# Patient Record
Sex: MALE | Race: BLACK OR AFRICAN AMERICAN | Employment: PART TIME | URBAN - METROPOLITAN AREA
[De-identification: names, ages, dates, MRNs, and addresses within clinical notes are randomized per-mention and may not be internally consistent; named-entity substitution may affect disease eponyms.]

---

## 2024-08-14 ENCOUNTER — APPOINTMENT (OUTPATIENT)
Dept: GENERAL RADIOLOGY | Age: 64
DRG: 176 | End: 2024-08-14
Payer: MEDICAID

## 2024-08-14 ENCOUNTER — HOSPITAL ENCOUNTER (INPATIENT)
Age: 64
LOS: 2 days | Discharge: HOME | DRG: 176 | End: 2024-08-16
Attending: EMERGENCY MEDICINE | Admitting: FAMILY MEDICINE
Payer: MEDICAID

## 2024-08-14 ENCOUNTER — ANCILLARY PROCEDURE (OUTPATIENT)
Dept: EMERGENCY DEPT | Age: 64
DRG: 176 | End: 2024-08-14
Attending: EMERGENCY MEDICINE
Payer: MEDICAID

## 2024-08-14 ENCOUNTER — APPOINTMENT (OUTPATIENT)
Dept: CT IMAGING | Age: 64
DRG: 176 | End: 2024-08-14
Payer: MEDICAID

## 2024-08-14 DIAGNOSIS — I26.99 ACUTE PULMONARY EMBOLISM WITHOUT ACUTE COR PULMONALE, UNSPECIFIED PULMONARY EMBOLISM TYPE (HCC): Primary | ICD-10-CM

## 2024-08-14 LAB
ANION GAP SERPL CALCULATED.3IONS-SCNC: 11 MMOL/L (ref 3–16)
ANTI-XA UNFRAC HEPARIN: <0.1 IU/ML (ref 0.3–0.7)
APTT BLD: 32.9 SEC (ref 22.1–36.4)
BASOPHILS # BLD: 0 K/UL (ref 0–0.2)
BASOPHILS NFR BLD: 0.5 %
BUN SERPL-MCNC: 6 MG/DL (ref 7–20)
CALCIUM SERPL-MCNC: 9.3 MG/DL (ref 8.3–10.6)
CHLORIDE SERPL-SCNC: 97 MMOL/L (ref 99–110)
CO2 SERPL-SCNC: 27 MMOL/L (ref 21–32)
CREAT SERPL-MCNC: 0.7 MG/DL (ref 0.8–1.3)
D-DIMER QUANTITATIVE: 7.09 UG/ML FEU (ref 0–0.6)
DEPRECATED RDW RBC AUTO: 11.8 % (ref 12.4–15.4)
EKG ATRIAL RATE: 88 BPM
EKG DIAGNOSIS: NORMAL
EKG P AXIS: 78 DEGREES
EKG P-R INTERVAL: 138 MS
EKG Q-T INTERVAL: 402 MS
EKG QRS DURATION: 90 MS
EKG QTC CALCULATION (BAZETT): 486 MS
EKG R AXIS: -30 DEGREES
EKG T AXIS: 20 DEGREES
EKG VENTRICULAR RATE: 88 BPM
EOSINOPHIL # BLD: 0 K/UL (ref 0–0.6)
EOSINOPHIL NFR BLD: 0.2 %
GFR SERPLBLD CREATININE-BSD FMLA CKD-EPI: >90 ML/MIN/{1.73_M2}
GLUCOSE SERPL-MCNC: 98 MG/DL (ref 70–99)
HCT VFR BLD AUTO: 43.2 % (ref 40.5–52.5)
HGB BLD-MCNC: 14.2 G/DL (ref 13.5–17.5)
LACTATE BLDV-SCNC: 1.6 MMOL/L (ref 0.4–1.9)
LACTATE BLDV-SCNC: 2 MMOL/L (ref 0.4–1.9)
LYMPHOCYTES # BLD: 1.4 K/UL (ref 1–5.1)
LYMPHOCYTES NFR BLD: 16.8 %
MCH RBC QN AUTO: 30.6 PG (ref 26–34)
MCHC RBC AUTO-ENTMCNC: 32.8 G/DL (ref 31–36)
MCV RBC AUTO: 93.2 FL (ref 80–100)
MONOCYTES # BLD: 1.2 K/UL (ref 0–1.3)
MONOCYTES NFR BLD: 14.5 %
NEUTROPHILS # BLD: 5.7 K/UL (ref 1.7–7.7)
NEUTROPHILS NFR BLD: 68 %
NT-PROBNP SERPL-MCNC: 1170 PG/ML (ref 0–124)
PLATELET # BLD AUTO: 211 K/UL (ref 135–450)
PMV BLD AUTO: 8 FL (ref 5–10.5)
POTASSIUM SERPL-SCNC: 3.6 MMOL/L (ref 3.5–5.1)
RBC # BLD AUTO: 4.63 M/UL (ref 4.2–5.9)
SODIUM SERPL-SCNC: 135 MMOL/L (ref 136–145)
TROPONIN, HIGH SENSITIVITY: 24 NG/L (ref 0–22)
TROPONIN, HIGH SENSITIVITY: 25 NG/L (ref 0–22)
TROPONIN, HIGH SENSITIVITY: 29 NG/L (ref 0–22)
WBC # BLD AUTO: 8.4 K/UL (ref 4–11)

## 2024-08-14 PROCEDURE — 71260 CT THORAX DX C+: CPT

## 2024-08-14 PROCEDURE — 84484 ASSAY OF TROPONIN QUANT: CPT

## 2024-08-14 PROCEDURE — 6360000004 HC RX CONTRAST MEDICATION: Performed by: EMERGENCY MEDICINE

## 2024-08-14 PROCEDURE — 80048 BASIC METABOLIC PNL TOTAL CA: CPT

## 2024-08-14 PROCEDURE — 94761 N-INVAS EAR/PLS OXIMETRY MLT: CPT

## 2024-08-14 PROCEDURE — 2060000000 HC ICU INTERMEDIATE R&B

## 2024-08-14 PROCEDURE — 2580000003 HC RX 258: Performed by: FAMILY MEDICINE

## 2024-08-14 PROCEDURE — 83880 ASSAY OF NATRIURETIC PEPTIDE: CPT

## 2024-08-14 PROCEDURE — 36415 COLL VENOUS BLD VENIPUNCTURE: CPT

## 2024-08-14 PROCEDURE — 85520 HEPARIN ASSAY: CPT

## 2024-08-14 PROCEDURE — 71046 X-RAY EXAM CHEST 2 VIEWS: CPT

## 2024-08-14 PROCEDURE — 85025 COMPLETE CBC W/AUTO DIFF WBC: CPT

## 2024-08-14 PROCEDURE — 85730 THROMBOPLASTIN TIME PARTIAL: CPT

## 2024-08-14 PROCEDURE — 93005 ELECTROCARDIOGRAM TRACING: CPT | Performed by: EMERGENCY MEDICINE

## 2024-08-14 PROCEDURE — 70450 CT HEAD/BRAIN W/O DYE: CPT

## 2024-08-14 PROCEDURE — 93308 TTE F-UP OR LMTD: CPT

## 2024-08-14 PROCEDURE — 99285 EMERGENCY DEPT VISIT HI MDM: CPT

## 2024-08-14 PROCEDURE — 6360000002 HC RX W HCPCS: Performed by: EMERGENCY MEDICINE

## 2024-08-14 PROCEDURE — 85379 FIBRIN DEGRADATION QUANT: CPT

## 2024-08-14 PROCEDURE — 83605 ASSAY OF LACTIC ACID: CPT

## 2024-08-14 RX ORDER — HEPARIN SODIUM 10000 [USP'U]/100ML
5-30 INJECTION, SOLUTION INTRAVENOUS CONTINUOUS
Status: DISCONTINUED | OUTPATIENT
Start: 2024-08-14 | End: 2024-08-16

## 2024-08-14 RX ORDER — POTASSIUM CHLORIDE 20 MEQ/1
40 TABLET, EXTENDED RELEASE ORAL PRN
Status: DISCONTINUED | OUTPATIENT
Start: 2024-08-14 | End: 2024-08-15

## 2024-08-14 RX ORDER — HEPARIN SODIUM 1000 [USP'U]/ML
80 INJECTION, SOLUTION INTRAVENOUS; SUBCUTANEOUS PRN
Status: DISCONTINUED | OUTPATIENT
Start: 2024-08-14 | End: 2024-08-16

## 2024-08-14 RX ORDER — HEPARIN SODIUM 1000 [USP'U]/ML
80 INJECTION, SOLUTION INTRAVENOUS; SUBCUTANEOUS ONCE
Status: COMPLETED | OUTPATIENT
Start: 2024-08-14 | End: 2024-08-14

## 2024-08-14 RX ORDER — ACETAMINOPHEN 325 MG/1
650 TABLET ORAL EVERY 6 HOURS PRN
Status: DISCONTINUED | OUTPATIENT
Start: 2024-08-14 | End: 2024-08-16 | Stop reason: HOSPADM

## 2024-08-14 RX ORDER — SODIUM CHLORIDE 0.9 % (FLUSH) 0.9 %
5-40 SYRINGE (ML) INJECTION EVERY 12 HOURS SCHEDULED
Status: DISCONTINUED | OUTPATIENT
Start: 2024-08-14 | End: 2024-08-16 | Stop reason: HOSPADM

## 2024-08-14 RX ORDER — HEPARIN SODIUM 1000 [USP'U]/ML
80 INJECTION, SOLUTION INTRAVENOUS; SUBCUTANEOUS PRN
Status: CANCELLED | OUTPATIENT
Start: 2024-08-14

## 2024-08-14 RX ORDER — ONDANSETRON 2 MG/ML
4 INJECTION INTRAMUSCULAR; INTRAVENOUS EVERY 6 HOURS PRN
Status: DISCONTINUED | OUTPATIENT
Start: 2024-08-14 | End: 2024-08-16 | Stop reason: HOSPADM

## 2024-08-14 RX ORDER — MAGNESIUM SULFATE IN WATER 40 MG/ML
2000 INJECTION, SOLUTION INTRAVENOUS PRN
Status: DISCONTINUED | OUTPATIENT
Start: 2024-08-14 | End: 2024-08-16 | Stop reason: HOSPADM

## 2024-08-14 RX ORDER — POLYETHYLENE GLYCOL 3350 17 G/17G
17 POWDER, FOR SOLUTION ORAL DAILY PRN
Status: DISCONTINUED | OUTPATIENT
Start: 2024-08-14 | End: 2024-08-16 | Stop reason: HOSPADM

## 2024-08-14 RX ORDER — HEPARIN SODIUM 1000 [USP'U]/ML
40 INJECTION, SOLUTION INTRAVENOUS; SUBCUTANEOUS PRN
Status: CANCELLED | OUTPATIENT
Start: 2024-08-14

## 2024-08-14 RX ORDER — ACETAMINOPHEN 650 MG/1
650 SUPPOSITORY RECTAL EVERY 6 HOURS PRN
Status: DISCONTINUED | OUTPATIENT
Start: 2024-08-14 | End: 2024-08-16 | Stop reason: HOSPADM

## 2024-08-14 RX ORDER — SODIUM CHLORIDE 9 MG/ML
INJECTION, SOLUTION INTRAVENOUS PRN
Status: DISCONTINUED | OUTPATIENT
Start: 2024-08-14 | End: 2024-08-16 | Stop reason: HOSPADM

## 2024-08-14 RX ORDER — HEPARIN SODIUM 1000 [USP'U]/ML
80 INJECTION, SOLUTION INTRAVENOUS; SUBCUTANEOUS ONCE
Status: CANCELLED | OUTPATIENT
Start: 2024-08-14 | End: 2024-08-14

## 2024-08-14 RX ORDER — POTASSIUM CHLORIDE 7.45 MG/ML
10 INJECTION INTRAVENOUS PRN
Status: DISCONTINUED | OUTPATIENT
Start: 2024-08-14 | End: 2024-08-15

## 2024-08-14 RX ORDER — SODIUM CHLORIDE 0.9 % (FLUSH) 0.9 %
5-40 SYRINGE (ML) INJECTION PRN
Status: DISCONTINUED | OUTPATIENT
Start: 2024-08-14 | End: 2024-08-16 | Stop reason: HOSPADM

## 2024-08-14 RX ORDER — HEPARIN SODIUM 10000 [USP'U]/100ML
5-30 INJECTION, SOLUTION INTRAVENOUS CONTINUOUS
Status: CANCELLED | OUTPATIENT
Start: 2024-08-14

## 2024-08-14 RX ORDER — PROMETHAZINE HYDROCHLORIDE 25 MG/1
12.5 TABLET ORAL EVERY 6 HOURS PRN
Status: DISCONTINUED | OUTPATIENT
Start: 2024-08-14 | End: 2024-08-16 | Stop reason: HOSPADM

## 2024-08-14 RX ORDER — HEPARIN SODIUM 1000 [USP'U]/ML
40 INJECTION, SOLUTION INTRAVENOUS; SUBCUTANEOUS PRN
Status: DISCONTINUED | OUTPATIENT
Start: 2024-08-14 | End: 2024-08-16

## 2024-08-14 RX ADMIN — HEPARIN SODIUM 18 UNITS/KG/HR: 10000 INJECTION, SOLUTION INTRAVENOUS at 20:39

## 2024-08-14 RX ADMIN — SODIUM CHLORIDE, PRESERVATIVE FREE 10 ML: 5 INJECTION INTRAVENOUS at 21:33

## 2024-08-14 RX ADMIN — IOPAMIDOL 75 ML: 755 INJECTION, SOLUTION INTRAVENOUS at 17:30

## 2024-08-14 RX ADMIN — HEPARIN SODIUM 6300 UNITS: 1000 INJECTION INTRAVENOUS; SUBCUTANEOUS at 20:36

## 2024-08-14 ASSESSMENT — PAIN - FUNCTIONAL ASSESSMENT: PAIN_FUNCTIONAL_ASSESSMENT: NONE - DENIES PAIN

## 2024-08-14 NOTE — ED NOTES
Heparin Bolus and Heparin drip being held until stat head CT is performed to rule out bleed per Dr. Morton. Pt resting comfortably in room with call light in reach.      Henrik Mattson, TERESO  08/14/24 1947

## 2024-08-14 NOTE — H&P
Hospital Medicine History & Physical      Date of Admission: 8/14/2024    Date of Service:  Pt seen/examined on 8/14/2024    [x]Admitted to Inpatient with expected LOS greater than two midnights due to medical therapy.  []Placed in Observation status.    Chief Admission Complaint: Chest pain syncope    Presenting Admission History:      63 y.o. male who presented to [facility] with bilateral PE with heart strain.  PMHx significant for no significant past medical history.    States symptoms started Monday stated    He got out felt like he was going to pass out fell and hit the wall hit his head off the side of the wall, after that stated he has been having chest pain sharp stabbing especially on the right.  Reports shortness of breath especially on exertion.  Does report left lower extremity swelling/pain, states it was worse about a year ago.  Denies any abdominal pain abdominal distention nausea or vomiting    In the ER  Tachycardic elevated respiratory rate  Troponin 25, repeat 24  BNP 1170  D-dimer 7.09    Sodium 135    CT chest PE shows Bilateral pulmonary emboli./Small enhancing lesion right lobe of the liver.          Assessment/Plan:      Current Principal Problem:  Bilateral pulmonary embolism (HCC)    Bilateral PE  -Heparin gtt  -Was in room with interventional radiologist discussed procedure along with ER physician, patient was made aware of risks and benefits of the procedure by interventional radiologist me and the ER physician, patient refused procedure  -Monitor troponins BNP  -Echo    Fall/syncope  -CT head  -CTA head neck    Liver lesion  -MRI abdomen  -Monitor LFTs    Discussed management of the case in detail w/ the Emergency Department Provider:     CXR: I have reviewed the CXR with the following interpretation: No acute disease.   EKG:  I have reviewed the EKG with the following interpretation: T depressions T wave inversion    Physical Exam Performed:      /70   Pulse 90   Temp 98.1

## 2024-08-14 NOTE — PROGRESS NOTES
IR was asked to review this patient who has had prior episodes of syncope and now presented to the ED. He is a 64 yo male who was found to have bilateral PE and a small saddle PE. His RV/LV ratio is 1.1. He has mildly elevated troponin, lactate and BNP. He is not on oxygen and is currently hemodynamically stable. Based on the above criteria he is intermediate-high risk based on ESC 2019 criteria. I believe he would benefit from intervention given these findings. I discussed this with the ED physician and the patient via phone. I answered all of the patient's questions. After the discussion the patient has decided against intervention at this time. Please do not hesitate to call IR with any questions or if patient worsens overnight.     Gamal Wilks MD  7:45 PM  8/14/2024

## 2024-08-14 NOTE — ED PROVIDER NOTES
THE [facility]  EMERGENCY DEPARTMENT ENCOUNTER          ATTENDING PHYSICIAN NOTE       Date of evaluation: 8/14/2024    Chief Complaint     Foot Pain (Bilateral foot pain. No known injury.) and Rib Pain (injury) (Right rib pain. No known injury. Rates pain currently as a zero. )      History of Present Illness     Natan Lynch is a 63 y.o. male who presents to the emergency room today complaining of 1 week of a pleuritic pain in his right chest wall states that it feels like a catch when he takes a deep breath.  He has intermittently felt very dizzy/lightheaded and actually had an episode today where he fell forward after standing up to go to the bathroom.  Patient reports also that he is been dealing with some issues with his left foot off and on for the past year notes that a few weeks ago it was swollen up like a sausage but seems to be better now.    Review of Systems     Review of Systems  All systems were reviewed with the patient/family and negative except as otherwise documented in the HPI.      Past Medical, Surgical, Family, and Social History     He has no past medical history on file.  He has no past surgical history on file.  His family history is not on file.  He     Medications     Previous Medications    No medications on file       Allergies     He has No Known Allergies.    Physical Exam     INITIAL VITALS: BP: 108/72, Temp: 98.1 °F (36.7 °C), Pulse: 100, Respirations: 15, SpO2: 98 %   Physical Exam  Constitutional: Well-nourished appearing middle-aged male lying on the stretcher comfortable and in no acute distress  Eyes:  Sclera anicteric.  HEENT:  Normocephalic, oropharynx moist.   Neck: normal range of motion, supple.  Respiratory:  Even and non-labored, no distress, clear to auscultation bilaterally  Cardiovascular:  Extremities warm, well perfused, no audible murmurs rubs or gallop  GI:  Soft, nondistended   Musculoskeletal:  No edema, no deformities.   Skin:  No rash, no lesions, no

## 2024-08-15 ENCOUNTER — APPOINTMENT (OUTPATIENT)
Dept: VASCULAR LAB | Age: 64
DRG: 176 | End: 2024-08-15
Attending: STUDENT IN AN ORGANIZED HEALTH CARE EDUCATION/TRAINING PROGRAM
Payer: MEDICAID

## 2024-08-15 LAB
ALBUMIN SERPL-MCNC: 3.5 G/DL (ref 3.4–5)
ALBUMIN/GLOB SERPL: 1.1 {RATIO} (ref 1.1–2.2)
ALP SERPL-CCNC: 63 U/L (ref 40–129)
ALT SERPL-CCNC: 15 U/L (ref 10–40)
ANION GAP SERPL CALCULATED.3IONS-SCNC: 8 MMOL/L (ref 3–16)
ANTI-XA UNFRAC HEPARIN: 0.41 IU/ML (ref 0.3–0.7)
ANTI-XA UNFRAC HEPARIN: 0.49 IU/ML (ref 0.3–0.7)
AST SERPL-CCNC: 25 U/L (ref 15–37)
BASOPHILS # BLD: 0 K/UL (ref 0–0.2)
BASOPHILS NFR BLD: 0.5 %
BILIRUB SERPL-MCNC: 2.3 MG/DL (ref 0–1)
BUN SERPL-MCNC: 7 MG/DL (ref 7–20)
CALCIUM SERPL-MCNC: 8.8 MG/DL (ref 8.3–10.6)
CHLORIDE SERPL-SCNC: 99 MMOL/L (ref 99–110)
CO2 SERPL-SCNC: 28 MMOL/L (ref 21–32)
CREAT SERPL-MCNC: 0.7 MG/DL (ref 0.8–1.3)
DEPRECATED RDW RBC AUTO: 11.6 % (ref 12.4–15.4)
ECHO BSA: 2.07 M2
EKG ATRIAL RATE: 75 BPM
EKG DIAGNOSIS: NORMAL
EKG P AXIS: 77 DEGREES
EKG P-R INTERVAL: 144 MS
EKG Q-T INTERVAL: 432 MS
EKG QRS DURATION: 94 MS
EKG QTC CALCULATION (BAZETT): 482 MS
EKG R AXIS: -1 DEGREES
EKG T AXIS: 47 DEGREES
EKG VENTRICULAR RATE: 75 BPM
EOSINOPHIL # BLD: 0 K/UL (ref 0–0.6)
EOSINOPHIL NFR BLD: 0.3 %
GFR SERPLBLD CREATININE-BSD FMLA CKD-EPI: >90 ML/MIN/{1.73_M2}
GLUCOSE SERPL-MCNC: 105 MG/DL (ref 70–99)
HCT VFR BLD AUTO: 38.4 % (ref 40.5–52.5)
HGB BLD-MCNC: 12.6 G/DL (ref 13.5–17.5)
LYMPHOCYTES # BLD: 1.6 K/UL (ref 1–5.1)
LYMPHOCYTES NFR BLD: 18.4 %
MCH RBC QN AUTO: 30.4 PG (ref 26–34)
MCHC RBC AUTO-ENTMCNC: 32.7 G/DL (ref 31–36)
MCV RBC AUTO: 92.8 FL (ref 80–100)
MONOCYTES # BLD: 1.1 K/UL (ref 0–1.3)
MONOCYTES NFR BLD: 12.7 %
NEUTROPHILS # BLD: 6.1 K/UL (ref 1.7–7.7)
NEUTROPHILS NFR BLD: 68.1 %
NT-PROBNP SERPL-MCNC: 892 PG/ML (ref 0–124)
PLATELET # BLD AUTO: 209 K/UL (ref 135–450)
PMV BLD AUTO: 8.2 FL (ref 5–10.5)
POTASSIUM SERPL-SCNC: 3.6 MMOL/L (ref 3.5–5.1)
PROT SERPL-MCNC: 6.7 G/DL (ref 6.4–8.2)
RBC # BLD AUTO: 4.14 M/UL (ref 4.2–5.9)
SODIUM SERPL-SCNC: 135 MMOL/L (ref 136–145)
TROPONIN, HIGH SENSITIVITY: 23 NG/L (ref 0–22)
WBC # BLD AUTO: 8.9 K/UL (ref 4–11)

## 2024-08-15 PROCEDURE — 85520 HEPARIN ASSAY: CPT

## 2024-08-15 PROCEDURE — 2580000003 HC RX 258: Performed by: STUDENT IN AN ORGANIZED HEALTH CARE EDUCATION/TRAINING PROGRAM

## 2024-08-15 PROCEDURE — 2580000003 HC RX 258: Performed by: FAMILY MEDICINE

## 2024-08-15 PROCEDURE — 99254 IP/OBS CNSLTJ NEW/EST MOD 60: CPT | Performed by: INTERNAL MEDICINE

## 2024-08-15 PROCEDURE — 93005 ELECTROCARDIOGRAM TRACING: CPT | Performed by: FAMILY MEDICINE

## 2024-08-15 PROCEDURE — 2060000000 HC ICU INTERMEDIATE R&B

## 2024-08-15 PROCEDURE — 93970 EXTREMITY STUDY: CPT | Performed by: SURGERY

## 2024-08-15 PROCEDURE — 6360000002 HC RX W HCPCS: Performed by: EMERGENCY MEDICINE

## 2024-08-15 PROCEDURE — 36415 COLL VENOUS BLD VENIPUNCTURE: CPT

## 2024-08-15 PROCEDURE — 80053 COMPREHEN METABOLIC PANEL: CPT

## 2024-08-15 PROCEDURE — 84484 ASSAY OF TROPONIN QUANT: CPT

## 2024-08-15 PROCEDURE — 6370000000 HC RX 637 (ALT 250 FOR IP): Performed by: STUDENT IN AN ORGANIZED HEALTH CARE EDUCATION/TRAINING PROGRAM

## 2024-08-15 PROCEDURE — 85025 COMPLETE CBC W/AUTO DIFF WBC: CPT

## 2024-08-15 PROCEDURE — 93970 EXTREMITY STUDY: CPT

## 2024-08-15 PROCEDURE — 93010 ELECTROCARDIOGRAM REPORT: CPT | Performed by: INTERNAL MEDICINE

## 2024-08-15 PROCEDURE — 83880 ASSAY OF NATRIURETIC PEPTIDE: CPT

## 2024-08-15 RX ORDER — POTASSIUM CHLORIDE 20 MEQ/1
40 TABLET, EXTENDED RELEASE ORAL ONCE
Status: COMPLETED | OUTPATIENT
Start: 2024-08-15 | End: 2024-08-15

## 2024-08-15 RX ORDER — SODIUM CHLORIDE 9 MG/ML
INJECTION, SOLUTION INTRAVENOUS CONTINUOUS
Status: DISCONTINUED | OUTPATIENT
Start: 2024-08-15 | End: 2024-08-16

## 2024-08-15 RX ADMIN — HEPARIN SODIUM 18 UNITS/KG/HR: 10000 INJECTION, SOLUTION INTRAVENOUS at 12:36

## 2024-08-15 RX ADMIN — SODIUM CHLORIDE: 9 INJECTION, SOLUTION INTRAVENOUS at 10:11

## 2024-08-15 RX ADMIN — SODIUM CHLORIDE 950 ML: 9 INJECTION, SOLUTION INTRAVENOUS at 20:50

## 2024-08-15 RX ADMIN — SODIUM CHLORIDE, PRESERVATIVE FREE 10 ML: 5 INJECTION INTRAVENOUS at 08:42

## 2024-08-15 RX ADMIN — POTASSIUM CHLORIDE 40 MEQ: 1500 TABLET, EXTENDED RELEASE ORAL at 10:11

## 2024-08-15 NOTE — PROGRESS NOTES
Pt arrived to RM 4301 from ED via stretcher. VSS. Pt educated on plan of care and safety precautions such as calling RN prior to getting out of bed. Tele monitor placed, gripper socks placed, gown and pants placed per patient request. Call light within reach.    RN attempted to complete MRI screening but patient states he does not want an MRI because \"they just did a scan of my abdomen and head and I don't have the money for all of these tests.\" Patient educated on differences in scans and medical necessity.   MD notified.    Electronically signed by Zhane Martinez RN on 8/14/2024 at 10:01 PM

## 2024-08-15 NOTE — PROGRESS NOTES
Spoke with patient's nurse and patient is still refusing MRI. Scan will be canceled at this time. If the patient decides to go forward with scan, please reorder.

## 2024-08-15 NOTE — CONSULTS
IR CONSULT NOTE:    Patient seen at bedside by myself and Dr Escudero to discuss options for treatment of PE and DVT. Thrombectomy and EKOS were explained in detail. Patient states that he declines all intervention at this time and is not interested in further discussion. IR will now sign off. Please do not hesitate to reach out with any additional questions or concerns.     Puja Pyle, APRN - CNP  [phone]

## 2024-08-15 NOTE — PROGRESS NOTES
Hospital Medicine Progress Note      Date of Admission: 8/14/2024  Hospital Day: 2    Chief Admission Complaint: Chest pain, syncope     Subjective:  Patient was seen and evaluated at bedside.  He reports having right-sided pleuritic chest pain on deep breathing.  No events overnight.    Presenting Admission History:       63-year-old gentleman with no reported past medical history who presented to the hospital after sustaining a fall and trauma to the head.  Reported having episodes of sharp chest pain on the right side ongoing for 2 days prior to presentation.  Reported dyspnea on exertion.  Also reported left lower extremity swelling and pain which is worse from about a year ago.    In the ER patient was tachypneic and tachycardic.  High-sensitivity troponin 25, 24.  BNP 1170.  D-dimer 7.09.  EKG showed T wave inversions from V1-V3.  CT angiogram for PE showed bilateral PE and a small enhancing lesion of the right lobe of the liver.  Interventional radiology was consulted and after discussion with the patient patient declined intervention.  Was started on heparin and admitted to our service.    Assessment/Plan:      Current Principal Problem:  Bilateral pulmonary embolism (HCC)    Bilateral saddle PE with concerns of right heart strain  Syncope/EKG changes  Right liver lesion  Cigarette smoker    Plan:  *CT angiogram for PE concerning for bilateral PE/right heart strain?  Point-of-care ultrasound in the ER negative  IR consulted in the ER, patient declined intervention  Anticoagulation with heparin by weight  Check lower extremity Dopplers  Check echo  IV fluids  As needed pain control  Cardiology consulted on admission for PE with right heart strain    *Presented with syncope likely secondary from PE  Trend troponins  EKG shows T wave inversions in V1-V3  Cardiology consult?  Echo  IV fluids  BNP 1170  Orthostatic vitals    *Incidental finding of right liver lesion  MRI abdomen ordered, patient

## 2024-08-15 NOTE — PROGRESS NOTES
4 Eyes Skin Assessment     NAME:  Natan Lynch  YOB: 1960  MEDICAL RECORD NUMBER:  [id number]    The patient is being assessed for  Admission    I agree that at least one RN has performed a thorough Head to Toe Skin Assessment on the patient. ALL assessment sites listed below have been assessed.      Areas assessed by both nurses:    Head, Face, Ears, Shoulders, Back, Chest, Arms, Elbows, Hands, Sacrum. Buttock, Coccyx, Ischium, Legs. Feet and Heels, and Under Medical Devices         Does the Patient have a Wound? No noted wound(s)    Abrasion upper back  Plantar wart sole of right foot       Antelmo Prevention initiated by RN: No  Wound Care Orders initiated by RN: No    Pressure Injury (Stage 3,4, Unstageable, DTI, NWPT, and Complex wounds) if present, place Wound referral order by RN under : No    New Ostomies, if present place, Ostomy referral order under : No     Nurse 1 eSignature: Electronically signed by Zhane Martinez RN on 8/14/24 at 9:49 PM EDT    Nurse 2 eSignature: Electronically signed by Darren Mazariegos RN on 8/14/24 at 9:39 PM EDT

## 2024-08-15 NOTE — ED NOTES
ED TO INPATIENT SBAR HANDOFF    Patient Name: Natan Lynch   :  1960  63 y.o.   MRN:  [id number]  Preferred Name    ED Room #:  B22/B22-22  Family/Caregiver Present no   Restraints no   Sitter no   Sepsis Risk Score      Situation  Code Status: Full Code No additional code details.    Allergies: Patient has no known allergies.  Weight: Patient Vitals for the past 96 hrs (Last 3 readings):   Weight   24 1912 78.5 kg (173 lb 1 oz)     Arrived from: home  Chief Complaint:   Chief Complaint   Patient presents with    Foot Pain     Bilateral foot pain. No known injury.    Rib Pain (injury)     Right rib pain. No known injury. Rates pain currently as a zero.      Hospital Problem/Diagnosis:  Principal Problem:    Bilateral pulmonary embolism (HCC)  Resolved Problems:    * No resolved hospital problems. *    Imaging:   CT HEAD WO CONTRAST   Final Result   No acute intracranial hemorrhage      Electronically signed by Rosa Pinto      POC US ECHOCARDIO [employer]STHOR[employer] LTD   Final Result      CT CHEST PULMONARY EMBOLISM W CONTRAST   Final Result      Bilateral pulmonary emboli.   Small enhancing lesion right lobe of the liver.      Findings were relayed to the emergency room and spoke with Dr. Jackson 5:59 PM 2024      Electronically signed by Rosa Pinto      XR CHEST (2 VW)   Final Result      No acute disease.         Electronically signed by Fish Rivera      CTA HEAD NECK W CONTRAST    (Results Pending)     Abnormal labs:   Abnormal Labs Reviewed   BASIC METABOLIC PANEL W/ REFLEX TO MG FOR LOW K - Abnormal; Notable for the following components:       Result Value    Sodium 135 (*)     Chloride 97 (*)     BUN 6 (*)     Creatinine 0.7 (*)     All other components within normal limits   CBC WITH AUTO DIFFERENTIAL - Abnormal; Notable for the following components:    RDW 11.8 (*)     All other components within normal limits   TROPONIN - Abnormal; Notable for the following components:

## 2024-08-15 NOTE — CARE COORDINATION
Case Management Assessment  Initial Evaluation    Date/Time of Evaluation: 8/15/2024 11:34 AM  Assessment Completed by: Lore Ackerman    If patient is discharged prior to next notation, then this note serves as note for discharge by case management.    Patient Name: Natan Lynch                   YOB: 1960  Diagnosis: Bilateral pulmonary embolism (HCC) [I26.99]  Acute pulmonary embolism without acute cor pulmonale, unspecified pulmonary embolism type (HCC) [I26.99]                   Date / Time: 8/14/2024  3:44 PM    Patient Admission Status: Inpatient   Readmission Risk (Low < 19, Mod (19-27), High > 27): Readmission Risk Score: 6    Current PCP: No primary care provider on file.  PCP verified by CM? No    Chart Reviewed: Yes      History Provided by: Patient  Patient Orientation: Alert and Oriented    Patient Cognition: Alert    Hospitalization in the last 30 days (Readmission):  No    If yes, Readmission Assessment in CM Navigator will be completed.    Advance Directives:      Code Status: Full Code   Patient's Primary Decision Maker is: Legal Next of Kin      Discharge Planning:    Patient lives with: Alone Type of Home: Apartment  Primary Care Giver: Self  Patient Support Systems include: Family Members   Current Financial resources: Other (Comment) (Medicaid pending)  Current community resources: None  Current services prior to admission: None            Current DME:              Type of Home Care services:  None    ADLS  Prior functional level: Independent in ADLs/IADLs  Current functional level: Independent in ADLs/IADLs    PT AM-PAC:   /24  OT AM-PAC:   /24    Family can provide assistance at DC: Yes  Would you like Case Management to discuss the discharge plan with any other family members/significant others, and if so, who? No  Plans to Return to Present Housing: Yes  Other Identified Issues/Barriers to RETURNING to current housing: None  Potential Assistance needed at discharge: N/A

## 2024-08-15 NOTE — PLAN OF CARE
Problem: Discharge Planning  Goal: Discharge to home or other facility with appropriate resources  Outcome: Progressing  Flowsheets (Taken 8/15/2024 1847)  Discharge to home or other facility with appropriate resources:   Identify barriers to discharge with patient and caregiver   Arrange for needed discharge resources and transportation as appropriate   Identify discharge learning needs (meds, wound care, etc)   Arrange for interpreters to assist at discharge as needed   Refer to discharge planning if patient needs post-hospital services based on physician order or complex needs related to functional status, cognitive ability or social support system     Problem: Safety - Adult  Goal: Free from fall injury  Outcome: Progressing  Flowsheets (Taken 8/15/2024 1847)  Free From Fall Injury:   Instruct family/caregiver on patient safety   Based on caregiver fall risk screen, instruct family/caregiver to ask for assistance with transferring infant if caregiver noted to have fall risk factors     Problem: ABCDS Injury Assessment  Goal: Absence of physical injury  Outcome: Progressing  Flowsheets (Taken 8/15/2024 1847)  Absence of Physical Injury: Implement safety measures based on patient assessment

## 2024-08-15 NOTE — CONSULTS
[facility]  Cardiology Inpatient Consult Service                                                                                          Pt Name: Natan Lynch  Age: 63 y.o.  Sex: male  : 1960  Location: [facility]    Referring Physician: Rome Rosa MD  Primary cardiologist :     Reason for Consult:     Reason for Consultation/Chief Complaint: Chest pain    HPI:      Natan Lynch is a 63 y.o. male presenting with right-sided pleuritic chest pain associate with shortness of breath for the last 3 to 4 days.  Had dizziness.  States that he hit the wall.  No recent travel.  States that he might of lost approximately 20 pounds in weight.  Previously was 190 pounds.  Current weight is 170 pounds.  Currently continues to have chest pain on taking a deep breath.  No hemoptysis.  No recent surgery.  No family history of thrombophilia.  Denies any GI bleed.  No abdominal pain.      Histories     No significant past medical history, social history, family history.  Social History:   reports that he has been smoking cigarettes. He has a 22.5 pack-year smoking history. He has been exposed to tobacco smoke. He has never used smokeless tobacco. He reports that he does not currently use drugs.         Medications:       Home Medications  Were reviewed and are listed in nursing record. and/or listed below  Prior to Admission medications    Not on File          Inpatient Medications:   sodium chloride flush  5-40 mL IntraVENous 2 times per day       IV drips:   sodium chloride 100 mL/hr at 08/15/24 1011    heparin (PORCINE) Infusion 18 Units/kg/hr (08/15/24 1236)    sodium chloride         PRN:  heparin (porcine), heparin (porcine), sodium chloride flush, sodium chloride, magnesium sulfate, polyethylene glycol, acetaminophen **OR** acetaminophen, promethazine **OR** ondansetron    Allergy:     Patient has no known allergies.       Review of Systems:     As per HPI.  Rest of 10  needs evaluation for any occult malignancy.  Currently, patient is hemodynamically stable.  See no indication for emergent EKOS.  Will check echocardiogram.  Continue heparin.  Possible switch to Eliquis or Coumadin.  Marginal increase in troponin and BNP secondary to pulmonary embolism.  Will discuss with hospitalist team.  Cardiology will follow along.      Thank you for allowing to us to participate in the care or Natan Lynch. Further evaluation will be based upon the patient's clinical course and testing results.    I have spent 55 minutes of face to face time with the patient with more than 50% spent counseling and coordinating care.     All questions and concerns were addressed to the patient/family. Alternatives to my treatment were discussed. The note was completed using EMR. Every effort was made to ensure accuracy; however, inadvertent computerized transcription errors may be present. I have personally reviewed the reports and images of labs, radiological studies, cardiac studies including ECG's and telemetry, current and old medical records.     Electronically signed by Phu Conrad MD on 8/15/24 at 12:49 PM EDT

## 2024-08-15 NOTE — ED NOTES
CT informed RN they recommend waiting six hours after previous scan for STAT head CT.  Reached out to inpatient doctor to inform them of this and ask plan of care.  MD responded with \"K.\"    CT now taking patient to scan, stating they spoke with a doctor who said it was okay for the patient to go to scan and receive additional contrast at this time.     Heparin still being held until head CT is obtained and resulted.      Asif Pruett, RN  08/14/24 2011

## 2024-08-15 NOTE — PLAN OF CARE
Problem: Discharge Planning  Goal: Discharge to home or other facility with appropriate resources  Outcome: Progressing  Flowsheets (Taken 8/15/2024 0018)  Discharge to home or other facility with appropriate resources:   Identify barriers to discharge with patient and caregiver   Arrange for needed discharge resources and transportation as appropriate   Identify discharge learning needs (meds, wound care, etc)   Arrange for interpreters to assist at discharge as needed   Refer to discharge planning if patient needs post-hospital services based on physician order or complex needs related to functional status, cognitive ability or social support system     Problem: Safety - Adult  Goal: Free from fall injury  Outcome: Progressing  Flowsheets (Taken 8/15/2024 0018)  Free From Fall Injury: Instruct family/caregiver on patient safety     Problem: ABCDS Injury Assessment  Goal: Absence of physical injury  Outcome: Progressing  Flowsheets (Taken 8/15/2024 0018)  Absence of Physical Injury: Implement safety measures based on patient assessment

## 2024-08-16 ENCOUNTER — APPOINTMENT (OUTPATIENT)
Age: 64
DRG: 176 | End: 2024-08-16
Attending: FAMILY MEDICINE
Payer: MEDICAID

## 2024-08-16 VITALS
BODY MASS INDEX: 19.36 KG/M2 | WEIGHT: 164 LBS | DIASTOLIC BLOOD PRESSURE: 61 MMHG | HEART RATE: 74 BPM | OXYGEN SATURATION: 94 % | SYSTOLIC BLOOD PRESSURE: 103 MMHG | RESPIRATION RATE: 18 BRPM | TEMPERATURE: 98.4 F | HEIGHT: 77 IN

## 2024-08-16 LAB
ALBUMIN SERPL-MCNC: 3.1 G/DL (ref 3.4–5)
ALBUMIN/GLOB SERPL: 1 {RATIO} (ref 1.1–2.2)
ALP SERPL-CCNC: 58 U/L (ref 40–129)
ALT SERPL-CCNC: 18 U/L (ref 10–40)
ANION GAP SERPL CALCULATED.3IONS-SCNC: 10 MMOL/L (ref 3–16)
AST SERPL-CCNC: 27 U/L (ref 15–37)
BASOPHILS # BLD: 0 K/UL (ref 0–0.2)
BASOPHILS NFR BLD: 0.4 %
BILIRUB SERPL-MCNC: 2 MG/DL (ref 0–1)
BUN SERPL-MCNC: 5 MG/DL (ref 7–20)
CALCIUM SERPL-MCNC: 8.5 MG/DL (ref 8.3–10.6)
CHLORIDE SERPL-SCNC: 102 MMOL/L (ref 99–110)
CO2 SERPL-SCNC: 24 MMOL/L (ref 21–32)
CREAT SERPL-MCNC: 0.7 MG/DL (ref 0.8–1.3)
DEPRECATED RDW RBC AUTO: 11.6 % (ref 12.4–15.4)
ECHO AO ROOT DIAM: 3.5 CM
ECHO AO ROOT INDEX: 1.7 CM/M2
ECHO AV AREA PEAK VELOCITY: 2.7 CM2
ECHO AV AREA VTI: 2.8 CM2
ECHO AV AREA/BSA PEAK VELOCITY: 1.3 CM2/M2
ECHO AV AREA/BSA VTI: 1.4 CM2/M2
ECHO AV MEAN GRADIENT: 2 MMHG
ECHO AV MEAN VELOCITY: 0.7 M/S
ECHO AV PEAK GRADIENT: 5 MMHG
ECHO AV PEAK VELOCITY: 1.1 M/S
ECHO AV VELOCITY RATIO: 0.82
ECHO AV VTI: 19.7 CM
ECHO BSA: 2.01 M2
ECHO LA AREA 2C: 17.3 CM2
ECHO LA AREA 4C: 15.3 CM2
ECHO LA MAJOR AXIS: 4.9 CM
ECHO LA MINOR AXIS: 4.4 CM
ECHO LA VOL BP: 48 ML (ref 18–58)
ECHO LA VOL MOD A2C: 57 ML (ref 18–58)
ECHO LA VOL MOD A4C: 36 ML (ref 18–58)
ECHO LA VOL/BSA BIPLANE: 23 ML/M2 (ref 16–34)
ECHO LA VOLUME INDEX MOD A2C: 28 ML/M2 (ref 16–34)
ECHO LA VOLUME INDEX MOD A4C: 17 ML/M2 (ref 16–34)
ECHO LV E' LATERAL VELOCITY: 13 CM/S
ECHO LV E' SEPTAL VELOCITY: 9 CM/S
ECHO LV EDV A2C: 132 ML
ECHO LV EDV A4C: 115 ML
ECHO LV EDV INDEX A4C: 56 ML/M2
ECHO LV EDV NDEX A2C: 64 ML/M2
ECHO LV EJECTION FRACTION A2C: 63 %
ECHO LV EJECTION FRACTION A4C: 59 %
ECHO LV EJECTION FRACTION BIPLANE: 62 % (ref 55–100)
ECHO LV ESV A2C: 49 ML
ECHO LV ESV A4C: 47 ML
ECHO LV ESV INDEX A2C: 24 ML/M2
ECHO LV ESV INDEX A4C: 23 ML/M2
ECHO LV FRACTIONAL SHORTENING: 29 % (ref 28–44)
ECHO LV INTERNAL DIMENSION DIASTOLE INDEX: 2.33 CM/M2
ECHO LV INTERNAL DIMENSION DIASTOLIC: 4.8 CM (ref 4.2–5.9)
ECHO LV INTERNAL DIMENSION SYSTOLIC INDEX: 1.65 CM/M2
ECHO LV INTERNAL DIMENSION SYSTOLIC: 3.4 CM
ECHO LV IVSD: 0.7 CM (ref 0.6–1)
ECHO LV MASS 2D: 126.7 G (ref 88–224)
ECHO LV MASS INDEX 2D: 61.5 G/M2 (ref 49–115)
ECHO LV POSTERIOR WALL DIASTOLIC: 0.9 CM (ref 0.6–1)
ECHO LV RELATIVE WALL THICKNESS RATIO: 0.38
ECHO LVOT AREA: 3.5 CM2
ECHO LVOT AV VTI INDEX: 0.81
ECHO LVOT DIAM: 2.1 CM
ECHO LVOT MEAN GRADIENT: 1 MMHG
ECHO LVOT PEAK GRADIENT: 3 MMHG
ECHO LVOT PEAK VELOCITY: 0.9 M/S
ECHO LVOT STROKE VOLUME INDEX: 26.9 ML/M2
ECHO LVOT SV: 55.4 ML
ECHO LVOT VTI: 16 CM
ECHO MV A VELOCITY: 0.79 M/S
ECHO MV AREA VTI: 2.1 CM2
ECHO MV E DECELERATION TIME (DT): 214 MS
ECHO MV E VELOCITY: 0.82 M/S
ECHO MV E/A RATIO: 1.04
ECHO MV E/E' LATERAL: 6.31
ECHO MV E/E' RATIO (AVERAGED): 7.71
ECHO MV E/E' SEPTAL: 9.11
ECHO MV LVOT VTI INDEX: 1.68
ECHO MV MAX VELOCITY: 0.9 M/S
ECHO MV MEAN GRADIENT: 1 MMHG
ECHO MV MEAN VELOCITY: 0.6 M/S
ECHO MV PEAK GRADIENT: 3 MMHG
ECHO MV REGURGITANT PEAK GRADIENT: 4 MMHG
ECHO MV REGURGITANT PEAK VELOCITY: 1 M/S
ECHO MV REGURGITANT VTIA: 16.9 CM
ECHO MV VTI: 26.9 CM
ECHO PULMONARY ARTERY END DIASTOLIC PRESSURE: 1 MMHG
ECHO PV MAX VELOCITY: 0.6 M/S
ECHO PV MEAN GRADIENT: 1 MMHG
ECHO PV MEAN VELOCITY: 0.4 M/S
ECHO PV PEAK GRADIENT: 1 MMHG
ECHO PV REGURGITANT MAX VELOCITY: 0.6 M/S
ECHO PV VTI: 12.2 CM
ECHO RA AREA 4C: 18.1 CM2
ECHO RA END SYSTOLIC VOLUME APICAL 4 CHAMBER INDEX BSA: 27 ML/M2
ECHO RA VOLUME: 55 ML
ECHO RV BASAL DIMENSION: 3.9 CM
ECHO RV FREE WALL PEAK S': 14 CM/S
ECHO RV MID DIMENSION: 3.3 CM
ECHO RV TAPSE: 2.5 CM (ref 1.7–?)
EOSINOPHIL # BLD: 0.1 K/UL (ref 0–0.6)
EOSINOPHIL NFR BLD: 0.7 %
GFR SERPLBLD CREATININE-BSD FMLA CKD-EPI: >90 ML/MIN/{1.73_M2}
GLUCOSE SERPL-MCNC: 96 MG/DL (ref 70–99)
HCT VFR BLD AUTO: 34.5 % (ref 40.5–52.5)
HGB BLD-MCNC: 11.4 G/DL (ref 13.5–17.5)
LYMPHOCYTES # BLD: 1.7 K/UL (ref 1–5.1)
LYMPHOCYTES NFR BLD: 23.1 %
MCH RBC QN AUTO: 30.6 PG (ref 26–34)
MCHC RBC AUTO-ENTMCNC: 33 G/DL (ref 31–36)
MCV RBC AUTO: 92.6 FL (ref 80–100)
MONOCYTES # BLD: 0.9 K/UL (ref 0–1.3)
MONOCYTES NFR BLD: 13.2 %
NEUTROPHILS # BLD: 4.5 K/UL (ref 1.7–7.7)
NEUTROPHILS NFR BLD: 62.6 %
NT-PROBNP SERPL-MCNC: 531 PG/ML (ref 0–124)
PLATELET # BLD AUTO: 249 K/UL (ref 135–450)
PMV BLD AUTO: 7.3 FL (ref 5–10.5)
POTASSIUM SERPL-SCNC: 3.7 MMOL/L (ref 3.5–5.1)
PROT SERPL-MCNC: 6.2 G/DL (ref 6.4–8.2)
RBC # BLD AUTO: 3.73 M/UL (ref 4.2–5.9)
SODIUM SERPL-SCNC: 136 MMOL/L (ref 136–145)
WBC # BLD AUTO: 7.2 K/UL (ref 4–11)

## 2024-08-16 PROCEDURE — 2580000003 HC RX 258: Performed by: FAMILY MEDICINE

## 2024-08-16 PROCEDURE — 2580000003 HC RX 258: Performed by: STUDENT IN AN ORGANIZED HEALTH CARE EDUCATION/TRAINING PROGRAM

## 2024-08-16 PROCEDURE — 36415 COLL VENOUS BLD VENIPUNCTURE: CPT

## 2024-08-16 PROCEDURE — 85025 COMPLETE CBC W/AUTO DIFF WBC: CPT

## 2024-08-16 PROCEDURE — 6370000000 HC RX 637 (ALT 250 FOR IP): Performed by: STUDENT IN AN ORGANIZED HEALTH CARE EDUCATION/TRAINING PROGRAM

## 2024-08-16 PROCEDURE — 6360000002 HC RX W HCPCS: Performed by: EMERGENCY MEDICINE

## 2024-08-16 PROCEDURE — 93306 TTE W/DOPPLER COMPLETE: CPT | Performed by: INTERNAL MEDICINE

## 2024-08-16 PROCEDURE — 99231 SBSQ HOSP IP/OBS SF/LOW 25: CPT | Performed by: INTERNAL MEDICINE

## 2024-08-16 PROCEDURE — 83880 ASSAY OF NATRIURETIC PEPTIDE: CPT

## 2024-08-16 PROCEDURE — 80053 COMPREHEN METABOLIC PANEL: CPT

## 2024-08-16 PROCEDURE — 93306 TTE W/DOPPLER COMPLETE: CPT

## 2024-08-16 RX ORDER — ACETAMINOPHEN 500 MG
500 TABLET ORAL 4 TIMES DAILY PRN
Qty: 120 TABLET | Refills: 0 | Status: SHIPPED | OUTPATIENT
Start: 2024-08-16

## 2024-08-16 RX ORDER — TRAMADOL HYDROCHLORIDE 50 MG/1
50 TABLET ORAL EVERY 8 HOURS PRN
Qty: 9 TABLET | Refills: 0 | Status: CANCELLED | OUTPATIENT
Start: 2024-08-16 | End: 2024-08-19

## 2024-08-16 RX ADMIN — SODIUM CHLORIDE, PRESERVATIVE FREE 10 ML: 5 INJECTION INTRAVENOUS at 00:35

## 2024-08-16 RX ADMIN — SODIUM CHLORIDE 950 ML: 9 INJECTION, SOLUTION INTRAVENOUS at 05:19

## 2024-08-16 RX ADMIN — SODIUM CHLORIDE, PRESERVATIVE FREE 10 ML: 5 INJECTION INTRAVENOUS at 08:30

## 2024-08-16 RX ADMIN — HEPARIN SODIUM 18 UNITS/KG/HR: 10000 INJECTION, SOLUTION INTRAVENOUS at 06:25

## 2024-08-16 RX ADMIN — APIXABAN 10 MG: 5 TABLET, FILM COATED ORAL at 08:29

## 2024-08-16 NOTE — PROGRESS NOTES
Pt d/c at 1600 to home.  Pt refused lyft and stated he was going to call cousin or walk.  IV and telemetry removed.  Pt belongings gathered and sent with pt.  Meds to beds obtained.  D/C education and instructions reviewed and sent with pt.  All questions asked answered.   Update-  Pt called again and was able to further clarify eliquis starter pack instructions.

## 2024-08-16 NOTE — PROGRESS NOTES
Received follow up call from provider stating pt had additional questions about eliquis starter pack after discharge and asked that I follow up.  Attempted to call pt back at 1725.  No answer x2. Unable to leave voicemail.

## 2024-08-16 NOTE — DISCHARGE SUMMARY
alert and oriented x3,  Cardiac: S1 plus S2 regular rate and rhythm, no murmurs rubs or gallops  Respiratory: No wheeze or crackles appreciated, equal air entry bilaterally  GI/: Abdomen soft, nontender, bowel sounds audible  Skin: No rashes or ulcers present  MSK: Peripheral pulses intact        Labs and Imaging   Vascular duplex lower extremity venous bilateral    Result Date: 8/15/2024    Acute occlusive deep vein thrombosis in the right popliteal vein.   Acute occlusive deep vein thrombosis in the right soleal vein.   Acute occlusive deep vein thrombosis in the right posterior tibial vein.   No other evidence of deep vein and superficial thrombosis in the bilateral lower extremity.     CT HEAD WO CONTRAST    Result Date: 8/14/2024  CT HEAD WO CONTRAST: DATE: 8/14/2024 20:06 EDT CLINICAL HISTORY:: light headed fall presyncope' . COMPARISON: No previous for comparison. TECHNIQUE: CT examination of the head was performed with axial images and additional reconstructions as needed without intravenous contrast. A dose lowering technique was used for this procedure, which may include, but is not limited to, dose reduction technique, automated exposure control, the use of iterative reconstruction, and ALARA (As Low As Reasonably Achievable) / Image Gently techniques. 350 FINDINGS: Brain:Mild age-related atrophy. No midline shift or mass effect no acute intracranial hemorrhage. . Ventricles:Ventricles normal in size and position.. Orbits:The orbits are unremarkable. Sinuses:The visualized sinuses appear within normal limits. Mastoids: No evidence of mastoid effusion. Bones: No evidence of fracture or osseous destructive lesion..     No acute intracranial hemorrhage Electronically signed by Rosa Pinto    [facility] [facility]    Result Date: 8/14/2024  POCUS_Cardiac     Exam Information:          Exam type:  Clinically indicated     Indication(s) for Exam:          The exam was performed with the  pericardial effusion. . Mediastinum and Marj: No suspicious hilar or mediastinal lymphadenopathy. Chest Wall and Lower Neck: The visualized portions of the thyroid gland appear within normal limits. No axillary lymphadenopathy. No chest wall masses. Upper Abdomen: There is a small enhancing lesion in the right lobe of the liver measuring 10 mm. Liver is incompletely evaluated. Bones: No osseous destructive lesions.     Bilateral pulmonary emboli. Small enhancing lesion right lobe of the liver. Findings were relayed to the emergency room and spoke with Dr. Jackson 5:59 PM 8/14/2024 Electronically signed by Rosa Pinto    XR CHEST (2 VW)    Result Date: 8/14/2024  CHEST PA AND LATERAL: HISTORY: Pain COMPARISON:  None FINDINGS: The heart size is within normal limits. The lungs appear clear of acute parenchymal infiltrate. Hyperinflation is seen suggesting emphysematous change.     No acute disease. Electronically signed by Fish Rivera      CBC:   Recent Labs     08/14/24  1630 08/15/24  0213   WBC 8.4 8.9   HGB 14.2 12.6*    209     BMP:    Recent Labs     08/14/24  1630 08/15/24  0213   * 135*   K 3.6 3.6   CL 97* 99   CO2 27 28   BUN 6* 7   CREATININE 0.7* 0.7*   GLUCOSE 98 105*     Hepatic:   Recent Labs     08/15/24  0213   AST 25   ALT 15   BILITOT 2.3*   ALKPHOS 63     Lipids: No results found for: \"CHOL\", \"HDL\", \"TRIG\"  Hemoglobin A1C: No results found for: \"LABA1C\"  TSH: No results found for: \"TSH\"  Troponin: No results found for: \"TROPONINT\"  Lactic Acid: No results for input(s): \"LACTA\" in the last 72 hours.  BNP:   Recent Labs     08/14/24  1630 08/15/24  0213   PROBNP 1,170* 892*     UA:No results found for: \"NITRU\", \"COLORU\", \"PHUR\", \"LABCAST\", \"WBCUA\", \"RBCUA\", \"MUCUS\", \"TRICHOMONAS\", \"YEAST\", \"BACTERIA\", \"CLARITYU\", \"SPECGRAV\", \"LEUKOCYTESUR\", \"UROBILINOGEN\", \"BILIRUBINUR\", \"BLOODU\", \"GLUCOSEU\", \"KETUA\", \"AMORPHOUS\"  Urine Cultures: No results found for: \"LABURIN\"  Blood Cultures:

## 2024-08-16 NOTE — PLAN OF CARE
Problem: Discharge Planning  Goal: Discharge to home or other facility with appropriate resources  8/15/2024 2253 by Leyda Wadsworth RN  Outcome: Progressing  Flowsheets (Taken 8/15/2024 1847 by Megan Valente RN)  Discharge to home or other facility with appropriate resources:   Identify barriers to discharge with patient and caregiver   Arrange for needed discharge resources and transportation as appropriate   Identify discharge learning needs (meds, wound care, etc)   Arrange for interpreters to assist at discharge as needed   Refer to discharge planning if patient needs post-hospital services based on physician order or complex needs related to functional status, cognitive ability or social support system     Problem: Safety - Adult  Goal: Free from fall injury  8/15/2024 2253 by Leyda Wadsworth RN  Outcome: Progressing  Flowsheets (Taken 8/15/2024 1847 by Megan Valente RN)  Free From Fall Injury:   Instruct family/caregiver on patient safety   Based on caregiver fall risk screen, instruct family/caregiver to ask for assistance with transferring infant if caregiver noted to have fall risk factors     Problem: ABCDS Injury Assessment  Goal: Absence of physical injury  8/15/2024 2253 by Leyda Wadsworth RN  Outcome: Progressing  Flowsheets (Taken 8/15/2024 1847 by Megan Valente RN)  Absence of Physical Injury: Implement safety measures based on patient assessment     Problem: Cardiovascular - Adult  Goal: Maintains optimal cardiac output and hemodynamic stability  Outcome: Progressing  Flowsheets (Taken 8/15/2024 2253)  Maintains optimal cardiac output and hemodynamic stability:   Monitor blood pressure and heart rate   Monitor urine output and notify Licensed Independent Practitioner for values outside of normal range   Assess for signs of decreased cardiac output     Problem: Cardiovascular - Adult  Goal: Absence of cardiac dysrhythmias or at baseline  Outcome: Progressing  Flowsheets (Taken 8/15/2024

## 2024-08-16 NOTE — CARE COORDINATION
Case Management Assessment            Discharge Note                    Date / Time of Note: 8/16/2024 1:36 PM                  Discharge Note Completed by: Lore Ackerman    Patient Name: Natan Lynch   YOB: 1960  Diagnosis: Bilateral pulmonary embolism (HCC) [I26.99]  Acute pulmonary embolism without acute cor pulmonale, unspecified pulmonary embolism type (HCC) [I26.99]   Date / Time: 8/14/2024  3:44 PM    Current PCP: No primary care provider on file.  Clinic patient: No    Hospitalization in the last 30 days: No       Advance Directives:  Code Status: Full Code  [place] DNR form completed and on chart: Not Indicated    Financial:  Payor: PENDING MEDICAID / Plan: PENDING MEDICAID / Product Type: *No Product type* /      Pharmacy:  No Pharmacies Listed    Assistance purchasing medications?: Potential Assistance Purchasing Medications: No  Assistance provided by Case Management: None at this time    Does patient want to participate in local refill/ meds to beds program?: Yes    Meds To Beds General Rules:  1. Can ONLY be done Monday- Friday between 8:30am-5pm  2. Prescription(s) must be in pharmacy by 3pm to be filled same day  3.Copy of patient's insurance/ prescription drug card and patient face sheet must be sent along with the prescription(s)  4. Cost of Rx cannot be added to hospital bill. If financial assistance is needed, please contact unit  or ;  or  CANNOT provide pharmacy voucher for patients co-pays  5. Patients can then  the prescription on their way out of the hospital at discharge, or pharmacy can deliver to the bedside if staff is available. (payment due at time of pick-up or delivery - cash, check, or card accepted)     Able to afford home medications/ co-pay costs: No    ADLS:  Current PT AM-PAC Score:   /24  Current OT AM-PAC Score:   /24    DISCHARGE Disposition: Home- No Services Needed    LOC at discharge: Not

## 2024-08-16 NOTE — PLAN OF CARE
Problem: Discharge Planning  Goal: Discharge to home or other facility with appropriate resources  8/16/2024 1138 by Renea Braun RN  Outcome: Progressing  Flowsheets (Taken 8/15/2024 1847 by Megan Valente RN)  Discharge to home or other facility with appropriate resources:   Identify barriers to discharge with patient and caregiver   Arrange for needed discharge resources and transportation as appropriate   Identify discharge learning needs (meds, wound care, etc)   Arrange for interpreters to assist at discharge as needed   Refer to discharge planning if patient needs post-hospital services based on physician order or complex needs related to functional status, cognitive ability or social support system  Note: Pt bridged off heparin gtt to eliquis.  V/S stable.  Pt denies pain.  Neurovascular checks unchanged.     Problem: Safety - Adult  Goal: Free from fall injury  8/16/2024 1138 by Renea Braun RN  Outcome: Progressing  Flowsheets (Taken 8/15/2024 1847 by Megan Valente RN)  Free From Fall Injury:   Instruct family/caregiver on patient safety   Based on caregiver fall risk screen, instruct family/caregiver to ask for assistance with transferring infant if caregiver noted to have fall risk factors  Note: Pt bed in low position and side rails up. Call light and belongings in reach. Pt encouraged to call for assistance. Will continue with hourly rounds for PO intake, pain needs, toileting, and repositioning as needed.         Problem: Cardiovascular - Adult  Goal: Maintains optimal cardiac output and hemodynamic stability  8/16/2024 1138 by Renea Braun RN  Outcome: Progressing  Flowsheets (Taken 8/15/2024 1274 by Leyda Wadsworth RN)  Maintains optimal cardiac output and hemodynamic stability:   Monitor blood pressure and heart rate   Monitor urine output and notify Licensed Independent Practitioner for values outside of normal range   Assess for signs of decreased cardiac output     Problem:

## 2024-08-16 NOTE — PROGRESS NOTES
[facility]  Cardiology Inpatient Consult Service  Daily Progress Note        Admit Date:  8/14/2024    Referring Physician: Rome Rosa MD      Assessment & Plan:     DVT with acute pulmonary embolism.  No evidence of acute cor pulmonale.  Waiting for formal echocardiogram.  Plan for Eliquis at discharge with possible transition to Coumadin after a month.  Unexplained weight loss.  Advised patient to consider age-appropriate screening for any underlying malignancy.  Discharge planning per primary team.    Subjective:   Interval history:  Continues to have pleuritic chest pain however symptoms like marginally better.    Medications:   apixaban  10 mg Oral BID    Followed by    [START ON 8/23/2024] apixaban  5 mg Oral BID    sodium chloride flush  5-40 mL IntraVENous 2 times per day       IV drips:   sodium chloride         PRN:  sodium chloride flush, sodium chloride, magnesium sulfate, polyethylene glycol, acetaminophen **OR** acetaminophen, promethazine **OR** ondansetron      Objective:     Vitals:    08/15/24 2032 08/16/24 0008 08/16/24 0515 08/16/24 0548   BP: 108/67  (!) 96/58    Pulse: 74 74 73    Resp: 18 18 18    Temp: 98.4 °F (36.9 °C) 98.7 °F (37.1 °C) 98.8 °F (37.1 °C)    TempSrc: Oral Oral Oral    SpO2: 97% 95% 93%    Weight:    74.4 kg (164 lb 0.4 oz)   Height:           Intake/Output Summary (Last 24 hours) at 8/16/2024 0814  Last data filed at 8/16/2024 0548  Gross per 24 hour   Intake 600 ml   Output 0 ml   Net 600 ml     I/O last 3 completed shifts:  In: 600 [P.O.:600]  Out: 0   Wt Readings from Last 3 Encounters:   08/16/24 74.4 kg (164 lb 0.4 oz)       Admit Wt: Weight - Scale: 78.5 kg (173 lb 1 oz)   Todays Wt: Weight - Scale: 74.4 kg (164 lb 0.4 oz)    TELEMETRY: Personally interpreted Sinus     Physical Exam:     General:  Awake, alert, NAD  Skin:  Warm and dry  Neck:  No JVD  Chest:  Clear to auscultation, respiration normal  Cardiovascular:  RRR  S1S2  Abdomen:  Soft. BS+  Extremities:  No edema        Labs: Reviewed.  White cell count is normal.  Hemoglobin is 11.4  Recent Labs     08/14/24  1630 08/15/24  0213   * 135*   K 3.6 3.6   BUN 6* 7   CREATININE 0.7* 0.7*   CL 97* 99   CO2 27 28   GLUCOSE 98 105*   CALCIUM 9.3 8.8     Recent Labs     08/14/24  1630 08/15/24  0213 08/16/24  0740   WBC 8.4 8.9 7.2   HGB 14.2 12.6* 11.4*   HCT 43.2 38.4* 34.5*    209 249   MCV 93.2 92.8 92.6       Lab Results   Component Value Date/Time    TROPHS 23 08/15/2024 09:34 AM       Imaging:   Waiting on echo.  Right lower extremity DVT involving the popliteal vein, posterior tibial vein and soleal vein.    All questions and concerns were addressed to the patient/family. Alternatives to my treatment were discussed.  I have personally reviewed the reports and images of labs, radiological studies, cardiac studies including ECG's and telemetry, current and old medical records.     I would like to thank you for providing me the opportunity to participate in the care of your patient. If you have any questions, please do not hesitate to contact me.     Phu Conrad MD,  [facility] [place] [address]  Ph: [phone]  Fax: [phone]

## 2024-08-30 ENCOUNTER — OFFICE VISIT (OUTPATIENT)
Dept: INTERNAL MEDICINE CLINIC | Age: 64
End: 2024-08-30
Payer: MEDICAID

## 2024-08-30 VITALS
BODY MASS INDEX: 19.53 KG/M2 | OXYGEN SATURATION: 98 % | RESPIRATION RATE: 16 BRPM | SYSTOLIC BLOOD PRESSURE: 101 MMHG | TEMPERATURE: 97.2 F | WEIGHT: 168.8 LBS | HEART RATE: 94 BPM | DIASTOLIC BLOOD PRESSURE: 66 MMHG | HEIGHT: 78 IN

## 2024-08-30 DIAGNOSIS — F17.200 NICOTINE DEPENDENCE WITH CURRENT USE: ICD-10-CM

## 2024-08-30 DIAGNOSIS — I82.401 ACUTE DEEP VEIN THROMBOSIS (DVT) OF RIGHT LOWER EXTREMITY, UNSPECIFIED VEIN (HCC): Primary | ICD-10-CM

## 2024-08-30 DIAGNOSIS — R60.0 EDEMA OF BOTH FEET: ICD-10-CM

## 2024-08-30 PROCEDURE — 99213 OFFICE O/P EST LOW 20 MIN: CPT

## 2024-08-30 ASSESSMENT — PATIENT HEALTH QUESTIONNAIRE - PHQ9
SUM OF ALL RESPONSES TO PHQ QUESTIONS 1-9: 0
SUM OF ALL RESPONSES TO PHQ9 QUESTIONS 1 & 2: 0
2. FEELING DOWN, DEPRESSED OR HOPELESS: NOT AT ALL
1. LITTLE INTEREST OR PLEASURE IN DOING THINGS: NOT AT ALL
SUM OF ALL RESPONSES TO PHQ QUESTIONS 1-9: 0

## 2024-08-30 ASSESSMENT — ENCOUNTER SYMPTOMS
SHORTNESS OF BREATH: 0
CHEST TIGHTNESS: 0
ANAL BLEEDING: 0

## 2024-08-30 NOTE — PROGRESS NOTES
The [facility] Out[facility]    Natan Lynch is a 63 y.o. male, with a MHx significant for DVT with PE, who presents to the clinic for hospital follow-up.    Natan Lynch presents today to establish care after a recent hospital admission for DVT with saddle PE. He has been doing well since his discharge and adherent with his Eliquis. He has concerns today about swelling in his feet. He also notes that he has had warts recently that were treated successfully with home remedies (apple cider vinegar and duct tape).        Review of Systems   Constitutional:  Negative for appetite change and fatigue.   Respiratory:  Negative for chest tightness and shortness of breath.    Cardiovascular:  Negative for chest pain.   Gastrointestinal:  Negative for anal bleeding.   Genitourinary:  Negative for hematuria.   Neurological:  Negative for dizziness and light-headedness.   Hematological:  Does not bruise/bleed easily.   All other systems reviewed and are negative.      MEDICATION:  Prior to Visit Medications    Medication Sig Taking? Authorizing Provider   apixaban (ELIQUIS) 5 MG TABS tablet Take 1 tablet by mouth 2 times daily Yes Robbin Handley MD   acetaminophen (TYLENOL) 500 MG tablet Take 1 tablet by mouth 4 times daily as needed for Pain  Patient not taking: Reported on 8/30/2024  Rome Rosa MD          VITALS:  Vitals:    08/30/24 1024   BP: 101/66   Site: Right Upper Arm   Position: Sitting   Cuff Size: Medium Adult   Pulse: 94   Resp: 16   Temp: 97.2 °F (36.2 °C)   TempSrc: Temporal   SpO2: 98%   Weight: 76.6 kg (168 lb 12.8 oz)   Height: 1.969 m (6' 5.5\")      Estimated body mass index is 19.76 kg/m² as calculated from the following:    Height as of this encounter: 1.969 m (6' 5.5\").    Weight as of this encounter: 76.6 kg (168 lb 12.8 oz).  Physical Exam  Vitals and nursing note reviewed.   Constitutional:       Appearance: Normal appearance. He is normal weight.   HENT:

## 2024-08-30 NOTE — PATIENT INSTRUCTIONS
Continue taking your Eliquis as prescribed.    Start using T.E.D. hose every day to help with the swelling in your legs and feet. Put them on when you first wake up and wear them throughout the day. Be sure to remove them before you go to bed.    Please return to the clinic for follow-up in about 2 weeks

## 2024-09-05 ENCOUNTER — TELEPHONE (OUTPATIENT)
Dept: INTERNAL MEDICINE CLINIC | Age: 64
End: 2024-09-05

## 2024-09-05 NOTE — TELEPHONE ENCOUNTER
Received phone call regarding unemployment documents. I have answered the questions that were included on the form as below and sent a copy of my answers via fax to the [place] Unemployment office.      Patient name  Natan Lynch    Date seen  8/13    Nature of illness  Deep Vein Thrombosis, Pulmonary Embolism    Date unable to work/Work restirictions:  Date unable to work: N/A     Work Restrictions:  Avoid tasks with high risk of bleeding.    Did you advise patient to quit employment  No    Have you completed any other applications for insurance/workers comp/ assistance  No    Rafael Gilliland MD  9/5/2024

## 2024-09-05 NOTE — TELEPHONE ENCOUNTER
Pt would like to speak with a doctor about his unemployment forms, and to verify he has no restrictions.     Pt can be reached at [phone]

## 2024-09-17 ENCOUNTER — TELEPHONE (OUTPATIENT)
Dept: INTERNAL MEDICINE CLINIC | Age: 64
End: 2024-09-17

## 2024-09-19 ENCOUNTER — OFFICE VISIT (OUTPATIENT)
Dept: INTERNAL MEDICINE CLINIC | Age: 64
End: 2024-09-19

## 2024-09-19 VITALS
DIASTOLIC BLOOD PRESSURE: 77 MMHG | BODY MASS INDEX: 19.74 KG/M2 | TEMPERATURE: 98.1 F | WEIGHT: 170.6 LBS | HEIGHT: 78 IN | OXYGEN SATURATION: 100 % | HEART RATE: 84 BPM | RESPIRATION RATE: 20 BRPM | SYSTOLIC BLOOD PRESSURE: 119 MMHG

## 2024-09-19 DIAGNOSIS — I82.401 ACUTE DEEP VEIN THROMBOSIS (DVT) OF RIGHT LOWER EXTREMITY, UNSPECIFIED VEIN (HCC): Primary | ICD-10-CM

## 2024-09-19 DIAGNOSIS — I26.99 BILATERAL PULMONARY EMBOLISM (HCC): ICD-10-CM

## 2024-09-19 PROCEDURE — 99213 OFFICE O/P EST LOW 20 MIN: CPT

## 2024-09-19 ASSESSMENT — ENCOUNTER SYMPTOMS
ALLERGIC/IMMUNOLOGIC NEGATIVE: 1
EYES NEGATIVE: 1
GASTROINTESTINAL NEGATIVE: 1
RESPIRATORY NEGATIVE: 1

## 2024-09-20 ENCOUNTER — TELEPHONE (OUTPATIENT)
Dept: INTERNAL MEDICINE CLINIC | Age: 64
End: 2024-09-20

## 2024-09-20 DIAGNOSIS — I82.401 ACUTE DEEP VEIN THROMBOSIS (DVT) OF RIGHT LOWER EXTREMITY, UNSPECIFIED VEIN (HCC): Primary | ICD-10-CM

## 2024-09-20 NOTE — TELEPHONE ENCOUNTER
BRE FROM [facility] STATED THE ELIQUIS RX IS NOT ON ANY DISCOUNT PROGRAM AND IT WILL COST THE PT ABOUT 600.00.

## 2024-09-20 NOTE — TELEPHONE ENCOUNTER
Per chart review pt gets his meds from [facility] pharmacy, but per Dr. Ndiaye note, they will not have Eliquis available come 10/16.     In the interim, pt was able to obtain Eliquis at his pharmacy and called in confused about his Rx so he was switched back to Eliquis, but the Rx was sent to [facility], which would cost him ~600$.     Will sent original Rx for Xarelto to [facility] pharmacy and update pt. He can continue Eliquis for now and start on Xarelto in Oct.     Sim Roman MD  Internal Medicine Resident  PGY-3

## 2025-01-10 DIAGNOSIS — I73.9 PERIPHERAL VASCULAR DISEASE, UNSPECIFIED (HCC): Primary | ICD-10-CM

## 2025-01-29 ENCOUNTER — TELEPHONE (OUTPATIENT)
Dept: INTERNAL MEDICINE CLINIC | Age: 65
End: 2025-01-29

## 2025-01-29 NOTE — TELEPHONE ENCOUNTER
Patient called with some questions regarding some testing for DVT.     Patient can be reached at [phone]

## 2025-02-11 ENCOUNTER — HOSPITAL ENCOUNTER (OUTPATIENT)
Dept: VASCULAR LAB | Age: 65
Discharge: HOME | End: 2025-02-13
Attending: STUDENT IN AN ORGANIZED HEALTH CARE EDUCATION/TRAINING PROGRAM
Payer: COMMERCIAL

## 2025-02-11 PROCEDURE — 93923 UPR/LXTR ART STDY 3+ LVLS: CPT

## 2025-02-16 LAB
VAS LEFT ABI: 0.76
VAS LEFT ANKLE BP: 90 MMHG
VAS LEFT ARM BP: 119 MMHG
VAS LEFT CALF PRESSURE: 152 MMHG
VAS LEFT DORSALIS PEDIS BP: 90 MMHG
VAS LEFT HIGH THIGH PRESSURE: 151 MMHG
VAS LEFT LOW THIGH PRESSURE: 144 MMHG
VAS LEFT PTA BP: 90 MMHG
VAS RIGHT ABI: 0.78
VAS RIGHT ANKLE BP: 93 MMHG
VAS RIGHT ARM BP: 109 MMHG
VAS RIGHT CALF PRESSURE: 121 MMHG
VAS RIGHT DORSALIS PEDIS BP: 60 MMHG
VAS RIGHT HIGH THIGH PRESSURE: 151 MMHG
VAS RIGHT LOW THIGH PRESSURE: 162 MMHG
VAS RIGHT PTA BP: 93 MMHG

## 2025-02-19 ENCOUNTER — TELEPHONE (OUTPATIENT)
Dept: INTERNAL MEDICINE CLINIC | Age: 65
End: 2025-02-19

## 2025-02-21 ENCOUNTER — OFFICE VISIT (OUTPATIENT)
Dept: INTERNAL MEDICINE CLINIC | Age: 65
End: 2025-02-21
Payer: COMMERCIAL

## 2025-02-21 VITALS
HEART RATE: 105 BPM | TEMPERATURE: 97.5 F | SYSTOLIC BLOOD PRESSURE: 111 MMHG | OXYGEN SATURATION: 100 % | WEIGHT: 177.5 LBS | DIASTOLIC BLOOD PRESSURE: 70 MMHG | BODY MASS INDEX: 20.54 KG/M2 | HEIGHT: 78 IN | RESPIRATION RATE: 20 BRPM

## 2025-02-21 DIAGNOSIS — I73.9 PERIPHERAL VASCULAR DISEASE: ICD-10-CM

## 2025-02-21 DIAGNOSIS — I26.99 BILATERAL PULMONARY EMBOLISM (HCC): Primary | ICD-10-CM

## 2025-02-21 PROCEDURE — 99213 OFFICE O/P EST LOW 20 MIN: CPT

## 2025-02-21 ASSESSMENT — PATIENT HEALTH QUESTIONNAIRE - PHQ9
SUM OF ALL RESPONSES TO PHQ QUESTIONS 1-9: 0
1. LITTLE INTEREST OR PLEASURE IN DOING THINGS: NOT AT ALL
SUM OF ALL RESPONSES TO PHQ QUESTIONS 1-9: 0
SUM OF ALL RESPONSES TO PHQ9 QUESTIONS 1 & 2: 0
2. FEELING DOWN, DEPRESSED OR HOPELESS: NOT AT ALL
SUM OF ALL RESPONSES TO PHQ QUESTIONS 1-9: 0
SUM OF ALL RESPONSES TO PHQ QUESTIONS 1-9: 0

## 2025-02-21 NOTE — PATIENT INSTRUCTIONS
Nice to meet you Mr. Lynch.    Please continue taking your Xarelto, we are going to continue this for now as the clots in your lungs were pretty severe and want to prevent any recurrences.    Please come to this clinic for a vascular surgery appointment this coming Monday at 9 AM. This is for you feet pain which is likely due to arterial issues with your legs. This was revealed on your ultrasounds that were taken on your legs recently.    Please come back for an appointment in 10 weeks.

## 2025-02-21 NOTE — ASSESSMENT & PLAN NOTE
Uncontrolled, never addressed prior.  Has recent arterial vascular studies done showing severe disease both sides.  -Vascular surgery appointment this coming Monday 9 AM with resident clinic here  -Instructed patient to elevate feet when possible for pain relief.  -Likely no major role for cilostazol for medications for pain relief given severe disease.

## 2025-02-21 NOTE — PROGRESS NOTES
Discharge Note  Short Stay      SUMMARY     Admit Date: 9/8/2020    Attending Physician: Kunal Barnard Jr., MD     Discharge Physician: Kunal Barnard Jr., MD    Discharge Date: 9/8/2020 9:30 AM    Final Diagnosis: Screening [Z13.9]  Impression:          - One 2 to 3 mm polyp in the cecum, removed with a                        cold snare. Resected and retrieved.                        - Diverticulosis in the sigmoid colon.                        - Non-bleeding internal hemorrhoids.                        - The examination was otherwise normal.                        - The examined portion of the ileum was normal.   Recommendation:      - Discharge patient to home.                        - Await pathology results.                        - If the pathology report reveals adenomatous                        tissue, then repeat the colonoscopy for surveillance                        in 5 years.                        - If the pathology report indicates hyperplastic                        polyp, then repeat colonoscopy for surveillance in 6                        years.                        - High fiber diet.                        - Use fiber, for example Citrucel, Fibercon, Konsyl                        or Metamucil.                        - Call the G.I. clinic in 2 weeks for reports (if                        you haven't heard from us sooner) [phone].                        - Patient has a contact number available for                        emergencies. The signs and symptoms of potential                        delayed complications were discussed with the                        patient. Return to normal activities tomorrow.                        Written discharge instructions were provided to the                        patient.                        - Return to normal activities tomorrow.                        - Continue present medications.   Kunal Barnard MD   9/8/2020   Disposition: HOME OR SELF  [facility]    Natan Lynch is a 64 y.o. male, here for evaluation of the following concerns:    Patient is a 64M, with PMHx bilateral PE and RLE DVT both 8/2024, on Xarelto.  Last OV 9/30, patient reported no symptoms, plan was made to check coag studies after completion of Xarelto which is plan to continue for 6 months following diagnosis which was on 8/14/2024.    Patient reports continued bilateral feet pain, worse with walking, does improve with elevating feet, in bed sometimes laying on bed on a certain side does aggravate his pressure points and makes it worse.  Reports this is occurred for the past 1 or 2 years.  Reports he is compliant with Xarelto, denies any sort of bleeding or other issues.  Denies shortness of breath, chest pain, palpitations, N/V/D, abdominal pain, dizziness or lightheadedness.  Denies losing sensation in feet as well, denies changes in temperature of feet.  Denies changes in vision    MEDICATIONS:  Prior to Visit Medications    Medication Sig Taking? Authorizing Provider   rivaroxaban (XARELTO) 20 MG TABS tablet Take 1 tablet by mouth daily (with breakfast) Yes Sim Roman MD   acetaminophen (TYLENOL) 500 MG tablet Take 1 tablet by mouth 4 times daily as needed for Pain  Patient not taking: Reported on 8/30/2024  Rome Rosa MD        Vitals:    02/21/25 1418   BP: 111/70   Site: Left Upper Arm   Position: Sitting   Cuff Size: Medium Adult   Pulse: (!) 105   Resp: 20   Temp: 97.5 °F (36.4 °C)   TempSrc: Temporal   SpO2: 100%   Weight: 80.5 kg (177 lb 8 oz)   Height: 1.969 m (6' 5.5\")      Estimated body mass index is 20.78 kg/m² as calculated from the following:    Height as of this encounter: 1.969 m (6' 5.5\").    Weight as of this encounter: 80.5 kg (177 lb 8 oz).  Physical Exam  Constitutional:       General: He is not in acute distress.     Appearance: Normal appearance. He is not ill-appearing.   HENT:       CARE    Patient Instructions:   Current Discharge Medication List      CONTINUE these medications which have NOT CHANGED    Details   ASCORBATE CALCIUM (VITAMIN C ORAL) Take 1 tablet by mouth once daily.        cholecalciferol, vitamin D3, 3,000 unit Tab Take 5,000 Int'l Units by mouth once daily.      KRILL OIL ORAL Take 500 mg by mouth once daily.        multivitamin (MULTIVITAMIN) per tablet Take 1 tablet by mouth once daily.        polycarbophil (FIBERCON) 625 mg tablet Take 2 tablets by mouth once daily.       albuterol (VENTOLIN HFA) 90 mcg/actuation inhaler Inhale 2 puffs into the lungs every 6 (six) hours as needed for Wheezing. Rescue  Qty: 18 g, Refills: 0    Associated Diagnoses: Wheezing      meloxicam (MOBIC) 7.5 MG tablet Take 1 tablet (7.5 mg total) by mouth once daily.  Qty: 30 tablet, Refills: 1    Associated Diagnoses: Acute pain of left knee      UNABLE TO FIND Take 4 tablets by mouth once daily. NUGENIX             Discharge Procedure Orders (must include Diet, Follow-up, Activity)    Follow Up:  Follow up with PCP as per your routine.  Please follow a high fiber diet.  Activity as tolerated.    No driving day of procedure.

## 2025-02-21 NOTE — ASSESSMENT & PLAN NOTE
Now asymptomatic, initially diagnosed 8/2024, had bilateral PE as well as acute DVT.  Status post 6 months of Xarelto  -Continue Xarelto given bilateral PE/saddle characteristic, patient tolerating Xarelto no bleeding issues

## 2025-02-24 DIAGNOSIS — I73.9 PERIPHERAL VASCULAR DISEASE, UNSPECIFIED: Primary | ICD-10-CM

## 2025-02-24 NOTE — PROGRESS NOTES
Ordering vascular surgery referral due to concern about rest pain, and absent digital signals on recent BALJIT test.

## 2025-02-25 ENCOUNTER — TELEPHONE (OUTPATIENT)
Dept: INTERNAL MEDICINE CLINIC | Age: 65
End: 2025-02-25

## 2025-02-25 ENCOUNTER — TELEPHONE (OUTPATIENT)
Dept: VASCULAR SURGERY | Age: 65
End: 2025-02-25

## 2025-02-25 NOTE — TELEPHONE ENCOUNTER
Spoke to patient. He said his symptoms are no worse since his recent visit. He is waiting on vascular surgery to call him back to schedule an appointment. Recommended he goes to the ED if his feet pain worsened and to call back vascular surgery office to book an appointment.

## 2025-02-25 NOTE — TELEPHONE ENCOUNTER
Patient called to schedule a new patient appt with Dr. Alvarado. Referral in [facility]     Please call patient [phone]

## 2025-02-25 NOTE — TELEPHONE ENCOUNTER
Attempted to call Mr. Lynch but unable to LVM. Mr. Lynch does not need scan, only new patient appt.

## 2025-02-25 NOTE — TELEPHONE ENCOUNTER
Patient called asking for a Doctor to give him a call. Patient will not discuss the reason he needs to speak with a Doctor.      Patient can be reached at [phone]

## 2025-02-27 ENCOUNTER — TELEPHONE (OUTPATIENT)
Dept: VASCULAR SURGERY | Age: 65
End: 2025-02-27

## 2025-03-11 ENCOUNTER — OFFICE VISIT (OUTPATIENT)
Dept: VASCULAR SURGERY | Age: 65
End: 2025-03-11

## 2025-03-11 VITALS
WEIGHT: 165 LBS | SYSTOLIC BLOOD PRESSURE: 98 MMHG | HEIGHT: 78 IN | OXYGEN SATURATION: 96 % | BODY MASS INDEX: 19.09 KG/M2 | DIASTOLIC BLOOD PRESSURE: 60 MMHG | HEART RATE: 117 BPM

## 2025-03-11 DIAGNOSIS — F17.200 NICOTINE DEPENDENCE WITH CURRENT USE: ICD-10-CM

## 2025-03-11 DIAGNOSIS — I70.263 ATHEROSCLEROSIS OF NATIVE ARTERY OF BOTH LOWER EXTREMITIES WITH GANGRENE: Primary | ICD-10-CM

## 2025-03-11 DIAGNOSIS — I73.9 PERIPHERAL VASCULAR DISEASE: Primary | ICD-10-CM

## 2025-03-11 NOTE — PROGRESS NOTES
[facility] Vascular Surgery  Casandra Alvarado MD, FACS, [facility]  [phone]    OUTPATIENT CONSULTATION          Date of Consultation:  3/11/2025    PCP:  Robbin Handley MD       Chief Complaint: Foot pain and ulcers    History of Present Illness:   Natan Guzman a 64 y.o. male who presents today for several week h/o of worsening bilateral foot ulceration.  Other symptoms include pain and discoloration of feet as well as continued increased size of ulcers.  Current, everyday smoker, but interested in quitting and understands the connection between this and PAD.  H/o DVT/PE and has been on Xarelto.  Denies trauma to feet.  No prior claudication or rest pain before this started.  Non-invasive arterial study suggested bilateral tibial artery disease as well as right femoral-popliteal disease.     I personally reviewed images of the following study:  Vascular US Arterial PVR Lower at Rest 2/11/2025          PastMedical History:  History reviewed. No pertinent past medical history.    Past Surgical History:   History reviewed. No pertinent surgical history.    Home Medications:   Prior to Admission medications    Medication Sig Start Date End Date Taking? Authorizing Provider   rivaroxaban (XARELTO) 20 MG TABS tablet Take 1 tablet by mouth daily (with breakfast) 10/16/24   Sim Roman MD   acetaminophen (TYLENOL) 500 MG tablet Take 1 tablet by mouth 4 times daily as needed for Pain  Patient not taking: Reported on 8/30/2024 8/16/24   Rome Rosa MD      Allergies:  Patient has no known allergies.     Social History:    Social History     Socioeconomic History    Marital status: Single     Spouse name: Not on file    Number of children: Not on file    Years of education: Not on file    Highest education level: Not on file   Occupational History    Not on file   Tobacco Use    Smoking status: Every Day     Current packs/day: 0.50     Average packs/day: 0.5 packs/day for 45.0 years

## 2025-03-18 ENCOUNTER — APPOINTMENT (OUTPATIENT)
Dept: CT IMAGING | Age: 65
End: 2025-03-18
Payer: COMMERCIAL

## 2025-03-18 ENCOUNTER — APPOINTMENT (OUTPATIENT)
Dept: GENERAL RADIOLOGY | Age: 65
End: 2025-03-18
Payer: COMMERCIAL

## 2025-03-18 ENCOUNTER — HOSPITAL ENCOUNTER (EMERGENCY)
Age: 65
Discharge: HOME | End: 2025-03-19
Attending: EMERGENCY MEDICINE
Payer: COMMERCIAL

## 2025-03-18 VITALS
HEART RATE: 85 BPM | SYSTOLIC BLOOD PRESSURE: 92 MMHG | TEMPERATURE: 98.6 F | RESPIRATION RATE: 18 BRPM | DIASTOLIC BLOOD PRESSURE: 57 MMHG | OXYGEN SATURATION: 99 %

## 2025-03-18 DIAGNOSIS — I73.9 PAD (PERIPHERAL ARTERY DISEASE): ICD-10-CM

## 2025-03-18 DIAGNOSIS — L03.116 CELLULITIS OF LEFT LOWER EXTREMITY: Primary | ICD-10-CM

## 2025-03-18 LAB
ANION GAP SERPL CALCULATED.3IONS-SCNC: 13 MMOL/L (ref 3–16)
BASOPHILS # BLD: 0 K/UL (ref 0–0.2)
BASOPHILS NFR BLD: 0.4 %
BUN SERPL-MCNC: 5 MG/DL (ref 7–20)
CALCIUM SERPL-MCNC: 9.8 MG/DL (ref 8.3–10.6)
CHLORIDE SERPL-SCNC: 95 MMOL/L (ref 99–110)
CO2 SERPL-SCNC: 25 MMOL/L (ref 21–32)
CREAT SERPL-MCNC: 0.8 MG/DL (ref 0.8–1.3)
CRP SERPL-MCNC: 71.2 MG/L (ref 0–5.1)
DEPRECATED RDW RBC AUTO: 11.3 % (ref 12.4–15.4)
EOSINOPHIL # BLD: 0.1 K/UL (ref 0–0.6)
EOSINOPHIL NFR BLD: 0.8 %
ERYTHROCYTE [SEDIMENTATION RATE] IN BLOOD BY WESTERGREN METHOD: 104 MM/HR (ref 0–20)
GFR SERPLBLD CREATININE-BSD FMLA CKD-EPI: >90 ML/MIN/{1.73_M2}
GLUCOSE SERPL-MCNC: 105 MG/DL (ref 70–99)
HCT VFR BLD AUTO: 37.9 % (ref 40.5–52.5)
HGB BLD-MCNC: 12.7 G/DL (ref 13.5–17.5)
LYMPHOCYTES # BLD: 1.6 K/UL (ref 1–5.1)
LYMPHOCYTES NFR BLD: 24.3 %
MCH RBC QN AUTO: 30 PG (ref 26–34)
MCHC RBC AUTO-ENTMCNC: 33.6 G/DL (ref 31–36)
MCV RBC AUTO: 89.5 FL (ref 80–100)
MONOCYTES # BLD: 1 K/UL (ref 0–1.3)
MONOCYTES NFR BLD: 14.3 %
NEUTROPHILS # BLD: 4.1 K/UL (ref 1.7–7.7)
NEUTROPHILS NFR BLD: 60.2 %
PLATELET # BLD AUTO: 349 K/UL (ref 135–450)
PMV BLD AUTO: 7.2 FL (ref 5–10.5)
POTASSIUM SERPL-SCNC: 3.9 MMOL/L (ref 3.5–5.1)
RBC # BLD AUTO: 4.23 M/UL (ref 4.2–5.9)
SODIUM SERPL-SCNC: 133 MMOL/L (ref 136–145)
WBC # BLD AUTO: 6.7 K/UL (ref 4–11)

## 2025-03-18 PROCEDURE — 85652 RBC SED RATE AUTOMATED: CPT

## 2025-03-18 PROCEDURE — 80048 BASIC METABOLIC PNL TOTAL CA: CPT

## 2025-03-18 PROCEDURE — 6370000000 HC RX 637 (ALT 250 FOR IP): Performed by: PHYSICIAN ASSISTANT

## 2025-03-18 PROCEDURE — 6370000000 HC RX 637 (ALT 250 FOR IP): Performed by: EMERGENCY MEDICINE

## 2025-03-18 PROCEDURE — 86140 C-REACTIVE PROTEIN: CPT

## 2025-03-18 PROCEDURE — 96374 THER/PROPH/DIAG INJ IV PUSH: CPT

## 2025-03-18 PROCEDURE — 2500000003 HC RX 250 WO HCPCS: Performed by: PHYSICIAN ASSISTANT

## 2025-03-18 PROCEDURE — 73630 X-RAY EXAM OF FOOT: CPT

## 2025-03-18 PROCEDURE — 75635 CT ANGIO ABDOMINAL ARTERIES: CPT

## 2025-03-18 PROCEDURE — 6360000004 HC RX CONTRAST MEDICATION: Performed by: PHYSICIAN ASSISTANT

## 2025-03-18 PROCEDURE — 99285 EMERGENCY DEPT VISIT HI MDM: CPT

## 2025-03-18 PROCEDURE — 6360000002 HC RX W HCPCS: Performed by: PHYSICIAN ASSISTANT

## 2025-03-18 PROCEDURE — 85025 COMPLETE CBC W/AUTO DIFF WBC: CPT

## 2025-03-18 RX ORDER — OXYCODONE HYDROCHLORIDE 5 MG/1
5 TABLET ORAL ONCE
Refills: 0 | Status: COMPLETED | OUTPATIENT
Start: 2025-03-18 | End: 2025-03-18

## 2025-03-18 RX ORDER — SULFAMETHOXAZOLE AND TRIMETHOPRIM 800; 160 MG/1; MG/1
1 TABLET ORAL ONCE
Status: COMPLETED | OUTPATIENT
Start: 2025-03-18 | End: 2025-03-18

## 2025-03-18 RX ORDER — SULFAMETHOXAZOLE AND TRIMETHOPRIM 800; 160 MG/1; MG/1
1 TABLET ORAL 2 TIMES DAILY
Qty: 14 TABLET | Refills: 0 | Status: SHIPPED | OUTPATIENT
Start: 2025-03-18 | End: 2025-03-25

## 2025-03-18 RX ORDER — CEPHALEXIN 500 MG/1
500 CAPSULE ORAL 4 TIMES DAILY
Qty: 28 CAPSULE | Refills: 0 | Status: SHIPPED | OUTPATIENT
Start: 2025-03-18 | End: 2025-03-25

## 2025-03-18 RX ORDER — IOPAMIDOL 755 MG/ML
75 INJECTION, SOLUTION INTRAVASCULAR
Status: COMPLETED | OUTPATIENT
Start: 2025-03-18 | End: 2025-03-18

## 2025-03-18 RX ADMIN — OXYCODONE HYDROCHLORIDE 5 MG: 5 TABLET ORAL at 20:11

## 2025-03-18 RX ADMIN — WATER 2000 MG: 1 INJECTION INTRAMUSCULAR; INTRAVENOUS; SUBCUTANEOUS at 21:45

## 2025-03-18 RX ADMIN — SULFAMETHOXAZOLE AND TRIMETHOPRIM 1 TABLET: 800; 160 TABLET ORAL at 21:44

## 2025-03-18 RX ADMIN — IOPAMIDOL 75 ML: 755 INJECTION, SOLUTION INTRAVENOUS at 20:24

## 2025-03-18 ASSESSMENT — PAIN DESCRIPTION - ORIENTATION: ORIENTATION: LEFT

## 2025-03-18 ASSESSMENT — PAIN DESCRIPTION - DESCRIPTORS: DESCRIPTORS: CRAMPING

## 2025-03-18 ASSESSMENT — PAIN SCALES - GENERAL: PAINLEVEL_OUTOF10: 5

## 2025-03-18 ASSESSMENT — PAIN DESCRIPTION - LOCATION: LOCATION: FOOT

## 2025-03-18 NOTE — ED PROVIDER NOTES
THE [facility]  EMERGENCY DEPARTMENT ENCOUNTER          PHYSICIAN ASSISTANT NOTE     Date of evaluation: 3/18/2025    ADDENDUM:      Care of this patient was assumed from Dr. Rabago.  The patient was seen for Toe Pain (Pt. Presents to ED with pain in left foot and pain in toes on right pain. Noticed \"sores\" in area a couple weeks ago. ) and Foot Pain  .  The patient's initial evaluation and plan have been discussed with the prior provider who initially evaluated the patient.  Nursing Notes, Past Medical Hx, Past Surgical Hx, Social Hx, Allergies, and Family Hx were all reviewed.    ASSESSMENT / PLAN  (MEDICAL DECISION MAKING)     Natan Lynch is a 64 y.o. male with PMH of PAD, PVD, DVT/PE on ATC who presents with bilateral foot wounds x 2 weeks that are getting worse. Per previous provider, medium sized wound on lateral aspect R foot and multiple small wounds on toes of right foot. Pulses are palpable and feet warm but toes feel cool. Labs notable for elevated inflammatory markers. Xrays negative.   Vascular surgery consulted given his history of vascular disease and recent office visit 1 week ago. CTA abdomen with b/l runoff ordered. IV abx ordered as well for cellultis.  CTA revealed no hemodynamic significant stenosis.  Vascular surgery recommended podiatry consult and local wound care.  Podiatry was consulted and recommended local wound care, antibiotics and they will follow-up outpatient.  Patient given prescription for Keflex and Bactrim.  He is discharged home with return precautions.    Is this patient to be included in the SEP-1 core measure? No Exclusion criteria - the patient is NOT to be included for SEP-1 Core Measure due to: 2+ SIRS criteria are not met    Medical Decision Making  Problems Addressed:  Cellulitis of left lower extremity: acute illness or injury  PAD (peripheral artery disease): acute illness or injury    Amount and/or Complexity of Data Reviewed  Labs: ordered. 
  ED Attending Attestation Note     Date of evaluation: 3/18/2025    This patient was seen by the resident.  I have seen and examined the patient, agree with the workup, evaluation, management and diagnosis. The care plan has been discussed.      Briefly, Natan Lynch is a 64 y.o. male with a PMH inclusive of PAD, PVD, VTE on anticoagulation who presents for evaluation of bilat foot wounds.  Present for the last couple of weeks.    Notable exam findings include wounds to first and second toes of right foot and to the lateral aspect of the dorsal surface of the left foot.  Dopplerable pulses bilaterally.  Warmth, swelling noted to left foot and ankle.    Assessment/ Medical Decision Making:     X-rays without acute findings.  Labs with elevated inflammatory markers.  Will treat for cellulitis given this and his associated warmth and swelling of the left foot the wound and themselves do not appear infected.    Vascular surgery consulted given his history of vascular disease, recent clinic visit and plan for further workup of vascular insufficiency.  CTA was performed at the recommendation which did not demonstrate any orthopedic blockage.  They evaluated and advised podiatry consultation.  This is pending at time of signout to oncoming team.  Pain treated with oral narcotics.     Nicky Conn MD  03/18/25 [id number]    
  Physical Exam  Constitutional:       General: He is not in acute distress.     Appearance: He is not ill-appearing or toxic-appearing.   HENT:      Head: Normocephalic and atraumatic.      Right Ear: External ear normal.      Left Ear: External ear normal.      Nose: Nose normal.      Mouth/Throat:      Mouth: Mucous membranes are moist.      Pharynx: Oropharynx is clear.   Eyes:      Extraocular Movements: Extraocular movements intact.      Pupils: Pupils are equal, round, and reactive to light.   Cardiovascular:      Rate and Rhythm: Normal rate and regular rhythm.      Pulses: Normal pulses.      Heart sounds: Normal heart sounds.   Pulmonary:      Effort: Pulmonary effort is normal.      Breath sounds: Normal breath sounds.   Abdominal:      General: Abdomen is flat.      Palpations: Abdomen is soft.   Musculoskeletal:         General: Normal range of motion.      Cervical back: Normal range of motion.   Skin:     Comments: 10 x 8 cm wound to the left lateral foot without evidence of bleeding or drainage.  Does not appear grossly infected.  Multiple small wounds to patient's right big and middle toe.  Does have bilateral DP pulses present but there is evidence of decreased perfusion.   Neurological:      General: No focal deficit present.      Mental Status: He is alert and oriented to person, place, and time.      Cranial Nerves: No cranial nerve deficit.      Sensory: No sensory deficit.      Motor: No weakness.   Psychiatric:         Mood and Affect: Mood normal.         Behavior: Behavior normal.               Fish Rabago MD  Resident  03/18/25 2036

## 2025-03-19 NOTE — DISCHARGE INSTRUCTIONS
Take antibiotics as prescribed until complete.    Follow-up with podiatry next Monday in clinic.  Call to schedule an appointment.    Return to the emergency department with new or worsening symptoms.

## 2025-03-19 NOTE — CONSULTS
Vascular Surgery   Resident Consult Note    Reason for Consult: \"pt with bilateral foot wounds x 2 weeks, getting worse, recently seen by vascular surgeon Dr. Alvarado 3/11- note in but not complete, we see order for CTA runoff, curious what plan was and if we should obtain this today\"    History of Present Illness:   Natan Lynch is a 64 y.o. male with Hx of bilateral PE and RLE DBT (08/2024) on Xarelto, and PAD who presents with bilateral food wounds that he believes are getting worse. Patient is known to Dr. Alvarado who saw him on 03/11 with future plans for CTA imaging.     Patient reports long standing history of bilateral foot pain. He reports that it has become worse over the past few weeks and noticed some wounds developing. He presents today because he wanted it taken care of. He walks at baseline and is able to walk despite the pain. He smokes on average 1/2 pack of cigarettes per day for 50+ years. He denies having to rest after walking long distances due to pain, denies waking up with legs hanging off the bed. He denies any heart or renal disease or diabetes.     Past Medical History:    History reviewed. No pertinent past medical history.    Past Surgical History:    History reviewed. No pertinent surgical history.    Allergies:  Patient has no known allergies.    Medications:   Home Meds  No current facility-administered medications on file prior to encounter.     Current Outpatient Medications on File Prior to Encounter   Medication Sig Dispense Refill    rivaroxaban (XARELTO) 20 MG TABS tablet Take 1 tablet by mouth daily (with breakfast) 90 tablet 3    acetaminophen (TYLENOL) 500 MG tablet Take 1 tablet by mouth 4 times daily as needed for Pain 120 tablet 0       Current Meds  sulfamethoxazole-trimethoprim (BACTRIM DS;SEPTRA DS) 800-160 MG per tablet 1 tablet, Once  ceFAZolin (ANCEF) 2,000 mg in sterile water 20 mL IV syringe, Once        Family History:   History reviewed. No pertinent family 
patient have further vascular workup however vascular surgery recommending outpatient follow-up.  Patient should have close follow-up with vascular surgery in the outpatient setting as well as with podiatric service.  Recommend patient be discharged with oral broad-spectrum antibiotics for 1 week.  Patient instructed to call [facility] clinic for appointment next Monday.  Patient may be weightbearing as tolerated.  Patient given dressing supplies.    The patient assessment and plan was discussed with Dr. Leon Acosta DPM.    Paulina Godwin DPM   Podiatric Resident PGY2  [employer]  Pager number [phone]  3/19/2025, 12:04 AM

## 2025-03-24 ENCOUNTER — OFFICE VISIT (OUTPATIENT)
Dept: INTERNAL MEDICINE CLINIC | Age: 65
End: 2025-03-24
Payer: COMMERCIAL

## 2025-03-24 DIAGNOSIS — R23.4 ESCHAR OF FOOT: ICD-10-CM

## 2025-03-24 DIAGNOSIS — Z87.891 SMOKING HISTORY: ICD-10-CM

## 2025-03-24 DIAGNOSIS — I96 GANGRENE OF TOE OF RIGHT FOOT (HCC): Primary | ICD-10-CM

## 2025-03-24 DIAGNOSIS — I73.9 PERIPHERAL ARTERIAL DISEASE: ICD-10-CM

## 2025-03-24 DIAGNOSIS — Z86.718 HISTORY OF DVT (DEEP VEIN THROMBOSIS): ICD-10-CM

## 2025-03-24 DIAGNOSIS — I73.9 PERIPHERAL VASCULAR DISEASE: ICD-10-CM

## 2025-03-24 PROCEDURE — 99213 OFFICE O/P EST LOW 20 MIN: CPT

## 2025-03-24 NOTE — PROGRESS NOTES
Department of Podiatry  Resident Progress Note    Natan Lynch  Allergies: Patient has no known allergies.    SUBJECTIVE  The patient is a 64 y.o. male who presents to [facility] podiatry clinic today after being seen in [facility] ED on 3/18. Patient was seen in the ED for bilateral lower extremity ulcerations with concern for infection. He has been dealing with these wounds for approximately 2 weeks now. He noticed discoloration to his right foot first and was referred to Dr. Alvarado who was him on 3/11. He was told that he would need some intervention although he is unable to specify the type. He was unsure if he has a follow up appointment with her. Patient states that he had arterial studies done by a different podiatrist that did not show any blockages. He states that as instructed in the ED he has been painting the feet with betadine ans covering them with gauze. He has been keeping the feet dry when showering. Patient was given Keflex and Bactrim in the ED. Patient is concerned he will loose his toes and possibly his foot. Patient denies any other pedal complaints today. Patient denies any N/F/V/SOB/CP.     Past Medical History:    PMH of PAD, PVD, DVT/PE on ATC     REVIEW OF SYSTEMS:  A 12 point review of systems is unremarkable with the exception of the chief complaint. Patient specifically denies nausea, vomiting, fever, chills, shortness of breath, chest pain, abdominal pain, constipation, or difficulty urinating.     OBJECTIVE  Patient presents to clinic today unaccompanied and unassisted wearing boots. Patient is Aox3 and appears in NAD.    VASCULAR: DP and PT pulses are non-palpable 0/4 b/l.  Upon Doppler examination DP and PT pulses bilateral are found to be monophasic.  CFT is delayed to the digits of the foot b/l. Skin temperature is cool to cool from proximal to distal with no focal calor.  Significant coolness appreciated to the right distal forefoot compared to the left foot.  No edema. No pain with calf

## 2025-03-24 NOTE — PATIENT INSTRUCTIONS
Schedule  appointment with Dr. Alvarado [phone] and make appointment .  Rerturn in 1 week .  Paint with betadine and cover with dry dressing

## 2025-03-25 ENCOUNTER — TELEPHONE (OUTPATIENT)
Dept: VASCULAR SURGERY | Age: 65
End: 2025-03-25

## 2025-03-25 NOTE — TELEPHONE ENCOUNTER
Attempted to call pt to r/s appt with Dr. Alvarado, as she will be out of the office 3/27. VM not set up, so could not leave a message.

## 2025-03-27 ENCOUNTER — TELEPHONE (OUTPATIENT)
Dept: INTERNAL MEDICINE CLINIC | Age: 65
End: 2025-03-27

## 2025-03-27 NOTE — TELEPHONE ENCOUNTER
Patient called to be sure he is to do betadine dressing DAILY   . I told him yes , Dr. Antonia Godwin wants wound cleaned with betadine daily and apply dry dressing . Patient verbalized understanding and repeated back correctly

## 2025-03-31 ENCOUNTER — OFFICE VISIT (OUTPATIENT)
Dept: INTERNAL MEDICINE CLINIC | Age: 65
End: 2025-03-31
Payer: COMMERCIAL

## 2025-03-31 DIAGNOSIS — I96 GANGRENE OF TOE OF RIGHT FOOT (HCC): Primary | ICD-10-CM

## 2025-03-31 DIAGNOSIS — R23.4 ESCHAR OF FOOT: ICD-10-CM

## 2025-03-31 DIAGNOSIS — Z72.0 TOBACCO ABUSE DISORDER: ICD-10-CM

## 2025-03-31 DIAGNOSIS — R60.0 EDEMA OF BOTH FEET: ICD-10-CM

## 2025-03-31 DIAGNOSIS — Z86.718 HISTORY OF DVT (DEEP VEIN THROMBOSIS): ICD-10-CM

## 2025-03-31 DIAGNOSIS — I73.9 PERIPHERAL ARTERIAL DISEASE: ICD-10-CM

## 2025-03-31 PROCEDURE — 99213 OFFICE O/P EST LOW 20 MIN: CPT

## 2025-03-31 NOTE — PATIENT INSTRUCTIONS
Keep your appointment ion Thursday with Dr. Roche.  Paint wound with betadine swab then cover with dry dressing .

## 2025-03-31 NOTE — PROGRESS NOTES
Department of Podiatry  Resident Progress Note    Natan Lynch  Allergies: Patient has no known allergies.    SUBJECTIVE  The patient is a 64 y.o. male who presents to [facility] podiatry clinic today after being seen in [facility] ED on 3/18. Patient was seen in the ED for bilateral lower extremity ulcerations with concern for infection. He noticed discoloration to his right foot first and was referred to Dr. Alvarado who was him on 3/11. Pt states that his appointment on 03/27 was cancelled and he will be following up with her on 03/27/2025 as directed. He states that as instructed at his last visit he has been painting the feet with betadine ans covering them with gauze. He has been keeping the feet dry when showering. Patient was given Keflex and Bactrim in the ED and has since finished these in their entirety. Patient is upset that he may lose digits or more. Patient denies any other pedal complaints today. Patient denies any N/F/V/SOB/CP.     Past Medical History:    PMH of PAD, PVD, DVT/PE on ATC     REVIEW OF SYSTEMS:  A 12 point review of systems is unremarkable with the exception of the chief complaint. Patient specifically denies nausea, vomiting, fever, chills, shortness of breath, chest pain, abdominal pain, constipation, or difficulty urinating.     OBJECTIVE  Patient presents to clinic today unaccompanied and unassisted wearing boots. Patient is Aox3 and appears in NAD.    VASCULAR: DP and PT pulses are non-palpable 0/4 b/l.  Upon prior Doppler examination DP and PT pulses bilateral are found to be monophasic b/l.  CFT is delayed to the digits of the foot b/l. Skin temperature is cool to cool from proximal to distal with no focal calor.  Significant coolness appreciated to the right distal forefoot compared to the left foot.  No edema. No pain with calf compression b/l.     NEUROLOGIC: Gross and epicritic sensation is intact b/l. Protective sensation is intact at all pedal sites b/l.     DERMATOLOGIC:   Patient

## 2025-04-03 ENCOUNTER — OFFICE VISIT (OUTPATIENT)
Dept: VASCULAR SURGERY | Age: 65
End: 2025-04-03

## 2025-04-03 VITALS
HEIGHT: 78 IN | TEMPERATURE: 96.4 F | SYSTOLIC BLOOD PRESSURE: 90 MMHG | HEART RATE: 114 BPM | BODY MASS INDEX: 19.44 KG/M2 | DIASTOLIC BLOOD PRESSURE: 62 MMHG | OXYGEN SATURATION: 97 % | WEIGHT: 168 LBS

## 2025-04-03 DIAGNOSIS — I70.263 ATHEROSCLEROSIS OF NATIVE ARTERY OF BOTH LOWER EXTREMITIES WITH GANGRENE: Primary | ICD-10-CM

## 2025-04-03 DIAGNOSIS — F17.200 NICOTINE DEPENDENCE WITH CURRENT USE: ICD-10-CM

## 2025-04-03 RX ORDER — VARENICLINE TARTRATE 0.5 MG/1
.5-1 TABLET, FILM COATED ORAL SEE ADMIN INSTRUCTIONS
Qty: 57 TABLET | Refills: 0 | Status: SHIPPED | OUTPATIENT
Start: 2025-04-03

## 2025-04-03 NOTE — PROGRESS NOTES
4/3/2025     Natan Lynch (:  1960) is a 64 y.o. male,Established patient, here for evaluation of the following chief complaint(s):  Follow-up ( f/u CTA w/runoff/)        ASSESSMENT/PLAN:  1. 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 Nicotine dependence with current use    CTA shows bilateral tibial artery disease.  He has moderate tissue loss of left lateral foot, and minor right foot (Toes).    Recommend lower extremity arteriogram--scheduled for next week.   Interested in Chantix for smoking cessation.  Rx sent to Pharmacy.  All questions answered and wishes to proceed.     No follow-ups on file.       SUBJECTIVE/OBJECTIVE:  Mr. Lynch returns today in follow up of his CTA runoff and claudication symptoms.   He presented to the ED on 3./18, and was prescribed antibiotics.   Started care at Podiatry Clinic on 3/31.  CTA showed severe tibial artery disease.  Still having pain, coolness, especially in left foot.  No fevers or chills.  No drainage from the right toes or left lateral foot ulcer.   Wants to stop smoking.  Interested in Chantix.    Component      Latest Ref Rng 3/18/2025   WBC      4.0 - 11.0 K/uL 6.7    RBC      4.20 - 5.90 M/uL 4.23    Hemoglobin Quant      13.5 - 17.5 g/dL 12.7 (L)    Hematocrit      40.5 - 52.5 % 37.9 (L)    MCV      80.0 - 100.0 fL 89.5    MCH      26.0 - 34.0 pg 30.0    MCHC      31.0 - 36.0 g/dL 33.6    RDW      12.4 - 15.4 % 11.3 (L)    Platelet Count      135 - 450 K/uL 349       Component      Latest Ref Rng 3/18/2025   Sodium      136 - 145 mmol/L 133 (L)    Potassium      3.5 - 5.1 mmol/L 3.9    Chloride      99 - 110 mmol/L 95 (L)    CARBON DIOXIDE      21 - 32 mmol/L 25    Anion Gap      3 - 16  13    Glucose      70 - 99 mg/dL 105 (H)    BUN,BUNPL      7 - 20 mg/dL 5 (L)    Creatinine      0.8 - 1.3 mg/dL 0.8       Physical Exam  Constitutional:       Appearance: Normal appearance.   Cardiovascular:      Rate and Rhythm: Normal

## 2025-04-08 NOTE — PROGRESS NOTES
Attempted to call patient about procedure. No answer. Unable to leave message. Voicemail not set up.

## 2025-04-09 ENCOUNTER — HOSPITAL ENCOUNTER (INPATIENT)
Age: 65
LOS: 15 days | Discharge: NURSING, REHAB OR LONG-TERM CARE | DRG: 197 | End: 2025-04-24
Attending: SURGERY | Admitting: SURGERY
Payer: COMMERCIAL

## 2025-04-09 DIAGNOSIS — I82.402 DEEP VEIN THROMBOSIS (DVT) OF LEFT LOWER EXTREMITY, UNSPECIFIED CHRONICITY, UNSPECIFIED VEIN (HCC): ICD-10-CM

## 2025-04-09 DIAGNOSIS — I73.9 PVD (PERIPHERAL VASCULAR DISEASE): ICD-10-CM

## 2025-04-09 DIAGNOSIS — Z01.810 PRE-OPERATIVE CARDIOVASCULAR EXAMINATION: Primary | ICD-10-CM

## 2025-04-09 DIAGNOSIS — I73.9 PAD (PERIPHERAL ARTERY DISEASE): ICD-10-CM

## 2025-04-09 DIAGNOSIS — I99.8 ISCHEMIA OF LEFT LOWER EXTREMITY: ICD-10-CM

## 2025-04-09 DIAGNOSIS — I96 GANGRENE (HCC): ICD-10-CM

## 2025-04-09 LAB
ANION GAP SERPL CALCULATED.3IONS-SCNC: 11 MMOL/L (ref 3–16)
BUN SERPL-MCNC: 4 MG/DL (ref 7–20)
CALCIUM SERPL-MCNC: 9.5 MG/DL (ref 8.3–10.6)
CHLORIDE SERPL-SCNC: 100 MMOL/L (ref 99–110)
CO2 SERPL-SCNC: 27 MMOL/L (ref 21–32)
CREAT SERPL-MCNC: 0.8 MG/DL (ref 0.8–1.3)
DEPRECATED RDW RBC AUTO: 12 % (ref 12.4–15.4)
ECHO BSA: 1.99 M2
GFR SERPLBLD CREATININE-BSD FMLA CKD-EPI: >90 ML/MIN/{1.73_M2}
GLUCOSE SERPL-MCNC: 99 MG/DL (ref 70–99)
HCT VFR BLD AUTO: 34.4 % (ref 40.5–52.5)
HGB BLD-MCNC: 11.2 G/DL (ref 13.5–17.5)
INR PPP: 1.06 (ref 0.85–1.15)
MCH RBC QN AUTO: 29.4 PG (ref 26–34)
MCHC RBC AUTO-ENTMCNC: 32.5 G/DL (ref 31–36)
MCV RBC AUTO: 90.3 FL (ref 80–100)
PLATELET # BLD AUTO: 404 K/UL (ref 135–450)
PMV BLD AUTO: 6.7 FL (ref 5–10.5)
POTASSIUM SERPL-SCNC: 3.5 MMOL/L (ref 3.5–5.1)
PROTHROMBIN TIME: 14 SEC (ref 11.9–14.9)
RBC # BLD AUTO: 3.81 M/UL (ref 4.2–5.9)
SODIUM SERPL-SCNC: 138 MMOL/L (ref 136–145)
WBC # BLD AUTO: 6.5 K/UL (ref 4–11)

## 2025-04-09 PROCEDURE — 99152 MOD SED SAME PHYS/QHP 5/>YRS: CPT | Performed by: SURGERY

## 2025-04-09 PROCEDURE — 6370000000 HC RX 637 (ALT 250 FOR IP): Performed by: SURGERY

## 2025-04-09 PROCEDURE — 2580000003 HC RX 258

## 2025-04-09 PROCEDURE — 75710 ARTERY X-RAYS ARM/LEG: CPT | Performed by: SURGERY

## 2025-04-09 PROCEDURE — 2709999900 HC NON-CHARGEABLE SUPPLY: Performed by: SURGERY

## 2025-04-09 PROCEDURE — C1887 CATHETER, GUIDING: HCPCS | Performed by: SURGERY

## 2025-04-09 PROCEDURE — 6370000000 HC RX 637 (ALT 250 FOR IP)

## 2025-04-09 PROCEDURE — C1769 GUIDE WIRE: HCPCS | Performed by: SURGERY

## 2025-04-09 PROCEDURE — C1760 CLOSURE DEV, VASC: HCPCS | Performed by: SURGERY

## 2025-04-09 PROCEDURE — B41GZZZ FLUOROSCOPY OF LEFT LOWER EXTREMITY ARTERIES: ICD-10-PCS | Performed by: SURGERY

## 2025-04-09 PROCEDURE — 2500000003 HC RX 250 WO HCPCS

## 2025-04-09 PROCEDURE — 36247 INS CATH ABD/L-EXT ART 3RD: CPT | Performed by: SURGERY

## 2025-04-09 PROCEDURE — 1200000000 HC SEMI PRIVATE

## 2025-04-09 PROCEDURE — 99153 MOD SED SAME PHYS/QHP EA: CPT | Performed by: SURGERY

## 2025-04-09 PROCEDURE — 85610 PROTHROMBIN TIME: CPT

## 2025-04-09 PROCEDURE — 7100000011 HC PHASE II RECOVERY - ADDTL 15 MIN: Performed by: SURGERY

## 2025-04-09 PROCEDURE — 6360000004 HC RX CONTRAST MEDICATION: Performed by: SURGERY

## 2025-04-09 PROCEDURE — 80048 BASIC METABOLIC PNL TOTAL CA: CPT

## 2025-04-09 PROCEDURE — 85027 COMPLETE CBC AUTOMATED: CPT

## 2025-04-09 PROCEDURE — 7100000010 HC PHASE II RECOVERY - FIRST 15 MIN: Performed by: SURGERY

## 2025-04-09 PROCEDURE — 6360000002 HC RX W HCPCS: Performed by: SURGERY

## 2025-04-09 PROCEDURE — C1894 INTRO/SHEATH, NON-LASER: HCPCS | Performed by: SURGERY

## 2025-04-09 PROCEDURE — 75774 ARTERY X-RAY EACH VESSEL: CPT | Performed by: SURGERY

## 2025-04-09 RX ORDER — ONDANSETRON 2 MG/ML
4 INJECTION INTRAMUSCULAR; INTRAVENOUS EVERY 6 HOURS PRN
Status: DISCONTINUED | OUTPATIENT
Start: 2025-04-09 | End: 2025-04-24 | Stop reason: HOSPADM

## 2025-04-09 RX ORDER — MIDAZOLAM HYDROCHLORIDE 1 MG/ML
INJECTION, SOLUTION INTRAMUSCULAR; INTRAVENOUS PRN
Status: DISCONTINUED | OUTPATIENT
Start: 2025-04-09 | End: 2025-04-09 | Stop reason: HOSPADM

## 2025-04-09 RX ORDER — LIDOCAINE HYDROCHLORIDE 10 MG/ML
INJECTION, SOLUTION EPIDURAL; INFILTRATION; INTRACAUDAL; PERINEURAL PRN
Status: DISCONTINUED | OUTPATIENT
Start: 2025-04-09 | End: 2025-04-09 | Stop reason: HOSPADM

## 2025-04-09 RX ORDER — SODIUM CHLORIDE 9 MG/ML
INJECTION, SOLUTION INTRAVENOUS CONTINUOUS
Status: DISCONTINUED | OUTPATIENT
Start: 2025-04-10 | End: 2025-04-13

## 2025-04-09 RX ORDER — DIAZEPAM 5 MG/1
5 TABLET ORAL EVERY 12 HOURS
Status: DISCONTINUED | OUTPATIENT
Start: 2025-04-09 | End: 2025-04-11

## 2025-04-09 RX ORDER — SODIUM CHLORIDE 9 MG/ML
INJECTION, SOLUTION INTRAVENOUS PRN
Status: DISCONTINUED | OUTPATIENT
Start: 2025-04-09 | End: 2025-04-24 | Stop reason: HOSPADM

## 2025-04-09 RX ORDER — SODIUM CHLORIDE 0.9 % (FLUSH) 0.9 %
5-40 SYRINGE (ML) INJECTION EVERY 12 HOURS SCHEDULED
Status: DISCONTINUED | OUTPATIENT
Start: 2025-04-09 | End: 2025-04-24 | Stop reason: HOSPADM

## 2025-04-09 RX ORDER — SODIUM CHLORIDE 9 MG/ML
INJECTION, SOLUTION INTRAVENOUS CONTINUOUS
Status: DISCONTINUED | OUTPATIENT
Start: 2025-04-09 | End: 2025-04-09

## 2025-04-09 RX ORDER — MORPHINE SULFATE 2 MG/ML
2 INJECTION, SOLUTION INTRAMUSCULAR; INTRAVENOUS
Status: DISCONTINUED | OUTPATIENT
Start: 2025-04-09 | End: 2025-04-14

## 2025-04-09 RX ORDER — OXYCODONE HYDROCHLORIDE 5 MG/1
10 TABLET ORAL EVERY 4 HOURS PRN
Status: DISCONTINUED | OUTPATIENT
Start: 2025-04-09 | End: 2025-04-14

## 2025-04-09 RX ORDER — OXYCODONE HYDROCHLORIDE 5 MG/1
10 TABLET ORAL EVERY 4 HOURS PRN
Status: DISCONTINUED | OUTPATIENT
Start: 2025-04-09 | End: 2025-04-11 | Stop reason: SDUPTHER

## 2025-04-09 RX ORDER — ASPIRIN 81 MG/1
81 TABLET ORAL DAILY
Status: DISCONTINUED | OUTPATIENT
Start: 2025-04-09 | End: 2025-04-14

## 2025-04-09 RX ORDER — FENTANYL CITRATE 50 UG/ML
INJECTION, SOLUTION INTRAMUSCULAR; INTRAVENOUS PRN
Status: DISCONTINUED | OUTPATIENT
Start: 2025-04-09 | End: 2025-04-09 | Stop reason: HOSPADM

## 2025-04-09 RX ORDER — LABETALOL HYDROCHLORIDE 5 MG/ML
10 INJECTION, SOLUTION INTRAVENOUS
Status: DISCONTINUED | OUTPATIENT
Start: 2025-04-09 | End: 2025-04-24 | Stop reason: HOSPADM

## 2025-04-09 RX ORDER — SODIUM CHLORIDE 0.9 % (FLUSH) 0.9 %
5-40 SYRINGE (ML) INJECTION PRN
Status: DISCONTINUED | OUTPATIENT
Start: 2025-04-09 | End: 2025-04-24 | Stop reason: HOSPADM

## 2025-04-09 RX ORDER — METHOCARBAMOL 500 MG/1
1000 TABLET, FILM COATED ORAL 3 TIMES DAILY
Status: DISCONTINUED | OUTPATIENT
Start: 2025-04-09 | End: 2025-04-15

## 2025-04-09 RX ORDER — IOPAMIDOL 612 MG/ML
INJECTION, SOLUTION INTRAVASCULAR PRN
Status: DISCONTINUED | OUTPATIENT
Start: 2025-04-09 | End: 2025-04-09 | Stop reason: HOSPADM

## 2025-04-09 RX ORDER — MORPHINE SULFATE 2 MG/ML
4 INJECTION, SOLUTION INTRAMUSCULAR; INTRAVENOUS
Status: DISCONTINUED | OUTPATIENT
Start: 2025-04-09 | End: 2025-04-14

## 2025-04-09 RX ORDER — HEPARIN SODIUM 1000 [USP'U]/ML
INJECTION, SOLUTION INTRAVENOUS; SUBCUTANEOUS PRN
Status: DISCONTINUED | OUTPATIENT
Start: 2025-04-09 | End: 2025-04-09 | Stop reason: HOSPADM

## 2025-04-09 RX ORDER — ONDANSETRON 4 MG/1
4 TABLET, ORALLY DISINTEGRATING ORAL EVERY 8 HOURS PRN
Status: DISCONTINUED | OUTPATIENT
Start: 2025-04-09 | End: 2025-04-24 | Stop reason: HOSPADM

## 2025-04-09 RX ADMIN — METHOCARBAMOL 1000 MG: 500 TABLET ORAL at 20:44

## 2025-04-09 RX ADMIN — SODIUM CHLORIDE, PRESERVATIVE FREE 10 ML: 5 INJECTION INTRAVENOUS at 20:48

## 2025-04-09 RX ADMIN — SODIUM CHLORIDE: 0.9 INJECTION, SOLUTION INTRAVENOUS at 16:53

## 2025-04-09 RX ADMIN — OXYCODONE 10 MG: 5 TABLET ORAL at 20:47

## 2025-04-09 RX ADMIN — SODIUM CHLORIDE: 0.9 INJECTION, SOLUTION INTRAVENOUS at 20:50

## 2025-04-09 RX ADMIN — OXYCODONE 10 MG: 5 TABLET ORAL at 14:03

## 2025-04-09 ASSESSMENT — PAIN SCALES - WONG BAKER
WONGBAKER_NUMERICALRESPONSE: NO HURT
WONGBAKER_NUMERICALRESPONSE: NO HURT

## 2025-04-09 ASSESSMENT — PAIN - FUNCTIONAL ASSESSMENT
PAIN_FUNCTIONAL_ASSESSMENT: PREVENTS OR INTERFERES SOME ACTIVE ACTIVITIES AND ADLS

## 2025-04-09 ASSESSMENT — PAIN DESCRIPTION - DESCRIPTORS
DESCRIPTORS: BURNING
DESCRIPTORS: ACHING;GNAWING;THROBBING
DESCRIPTORS: ACHING;GNAWING;THROBBING

## 2025-04-09 ASSESSMENT — PAIN SCALES - GENERAL
PAINLEVEL_OUTOF10: 6
PAINLEVEL_OUTOF10: 7
PAINLEVEL_OUTOF10: 6
PAINLEVEL_OUTOF10: 8
PAINLEVEL_OUTOF10: 6

## 2025-04-09 ASSESSMENT — PAIN DESCRIPTION - ORIENTATION
ORIENTATION: LEFT;RIGHT
ORIENTATION: LEFT;RIGHT
ORIENTATION: RIGHT;LEFT

## 2025-04-09 ASSESSMENT — PAIN DESCRIPTION - LOCATION
LOCATION: FOOT

## 2025-04-09 NOTE — PROGRESS NOTES
4 Eyes Skin Assessment     NAME:  Natan Lynch  YOB: 1960  MEDICAL RECORD NUMBER:  [id number]    The patient is being assessed for  Admission    I agree that at least one RN has performed a thorough Head to Toe Skin Assessment on the patient. ALL assessment sites listed below have been assessed.      Areas assessed by both nurses:    Head, Face, Ears, Shoulders, Back, Chest, Arms, Elbows, Hands, Sacrum. Buttock, Coccyx, Ischium, Legs. Feet and Heels, and Under Medical Devices   Right foot first two toes necrotic   Left foot lateral anterior side ulcer   Blanchable redness on bilateral heels   Right femoral puncture site       Does the Patient have a Wound? Yes wound(s) were present on assessment. LDA wound assessment was Initiated and completed by TERESO Rodriges Prevention initiated by RN: Yes  Wound Care Orders initiated by RN: Yes    Pressure Injury (Stage 3,4, Unstageable, DTI, NWPT, and Complex wounds) if present, place Wound referral order by RN under : Yes    New Ostomies, if present place, Ostomy referral order under : No     Nurse 1 eSignature: Electronically signed by Nissa Marrero RN on 4/9/25 at 6:26 PM EDT    **SHARE this note so that the co-signing nurse can place an eSignature**    Nurse 2 eSignature: Electronically signed by Gonzalez Kumar RN on 4/9/25 at 6:19 PM EDT            Spoke with Britney and Verena Arroyo on speaker phone, infromed we did not get any call or information from her hospital stay in [place]. She states it was because of a syncopal episode. Discussed with both Dr Barnett would feel for continuity of care to see Dr Reid. He also works out of [facility] which is close to [place]. They will check if her driving service will take her to [place]. Verena Arroyo also has had her mammograms done at [facility]. Informed to have mammogram done at [facility] since it is all one system and she will not need to get the records.  I inquired if her POA is activated and Britney said she is not sure what that in tails. I tried to explain that usually a Dr signs off saying the patient is not able to take care of her or himself, she states she is not sure. Suggested to contact the  to verify. Britney acknowledged an understanding.  Contact information given for [facility].

## 2025-04-09 NOTE — PROGRESS NOTES
Cath Lab Pre Procedure Flowsheet    Plan of Care:     Hemodynamics and cardiac rhythm will remain stable.   Comfort level will be maintained.   Respiratory function will remain adequate.   Pt/family will verbalize understanding of the procedure.   Procedure will be tolerated without complications.   Patient will recover from procedure without complications.   ID armband on patient and identification verified.   Informed consent obtained.   Non invasive blood pressure cuff applied, monitoring initiated.   EKG pads and pulse oximeter applied, monitoring initiated.   Instructions given. Patient and / or family verbalize understanding.   H[facility] will [facility].     Pre-procedure:    NPO Status: Pt has been NPO since midnight. .    Contrast / IV Dye Allergy: None    Pregnancy Test: N/A.    Prep Sites: Groin(s)    Richard's Test: N/A    Pulses: Bilat Radial 2, Bilat Fem - BONIFACIO, Bilat DP/PT - BONIFACIO, wrapped in dressings    Anticoagulants: Xarelto. Last Dose: per pt, \"past week, Rx unable to fill\".  Any missed doses:  Yes..     Antiplatelets: None.     Chief Complaint:      Diabetic: No    Pre EKG Rhythm: Sinus rhythm    Pre SBP: 126    IV access: PIV #22 LFA    Pre-procedure blood work collected by: MORIAH Barboza RN    NIH Scale: N/A

## 2025-04-09 NOTE — H&P
Vascular Surgery   Resident History and Physical       Chief Complaint: Peripheral Arterial Disease    History of Present Illness:    Natan Lynch is a 64 y.o. male with history of DVT/PE on Xarelto, PAD, and tobacco abuse who initially presented to [facility] cath lab for elective LLE arteriogram earlier today.  Patient was unable to tolerate procedure due to continued movement despite deep sedation; we will plan for intervention tomorrow under general anesthesia.  Patient currently recovering in cath lab recovery bay.    On post operative check, patient reports he feels cramping to his RLQ which he believes is due to the fem stop. Fem stop removed. Patient reports improvement in discomfort. No numbness no tingling.     Past Medical History:        Diagnosis Date    Bilateral pulmonary embolism (HCC)     DVT (deep venous thrombosis) (HCC)     Peripheral artery disease     Tobacco abuse        Past Surgical History:    History reviewed. No pertinent surgical history.    Allergies:    Patient has no known allergies.    Medications:   Home Meds  No current facility-administered medications on file prior to encounter.     Current Outpatient Medications on File Prior to Encounter   Medication Sig Dispense Refill    rivaroxaban (XARELTO) 20 MG TABS tablet Take 1 tablet by mouth daily (with breakfast) 90 tablet 3    acetaminophen (TYLENOL) 500 MG tablet Take 1 tablet by mouth 4 times daily as needed for Pain 120 tablet 0    varenicline (CHANTIX) 0.5 MG tablet Take 1-2 tablets by mouth See Admin Instructions 0.5mg DAILY for 3 days followed by 0.5mg TWICE DAILY for 4 days followed by 1mg TWICE DAILY 57 tablet 0       Current Meds  0.9 % sodium chloride infusion, Continuous  sodium chloride flush 0.9 % injection 5-40 mL, 2 times per day  sodium chloride flush 0.9 % injection 5-40 mL, PRN  0.9 % sodium chloride infusion, PRN  labetalol (NORMODYNE;TRANDATE) injection 10 mg, Q2H PRN  morphine (PF) injection 2 mg, Q1H PRN

## 2025-04-09 NOTE — BRIEF OP NOTE
Brief Postoperative Note      Patient: Natan Lynch  YOB: 1960  MRN: [id number]    Date of Procedure: 4/9/2025    Pre-Op Diagnosis Codes:      * PVD (peripheral vascular disease) [I73.9]    Post-Op Diagnosis: Same       Procedure:  Ultrasound-guided access right common femoral artery  Third order selective catheterization left anterior tibial artery    Surgeon(s):  Casandra Alvarado MD    Assistant:  * No surgical staff found *    Anesthesia: IV Sedation    Estimated Blood Loss (mL): Minimal    Complications: None    Specimens:   * No specimens in log *    Implants:  * No implants in log *      Drains: * No LDAs found *    Findings:  Infection Present At Time Of Surgery (PATOS) (choose all levels that have infection present):  - Superficial Infection (skin/subcutaneous) present as evidenced by fluid consistent with infection  Other Findings: Severe tibial artery disease.  Unable to complete tibial artery angioplasty due to significant patient movement despite deep sedation.  Will return tomorrow for tibial intervention under general anesthesia.     Electronically signed by Casandra Alvarado MD on 4/9/2025 at 12:53 PM

## 2025-04-10 ENCOUNTER — ANESTHESIA EVENT (OUTPATIENT)
Dept: INTERVENTIONAL RADIOLOGY/VASCULAR | Age: 65
End: 2025-04-10
Payer: COMMERCIAL

## 2025-04-10 ENCOUNTER — ANESTHESIA (OUTPATIENT)
Dept: INTERVENTIONAL RADIOLOGY/VASCULAR | Age: 65
End: 2025-04-10
Payer: COMMERCIAL

## 2025-04-10 PROBLEM — E43 SEVERE MALNUTRITION: Chronic | Status: ACTIVE | Noted: 2025-04-10

## 2025-04-10 LAB
ALBUMIN SERPL-MCNC: 2.8 G/DL (ref 3.4–5)
ANION GAP SERPL CALCULATED.3IONS-SCNC: 8 MMOL/L (ref 3–16)
BASOPHILS # BLD: 0 K/UL (ref 0–0.2)
BASOPHILS NFR BLD: 0.5 %
BUN SERPL-MCNC: 6 MG/DL (ref 7–20)
CALCIUM SERPL-MCNC: 8.5 MG/DL (ref 8.3–10.6)
CHLORIDE SERPL-SCNC: 104 MMOL/L (ref 99–110)
CO2 SERPL-SCNC: 24 MMOL/L (ref 21–32)
CREAT SERPL-MCNC: 0.7 MG/DL (ref 0.8–1.3)
DEPRECATED RDW RBC AUTO: 12.1 % (ref 12.4–15.4)
ECHO BSA: 1.99 M2
EOSINOPHIL # BLD: 0.1 K/UL (ref 0–0.6)
EOSINOPHIL NFR BLD: 1.3 %
GFR SERPLBLD CREATININE-BSD FMLA CKD-EPI: >90 ML/MIN/{1.73_M2}
GLUCOSE SERPL-MCNC: 88 MG/DL (ref 70–99)
HCT VFR BLD AUTO: 29.8 % (ref 40.5–52.5)
HGB BLD-MCNC: 10 G/DL (ref 13.5–17.5)
LYMPHOCYTES # BLD: 1.3 K/UL (ref 1–5.1)
LYMPHOCYTES NFR BLD: 24.6 %
MAGNESIUM SERPL-MCNC: 2.07 MG/DL (ref 1.8–2.4)
MCH RBC QN AUTO: 30 PG (ref 26–34)
MCHC RBC AUTO-ENTMCNC: 33.4 G/DL (ref 31–36)
MCV RBC AUTO: 89.8 FL (ref 80–100)
MONOCYTES # BLD: 0.5 K/UL (ref 0–1.3)
MONOCYTES NFR BLD: 8.6 %
NEUTROPHILS # BLD: 3.5 K/UL (ref 1.7–7.7)
NEUTROPHILS NFR BLD: 65 %
PHOSPHATE SERPL-MCNC: 3.1 MG/DL (ref 2.5–4.9)
PLATELET # BLD AUTO: 284 K/UL (ref 135–450)
PMV BLD AUTO: 6.6 FL (ref 5–10.5)
POTASSIUM SERPL-SCNC: 3.7 MMOL/L (ref 3.5–5.1)
RBC # BLD AUTO: 3.32 M/UL (ref 4.2–5.9)
SODIUM SERPL-SCNC: 136 MMOL/L (ref 136–145)
WBC # BLD AUTO: 5.4 K/UL (ref 4–11)

## 2025-04-10 PROCEDURE — B41FZZZ FLUOROSCOPY OF RIGHT LOWER EXTREMITY ARTERIES: ICD-10-PCS | Performed by: SURGERY

## 2025-04-10 PROCEDURE — 6370000000 HC RX 637 (ALT 250 FOR IP)

## 2025-04-10 PROCEDURE — 7100000000 HC PACU RECOVERY - FIRST 15 MIN: Performed by: SURGERY

## 2025-04-10 PROCEDURE — 6360000004 HC RX CONTRAST MEDICATION: Performed by: SURGERY

## 2025-04-10 PROCEDURE — B41GZZZ FLUOROSCOPY OF LEFT LOWER EXTREMITY ARTERIES: ICD-10-PCS | Performed by: SURGERY

## 2025-04-10 PROCEDURE — 7100000001 HC PACU RECOVERY - ADDTL 15 MIN: Performed by: SURGERY

## 2025-04-10 PROCEDURE — 2500000003 HC RX 250 WO HCPCS: Performed by: SURGERY

## 2025-04-10 PROCEDURE — 36415 COLL VENOUS BLD VENIPUNCTURE: CPT

## 2025-04-10 PROCEDURE — 3700000000 HC ANESTHESIA ATTENDED CARE: Performed by: SURGERY

## 2025-04-10 PROCEDURE — C1760 CLOSURE DEV, VASC: HCPCS | Performed by: SURGERY

## 2025-04-10 PROCEDURE — C1769 GUIDE WIRE: HCPCS | Performed by: SURGERY

## 2025-04-10 PROCEDURE — 2580000003 HC RX 258

## 2025-04-10 PROCEDURE — 3700000001 HC ADD 15 MINUTES (ANESTHESIA): Performed by: SURGERY

## 2025-04-10 PROCEDURE — 36247 INS CATH ABD/L-EXT ART 3RD: CPT | Performed by: SURGERY

## 2025-04-10 PROCEDURE — 75774 ARTERY X-RAY EACH VESSEL: CPT | Performed by: SURGERY

## 2025-04-10 PROCEDURE — 6360000002 HC RX W HCPCS

## 2025-04-10 PROCEDURE — 36140 INTRO NDL ICATH UPR/LXTR ART: CPT | Performed by: SURGERY

## 2025-04-10 PROCEDURE — 75716 ARTERY X-RAYS ARMS/LEGS: CPT | Performed by: SURGERY

## 2025-04-10 PROCEDURE — C1887 CATHETER, GUIDING: HCPCS | Performed by: SURGERY

## 2025-04-10 PROCEDURE — 85025 COMPLETE CBC W/AUTO DIFF WBC: CPT

## 2025-04-10 PROCEDURE — 2709999900 HC NON-CHARGEABLE SUPPLY: Performed by: SURGERY

## 2025-04-10 PROCEDURE — 2060000000 HC ICU INTERMEDIATE R&B

## 2025-04-10 PROCEDURE — 80069 RENAL FUNCTION PANEL: CPT

## 2025-04-10 PROCEDURE — 2500000003 HC RX 250 WO HCPCS

## 2025-04-10 PROCEDURE — C1894 INTRO/SHEATH, NON-LASER: HCPCS | Performed by: SURGERY

## 2025-04-10 PROCEDURE — 83735 ASSAY OF MAGNESIUM: CPT

## 2025-04-10 PROCEDURE — 6360000002 HC RX W HCPCS: Performed by: SURGERY

## 2025-04-10 RX ORDER — SODIUM CHLORIDE 0.9 % (FLUSH) 0.9 %
5-40 SYRINGE (ML) INJECTION PRN
Status: DISCONTINUED | OUTPATIENT
Start: 2025-04-10 | End: 2025-04-10 | Stop reason: HOSPADM

## 2025-04-10 RX ORDER — FENTANYL CITRATE 50 UG/ML
25 INJECTION, SOLUTION INTRAMUSCULAR; INTRAVENOUS EVERY 5 MIN PRN
Status: DISCONTINUED | OUTPATIENT
Start: 2025-04-10 | End: 2025-04-10 | Stop reason: HOSPADM

## 2025-04-10 RX ORDER — PROPOFOL 10 MG/ML
INJECTION, EMULSION INTRAVENOUS
Status: DISCONTINUED | OUTPATIENT
Start: 2025-04-10 | End: 2025-04-10 | Stop reason: SDUPTHER

## 2025-04-10 RX ORDER — HEPARIN SODIUM 1000 [USP'U]/ML
INJECTION, SOLUTION INTRAVENOUS; SUBCUTANEOUS
Status: DISCONTINUED | OUTPATIENT
Start: 2025-04-10 | End: 2025-04-10 | Stop reason: SDUPTHER

## 2025-04-10 RX ORDER — DOBUTAMINE HYDROCHLORIDE 200 MG/100ML
10 INJECTION INTRAVENOUS CONTINUOUS
Status: CANCELLED | OUTPATIENT
Start: 2025-04-10

## 2025-04-10 RX ORDER — SODIUM CHLORIDE 0.9 % (FLUSH) 0.9 %
5-40 SYRINGE (ML) INJECTION EVERY 12 HOURS SCHEDULED
Status: DISCONTINUED | OUTPATIENT
Start: 2025-04-10 | End: 2025-04-10 | Stop reason: HOSPADM

## 2025-04-10 RX ORDER — EPHEDRINE SULFATE 50 MG/ML
INJECTION INTRAVENOUS
Status: DISCONTINUED | OUTPATIENT
Start: 2025-04-10 | End: 2025-04-10 | Stop reason: SDUPTHER

## 2025-04-10 RX ORDER — IOPAMIDOL 612 MG/ML
INJECTION, SOLUTION INTRAVASCULAR PRN
Status: DISCONTINUED | OUTPATIENT
Start: 2025-04-10 | End: 2025-04-10 | Stop reason: HOSPADM

## 2025-04-10 RX ORDER — SODIUM CHLORIDE 9 MG/ML
INJECTION, SOLUTION INTRAVENOUS PRN
Status: DISCONTINUED | OUTPATIENT
Start: 2025-04-10 | End: 2025-04-10 | Stop reason: HOSPADM

## 2025-04-10 RX ORDER — HALOPERIDOL 5 MG/ML
1 INJECTION INTRAMUSCULAR
Status: DISCONTINUED | OUTPATIENT
Start: 2025-04-10 | End: 2025-04-10 | Stop reason: HOSPADM

## 2025-04-10 RX ORDER — LIDOCAINE HYDROCHLORIDE 20 MG/ML
INJECTION, SOLUTION INTRAVENOUS
Status: DISCONTINUED | OUTPATIENT
Start: 2025-04-10 | End: 2025-04-10 | Stop reason: SDUPTHER

## 2025-04-10 RX ORDER — NALOXONE HYDROCHLORIDE 0.4 MG/ML
INJECTION, SOLUTION INTRAMUSCULAR; INTRAVENOUS; SUBCUTANEOUS PRN
Status: DISCONTINUED | OUTPATIENT
Start: 2025-04-10 | End: 2025-04-10 | Stop reason: HOSPADM

## 2025-04-10 RX ORDER — DEXAMETHASONE SODIUM PHOSPHATE 4 MG/ML
INJECTION, SOLUTION INTRA-ARTICULAR; INTRALESIONAL; INTRAMUSCULAR; INTRAVENOUS; SOFT TISSUE
Status: DISCONTINUED | OUTPATIENT
Start: 2025-04-10 | End: 2025-04-10 | Stop reason: SDUPTHER

## 2025-04-10 RX ORDER — SODIUM CHLORIDE, SODIUM LACTATE, POTASSIUM CHLORIDE, CALCIUM CHLORIDE 600; 310; 30; 20 MG/100ML; MG/100ML; MG/100ML; MG/100ML
INJECTION, SOLUTION INTRAVENOUS
Status: DISCONTINUED | OUTPATIENT
Start: 2025-04-10 | End: 2025-04-10 | Stop reason: SDUPTHER

## 2025-04-10 RX ORDER — HYDROMORPHONE HYDROCHLORIDE 1 MG/ML
0.5 INJECTION, SOLUTION INTRAMUSCULAR; INTRAVENOUS; SUBCUTANEOUS EVERY 5 MIN PRN
Status: DISCONTINUED | OUTPATIENT
Start: 2025-04-10 | End: 2025-04-10 | Stop reason: HOSPADM

## 2025-04-10 RX ORDER — SUCCINYLCHOLINE CHLORIDE 20 MG/ML
INJECTION INTRAMUSCULAR; INTRAVENOUS
Status: DISCONTINUED | OUTPATIENT
Start: 2025-04-10 | End: 2025-04-10 | Stop reason: SDUPTHER

## 2025-04-10 RX ORDER — ROCURONIUM BROMIDE 10 MG/ML
INJECTION, SOLUTION INTRAVENOUS
Status: DISCONTINUED | OUTPATIENT
Start: 2025-04-10 | End: 2025-04-10 | Stop reason: SDUPTHER

## 2025-04-10 RX ORDER — ONDANSETRON 2 MG/ML
INJECTION INTRAMUSCULAR; INTRAVENOUS
Status: DISCONTINUED | OUTPATIENT
Start: 2025-04-10 | End: 2025-04-10 | Stop reason: SDUPTHER

## 2025-04-10 RX ORDER — HYDRALAZINE HYDROCHLORIDE 20 MG/ML
10 INJECTION INTRAMUSCULAR; INTRAVENOUS
Status: DISCONTINUED | OUTPATIENT
Start: 2025-04-10 | End: 2025-04-10 | Stop reason: HOSPADM

## 2025-04-10 RX ORDER — PHENYLEPHRINE HYDROCHLORIDE 10 MG/ML
INJECTION INTRAVENOUS
Status: DISCONTINUED | OUTPATIENT
Start: 2025-04-10 | End: 2025-04-10 | Stop reason: SDUPTHER

## 2025-04-10 RX ORDER — PROCHLORPERAZINE EDISYLATE 5 MG/ML
5 INJECTION INTRAMUSCULAR; INTRAVENOUS
Status: DISCONTINUED | OUTPATIENT
Start: 2025-04-10 | End: 2025-04-10 | Stop reason: HOSPADM

## 2025-04-10 RX ADMIN — SUCCINYLCHOLINE CHLORIDE 160 MG: 20 INJECTION, SOLUTION INTRAMUSCULAR; INTRAVENOUS at 15:24

## 2025-04-10 RX ADMIN — ROCURONIUM BROMIDE 10 MG: 10 INJECTION, SOLUTION INTRAVENOUS at 16:31

## 2025-04-10 RX ADMIN — SODIUM CHLORIDE, PRESERVATIVE FREE 10 ML: 5 INJECTION INTRAVENOUS at 21:21

## 2025-04-10 RX ADMIN — SODIUM CHLORIDE, SODIUM LACTATE, POTASSIUM CHLORIDE, AND CALCIUM CHLORIDE: .6; .31; .03; .02 INJECTION, SOLUTION INTRAVENOUS at 15:17

## 2025-04-10 RX ADMIN — EPHEDRINE SULFATE 5 MG: 50 INJECTION INTRAVENOUS at 16:06

## 2025-04-10 RX ADMIN — ONDANSETRON 4 MG: 2 INJECTION INTRAMUSCULAR; INTRAVENOUS at 15:34

## 2025-04-10 RX ADMIN — SUGAMMADEX 200 MG: 100 INJECTION, SOLUTION INTRAVENOUS at 17:37

## 2025-04-10 RX ADMIN — PHENYLEPHRINE HYDROCHLORIDE 100 MCG: 10 INJECTION, SOLUTION INTRAVENOUS at 16:05

## 2025-04-10 RX ADMIN — DIAZEPAM 5 MG: 5 TABLET ORAL at 05:55

## 2025-04-10 RX ADMIN — SODIUM CHLORIDE: 9 INJECTION, SOLUTION INTRAVENOUS at 07:38

## 2025-04-10 RX ADMIN — PHENYLEPHRINE HYDROCHLORIDE 100 MCG: 10 INJECTION, SOLUTION INTRAVENOUS at 15:29

## 2025-04-10 RX ADMIN — PHENYLEPHRINE HYDROCHLORIDE 100 MCG: 10 INJECTION, SOLUTION INTRAVENOUS at 15:45

## 2025-04-10 RX ADMIN — DEXAMETHASONE SODIUM PHOSPHATE 4 MG: 4 INJECTION, SOLUTION INTRAMUSCULAR; INTRAVENOUS at 15:34

## 2025-04-10 RX ADMIN — HEPARIN SODIUM 2000 UNITS: 1000 INJECTION INTRAVENOUS; SUBCUTANEOUS at 16:50

## 2025-04-10 RX ADMIN — OXYCODONE 10 MG: 5 TABLET ORAL at 20:44

## 2025-04-10 RX ADMIN — HEPARIN SODIUM 2000 UNITS: 1000 INJECTION INTRAVENOUS; SUBCUTANEOUS at 16:26

## 2025-04-10 RX ADMIN — PHENYLEPHRINE HYDROCHLORIDE 100 MCG: 10 INJECTION, SOLUTION INTRAVENOUS at 15:50

## 2025-04-10 RX ADMIN — PROPOFOL 140 MG: 10 INJECTION, EMULSION INTRAVENOUS at 15:23

## 2025-04-10 RX ADMIN — OXYCODONE 10 MG: 5 TABLET ORAL at 09:30

## 2025-04-10 RX ADMIN — EPHEDRINE SULFATE 5 MG: 50 INJECTION INTRAVENOUS at 16:16

## 2025-04-10 RX ADMIN — PROPOFOL 50 MG: 10 INJECTION, EMULSION INTRAVENOUS at 15:24

## 2025-04-10 RX ADMIN — ASPIRIN 81 MG: 81 TABLET, COATED ORAL at 09:30

## 2025-04-10 RX ADMIN — ROCURONIUM BROMIDE 10 MG: 10 INJECTION, SOLUTION INTRAVENOUS at 15:23

## 2025-04-10 RX ADMIN — ROCURONIUM BROMIDE 50 MG: 10 INJECTION, SOLUTION INTRAVENOUS at 15:35

## 2025-04-10 RX ADMIN — PHENYLEPHRINE HYDROCHLORIDE 100 MCG: 10 INJECTION, SOLUTION INTRAVENOUS at 15:31

## 2025-04-10 RX ADMIN — DIAZEPAM 5 MG: 5 TABLET ORAL at 22:18

## 2025-04-10 RX ADMIN — LIDOCAINE HYDROCHLORIDE 100 MG: 20 INJECTION, SOLUTION INTRAVENOUS at 15:23

## 2025-04-10 RX ADMIN — SODIUM CHLORIDE: 9 INJECTION, SOLUTION INTRAVENOUS at 00:30

## 2025-04-10 RX ADMIN — METHOCARBAMOL 1000 MG: 500 TABLET ORAL at 20:44

## 2025-04-10 ASSESSMENT — PAIN DESCRIPTION - ORIENTATION
ORIENTATION: LEFT;RIGHT
ORIENTATION: RIGHT;LEFT
ORIENTATION: RIGHT

## 2025-04-10 ASSESSMENT — PAIN - FUNCTIONAL ASSESSMENT
PAIN_FUNCTIONAL_ASSESSMENT: PREVENTS OR INTERFERES SOME ACTIVE ACTIVITIES AND ADLS

## 2025-04-10 ASSESSMENT — PAIN SCALES - GENERAL
PAINLEVEL_OUTOF10: 8
PAINLEVEL_OUTOF10: 0
PAINLEVEL_OUTOF10: 6
PAINLEVEL_OUTOF10: 5
PAINLEVEL_OUTOF10: 9
PAINLEVEL_OUTOF10: 4
PAINLEVEL_OUTOF10: 0

## 2025-04-10 ASSESSMENT — PAIN DESCRIPTION - PAIN TYPE
TYPE: CHRONIC PAIN

## 2025-04-10 ASSESSMENT — PAIN DESCRIPTION - ONSET
ONSET: ON-GOING

## 2025-04-10 ASSESSMENT — PAIN DESCRIPTION - LOCATION
LOCATION: BACK;FOOT
LOCATION: FOOT
LOCATION: LEG

## 2025-04-10 ASSESSMENT — PAIN DESCRIPTION - FREQUENCY
FREQUENCY: CONTINUOUS

## 2025-04-10 ASSESSMENT — PAIN DESCRIPTION - DESCRIPTORS
DESCRIPTORS: SHOOTING;SHARP
DESCRIPTORS: ACHING;GNAWING;THROBBING
DESCRIPTORS: DISCOMFORT

## 2025-04-10 ASSESSMENT — PAIN SCALES - WONG BAKER
WONGBAKER_NUMERICALRESPONSE: HURTS A LITTLE BIT
WONGBAKER_NUMERICALRESPONSE: HURTS LITTLE MORE

## 2025-04-10 ASSESSMENT — LIFESTYLE VARIABLES: SMOKING_STATUS: 1

## 2025-04-10 ASSESSMENT — PAIN DESCRIPTION - DIRECTION
RADIATING_TOWARDS: ANKLES
RADIATING_TOWARDS: ANKLES

## 2025-04-10 NOTE — PROGRESS NOTES
Patient admitted to PACU #11 per bed from cath lab at 1746 s/p Angiography lower ext left. Report received at bedside in PACU per CRNA, Cath Lab nurse and Dr. Salomon. Patient was reported to be hemodynamically stable during procedure with no complications reported. Patient connected to PACU monitoring equipment. IVF's infusing with site unremarkable. Right groin with clear occlusive dressing with site unremarkable without any drainage noted. Left top of foot with small dressing in place. Bilateral DP's and PT's per doppler. Cath Lab nurse placed a strap across patient's legs to remind patient to keep legs straight. Patient is to lay flat for 4 hours from time of arrival to PACU. No further changes. Will continue to monitor.

## 2025-04-10 NOTE — PLAN OF CARE
Problem: Pain  Goal: Verbalizes/displays adequate comfort level or baseline comfort level  Outcome: Progressing  Flowsheets  Taken 4/10/2025 1635 by Hung Danielson  Verbalizes/displays adequate comfort level or baseline comfort level:   Encourage patient to monitor pain and request assistance   Assess pain using appropriate pain scale   Administer analgesics based on type and severity of pain and evaluate response   Implement non-pharmacological measures as appropriate and evaluate response   Consider cultural and social influences on pain and pain management   Notify Licensed Independent Practitioner if interventions unsuccessful or patient reports new pain  Problem: Discharge Planning  Goal: Discharge to home or other facility with appropriate resources  Outcome: Progressing  Flowsheets (Taken 4/10/2025 1635)  Discharge to home or other facility with appropriate resources:   Identify barriers to discharge with patient and caregiver   Identify discharge learning needs (meds, wound care, etc)   Arrange for needed discharge resources and transportation as appropriate     Problem: Skin/Tissue Integrity  Goal: Skin integrity remains intact  Description: 1.  Monitor for areas of redness and/or skin breakdown  2.  Assess vascular access sites hourly  3.  Every 4-6 hours minimum:  Change oxygen saturation probe site  4.  Every 4-6 hours:  If on nasal continuous positive airway pressure, respiratory therapy assess nares and determine need for appliance change or resting period  Outcome: Progressing  Flowsheets (Taken 4/10/2025 1635)  Skin Integrity Remains Intact:   Monitor for areas of redness and/or skin breakdown   Every 4-6 hours minimum:  Change oxygen saturation probe site   Assess vascular access sites hourly     Problem: Safety - Adult  Goal: Free from fall injury  Outcome: Progressing  Flowsheets (Taken 4/10/2025 1635)  Free From Fall Injury: Instruct family/caregiver on patient safety  Note: Pt will remain free

## 2025-04-10 NOTE — PROGRESS NOTES
Vascular Surgery Daily Progress Note  Patient: Natan Lynch    CC: peripheral arterial disease    Subjective :  No acute events overnight. Resting comfortably this morning. Pain controlled.     ROS: A 14 point review of systems was conducted, significant findings as noted above. All other systems negative.    Objective :   Infusions:   sodium chloride      sodium chloride 100 mL/hr at 04/10/25 0030        I/O:I/O last 3 completed shifts:  In: 150 [P.O.:150]  Out: 5 [Blood:5]           Wt Readings from Last 1 Encounters:   04/10/25 73.1 kg (161 lb 3.2 oz)       Exam:BP (!) 105/56   Pulse 71   Temp 98.5 °F (36.9 °C) (Oral)   Resp 16   Ht 1.969 m (6' 5.5\")   Wt 73.1 kg (161 lb 3.2 oz)   SpO2 100%   BMI 18.87 kg/m²     General appearance: alert, in no apparent distress   Lungs: normal effort on room air  CV: RRR  Abdomen: soft, non-tender, non-distended  Extremities: R groin access site CDI, soft, no hematoma or ecchymosis.     Pulses    F P PT DP   R + + -,+ -,+   L + + -,+ -,+    +, -/+ ,-/-         LABS:   Recent Labs     04/09/25  1115 04/10/25  0405   WBC 6.5 5.4   HGB 11.2* 10.0*   HCT 34.4* 29.8*   MCV 90.3 89.8    284        Recent Labs     04/09/25  1115 04/10/25  0405    136   K 3.5 3.7    104   CO2 27 24   PHOS  --  3.1   BUN 4* 6*   CREATININE 0.8 0.7*      No results for input(s): \"AST\", \"ALT\", \"BILIDIR\", \"BILITOT\", \"ALKPHOS\" in the last 72 hours.    Invalid input(s): \"ALB\"   No results for input(s): \"LIPASE\", \"AMYLASE\" in the last 72 hours.     Recent Labs     04/09/25  1115   INR 1.06      No results for input(s): \"CKTOTAL\", \"CKMB\", \"CKMBINDEX\", \"TROPONINI\" in the last 72 hours.    ASSESSMENT/PLAN: Pt. is a 64 y.o. male with history of DVT/PE on Xarelto, PAD, and tobacco abuse s/p LLE arteriogram (4/9) with plans to repeat procedure today.     -NPO for procedure today  -[facility]       Jasmin Salomon MD  PGY2, General Surgery  04/10/25   6:58 AM   [employer]  [phone]

## 2025-04-10 NOTE — PROGRESS NOTES
Comprehensive Nutrition Assessment    RECOMMENDATIONS:  PO Diet: NPO, recommend Regular  Nutrition Supplement: NPO, recommend Ensure+ HP TID (strawberry)  Nutrition Education: Education/Counseling initiated     NUTRITION ASSESSMENT:   Nutritional summary & status: Positive screen for wt loss. Pt s/p ultrasound-guided access right femoral artery & third order selective catheterization of left anterior tibial artery 4/9; unable to complete angioplasty d/t continued movement by pt so plans for intervention today under general anesthesia per H&P. Currently NPO for procedure. Per wt hx in EMR, pt has had clinically insignificant wt loss of 2% over past 8 months. Documented PO intake per EMR shows 1 meal at 26-50% consumed. Upon visit, pt reports 30 lb wt loss since August which wt hx doesn't reflect, however pt could be thinking of August 2023. Endorses poor intake pta more so related to difficulty obtaining food and cooking (r/t pain from PAD/ischemia) rather than decreased appetite. Discussed frozen meals and meals on wheels as potential options. Pt agreeable to Ensure and RD let pt know he could bring home any Ensures he doesn't drink while admitted; prefers srawberry. Pt expresses desire to gain some wt back and inquires about general, healthy diet so will provide handouts and verbal review as time permits. Will continue to monitor for diet advancement to order ONS as well as PO intake of meals. RD to follow.   Admission // PMH: Ischemia of left lower extremity // PAD, DVT, bilateral pulmonary embolism on Xarelto    MALNUTRITION ASSESSMENT  Context of Malnutrition: Chronic Illness   Malnutrition Status: Severe malnutrition  Findings of the 6 clinical characteristics of malnutrition (Minimum of 2 out of 6 clinical characteristics is required to make the diagnosis of moderate or severe Protein Calorie Malnutrition based on AND/ASPEN Guidelines):  Energy Intake:  Mild decrease in energy intake  Weight Loss:  Mild  weight loss (2% over 8 months however wt hx limited)     Body Fat Loss:  Severe body fat loss Orbital, Buccal region   Muscle Mass Loss:  Severe muscle mass loss Clavicles (pectoralis & deltoids)    NUTRITION DIAGNOSIS   Severe malnutrition, in context of chronic illness related to inadequate protein-energy intake as evidenced by criteria as identified in malnutrition assessment    Nutrition Monitoring and Evaluation:   Food/Nutrient Intake Outcomes:  Diet Advancement/Tolerance, Food and Nutrient Intake  Physical Signs/Symptoms Outcomes:  Biochemical Data, Nutrition Focused Physical Findings, Skin, Weight     OBJECTIVE DATA: Significant to nutrition assessment  Nutrition Related Findings: LBM not documented. Labs reviewed; lytes WNL. Gluc 88-99.  Wounds: Venous Stasis  Nutrition Goals: Initiate PO diet, PO intake 50% or greater, by next RD assessment     CURRENT NUTRITION THERAPIES  Diet NPO Exceptions are: Sips of Water with Meds  PO Intake: 26-50% (based on 1 meal)   PO Supplement Intake:None Ordered  Additional Sources of Calories/IVF:NS @ 100 mL/hr     COMPARATIVE STANDARDS  Energy (kcal):  2047 - 2339 (28 - 32 kcal/kg CBW)     Protein (g):  95 - 117 (1.3 - 1.6 gm/kg CBW)       Fluid (ml/day):  1 ml/kcal    ANTHROPOMETRICS  Current Height: 196.9 cm (6' 5.5\")  Current Weight - Scale: 73.1 kg (161 lb 3.2 oz)    Admission weight: 72.8 kg (160 lb 6.4 oz)    The patient will be monitored per nutrition standards of care. Consult dietitian if additional nutrition interventions are needed prior to RD reassessment.     Emily Santana RD  Toni:  [phone]

## 2025-04-10 NOTE — CARE COORDINATION
Case Management Assessment  Initial Evaluation    Date/Time of Evaluation: 4/10/2025 2:47 PM  Assessment Completed by: Asif Valdez RN    If patient is discharged prior to next notation, then this note serves as note for discharge by case management.    Patient Name: Natan Lynch                   YOB: 1960  Diagnosis: PVD (peripheral vascular disease) [I73.9]  Ischemia of left lower extremity [I99.8]                   Date / Time: 4/9/2025 10:31 AM    Patient Admission Status: Inpatient   Readmission Risk (Low < 19, Mod (19-27), High > 27): Readmission Risk Score: 12.9    Current PCP: Robbin Handley MD  PCP verified by CM? Yes    Chart Reviewed: Yes      History Provided by: Patient, Medical Record  Patient Orientation: Alert and Oriented    Patient Cognition: Alert    Hospitalization in the last 30 days (Readmission):  No    If yes, Readmission Assessment in CM Navigator will be completed and shown below.          Advance Directives:      Code Status: Full Code   Patient's Primary Decision Maker is: Legal Next of Kin      Discharge Planning:    Patient lives with: Alone Type of Home: Apartment  Primary Care Giver: Self  Patient Support Systems include: Family Members, Friends/Neighbors   Current Financial resources: Medicaid  Current community resources:    Current services prior to admission: None            Current DME:              Type of Home Care services:  None    ADLS  Prior functional level: Independent in ADLs/IADLs  Current functional level: Other (see comment) (tbd)    PT AM-PAC:   /24  OT AM-PAC:   /24    Family can provide assistance at DC: No  Would you like Case Management to discuss the discharge plan with any other family members/significant others, and if so, who? Yes  Plans to Return to Present Housing: Unknown at present  Other Identified Issues/Barriers to RETURNING to current housing: medical complications  Potential Assistance needed at discharge: N/A

## 2025-04-10 NOTE — PLAN OF CARE
Problem: Pain  Goal: Verbalizes/displays adequate comfort level or baseline comfort level  Outcome: Progressing     Problem: Discharge Planning  Goal: Discharge to home or other facility with appropriate resources  Outcome: Progressing  Flowsheets (Taken 4/9/2025 2000)  Discharge to home or other facility with appropriate resources: Identify barriers to discharge with patient and caregiver     Problem: Skin/Tissue Integrity  Goal: Skin integrity remains intact  Description: 1.  Monitor for areas of redness and/or skin breakdown  2.  Assess vascular access sites hourly  3.  Every 4-6 hours minimum:  Change oxygen saturation probe site  4.  Every 4-6 hours:  If on nasal continuous positive airway pressure, respiratory therapy assess nares and determine need for appliance change or resting period  Outcome: Progressing  Flowsheets  Taken 4/9/2025 2018  Skin Integrity Remains Intact: Monitor for areas of redness and/or skin breakdown  Taken 4/9/2025 2000  Skin Integrity Remains Intact: Monitor for areas of redness and/or skin breakdown     Problem: Safety - Adult  Goal: Free from fall injury  Outcome: Progressing  Flowsheets (Taken 4/9/2025 2018)  Free From Fall Injury: Instruct family/caregiver on patient safety     Problem: ABCDS Injury Assessment  Goal: Absence of physical injury  Outcome: Progressing  Flowsheets (Taken 4/9/2025 2018)  Absence of Physical Injury: Implement safety measures based on patient assessment

## 2025-04-10 NOTE — BRIEF OP NOTE
Brief Postoperative Note      Patient: Natan Lynch  YOB: 1960  MRN: [id number]    Date of Procedure: 4/10/2025    Pre-Op Diagnosis Codes:      * Deep vein thrombosis (DVT) of left lower extremity, unspecified chronicity, unspecified vein [I82.402]    Post-Op Diagnosis: Same       Procedure(s):  1) Ultrasound guided access right common femoral artery   2) Third order selective catheterization left anterior tibial artery  3) Ultrasound guided access left anterior tibial artery  4) right lower extremity arteriogram    Surgeon(s):  Casandra Alvarado MD    Assistant:  Jasmin Salomon MD    Anesthesia: General    Estimated Blood Loss (mL): less than 50     Complications: None    Specimens:   * No specimens in log *    Implants:  * No implants in log *      Drains: * No LDAs found *    Findings:  Infection Present At Time Of Surgery (PATOS) (choose all levels that have infection present):  - Superficial Infection (skin/subcutaneous) present as evidenced by fluid consistent with infection  Other Findings: unable to complete tibial artery angioplasty   Plan: cardiac stress test, bilateral lower extremity vein mapping, patient to need bypass of bilateral lower extremities (below knee pop to anterior tibial)  Flat bedrest for 4 hours    Electronically signed by Jasmin Salomon MD on 4/10/2025 at 5:26 PM

## 2025-04-10 NOTE — ANESTHESIA PRE PROCEDURE
PRN Beloy, Clair Bigornia, DO   10 mg at 04/10/25 0930   • ondansetron (ZOFRAN-ODT) disintegrating tablet 4 mg  4 mg Oral Q8H PRN Beloy, Clair Bigornia, DO        Or   • ondansetron (ZOFRAN) injection 4 mg  4 mg IntraVENous Q6H PRN Beloy, Clair Bigornia, DO       • methocarbamol (ROBAXIN) tablet 1,000 mg  1,000 mg Oral TID Beloy, Clair Bigornia, DO   1,000 mg at 04/09/25 2044   • diazePAM (VALIUM) tablet 5 mg  5 mg Oral Q12H Beloy, Clair Bigornia, DO   5 mg at 04/10/25 0555   • 0.9 % sodium chloride infusion   IntraVENous Continuous Beloy, Clair Bigornia,  mL/hr at 04/10/25 1214 Rate Verify at 04/10/25 1214       Allergies:  No Known Allergies    Problem List:    Patient Active Problem List   Diagnosis Code   • Bilateral pulmonary embolism (HCC) I26.99   • Acute deep vein thrombosis (DVT) of right lower extremity I82.401   • Nicotine dependence with current use F17.200   • Edema of both feet R60.0   • Peripheral vascular disease I73.9   • 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 I70.263   • Ischemia of left lower extremity I99.8       Past Medical History:        Diagnosis Date   • Bilateral pulmonary embolism ([facility])    • DVT (deep venous thrombosis) ([facility])    • Peripheral artery disease    • Tobacco abuse        Past Surgical History:        Procedure Laterality Date   • INVASIVE VASCULAR N/A 4/9/2025    Angiography lower ext left performed by Casandra Alvarado MD at [facility] CARDIAC CATH LAB   • INVASIVE VASCULAR Left 4/9/2025    Angioplasty ant tib artery performed by Casandra Alvarado MD at [facility] CARDIAC CATH LAB       Social History:    Social History     Tobacco Use   • Smoking status: Every Day     Current packs/day: 0.50     Average packs/day: 0.5 packs/day for 45.0 years (22.5 ttl pk-yrs)     Types: Cigarettes     Passive exposure: Past   • Smokeless tobacco: Never   Substance Use Topics   • Alcohol use: Yes     Comment: drinks beer on weekends and while watching

## 2025-04-10 NOTE — PROGRESS NOTES
PACU Transfer to Floor Note    Procedure(s):  Angiography lower ext left    Current Allergies: Patient has no known allergies.    Pt meets criteria as per Fatuma Score and ASPAN Standards to transfer to next phase of care.     No results for input(s): \"POCGLU\" in the last 72 hours.    Vitals:    04/10/25 1845   BP: 108/73   Pulse: 76   Resp: 10   Temp: 97.7 °F (36.5 °C)   SpO2: 100%     Vitals within 20% of pt's admission vitals as per FATUMA SCORE    SpO2: 100 %    O2 Flow Rate (L/min): 6 L/min      Intake/Output Summary (Last 24 hours) at 4/10/2025 1905  Last data filed at 4/10/2025 1800  Gross per 24 hour   Intake 3601 ml   Output 600 ml   Net 3001 ml       Pain assessment:  none    Pain Level: 0    Patient was assessed for alterations to skin integrity. There were not alterations observed.    Is patient incontinent: no    Report called to TERESO Persaud receiving patient on 4 PCU prior to patient leaving PACU. Patient connected to floor telemetry. No further changes.       4/10/2025 7:05 PM

## 2025-04-11 ENCOUNTER — APPOINTMENT (OUTPATIENT)
Dept: NUCLEAR MEDICINE | Age: 65
DRG: 197 | End: 2025-04-11
Attending: INTERNAL MEDICINE
Payer: COMMERCIAL

## 2025-04-11 ENCOUNTER — HOSPITAL ENCOUNTER (INPATIENT)
Dept: VASCULAR LAB | Age: 65
Discharge: HOME | DRG: 197 | End: 2025-04-13
Attending: SURGERY
Payer: COMMERCIAL

## 2025-04-11 ENCOUNTER — HOSPITAL ENCOUNTER (INPATIENT)
Age: 65
Discharge: HOME | DRG: 197 | End: 2025-04-13
Attending: INTERNAL MEDICINE
Payer: COMMERCIAL

## 2025-04-11 LAB
ECHO BSA: 1.99 M2
NUC STRESS EJECTION FRACTION: 71 %
NUC STRESS LV EDV: 129 ML (ref 67–155)
NUC STRESS LV ESV: 37 ML (ref 22–58)
NUC STRESS LV MASS: 157 G
NUC STRESS LV STROKE VOLUME: 92 ML
STRESS BASELINE DIAS BP: 56 MMHG
STRESS BASELINE HR: 57 BPM
STRESS BASELINE SYS BP: 105 MMHG
STRESS ESTIMATED WORKLOAD: 1 METS
STRESS EXERCISE DUR MIN: 1 MIN
STRESS EXERCISE DUR SEC: 0 SEC
STRESS PEAK DIAS BP: 60 MMHG
STRESS PEAK SYS BP: 108 MMHG
STRESS PERCENT HR ACHIEVED: 56 %
STRESS POST PEAK HR: 88 BPM
STRESS RATE PRESSURE PRODUCT: 9504 BPM*MMHG
STRESS TARGET HR: 158 BPM

## 2025-04-11 PROCEDURE — 6370000000 HC RX 637 (ALT 250 FOR IP)

## 2025-04-11 PROCEDURE — 93018 CV STRESS TEST I&R ONLY: CPT | Performed by: INTERNAL MEDICINE

## 2025-04-11 PROCEDURE — 2500000003 HC RX 250 WO HCPCS

## 2025-04-11 PROCEDURE — 2580000003 HC RX 258

## 2025-04-11 PROCEDURE — 3430000000 HC RX DIAGNOSTIC RADIOPHARMACEUTICAL: Performed by: INTERNAL MEDICINE

## 2025-04-11 PROCEDURE — A9502 TC99M TETROFOSMIN: HCPCS | Performed by: INTERNAL MEDICINE

## 2025-04-11 PROCEDURE — 6360000002 HC RX W HCPCS

## 2025-04-11 PROCEDURE — 93017 CV STRESS TEST TRACING ONLY: CPT

## 2025-04-11 PROCEDURE — 78452 HT MUSCLE IMAGE SPECT MULT: CPT

## 2025-04-11 PROCEDURE — 93016 CV STRESS TEST SUPVJ ONLY: CPT | Performed by: INTERNAL MEDICINE

## 2025-04-11 PROCEDURE — 78452 HT MUSCLE IMAGE SPECT MULT: CPT | Performed by: INTERNAL MEDICINE

## 2025-04-11 PROCEDURE — 2060000000 HC ICU INTERMEDIATE R&B

## 2025-04-11 PROCEDURE — 6360000002 HC RX W HCPCS: Performed by: INTERNAL MEDICINE

## 2025-04-11 PROCEDURE — 93970 EXTREMITY STUDY: CPT

## 2025-04-11 RX ORDER — METOPROLOL TARTRATE 50 MG
50 TABLET ORAL ONCE
Status: DISCONTINUED | OUTPATIENT
Start: 2025-04-11 | End: 2025-04-14

## 2025-04-11 RX ORDER — REGADENOSON 0.08 MG/ML
0.4 INJECTION, SOLUTION INTRAVENOUS
Status: CANCELLED | OUTPATIENT
Start: 2025-04-11

## 2025-04-11 RX ORDER — ENOXAPARIN SODIUM 100 MG/ML
40 INJECTION SUBCUTANEOUS DAILY
Status: DISCONTINUED | OUTPATIENT
Start: 2025-04-11 | End: 2025-04-14

## 2025-04-11 RX ORDER — DIAZEPAM 5 MG/1
2.5 TABLET ORAL EVERY 12 HOURS
Status: DISCONTINUED | OUTPATIENT
Start: 2025-04-11 | End: 2025-04-24 | Stop reason: HOSPADM

## 2025-04-11 RX ORDER — REGADENOSON 0.08 MG/ML
0.4 INJECTION, SOLUTION INTRAVENOUS
Status: COMPLETED | OUTPATIENT
Start: 2025-04-11 | End: 2025-04-11

## 2025-04-11 RX ADMIN — SODIUM CHLORIDE, PRESERVATIVE FREE 10 ML: 5 INJECTION INTRAVENOUS at 08:57

## 2025-04-11 RX ADMIN — METHOCARBAMOL 1000 MG: 500 TABLET ORAL at 08:57

## 2025-04-11 RX ADMIN — SODIUM CHLORIDE: 9 INJECTION, SOLUTION INTRAVENOUS at 01:01

## 2025-04-11 RX ADMIN — SODIUM CHLORIDE, PRESERVATIVE FREE 10 ML: 5 INJECTION INTRAVENOUS at 20:35

## 2025-04-11 RX ADMIN — ASPIRIN 81 MG: 81 TABLET, COATED ORAL at 08:57

## 2025-04-11 RX ADMIN — DIAZEPAM 5 MG: 5 TABLET ORAL at 06:25

## 2025-04-11 RX ADMIN — DIAZEPAM 2.5 MG: 5 TABLET ORAL at 18:17

## 2025-04-11 RX ADMIN — TETROFOSMIN 30.1 MILLICURIE: 1.38 INJECTION, POWDER, LYOPHILIZED, FOR SOLUTION INTRAVENOUS at 13:45

## 2025-04-11 RX ADMIN — ENOXAPARIN SODIUM 40 MG: 100 INJECTION SUBCUTANEOUS at 08:57

## 2025-04-11 RX ADMIN — METHOCARBAMOL 1000 MG: 500 TABLET ORAL at 20:35

## 2025-04-11 RX ADMIN — REGADENOSON 0.4 MG: 0.08 INJECTION, SOLUTION INTRAVENOUS at 13:45

## 2025-04-11 RX ADMIN — TETROFOSMIN 10.02 MILLICURIE: 1.38 INJECTION, POWDER, LYOPHILIZED, FOR SOLUTION INTRAVENOUS at 11:35

## 2025-04-11 RX ADMIN — METHOCARBAMOL 1000 MG: 500 TABLET ORAL at 16:13

## 2025-04-11 ASSESSMENT — PAIN SCALES - WONG BAKER
WONGBAKER_NUMERICALRESPONSE: NO HURT

## 2025-04-11 ASSESSMENT — PAIN SCALES - GENERAL
PAINLEVEL_OUTOF10: 0

## 2025-04-11 NOTE — PROGRESS NOTES
Vascular Surgery Daily Progress Note  Patient: Natan Lynch    CC: peripheral arterial disease    Subjective :  No acute events overnight. Resting comfortably this morning. Pain controlled.     ROS: A 14 point review of systems was conducted, significant findings as noted above. All other systems negative.    Objective :   Infusions:   sodium chloride      sodium chloride 100 mL/hr at 04/11/25 0101        I/O:I/O last 3 completed shifts:  In: 3721 [P.O.:300; I.V.:3421]  Out: 950 [Urine:950]           Wt Readings from Last 1 Encounters:   04/11/25 73.1 kg (161 lb 2.5 oz)       Exam:/68   Pulse 74   Temp 98 °F (36.7 °C) (Oral)   Resp 18   Ht 1.969 m (6' 5.5\")   Wt 73.1 kg (161 lb 2.5 oz)   SpO2 98%   BMI 18.86 kg/m²     General appearance: alert, in no apparent distress   Lungs: normal effort on room air  CV: RRR  Abdomen: soft, non-tender, non-distended  Extremities: R groin access site CDI, soft, no hematoma or ecchymosis.     Pulses    F P PT DP   R + + -,+ -,+   L + + -,+ -,+    +, -/+ ,-/-         LABS:   Recent Labs     04/09/25  1115 04/10/25  0405   WBC 6.5 5.4   HGB 11.2* 10.0*   HCT 34.4* 29.8*   MCV 90.3 89.8    284        Recent Labs     04/09/25  1115 04/10/25  0405    136   K 3.5 3.7    104   CO2 27 24   PHOS  --  3.1   BUN 4* 6*   CREATININE 0.8 0.7*      No results for input(s): \"AST\", \"ALT\", \"BILIDIR\", \"BILITOT\", \"ALKPHOS\" in the last 72 hours.    Invalid input(s): \"ALB\"   No results for input(s): \"LIPASE\", \"AMYLASE\" in the last 72 hours.     Recent Labs     04/09/25  1115   INR 1.06      No results for input(s): \"CKTOTAL\", \"CKMB\", \"CKMBINDEX\", \"TROPONINI\" in the last 72 hours.    ASSESSMENT/PLAN: Pt. is a 64 y.o. male with history of DVT/PE on Xarelto, PAD, and tobacco abuse s/p LLE arteriogram (4/9) with s/p repeat arteriogram 4/10 with findings of bilateral anterior tibial artery disease.     -cardiac stress test- NPO for test, okay for diet after  -bilateral LE

## 2025-04-11 NOTE — PLAN OF CARE
Problem: Safety - Adult  Goal: Free from fall injury  Outcome: Progressing  Flowsheets (Taken 4/11/2025 1632)  Free From Fall Injury:   Instruct family/caregiver on patient safety   Based on caregiver fall risk screen, instruct family/caregiver to ask for assistance with transferring infant if caregiver noted to have fall risk factors     Problem: ABCDS Injury Assessment  Goal: Absence of physical injury  Outcome: Progressing  Flowsheets (Taken 4/11/2025 1632)  Absence of Physical Injury: Implement safety measures based on patient assessment     Problem: Nutrition Deficit:  Goal: Optimize nutritional status  Outcome: Progressing  Flowsheets (Taken 4/11/2025 1632)  Nutrient intake appropriate for improving, restoring, or maintaining nutritional needs:   Assess nutritional status and recommend course of action   Monitor oral intake, labs, and treatment plans   Recommend appropriate diets, oral nutritional supplements, and vitamin/mineral supplements     Problem: Respiratory - Adult  Goal: Achieves optimal ventilation and oxygenation  Outcome: Progressing  Flowsheets (Taken 4/11/2025 1632)  Achieves optimal ventilation and oxygenation:   Assess for changes in respiratory status   Assess for changes in mentation and behavior   Position to facilitate oxygenation and minimize respiratory effort     Problem: Cardiovascular - Adult  Goal: Maintains optimal cardiac output and hemodynamic stability  Outcome: Progressing  Flowsheets (Taken 4/11/2025 1632)  Maintains optimal cardiac output and hemodynamic stability:   Monitor blood pressure and heart rate   Monitor urine output and notify Licensed Independent Practitioner for values outside of normal range   Assess for signs of decreased cardiac output

## 2025-04-11 NOTE — CARE COORDINATION
Case Management Assessment           Daily Note                 Date/ Time of Note: 4/11/2025 1:15 PM         Note completed by: Asif Valdez RN    Patient Name: Natan Lynch  YOB: 1960    Diagnosis:PVD (peripheral vascular disease) [I73.9]  Ischemia of left lower extremity [I99.8]  Patient Admission Status: Inpatient  Date of Admission:4/9/2025 10:31 AM    Length of Stay: 2 GLOS: GMLOS: 3.1 Readmission Risk Score: Readmission Risk Score: 13.4    Current Plan of Care: stress test & vein mapping, bilat. Planned.   Vascular surgery following. S/p LLE arteriogram    ________________________________________________________________________________________    Would benefit from therapy evals prior to dc  ________________________________________________________________________________________  Discharge Plan: Home  Pre-cert required for SNF: Not Applicable  COVID Result:  No results found for: \"COVID19\"    Transportation PLAN for discharge: family    Tentative discharge date: tbd    Potential assistance Purchasing Medications: Potential Assistance Purchasing Medications: No  Does Patient want to participate in local refill/ meds to beds program?:      Current barriers to discharge: Medical complications    Referrals completed:     Resources/ information provided:   ________________________________________________________________________________________  Case Management Notes: patient is from home, IPTA. Likely to return home; patient would benefit from therapy evals prior to dc for safe dispo planning    Natan and his family were provided with choice of provider; he and his family are in agreement with the discharge plan.    Emergency Contacts:  Extended Emergency Contact Information  Primary Emergency Contact: Nicolasa Lynch  Address: [address] [place], [place] [id number] [facility]  Home Phone: [phone]  Mobile Phone: [phone]  Relation: Brother/Sister    Care

## 2025-04-11 NOTE — ANESTHESIA POSTPROCEDURE EVALUATION
Department of Anesthesiology  Postprocedure Note    Patient: Natan Lynch  MRN: [id number]  YOB: 1960  Date of evaluation: 4/11/2025    Procedure Summary       Date: 04/10/25 Room / Location: [facility] HYBRID OR / [facility] CARDIAC CATH LAB    Anesthesia Start: 1517 Anesthesia Stop: 1745    Procedure: Angiography lower ext left Diagnosis:       Deep vein thrombosis (DVT) of left lower extremity, unspecified chronicity, unspecified vein      (Deep vein thrombosis (DVT) of left lower extremity, unspecified chronicity, unspecified vein [I82.402])    Providers: Casandra Alvarado MD Responsible Provider: Mick Butts MD    Anesthesia Type: General ASA Status: 3            Anesthesia Type: General    Daniele Phase I: Daniele Score: 10    Daniele Phase II: Daniele Score: 10    Anesthesia Post Evaluation    No notable events documented.

## 2025-04-11 NOTE — PROGRESS NOTES
Department of Surgery:  Post-op Note    CC: Deep vein thrombosis (DVT) of left lower extremity, unspecified chronicity, unspecified vein     Procedure(s) Performed:   1) Ultrasound guided access right common femoral artery   2) Third order selective catheterization left anterior tibial artery  3) Ultrasound guided access left anterior tibial artery  4) left lower extremity arteriogram    Subjective:   Pain is controlled, denies nausea or vomiting. Tolerating diet. No other issues.     Objective:  Anesthesia type: General      I/O    Intra op    Post op     Fluids  1500 mL 400 mL     EBL Less than 50 mL 0 mL     Urine 0 mL 400 mL     Exam:  Vitals:    04/10/25 1815 04/10/25 1830 04/10/25 1845 04/10/25 1902   BP: 108/62 109/64 108/73 105/61   Pulse: 73 89 76 79   Resp: 16 15 10 16   Temp:   97.7 °F (36.5 °C) 97.7 °F (36.5 °C)   TempSrc:   Oral Oral   SpO2: 99% 99% 100% 99%   Weight:       Height:           General appearance: alert, in no apparent distress   Lungs: normal effort on room air  CV: RRR  Abdomen: soft, non-tender, non-distended  Extremities: R groin access site CDI, soft, no hematoma or ecchymosis. LLE access site soft without hematoma or ecchymosis     Pulses     F P PT DP   R + + -,+ -,+   L + + -,+ -,+    +, -/+ ,-/-        Assessment and Plan  This is a 64 y.o. year old male s/p LLE arteriogram POD #0    Flat bedrest for 4 hours  No walking tonight  Will need cardiac stress test  Will need bilateral vein mapping  Okay for regular diet    Henrik Tejada DO  PGY-1, General Surgery Resident  04/10/25 8:03 PM  [phone]

## 2025-04-11 NOTE — PROGRESS NOTES
Pharmacist Review and Automatic Dose Adjustment of Prophylactic Enoxaparin    The reviewing pharmacist has made an adjustment to the ordered enoxaparin dose or converted to UFH per the approved [facility] protocol and table as defined below.    Plan / Rationale: Based upon the patient's weight and renal function, the ordered dose of Lovenox 30 mg daily has been converted to Lovenox 40 mg daily.    Thank you,  Bhavna Barker, [facility]  4/11/2025, 7:13 AM      Natan Lynch is a 64 y.o. male.     Recent Labs     04/09/25  1115 04/10/25  0405   CREATININE 0.8 0.7*       Estimated Creatinine Clearance: 113 mL/min (A) (based on SCr of 0.7 mg/dL (L)).    Recent Labs     04/09/25  1115 04/10/25  0405   HGB 11.2* 10.0*   HCT 34.4* 29.8*    284     Recent Labs     04/09/25  1115   INR 1.06       Height:   Ht Readings from Last 1 Encounters:   04/09/25 1.969 m (6' 5.5\")     Weight:  Wt Readings from Last 1 Encounters:   04/11/25 73.1 kg (161 lb 2.5 oz)

## 2025-04-11 NOTE — PLAN OF CARE
Problem: Pain  Goal: Verbalizes/displays adequate comfort level or baseline comfort level  4/10/2025 2307 by Summerlin, Christina, RN  Outcome: Progressing     Problem: Discharge Planning  Goal: Discharge to home or other facility with appropriate resources  4/10/2025 2307 by Summerlin, Christina, RN  Outcome: Progressing     Problem: Skin/Tissue Integrity  Goal: Skin integrity remains intact  Description: 1.  Monitor for areas of redness and/or skin breakdown  2.  Assess vascular access sites hourly  3.  Every 4-6 hours minimum:  Change oxygen saturation probe site  4.  Every 4-6 hours:  If on nasal continuous positive airway pressure, respiratory therapy assess nares and determine need for appliance change or resting period  4/10/2025 2307 by Summerlin, Christina, RN  Outcome: Progressing     Problem: Safety - Adult  Goal: Free from fall injury  4/10/2025 2307 by Summerlin, Christina, RN  Outcome: Progressing     Problem: Respiratory - Adult  Goal: Achieves optimal ventilation and oxygenation  4/10/2025 2307 by Summerlin, Christina, RN  Outcome: Progressing     Problem: Cardiovascular - Adult  Goal: Maintains optimal cardiac output and hemodynamic stability  4/10/2025 2307 by Summerlin, Christina, RN  Outcome: Progressing

## 2025-04-12 ENCOUNTER — ANESTHESIA EVENT (OUTPATIENT)
Dept: OPERATING ROOM | Age: 65
End: 2025-04-12
Payer: COMMERCIAL

## 2025-04-12 LAB
ALBUMIN SERPL-MCNC: 2.9 G/DL (ref 3.4–5)
ANION GAP SERPL CALCULATED.3IONS-SCNC: 6 MMOL/L (ref 3–16)
BASOPHILS # BLD: 0 K/UL (ref 0–0.2)
BASOPHILS NFR BLD: 0.3 %
BUN SERPL-MCNC: 5 MG/DL (ref 7–20)
CALCIUM SERPL-MCNC: 8.8 MG/DL (ref 8.3–10.6)
CHLORIDE SERPL-SCNC: 107 MMOL/L (ref 99–110)
CO2 SERPL-SCNC: 26 MMOL/L (ref 21–32)
CREAT SERPL-MCNC: 0.7 MG/DL (ref 0.8–1.3)
DEPRECATED RDW RBC AUTO: 12 % (ref 12.4–15.4)
EOSINOPHIL # BLD: 0.1 K/UL (ref 0–0.6)
EOSINOPHIL NFR BLD: 1.5 %
GFR SERPLBLD CREATININE-BSD FMLA CKD-EPI: >90 ML/MIN/{1.73_M2}
GLUCOSE SERPL-MCNC: 90 MG/DL (ref 70–99)
HCT VFR BLD AUTO: 28.8 % (ref 40.5–52.5)
HGB BLD-MCNC: 9.3 G/DL (ref 13.5–17.5)
LYMPHOCYTES # BLD: 2.1 K/UL (ref 1–5.1)
LYMPHOCYTES NFR BLD: 35.5 %
MAGNESIUM SERPL-MCNC: 1.96 MG/DL (ref 1.8–2.4)
MCH RBC QN AUTO: 29.4 PG (ref 26–34)
MCHC RBC AUTO-ENTMCNC: 32.4 G/DL (ref 31–36)
MCV RBC AUTO: 90.5 FL (ref 80–100)
MONOCYTES # BLD: 0.5 K/UL (ref 0–1.3)
MONOCYTES NFR BLD: 8.8 %
NEUTROPHILS # BLD: 3.2 K/UL (ref 1.7–7.7)
NEUTROPHILS NFR BLD: 53.9 %
PHOSPHATE SERPL-MCNC: 3.1 MG/DL (ref 2.5–4.9)
PLATELET # BLD AUTO: 319 K/UL (ref 135–450)
PMV BLD AUTO: 6.7 FL (ref 5–10.5)
POTASSIUM SERPL-SCNC: 4.1 MMOL/L (ref 3.5–5.1)
PREALB SERPL-MCNC: 6.6 MG/DL (ref 20–40)
RBC # BLD AUTO: 3.18 M/UL (ref 4.2–5.9)
SODIUM SERPL-SCNC: 139 MMOL/L (ref 136–145)
WBC # BLD AUTO: 6 K/UL (ref 4–11)

## 2025-04-12 PROCEDURE — 99231 SBSQ HOSP IP/OBS SF/LOW 25: CPT | Performed by: SURGERY

## 2025-04-12 PROCEDURE — 36415 COLL VENOUS BLD VENIPUNCTURE: CPT

## 2025-04-12 PROCEDURE — 6360000002 HC RX W HCPCS

## 2025-04-12 PROCEDURE — 2500000003 HC RX 250 WO HCPCS

## 2025-04-12 PROCEDURE — 2060000000 HC ICU INTERMEDIATE R&B

## 2025-04-12 PROCEDURE — 80069 RENAL FUNCTION PANEL: CPT

## 2025-04-12 PROCEDURE — 2580000003 HC RX 258

## 2025-04-12 PROCEDURE — 6370000000 HC RX 637 (ALT 250 FOR IP)

## 2025-04-12 PROCEDURE — 84134 ASSAY OF PREALBUMIN: CPT

## 2025-04-12 PROCEDURE — 83735 ASSAY OF MAGNESIUM: CPT

## 2025-04-12 PROCEDURE — 85025 COMPLETE CBC W/AUTO DIFF WBC: CPT

## 2025-04-12 RX ADMIN — DIAZEPAM 2.5 MG: 5 TABLET ORAL at 04:46

## 2025-04-12 RX ADMIN — OXYCODONE 10 MG: 5 TABLET ORAL at 08:59

## 2025-04-12 RX ADMIN — ASPIRIN 81 MG: 81 TABLET, COATED ORAL at 08:56

## 2025-04-12 RX ADMIN — SODIUM CHLORIDE, PRESERVATIVE FREE 10 ML: 5 INJECTION INTRAVENOUS at 08:56

## 2025-04-12 RX ADMIN — SODIUM CHLORIDE: 9 INJECTION, SOLUTION INTRAVENOUS at 11:18

## 2025-04-12 RX ADMIN — METHOCARBAMOL 1000 MG: 500 TABLET ORAL at 13:27

## 2025-04-12 RX ADMIN — DIAZEPAM 2.5 MG: 5 TABLET ORAL at 17:53

## 2025-04-12 RX ADMIN — SODIUM CHLORIDE: 9 INJECTION, SOLUTION INTRAVENOUS at 21:44

## 2025-04-12 RX ADMIN — SODIUM CHLORIDE: 9 INJECTION, SOLUTION INTRAVENOUS at 01:01

## 2025-04-12 RX ADMIN — METHOCARBAMOL 1000 MG: 500 TABLET ORAL at 19:21

## 2025-04-12 RX ADMIN — SODIUM CHLORIDE, PRESERVATIVE FREE 10 ML: 5 INJECTION INTRAVENOUS at 19:21

## 2025-04-12 RX ADMIN — METHOCARBAMOL 1000 MG: 500 TABLET ORAL at 08:56

## 2025-04-12 RX ADMIN — ENOXAPARIN SODIUM 40 MG: 100 INJECTION SUBCUTANEOUS at 08:56

## 2025-04-12 ASSESSMENT — PAIN DESCRIPTION - DESCRIPTORS: DESCRIPTORS: DISCOMFORT

## 2025-04-12 ASSESSMENT — PAIN SCALES - GENERAL
PAINLEVEL_OUTOF10: 0
PAINLEVEL_OUTOF10: 9
PAINLEVEL_OUTOF10: 0

## 2025-04-12 ASSESSMENT — PAIN SCALES - WONG BAKER
WONGBAKER_NUMERICALRESPONSE: NO HURT

## 2025-04-12 ASSESSMENT — PAIN - FUNCTIONAL ASSESSMENT: PAIN_FUNCTIONAL_ASSESSMENT: PREVENTS OR INTERFERES SOME ACTIVE ACTIVITIES AND ADLS

## 2025-04-12 ASSESSMENT — PAIN DESCRIPTION - ONSET: ONSET: PROGRESSIVE

## 2025-04-12 ASSESSMENT — PAIN DESCRIPTION - FREQUENCY: FREQUENCY: INTERMITTENT

## 2025-04-12 ASSESSMENT — PAIN DESCRIPTION - LOCATION: LOCATION: FOOT

## 2025-04-12 ASSESSMENT — PAIN DESCRIPTION - PAIN TYPE: TYPE: NEUROPATHIC PAIN;CHRONIC PAIN

## 2025-04-12 ASSESSMENT — PAIN DESCRIPTION - ORIENTATION: ORIENTATION: LEFT;RIGHT

## 2025-04-12 NOTE — CONSULTS
Department of Podiatry Consult Note  Resident       Reason for Consult: Established patient.  Foot wound  Requesting Physician: Dr. Casandra Alvarado MD    CHIEF COMPLAINT: Bilateral foot wounds    HISTORY OF PRESENT ILLNESS:                The patient is a 64 y.o. male with significant past medical history as seen below who is consulted for established patient.  Foot wound.  Patient is followed with our podiatric service most recently being seen on 3/31/2025 at which time we were caring for bilateral foot wounds ischemic in nature.  At that time patient had scheduled office appointment with vascular surgery with plans for intervention to increase blood flow.  Since his last appointment patient has been keeping his wounds clean and dry utilizing Betadine dressings daily.  He denies any acute pain however says he does have some aching to the areas on occasion. Patient denies any other pedal complaints at this time. Denies F/C/N/V.      Past Medical History:        Diagnosis Date    Bilateral pulmonary embolism (HCC)     DVT (deep venous thrombosis) (HCC)     Peripheral artery disease     Tobacco abuse        Past Surgical History:        Procedure Laterality Date    INVASIVE VASCULAR N/A 4/9/2025    Angiography lower ext left performed by Casandra Alvarado MD at [facility] CARDIAC CATH LAB    INVASIVE VASCULAR Left 4/9/2025    Angioplasty ant tib artery performed by Casandra Alvarado MD at [facility] CARDIAC CATH LAB       Allergies:   Patient has no known allergies.    Medications:   Home Meds  No current facility-administered medications on file prior to encounter.     Current Outpatient Medications on File Prior to Encounter   Medication Sig Dispense Refill    rivaroxaban (XARELTO) 20 MG TABS tablet Take 1 tablet by mouth daily (with breakfast) 90 tablet 3    acetaminophen (TYLENOL) 500 MG tablet Take 1 tablet by mouth 4 times daily as needed for Pain 120 tablet 0    varenicline (CHANTIX) 0.5 MG tablet Take 1-2 tablets by mouth See

## 2025-04-12 NOTE — PLAN OF CARE
Problem: Pain  Goal: Verbalizes/displays adequate comfort level or baseline comfort level  Outcome: Progressing  Flowsheets (Taken 4/11/2025 2125)  Verbalizes/displays adequate comfort level or baseline comfort level:   Encourage patient to monitor pain and request assistance   Assess pain using appropriate pain scale   Administer analgesics based on type and severity of pain and evaluate response   Implement non-pharmacological measures as appropriate and evaluate response   Consider cultural and social influences on pain and pain management   Notify Licensed Independent Practitioner if interventions unsuccessful or patient reports new pain     Problem: Safety - Adult  Goal: Free from fall injury  4/11/2025 2125 by Summerlin, Christina, RN  Flowsheets (Taken 4/11/2025 2125)  Free From Fall Injury:   Instruct family/caregiver on patient safety   Based on caregiver fall risk screen, instruct family/caregiver to ask for assistance with transferring infant if caregiver noted to have fall risk factors     Problem: Skin/Tissue Integrity  Goal: Skin integrity remains intact  Description: 1.  Monitor for areas of redness and/or skin breakdown  2.  Assess vascular access sites hourly  3.  Every 4-6 hours minimum:  Change oxygen saturation probe site  4.  Every 4-6 hours:  If on nasal continuous positive airway pressure, respiratory therapy assess nares and determine need for appliance change or resting period  Flowsheets (Taken 4/11/2025 2125)  Skin Integrity Remains Intact:   Monitor for areas of redness and/or skin breakdown   Every 4-6 hours minimum:  Change oxygen saturation probe site   Check visual cues for pain   Monitor skin under medical devices

## 2025-04-12 NOTE — PLAN OF CARE
Problem: Pain  Goal: Verbalizes/displays adequate comfort level or baseline comfort level  Outcome: Progressing  Flowsheets (Taken 4/12/2025 1610)  Verbalizes/displays adequate comfort level or baseline comfort level:   Assess pain using appropriate pain scale   Encourage patient to monitor pain and request assistance   Administer analgesics based on type and severity of pain and evaluate response     Problem: Safety - Adult  Goal: Free from fall injury  Outcome: Progressing  Flowsheets (Taken 4/12/2025 1610)  Free From Fall Injury:   Based on caregiver fall risk screen, instruct family/caregiver to ask for assistance with transferring infant if caregiver noted to have fall risk factors   Instruct family/caregiver on patient safety     Problem: ABCDS Injury Assessment  Goal: Absence of physical injury  Outcome: Progressing  Flowsheets (Taken 4/12/2025 1610)  Absence of Physical Injury: Implement safety measures based on patient assessment     Problem: Nutrition Deficit:  Goal: Optimize nutritional status  Outcome: Progressing  Flowsheets (Taken 4/12/2025 1610)  Nutrient intake appropriate for improving, restoring, or maintaining nutritional needs:   Recommend appropriate diets, oral nutritional supplements, and vitamin/mineral supplements   Assess nutritional status and recommend course of action   Monitor oral intake, labs, and treatment plans

## 2025-04-12 NOTE — PROGRESS NOTES
Vascular Surgery Daily Progress Note  Patient: Natan Lynch    CC: peripheral arterial disease    Subjective :  No acute events overnight. Resting comfortably this morning. Nuclear stress test yesterday with no signs of ischemia.    ROS: A 14 point review of systems was conducted, significant findings as noted above. All other systems negative.    Objective :   Infusions:   sodium chloride      sodium chloride 100 mL/hr at 04/12/25 0239        I/O:I/O last 3 completed shifts:  In: 2897.3 [P.O.:720; I.V.:2177.3]  Out: 2300 [Urine:2300]           Wt Readings from Last 1 Encounters:   04/12/25 70.1 kg (154 lb 8 oz)       Exam:/76   Pulse 83   Temp 98.1 °F (36.7 °C) (Oral)   Resp 16   Ht 1.969 m (6' 5.5\")   Wt 70.1 kg (154 lb 8 oz)   SpO2 100%   BMI 18.09 kg/m²     General appearance: alert, in no apparent distress   Lungs: normal effort on room air  CV: RRR  Abdomen: soft, non-tender, non-distended  Extremities: R groin access site CDI, soft, no hematoma or ecchymosis.     Pulses    F P PT DP   R + + -,+ -,+   L + + -,+ -,+    +, -/+ ,-/-         LABS:   Recent Labs     04/10/25  0405 04/12/25  0617   WBC 5.4 6.0   HGB 10.0* 9.3*   HCT 29.8* 28.8*   MCV 89.8 90.5    319        Recent Labs     04/10/25  0405 04/12/25  0617    139   K 3.7 4.1    107   CO2 24 26   PHOS 3.1 3.1   BUN 6* 5*   CREATININE 0.7* 0.7*      No results for input(s): \"AST\", \"ALT\", \"BILIDIR\", \"BILITOT\", \"ALKPHOS\" in the last 72 hours.    Invalid input(s): \"ALB\"   No results for input(s): \"LIPASE\", \"AMYLASE\" in the last 72 hours.     Recent Labs     04/09/25  1115   INR 1.06      No results for input(s): \"CKTOTAL\", \"CKMB\", \"CKMBINDEX\", \"TROPONINI\" in the last 72 hours.    ASSESSMENT/PLAN: Pt. is a 64 y.o. male with history of DVT/PE on Xarelto, PAD, and tobacco abuse s/p LLE arteriogram (4/9) with s/p repeat arteriogram 4/10 with findings of bilateral anterior tibial artery disease.     -cardiac stress test- no signs

## 2025-04-12 NOTE — PLAN OF CARE
Problem: Pain  Goal: Verbalizes/displays adequate comfort level or baseline comfort level  4/12/2025 1932 by Summerlin, Christina, RN  Outcome: Progressing  Flowsheets (Taken 4/12/2025 1932)  Verbalizes/displays adequate comfort level or baseline comfort level:   Encourage patient to monitor pain and request assistance   Assess pain using appropriate pain scale   Administer analgesics based on type and severity of pain and evaluate response   Implement non-pharmacological measures as appropriate and evaluate response   Consider cultural and social influences on pain and pain management     Problem: Skin/Tissue Integrity  Goal: Skin integrity remains intact  Description: 1.  Monitor for areas of redness and/or skin breakdown  2.  Assess vascular access sites hourly  3.  Every 4-6 hours minimum:  Change oxygen saturation probe site  4.  Every 4-6 hours:  If on nasal continuous positive airway pressure, respiratory therapy assess nares and determine need for appliance change or resting period  Outcome: Progressing  Flowsheets (Taken 4/12/2025 1932)  Skin Integrity Remains Intact:   Monitor for areas of redness and/or skin breakdown   Every 4-6 hours minimum:  Change oxygen saturation probe site   Assess need for specialty bed   Check visual cues for pain   Monitor skin under medical devices   Turn and reposition as indicated     Problem: Safety - Adult  Goal: Free from fall injury  4/12/2025 1932 by Summerlin, Christina, RN  Outcome: Progressing  Flowsheets (Taken 4/12/2025 1932)  Free From Fall Injury:   Instruct family/caregiver on patient safety   Based on caregiver fall risk screen, instruct family/caregiver to ask for assistance with transferring infant if caregiver noted to have fall risk factors     Problem: Cardiovascular - Adult  Goal: Maintains optimal cardiac output and hemodynamic stability  Outcome: Progressing  Flowsheets (Taken 4/12/2025 1932)  Maintains optimal cardiac output and hemodynamic

## 2025-04-13 LAB
ABO + RH BLD: NORMAL
ABO/RH: NORMAL
ALBUMIN SERPL-MCNC: 3.2 G/DL (ref 3.4–5)
ANION GAP SERPL CALCULATED.3IONS-SCNC: 10 MMOL/L (ref 3–16)
ANTIBODY SCREEN: NORMAL
BASOPHILS # BLD: 0 K/UL (ref 0–0.2)
BASOPHILS NFR BLD: 0.5 %
BUN SERPL-MCNC: 7 MG/DL (ref 7–20)
CALCIUM SERPL-MCNC: 9.3 MG/DL (ref 8.3–10.6)
CHLORIDE SERPL-SCNC: 107 MMOL/L (ref 99–110)
CO2 SERPL-SCNC: 25 MMOL/L (ref 21–32)
CREAT SERPL-MCNC: 0.7 MG/DL (ref 0.8–1.3)
DEPRECATED RDW RBC AUTO: 12.3 % (ref 12.4–15.4)
ECHO BSA: 1.99 M2
EOSINOPHIL # BLD: 0.2 K/UL (ref 0–0.6)
EOSINOPHIL NFR BLD: 2.7 %
GFR SERPLBLD CREATININE-BSD FMLA CKD-EPI: >90 ML/MIN/{1.73_M2}
GLUCOSE SERPL-MCNC: 85 MG/DL (ref 70–99)
HCT VFR BLD AUTO: 32.3 % (ref 40.5–52.5)
HGB BLD-MCNC: 10.3 G/DL (ref 13.5–17.5)
INR PPP: 1.2 (ref 0.85–1.15)
LYMPHOCYTES # BLD: 1.8 K/UL (ref 1–5.1)
LYMPHOCYTES NFR BLD: 28.5 %
MAGNESIUM SERPL-MCNC: 2.01 MG/DL (ref 1.8–2.4)
MCH RBC QN AUTO: 28.9 PG (ref 26–34)
MCHC RBC AUTO-ENTMCNC: 31.9 G/DL (ref 31–36)
MCV RBC AUTO: 90.6 FL (ref 80–100)
MONOCYTES # BLD: 0.5 K/UL (ref 0–1.3)
MONOCYTES NFR BLD: 8.5 %
NEUTROPHILS # BLD: 3.7 K/UL (ref 1.7–7.7)
NEUTROPHILS NFR BLD: 59.8 %
PHOSPHATE SERPL-MCNC: 3.4 MG/DL (ref 2.5–4.9)
PLATELET # BLD AUTO: 336 K/UL (ref 135–450)
PMV BLD AUTO: 6.7 FL (ref 5–10.5)
POTASSIUM SERPL-SCNC: 3.8 MMOL/L (ref 3.5–5.1)
PROTHROMBIN TIME: 15.4 SEC (ref 11.9–14.9)
RBC # BLD AUTO: 3.57 M/UL (ref 4.2–5.9)
SODIUM SERPL-SCNC: 142 MMOL/L (ref 136–145)
VAS LEFT GSV ANKLE DIAM: 3.9 MM
VAS LEFT GSV AT KNEE DIAM: 2.9 MM
VAS LEFT GSV BK DIST DIAM: 3.5 MM
VAS LEFT GSV BK MID DIAM: 2.4 MM
VAS LEFT GSV BK PROX DIAM: 2.3 MM
VAS LEFT GSV JUNC DIAM: 7.9 MM
VAS LEFT GSV THIGH DIST DIAM: 2.9 MM
VAS LEFT GSV THIGH MID DIAM: 3 MM
VAS LEFT GSV THIGH PROX DIAM: 2.9 MM
VAS RIGHT GSV ANKLE DIAM: 4.1 MM
VAS RIGHT GSV AT KNEE DIAM: 3.4 MM
VAS RIGHT GSV BK DIST DIAM: 4.2 MM
VAS RIGHT GSV BK MID DIAM: 2.6 MM
VAS RIGHT GSV BK PROX DIAM: 2.7 MM
VAS RIGHT GSV JUNC DIAM: 7.8 MM
VAS RIGHT GSV THIGH DIST DIAM: 3.2 MM
VAS RIGHT GSV THIGH MID DIAM: 3.5 MM
VAS RIGHT GSV THIGH PROX DIAM: 4.2 MM
WBC # BLD AUTO: 6.2 K/UL (ref 4–11)

## 2025-04-13 PROCEDURE — 6360000002 HC RX W HCPCS

## 2025-04-13 PROCEDURE — P9016 RBC LEUKOCYTES REDUCED: HCPCS

## 2025-04-13 PROCEDURE — 2500000003 HC RX 250 WO HCPCS

## 2025-04-13 PROCEDURE — 99231 SBSQ HOSP IP/OBS SF/LOW 25: CPT | Performed by: SURGERY

## 2025-04-13 PROCEDURE — 83735 ASSAY OF MAGNESIUM: CPT

## 2025-04-13 PROCEDURE — 36415 COLL VENOUS BLD VENIPUNCTURE: CPT

## 2025-04-13 PROCEDURE — 2580000003 HC RX 258

## 2025-04-13 PROCEDURE — 86850 RBC ANTIBODY SCREEN: CPT

## 2025-04-13 PROCEDURE — 93971 EXTREMITY STUDY: CPT | Performed by: SURGERY

## 2025-04-13 PROCEDURE — 86923 COMPATIBILITY TEST ELECTRIC: CPT

## 2025-04-13 PROCEDURE — 30233N1 TRANSFUSION OF NONAUTOLOGOUS RED BLOOD CELLS INTO PERIPHERAL VEIN, PERCUTANEOUS APPROACH: ICD-10-PCS | Performed by: SURGERY

## 2025-04-13 PROCEDURE — 80069 RENAL FUNCTION PANEL: CPT

## 2025-04-13 PROCEDURE — 85025 COMPLETE CBC W/AUTO DIFF WBC: CPT

## 2025-04-13 PROCEDURE — 6370000000 HC RX 637 (ALT 250 FOR IP)

## 2025-04-13 PROCEDURE — 86901 BLOOD TYPING SEROLOGIC RH(D): CPT

## 2025-04-13 PROCEDURE — 86900 BLOOD TYPING SEROLOGIC ABO: CPT

## 2025-04-13 PROCEDURE — 2060000000 HC ICU INTERMEDIATE R&B

## 2025-04-13 PROCEDURE — 85610 PROTHROMBIN TIME: CPT

## 2025-04-13 RX ORDER — SODIUM CHLORIDE, SODIUM LACTATE, POTASSIUM CHLORIDE, CALCIUM CHLORIDE 600; 310; 30; 20 MG/100ML; MG/100ML; MG/100ML; MG/100ML
INJECTION, SOLUTION INTRAVENOUS CONTINUOUS
Status: DISCONTINUED | OUTPATIENT
Start: 2025-04-14 | End: 2025-04-16

## 2025-04-13 RX ADMIN — DIAZEPAM 2.5 MG: 5 TABLET ORAL at 06:01

## 2025-04-13 RX ADMIN — DIAZEPAM 2.5 MG: 5 TABLET ORAL at 17:54

## 2025-04-13 RX ADMIN — OXYCODONE 10 MG: 5 TABLET ORAL at 21:53

## 2025-04-13 RX ADMIN — ASPIRIN 81 MG: 81 TABLET, COATED ORAL at 08:36

## 2025-04-13 RX ADMIN — SODIUM CHLORIDE, PRESERVATIVE FREE 10 ML: 5 INJECTION INTRAVENOUS at 21:53

## 2025-04-13 RX ADMIN — MORPHINE SULFATE 4 MG: 2 INJECTION, SOLUTION INTRAMUSCULAR; INTRAVENOUS at 13:11

## 2025-04-13 RX ADMIN — MORPHINE SULFATE 2 MG: 2 INJECTION, SOLUTION INTRAMUSCULAR; INTRAVENOUS at 23:40

## 2025-04-13 RX ADMIN — METHOCARBAMOL 1000 MG: 500 TABLET ORAL at 08:36

## 2025-04-13 RX ADMIN — SODIUM CHLORIDE, SODIUM LACTATE, POTASSIUM CHLORIDE, AND CALCIUM CHLORIDE: .6; .31; .03; .02 INJECTION, SOLUTION INTRAVENOUS at 23:41

## 2025-04-13 RX ADMIN — ENOXAPARIN SODIUM 40 MG: 100 INJECTION SUBCUTANEOUS at 08:36

## 2025-04-13 RX ADMIN — SODIUM CHLORIDE: 9 INJECTION, SOLUTION INTRAVENOUS at 08:37

## 2025-04-13 RX ADMIN — OXYCODONE 10 MG: 5 TABLET ORAL at 12:36

## 2025-04-13 RX ADMIN — METHOCARBAMOL 1000 MG: 500 TABLET ORAL at 21:53

## 2025-04-13 RX ADMIN — METHOCARBAMOL 1000 MG: 500 TABLET ORAL at 15:11

## 2025-04-13 ASSESSMENT — PAIN DESCRIPTION - DESCRIPTORS
DESCRIPTORS: ACHING;DISCOMFORT
DESCRIPTORS: ACHING
DESCRIPTORS: ACHING;DISCOMFORT
DESCRIPTORS: ACHING

## 2025-04-13 ASSESSMENT — PAIN SCALES - GENERAL
PAINLEVEL_OUTOF10: 7
PAINLEVEL_OUTOF10: 5
PAINLEVEL_OUTOF10: 7
PAINLEVEL_OUTOF10: 5
PAINLEVEL_OUTOF10: 5
PAINLEVEL_OUTOF10: 1
PAINLEVEL_OUTOF10: 1
PAINLEVEL_OUTOF10: 5

## 2025-04-13 ASSESSMENT — PAIN DESCRIPTION - FREQUENCY
FREQUENCY: INTERMITTENT

## 2025-04-13 ASSESSMENT — PAIN DESCRIPTION - ORIENTATION
ORIENTATION: RIGHT;LEFT
ORIENTATION: LEFT;RIGHT
ORIENTATION: RIGHT;LEFT
ORIENTATION: LEFT;RIGHT
ORIENTATION: RIGHT;LEFT
ORIENTATION: LEFT;RIGHT

## 2025-04-13 ASSESSMENT — PAIN DESCRIPTION - LOCATION
LOCATION: FOOT
LOCATION: FOOT;ANKLE
LOCATION: FOOT
LOCATION: FOOT;ANKLE
LOCATION: FOOT
LOCATION: FOOT

## 2025-04-13 ASSESSMENT — PAIN - FUNCTIONAL ASSESSMENT

## 2025-04-13 ASSESSMENT — PAIN SCALES - WONG BAKER
WONGBAKER_NUMERICALRESPONSE: NO HURT
WONGBAKER_NUMERICALRESPONSE: NO HURT

## 2025-04-13 ASSESSMENT — PAIN DESCRIPTION - ONSET
ONSET: ON-GOING
ONSET: AWAKENED FROM SLEEP
ONSET: ON-GOING

## 2025-04-13 ASSESSMENT — PAIN DESCRIPTION - PAIN TYPE
TYPE: NEUROPATHIC PAIN;CHRONIC PAIN
TYPE: NEUROPATHIC PAIN;CHRONIC PAIN
TYPE: NEUROPATHIC PAIN

## 2025-04-13 NOTE — PLAN OF CARE
Problem: Pain  Goal: Verbalizes/displays adequate comfort level or baseline comfort level  Outcome: Progressing     Problem: Discharge Planning  Goal: Discharge to home or other facility with appropriate resources  Outcome: Progressing     Problem: Skin/Tissue Integrity  Goal: Skin integrity remains intact  Description: 1.  Monitor for areas of redness and/or skin breakdown  2.  Assess vascular access sites hourly  3.  Every 4-6 hours minimum:  Change oxygen saturation probe site  4.  Every 4-6 hours:  If on nasal continuous positive airway pressure, respiratory therapy assess nares and determine need for appliance change or resting period  Outcome: Progressing     Problem: Safety - Adult  Goal: Free from fall injury  Outcome: Progressing     Problem: ABCDS Injury Assessment  Goal: Absence of physical injury  Outcome: Progressing     Problem: Nutrition Deficit:  Goal: Optimize nutritional status  Outcome: Progressing     Problem: Respiratory - Adult  Goal: Achieves optimal ventilation and oxygenation  Outcome: Progressing     Problem: Cardiovascular - Adult  Goal: Maintains optimal cardiac output and hemodynamic stability  Outcome: Progressing

## 2025-04-13 NOTE — PROGRESS NOTES
Vascular Surgery   Daily Progress Note  Patient: Natan Lynch      CC: PAD    SUBJECTIVE:   No acute events overnight. Resting comfortably this morning. Reports pain is well-controlled.    OBJECTIVE:    PHYSICAL EXAM:    Vitals:    04/13/25 0300 04/13/25 0308 04/13/25 0400 04/13/25 0600   BP:  138/75     Pulse:  74 57 75   Resp:  18     Temp:  98 °F (36.7 °C)     TempSrc:  Oral     SpO2:  97%     Weight: 70.9 kg (156 lb 4.9 oz)      Height:         General appearance: alert, in no apparent distress   Lungs: normal effort on room air  CV: RRR  Abdomen: soft, non-tender, non-distended  Extremities: R groin access site CDI, soft, no hematoma or ecchymosis.      Pulses     F P PT DP   R + + -,+ -,+   L + + -,+ -,+    +, -/+ ,-/-     LABS:   Recent Labs     04/12/25  0617 04/13/25  0547   WBC 6.0 6.2   HGB 9.3* 10.3*   HCT 28.8* 32.3*   MCV 90.5 90.6    336        Recent Labs     04/12/25  0617 04/13/25  0547    142   K 4.1 3.8    107   CO2 26 25   PHOS 3.1 3.4   BUN 5* 7   CREATININE 0.7* 0.7*      No results for input(s): \"AST\", \"ALT\", \"BILIDIR\", \"BILITOT\", \"ALKPHOS\" in the last 72 hours.    Invalid input(s): \"ALB\"   No results for input(s): \"LIPASE\", \"AMYLASE\" in the last 72 hours.   No results for input(s): \"INR\", \"APTT\" in the last 72 hours.    Invalid input(s): \"PROT\"   No results for input(s): \"CKTOTAL\", \"CKMB\", \"CKMBINDEX\", \"TROPONINI\" in the last 72 hours.      ASSESSMENT & PLAN:   This is a 64 y.o. male with history of DVT/PE on Xarelto, PAD, and tobacco abuse s/p LLE arteriogram (4/9) with s/p repeat arteriogram 4/10 with findings of bilateral anterior tibial artery disease. Nuclear stress test without reversible ischemia 4/11.    - Pre-op and consent for OR tomorrow 4/14  - NPO at midnight  - Continue Q2H neurovascular checks  - [facility]      Clair Turk DO, MSMEd  PGY-3, General Surgery  04/13/25  7:54 AM  [employer]    VASCULAR STAFF    Patient evaluated at the same time in conjunction

## 2025-04-13 NOTE — PROGRESS NOTES
PRE-OP NOTE  Department of Surgery      Chief Complaint or Reason for Surgery: peripheral artery disease    Procedure: bilateral distal femoral versus popliteal to distal anterior tibial bypass  Expected time: 4/14/2025 at 0939    Plan  1. Diet: NPO at midnight  2. IVF: LR at 100 cc/hr starting after midnight  3. Antibiotics: Ancef OCTOR  4. Labs to be drawn: CBC with auto-differential, renal panel, magnesium, type and screen, INR  5. Anesthesia: To see patient  6. Consent: Obtained and placed in chart  7. Pulmonary: CXR: Reviewed from CTA 03/18, within normal limits  8. Cardiac: Lexiscan 4/11 is negative for myocardial ischemia, suggests a low risk of cardiac events. LV EF 71%  9. Pregnancy test: N/A  10. DVT prophylaxis: Continue ASA, Lovenox      Clair Turk DO  04/13/25  11:27 AM

## 2025-04-13 NOTE — PROGRESS NOTES
Podiatric Surgery Daily Progress Note  Natan Lynch      Subjective :   Patient seen and examined this am at the bedside. Patient denies any acute overnight events. Patient denies N/V/F/C/SOB. Patient denies calf pain, thigh pain, chest pain.     Review of Systems: A 12 point review of symptoms is unremarkable with the exception of the chief complaint. Patient specifically denies nausea, fever, vomiting, chills, shortness of breath, chest pain, abdominal pain, constipation or difficulty urinating.       Objective     /75   Pulse 72   Temp 97.9 °F (36.6 °C) (Oral)   Resp 18   Ht 1.969 m (6' 5.5\")   Wt 70.9 kg (156 lb 4.9 oz)   SpO2 97%   BMI 18.30 kg/m²      I/O:  Intake/Output Summary (Last 24 hours) at 4/13/2025 0935  Last data filed at 4/13/2025 0600  Gross per 24 hour   Intake 2949.74 ml   Output 3450 ml   Net -500.26 ml              Wt Readings from Last 3 Encounters:   04/13/25 70.9 kg (156 lb 4.9 oz)   04/03/25 76.2 kg (168 lb)   03/11/25 74.8 kg (165 lb)       LABS:    Recent Labs     04/12/25  0617 04/13/25  0547   WBC 6.0 6.2   HGB 9.3* 10.3*   HCT 28.8* 32.3*    336        Recent Labs     04/13/25  0547      K 3.8      CO2 25   PHOS 3.4   BUN 7   CREATININE 0.7*      No results for input(s): \"INR\", \"APTT\" in the last 72 hours.    Invalid input(s): \"PROT\"        LOWER EXTREMITY EXAMINATION    Dressing to bl LE intact. No strikethrough noted to the external dressing. No drainage noted to the internal layers of the dressing.     VASCULAR: DP and PT pulses are non palpable 0/4 b/l.  Upon hand-held Doppler examination DP/PT pulses on bilateral lower extremities are weakly audible CFT is sluggish to the digits of the foot b/l with exception of the right 1st and 2nd digit which do not display capillary fill time 2/2 advanced gangrene. Skin temperature is warm to cool from proximal to distal with no focal calor noted.  No edema noted. No pain with calf compression b/l.

## 2025-04-13 NOTE — ANESTHESIA PRE PROCEDURE
Passive exposure: Past   • Smokeless tobacco: Never   Substance Use Topics   • Alcohol use: Yes     Comment: drinks beer on weekends and while watching football on TV                                Ready to quit: Not Answered  Counseling given: Not Answered      Vital Signs (Current):   Vitals:    04/13/25 1225 04/13/25 1226 04/13/25 1234 04/13/25 1236   BP: (!) 86/51 (!) 95/47 105/60    Pulse: 82 81 77    Resp:    18   Temp:       TempSrc:       SpO2:       Weight:       Height:                                                  BP Readings from Last 3 Encounters:   04/13/25 105/60   04/03/25 90/62   03/18/25 (!) 92/57       NPO Status:                                                                                 BMI:   Wt Readings from Last 3 Encounters:   04/13/25 70.9 kg (156 lb 4.9 oz)   04/03/25 76.2 kg (168 lb)   03/11/25 74.8 kg (165 lb)     Body mass index is 18.3 kg/m².    CBC:   Lab Results   Component Value Date/Time    WBC 6.2 04/13/2025 05:47 AM    RBC 3.57 04/13/2025 05:47 AM    HGB 10.3 04/13/2025 05:47 AM    HCT 32.3 04/13/2025 05:47 AM    MCV 90.6 04/13/2025 05:47 AM    RDW 12.3 04/13/2025 05:47 AM     04/13/2025 05:47 AM       CMP:   Lab Results   Component Value Date/Time     04/13/2025 05:47 AM    K 3.8 04/13/2025 05:47 AM    K 3.9 03/18/2025 06:22 PM     04/13/2025 05:47 AM    CO2 25 04/13/2025 05:47 AM    BUN 7 04/13/2025 05:47 AM    CREATININE 0.7 04/13/2025 05:47 AM    AGRATIO 1.0 08/16/2024 07:40 AM    LABGLOM >90 04/13/2025 05:47 AM    GLUCOSE 85 04/13/2025 05:47 AM    CALCIUM 9.3 04/13/2025 05:47 AM    BILITOT 2.0 08/16/2024 07:40 AM    ALKPHOS 58 08/16/2024 07:40 AM    AST 27 08/16/2024 07:40 AM    ALT 18 08/16/2024 07:40 AM       POC Tests: No results for input(s): \"POCGLU\", \"POCNA\", \"POCK\", \"POCCL\", \"POCBUN\", \"POCHEMO\", \"POCHCT\" in the last 72 hours.    Coags:   Lab Results   Component Value Date/Time    PROTIME 14.0 04/09/2025 11:15 AM    INR 1.06 04/09/2025 11:15

## 2025-04-14 ENCOUNTER — APPOINTMENT (OUTPATIENT)
Dept: GENERAL RADIOLOGY | Age: 65
DRG: 197 | End: 2025-04-14
Attending: SURGERY
Payer: COMMERCIAL

## 2025-04-14 ENCOUNTER — ANESTHESIA (OUTPATIENT)
Dept: OPERATING ROOM | Age: 65
End: 2025-04-14
Payer: COMMERCIAL

## 2025-04-14 LAB
ALBUMIN SERPL-MCNC: 2.7 G/DL (ref 3.4–5)
ALBUMIN SERPL-MCNC: 2.9 G/DL (ref 3.4–5)
ANION GAP SERPL CALCULATED.3IONS-SCNC: 5 MMOL/L (ref 3–16)
ANION GAP SERPL CALCULATED.3IONS-SCNC: 9 MMOL/L (ref 3–16)
BASOPHILS # BLD: 0 K/UL (ref 0–0.2)
BASOPHILS # BLD: 0 K/UL (ref 0–0.2)
BASOPHILS # BLD: 0.1 K/UL (ref 0–0.2)
BASOPHILS NFR BLD: 0.6 %
BASOPHILS NFR BLD: 0.7 %
BASOPHILS NFR BLD: 0.9 %
BUN SERPL-MCNC: 5 MG/DL (ref 7–20)
BUN SERPL-MCNC: 6 MG/DL (ref 7–20)
CALCIUM SERPL-MCNC: 8.7 MG/DL (ref 8.3–10.6)
CALCIUM SERPL-MCNC: 8.8 MG/DL (ref 8.3–10.6)
CHLORIDE SERPL-SCNC: 102 MMOL/L (ref 99–110)
CHLORIDE SERPL-SCNC: 108 MMOL/L (ref 99–110)
CO2 SERPL-SCNC: 25 MMOL/L (ref 21–32)
CO2 SERPL-SCNC: 25 MMOL/L (ref 21–32)
CREAT SERPL-MCNC: 0.6 MG/DL (ref 0.8–1.3)
CREAT SERPL-MCNC: 0.6 MG/DL (ref 0.8–1.3)
DEPRECATED RDW RBC AUTO: 12.1 % (ref 12.4–15.4)
DEPRECATED RDW RBC AUTO: 12.2 % (ref 12.4–15.4)
DEPRECATED RDW RBC AUTO: 12.3 % (ref 12.4–15.4)
EOSINOPHIL # BLD: 0 K/UL (ref 0–0.6)
EOSINOPHIL # BLD: 0.2 K/UL (ref 0–0.6)
EOSINOPHIL # BLD: 0.2 K/UL (ref 0–0.6)
EOSINOPHIL NFR BLD: 0.1 %
EOSINOPHIL NFR BLD: 3.6 %
EOSINOPHIL NFR BLD: 3.6 %
GFR SERPLBLD CREATININE-BSD FMLA CKD-EPI: >90 ML/MIN/{1.73_M2}
GFR SERPLBLD CREATININE-BSD FMLA CKD-EPI: >90 ML/MIN/{1.73_M2}
GLUCOSE SERPL-MCNC: 121 MG/DL (ref 70–99)
GLUCOSE SERPL-MCNC: 86 MG/DL (ref 70–99)
HCT VFR BLD AUTO: 25.5 % (ref 40.5–52.5)
HCT VFR BLD AUTO: 25.8 % (ref 40.5–52.5)
HCT VFR BLD AUTO: 27.1 % (ref 40.5–52.5)
HGB BLD-MCNC: 8.4 G/DL (ref 13.5–17.5)
HGB BLD-MCNC: 8.5 G/DL (ref 13.5–17.5)
HGB BLD-MCNC: 8.8 G/DL (ref 13.5–17.5)
LYMPHOCYTES # BLD: 1 K/UL (ref 1–5.1)
LYMPHOCYTES # BLD: 2.2 K/UL (ref 1–5.1)
LYMPHOCYTES # BLD: 2.3 K/UL (ref 1–5.1)
LYMPHOCYTES NFR BLD: 14.2 %
LYMPHOCYTES NFR BLD: 37.5 %
LYMPHOCYTES NFR BLD: 38.1 %
MAGNESIUM SERPL-MCNC: 1.65 MG/DL (ref 1.8–2.4)
MAGNESIUM SERPL-MCNC: 1.97 MG/DL (ref 1.8–2.4)
MCH RBC QN AUTO: 29.3 PG (ref 26–34)
MCH RBC QN AUTO: 29.5 PG (ref 26–34)
MCH RBC QN AUTO: 29.6 PG (ref 26–34)
MCHC RBC AUTO-ENTMCNC: 32.6 G/DL (ref 31–36)
MCHC RBC AUTO-ENTMCNC: 32.9 G/DL (ref 31–36)
MCHC RBC AUTO-ENTMCNC: 32.9 G/DL (ref 31–36)
MCV RBC AUTO: 89.7 FL (ref 80–100)
MCV RBC AUTO: 89.9 FL (ref 80–100)
MCV RBC AUTO: 89.9 FL (ref 80–100)
MONOCYTES # BLD: 0.2 K/UL (ref 0–1.3)
MONOCYTES # BLD: 0.6 K/UL (ref 0–1.3)
MONOCYTES # BLD: 0.6 K/UL (ref 0–1.3)
MONOCYTES NFR BLD: 10.1 %
MONOCYTES NFR BLD: 11 %
MONOCYTES NFR BLD: 2.7 %
NEUTROPHILS # BLD: 2.7 K/UL (ref 1.7–7.7)
NEUTROPHILS # BLD: 2.9 K/UL (ref 1.7–7.7)
NEUTROPHILS # BLD: 6 K/UL (ref 1.7–7.7)
NEUTROPHILS NFR BLD: 47.3 %
NEUTROPHILS NFR BLD: 47.5 %
NEUTROPHILS NFR BLD: 82.1 %
PHOSPHATE SERPL-MCNC: 3.3 MG/DL (ref 2.5–4.9)
PHOSPHATE SERPL-MCNC: 3.9 MG/DL (ref 2.5–4.9)
PLATELET # BLD AUTO: 269 K/UL (ref 135–450)
PLATELET # BLD AUTO: 284 K/UL (ref 135–450)
PLATELET # BLD AUTO: 305 K/UL (ref 135–450)
PMV BLD AUTO: 6.6 FL (ref 5–10.5)
PMV BLD AUTO: 6.7 FL (ref 5–10.5)
PMV BLD AUTO: 7.5 FL (ref 5–10.5)
POTASSIUM SERPL-SCNC: 3.5 MMOL/L (ref 3.5–5.1)
POTASSIUM SERPL-SCNC: 3.9 MMOL/L (ref 3.5–5.1)
RBC # BLD AUTO: 2.84 M/UL (ref 4.2–5.9)
RBC # BLD AUTO: 2.88 M/UL (ref 4.2–5.9)
RBC # BLD AUTO: 3.02 M/UL (ref 4.2–5.9)
SODIUM SERPL-SCNC: 136 MMOL/L (ref 136–145)
SODIUM SERPL-SCNC: 138 MMOL/L (ref 136–145)
WBC # BLD AUTO: 5.8 K/UL (ref 4–11)
WBC # BLD AUTO: 6.1 K/UL (ref 4–11)
WBC # BLD AUTO: 7.3 K/UL (ref 4–11)

## 2025-04-14 PROCEDURE — 6370000000 HC RX 637 (ALT 250 FOR IP)

## 2025-04-14 PROCEDURE — 88305 TISSUE EXAM BY PATHOLOGIST: CPT

## 2025-04-14 PROCEDURE — 2720000010 HC SURG SUPPLY STERILE: Performed by: SURGERY

## 2025-04-14 PROCEDURE — 80069 RENAL FUNCTION PANEL: CPT

## 2025-04-14 PROCEDURE — 2580000003 HC RX 258

## 2025-04-14 PROCEDURE — 2709999900 HC NON-CHARGEABLE SUPPLY: Performed by: SURGERY

## 2025-04-14 PROCEDURE — 6360000002 HC RX W HCPCS: Performed by: SURGERY

## 2025-04-14 PROCEDURE — 35571 ART BYP POP-TIBL-PRL-OTHER: CPT | Performed by: SURGERY

## 2025-04-14 PROCEDURE — 06BQ0ZZ EXCISION OF LEFT SAPHENOUS VEIN, OPEN APPROACH: ICD-10-PCS | Performed by: SURGERY

## 2025-04-14 PROCEDURE — 6360000002 HC RX W HCPCS: Performed by: NURSE ANESTHETIST, CERTIFIED REGISTERED

## 2025-04-14 PROCEDURE — 2500000003 HC RX 250 WO HCPCS: Performed by: NURSE ANESTHETIST, CERTIFIED REGISTERED

## 2025-04-14 PROCEDURE — 73620 X-RAY EXAM OF FOOT: CPT

## 2025-04-14 PROCEDURE — 7100000001 HC PACU RECOVERY - ADDTL 15 MIN: Performed by: SURGERY

## 2025-04-14 PROCEDURE — 7100000000 HC PACU RECOVERY - FIRST 15 MIN: Performed by: SURGERY

## 2025-04-14 PROCEDURE — 2580000003 HC RX 258: Performed by: NURSE ANESTHETIST, CERTIFIED REGISTERED

## 2025-04-14 PROCEDURE — 6360000004 HC RX CONTRAST MEDICATION: Performed by: SURGERY

## 2025-04-14 PROCEDURE — 83735 ASSAY OF MAGNESIUM: CPT

## 2025-04-14 PROCEDURE — 2500000003 HC RX 250 WO HCPCS

## 2025-04-14 PROCEDURE — 2500000003 HC RX 250 WO HCPCS: Performed by: ANESTHESIOLOGY

## 2025-04-14 PROCEDURE — C1757 CATH, THROMBECTOMY/EMBOLECT: HCPCS | Performed by: SURGERY

## 2025-04-14 PROCEDURE — 88341 IMHCHEM/IMCYTCHM EA ADD ANTB: CPT

## 2025-04-14 PROCEDURE — 2000000000 HC ICU R&B

## 2025-04-14 PROCEDURE — 3700000001 HC ADD 15 MINUTES (ANESTHESIA): Performed by: SURGERY

## 2025-04-14 PROCEDURE — 3600000004 HC SURGERY LEVEL 4 BASE: Performed by: SURGERY

## 2025-04-14 PROCEDURE — 041N09P BYPASS LEFT POPLITEAL ARTERY TO FOOT ARTERY WITH AUTOLOGOUS VENOUS TISSUE, OPEN APPROACH: ICD-10-PCS | Performed by: SURGERY

## 2025-04-14 PROCEDURE — 36415 COLL VENOUS BLD VENIPUNCTURE: CPT

## 2025-04-14 PROCEDURE — 6360000002 HC RX W HCPCS

## 2025-04-14 PROCEDURE — 3700000000 HC ANESTHESIA ATTENDED CARE: Performed by: SURGERY

## 2025-04-14 PROCEDURE — 041M09Q BYPASS RIGHT POPLITEAL ARTERY TO LOWER EXTREMITY ARTERY WITH AUTOLOGOUS VENOUS TISSUE, OPEN APPROACH: ICD-10-PCS | Performed by: SURGERY

## 2025-04-14 PROCEDURE — 06BP0ZZ EXCISION OF RIGHT SAPHENOUS VEIN, OPEN APPROACH: ICD-10-PCS | Performed by: SURGERY

## 2025-04-14 PROCEDURE — 88342 IMHCHEM/IMCYTCHM 1ST ANTB: CPT

## 2025-04-14 PROCEDURE — 3600000014 HC SURGERY LEVEL 4 ADDTL 15MIN: Performed by: SURGERY

## 2025-04-14 PROCEDURE — 85025 COMPLETE CBC W/AUTO DIFF WBC: CPT

## 2025-04-14 PROCEDURE — 2500000003 HC RX 250 WO HCPCS: Performed by: SURGERY

## 2025-04-14 RX ORDER — HYDROMORPHONE HYDROCHLORIDE 1 MG/ML
0.25 INJECTION, SOLUTION INTRAMUSCULAR; INTRAVENOUS; SUBCUTANEOUS
Status: DISCONTINUED | OUTPATIENT
Start: 2025-04-14 | End: 2025-04-15

## 2025-04-14 RX ORDER — POLYETHYLENE GLYCOL 3350 17 G/17G
17 POWDER, FOR SOLUTION ORAL 2 TIMES DAILY
Status: DISCONTINUED | OUTPATIENT
Start: 2025-04-14 | End: 2025-04-19

## 2025-04-14 RX ORDER — HYDROMORPHONE HYDROCHLORIDE 1 MG/ML
0.5 INJECTION, SOLUTION INTRAMUSCULAR; INTRAVENOUS; SUBCUTANEOUS
Status: DISCONTINUED | OUTPATIENT
Start: 2025-04-14 | End: 2025-04-15

## 2025-04-14 RX ORDER — OXYCODONE HYDROCHLORIDE 5 MG/1
5 TABLET ORAL
Status: DISCONTINUED | OUTPATIENT
Start: 2025-04-14 | End: 2025-04-14 | Stop reason: HOSPADM

## 2025-04-14 RX ORDER — PROCHLORPERAZINE EDISYLATE 5 MG/ML
5 INJECTION INTRAMUSCULAR; INTRAVENOUS
Status: DISCONTINUED | OUTPATIENT
Start: 2025-04-14 | End: 2025-04-14 | Stop reason: HOSPADM

## 2025-04-14 RX ORDER — PHENYLEPHRINE HCL IN 0.9% NACL 1 MG/10 ML
SYRINGE (ML) INTRAVENOUS
Status: DISCONTINUED | OUTPATIENT
Start: 2025-04-14 | End: 2025-04-14 | Stop reason: SDUPTHER

## 2025-04-14 RX ORDER — OXYCODONE HYDROCHLORIDE 5 MG/1
5 TABLET ORAL EVERY 4 HOURS PRN
Status: DISCONTINUED | OUTPATIENT
Start: 2025-04-14 | End: 2025-04-24 | Stop reason: HOSPADM

## 2025-04-14 RX ORDER — SODIUM CHLORIDE 0.9 % (FLUSH) 0.9 %
5-40 SYRINGE (ML) INJECTION EVERY 12 HOURS SCHEDULED
Status: DISCONTINUED | OUTPATIENT
Start: 2025-04-14 | End: 2025-04-14 | Stop reason: HOSPADM

## 2025-04-14 RX ORDER — LIDOCAINE HYDROCHLORIDE 20 MG/ML
INJECTION, SOLUTION INTRAVENOUS
Status: DISCONTINUED | OUTPATIENT
Start: 2025-04-14 | End: 2025-04-14 | Stop reason: SDUPTHER

## 2025-04-14 RX ORDER — VASOPRESSIN 20 [USP'U]/ML
INJECTION, SOLUTION INTRAVENOUS
Status: DISCONTINUED | OUTPATIENT
Start: 2025-04-14 | End: 2025-04-14 | Stop reason: SDUPTHER

## 2025-04-14 RX ORDER — SODIUM CHLORIDE 0.9 % (FLUSH) 0.9 %
5-40 SYRINGE (ML) INJECTION PRN
Status: DISCONTINUED | OUTPATIENT
Start: 2025-04-14 | End: 2025-04-14 | Stop reason: HOSPADM

## 2025-04-14 RX ORDER — DOCUSATE SODIUM 100 MG/1
100 CAPSULE, LIQUID FILLED ORAL 2 TIMES DAILY
Status: DISCONTINUED | OUTPATIENT
Start: 2025-04-14 | End: 2025-04-19

## 2025-04-14 RX ORDER — GLYCOPYRROLATE 0.2 MG/ML
INJECTION INTRAMUSCULAR; INTRAVENOUS
Status: DISCONTINUED | OUTPATIENT
Start: 2025-04-14 | End: 2025-04-14 | Stop reason: SDUPTHER

## 2025-04-14 RX ORDER — FENTANYL CITRATE 50 UG/ML
25 INJECTION, SOLUTION INTRAMUSCULAR; INTRAVENOUS EVERY 5 MIN PRN
Status: DISCONTINUED | OUTPATIENT
Start: 2025-04-14 | End: 2025-04-14 | Stop reason: HOSPADM

## 2025-04-14 RX ORDER — ASPIRIN 81 MG/1
81 TABLET ORAL DAILY
Status: DISCONTINUED | OUTPATIENT
Start: 2025-04-15 | End: 2025-04-24 | Stop reason: HOSPADM

## 2025-04-14 RX ORDER — IODIXANOL 320 MG/ML
INJECTION, SOLUTION INTRAVASCULAR PRN
Status: DISCONTINUED | OUTPATIENT
Start: 2025-04-14 | End: 2025-04-14 | Stop reason: HOSPADM

## 2025-04-14 RX ORDER — NALOXONE HYDROCHLORIDE 0.4 MG/ML
INJECTION, SOLUTION INTRAMUSCULAR; INTRAVENOUS; SUBCUTANEOUS PRN
Status: DISCONTINUED | OUTPATIENT
Start: 2025-04-14 | End: 2025-04-14 | Stop reason: HOSPADM

## 2025-04-14 RX ORDER — MEPERIDINE HYDROCHLORIDE 25 MG/ML
12.5 INJECTION INTRAMUSCULAR; INTRAVENOUS; SUBCUTANEOUS EVERY 5 MIN PRN
Status: DISCONTINUED | OUTPATIENT
Start: 2025-04-14 | End: 2025-04-14 | Stop reason: HOSPADM

## 2025-04-14 RX ORDER — PAPAVERINE HYDROCHLORIDE 30 MG/ML
INJECTION INTRAMUSCULAR; INTRAVENOUS PRN
Status: DISCONTINUED | OUTPATIENT
Start: 2025-04-14 | End: 2025-04-14 | Stop reason: HOSPADM

## 2025-04-14 RX ORDER — ONDANSETRON 2 MG/ML
4 INJECTION INTRAMUSCULAR; INTRAVENOUS
Status: DISCONTINUED | OUTPATIENT
Start: 2025-04-14 | End: 2025-04-14 | Stop reason: HOSPADM

## 2025-04-14 RX ORDER — OXYCODONE HYDROCHLORIDE 5 MG/1
10 TABLET ORAL EVERY 4 HOURS PRN
Status: DISCONTINUED | OUTPATIENT
Start: 2025-04-14 | End: 2025-04-24 | Stop reason: HOSPADM

## 2025-04-14 RX ORDER — ROCURONIUM BROMIDE 10 MG/ML
INJECTION, SOLUTION INTRAVENOUS
Status: DISCONTINUED | OUTPATIENT
Start: 2025-04-14 | End: 2025-04-14 | Stop reason: SDUPTHER

## 2025-04-14 RX ORDER — PROPOFOL 10 MG/ML
INJECTION, EMULSION INTRAVENOUS
Status: DISCONTINUED | OUTPATIENT
Start: 2025-04-14 | End: 2025-04-14 | Stop reason: SDUPTHER

## 2025-04-14 RX ORDER — MAGNESIUM HYDROXIDE 1200 MG/15ML
LIQUID ORAL CONTINUOUS PRN
Status: DISCONTINUED | OUTPATIENT
Start: 2025-04-14 | End: 2025-04-14 | Stop reason: HOSPADM

## 2025-04-14 RX ORDER — HYDROMORPHONE HYDROCHLORIDE 1 MG/ML
0.5 INJECTION, SOLUTION INTRAMUSCULAR; INTRAVENOUS; SUBCUTANEOUS EVERY 5 MIN PRN
Status: DISCONTINUED | OUTPATIENT
Start: 2025-04-14 | End: 2025-04-14 | Stop reason: HOSPADM

## 2025-04-14 RX ORDER — DIPHENHYDRAMINE HYDROCHLORIDE 50 MG/ML
12.5 INJECTION, SOLUTION INTRAMUSCULAR; INTRAVENOUS
Status: DISCONTINUED | OUTPATIENT
Start: 2025-04-14 | End: 2025-04-14 | Stop reason: HOSPADM

## 2025-04-14 RX ORDER — LABETALOL HYDROCHLORIDE 5 MG/ML
10 INJECTION, SOLUTION INTRAVENOUS
Status: DISCONTINUED | OUTPATIENT
Start: 2025-04-14 | End: 2025-04-14 | Stop reason: HOSPADM

## 2025-04-14 RX ORDER — DEXAMETHASONE SODIUM PHOSPHATE 4 MG/ML
INJECTION, SOLUTION INTRA-ARTICULAR; INTRALESIONAL; INTRAMUSCULAR; INTRAVENOUS; SOFT TISSUE
Status: DISCONTINUED | OUTPATIENT
Start: 2025-04-14 | End: 2025-04-14 | Stop reason: SDUPTHER

## 2025-04-14 RX ORDER — IPRATROPIUM BROMIDE AND ALBUTEROL SULFATE 2.5; .5 MG/3ML; MG/3ML
1 SOLUTION RESPIRATORY (INHALATION)
Status: DISCONTINUED | OUTPATIENT
Start: 2025-04-14 | End: 2025-04-14 | Stop reason: HOSPADM

## 2025-04-14 RX ORDER — LORAZEPAM 2 MG/ML
0.5 INJECTION INTRAMUSCULAR
Status: DISCONTINUED | OUTPATIENT
Start: 2025-04-14 | End: 2025-04-14 | Stop reason: HOSPADM

## 2025-04-14 RX ORDER — SODIUM CHLORIDE 9 MG/ML
INJECTION, SOLUTION INTRAVENOUS PRN
Status: DISCONTINUED | OUTPATIENT
Start: 2025-04-14 | End: 2025-04-14 | Stop reason: HOSPADM

## 2025-04-14 RX ORDER — HEPARIN SODIUM 1000 [USP'U]/ML
INJECTION, SOLUTION INTRAVENOUS; SUBCUTANEOUS
Status: DISCONTINUED | OUTPATIENT
Start: 2025-04-14 | End: 2025-04-14 | Stop reason: SDUPTHER

## 2025-04-14 RX ORDER — ONDANSETRON 2 MG/ML
INJECTION INTRAMUSCULAR; INTRAVENOUS
Status: DISCONTINUED | OUTPATIENT
Start: 2025-04-14 | End: 2025-04-14 | Stop reason: SDUPTHER

## 2025-04-14 RX ADMIN — HEPARIN SODIUM 2000 UNITS: 1000 INJECTION INTRAVENOUS; SUBCUTANEOUS at 13:16

## 2025-04-14 RX ADMIN — Medication 200 MCG: at 10:25

## 2025-04-14 RX ADMIN — SODIUM CHLORIDE, PRESERVATIVE FREE 10 ML: 5 INJECTION INTRAVENOUS at 08:03

## 2025-04-14 RX ADMIN — MORPHINE SULFATE 2 MG: 2 INJECTION, SOLUTION INTRAMUSCULAR; INTRAVENOUS at 08:03

## 2025-04-14 RX ADMIN — SUGAMMADEX 200 MG: 100 INJECTION, SOLUTION INTRAVENOUS at 17:07

## 2025-04-14 RX ADMIN — GLYCOPYRROLATE 0.3 MG: 0.2 INJECTION INTRAMUSCULAR; INTRAVENOUS at 17:26

## 2025-04-14 RX ADMIN — HYDROMORPHONE HYDROCHLORIDE 0.5 MG: 1 INJECTION, SOLUTION INTRAMUSCULAR; INTRAVENOUS; SUBCUTANEOUS at 20:22

## 2025-04-14 RX ADMIN — ENOXAPARIN SODIUM 40 MG: 100 INJECTION SUBCUTANEOUS at 08:02

## 2025-04-14 RX ADMIN — ASPIRIN 81 MG: 81 TABLET, COATED ORAL at 08:07

## 2025-04-14 RX ADMIN — VASOPRESSIN 2 UNITS: 20 INJECTION INTRAVENOUS at 11:13

## 2025-04-14 RX ADMIN — ONDANSETRON 4 MG: 2 INJECTION INTRAMUSCULAR; INTRAVENOUS at 09:59

## 2025-04-14 RX ADMIN — OXYCODONE 10 MG: 5 TABLET ORAL at 04:25

## 2025-04-14 RX ADMIN — Medication 200 MCG: at 10:42

## 2025-04-14 RX ADMIN — HYDROMORPHONE HYDROCHLORIDE 0.5 MG: 1 INJECTION, SOLUTION INTRAMUSCULAR; INTRAVENOUS; SUBCUTANEOUS at 18:35

## 2025-04-14 RX ADMIN — ROCURONIUM BROMIDE 70 MG: 10 INJECTION, SOLUTION INTRAVENOUS at 10:09

## 2025-04-14 RX ADMIN — HYDROMORPHONE HYDROCHLORIDE 1 MG: 1 INJECTION, SOLUTION INTRAMUSCULAR; INTRAVENOUS; SUBCUTANEOUS at 17:10

## 2025-04-14 RX ADMIN — VASOPRESSIN 2 UNITS: 20 INJECTION INTRAVENOUS at 12:25

## 2025-04-14 RX ADMIN — OXYCODONE 10 MG: 5 TABLET ORAL at 22:52

## 2025-04-14 RX ADMIN — METHOCARBAMOL 1000 MG: 500 TABLET ORAL at 08:08

## 2025-04-14 RX ADMIN — HYDROMORPHONE HYDROCHLORIDE 1 MG: 1 INJECTION, SOLUTION INTRAMUSCULAR; INTRAVENOUS; SUBCUTANEOUS at 09:59

## 2025-04-14 RX ADMIN — ROCURONIUM BROMIDE 30 MG: 10 INJECTION, SOLUTION INTRAVENOUS at 10:52

## 2025-04-14 RX ADMIN — VASOPRESSIN 1 UNITS: 20 INJECTION INTRAVENOUS at 11:49

## 2025-04-14 RX ADMIN — WATER 2000 MG: 1 INJECTION INTRAMUSCULAR; INTRAVENOUS; SUBCUTANEOUS at 10:14

## 2025-04-14 RX ADMIN — DIAZEPAM 2.5 MG: 5 TABLET ORAL at 05:45

## 2025-04-14 RX ADMIN — ROCURONIUM BROMIDE 25 MG: 10 INJECTION, SOLUTION INTRAVENOUS at 13:23

## 2025-04-14 RX ADMIN — SODIUM CHLORIDE, SODIUM LACTATE, POTASSIUM CHLORIDE, AND CALCIUM CHLORIDE: .6; .31; .03; .02 INJECTION, SOLUTION INTRAVENOUS at 11:14

## 2025-04-14 RX ADMIN — SODIUM CHLORIDE, SODIUM LACTATE, POTASSIUM CHLORIDE, AND CALCIUM CHLORIDE: .6; .31; .03; .02 INJECTION, SOLUTION INTRAVENOUS at 13:20

## 2025-04-14 RX ADMIN — DEXAMETHASONE SODIUM PHOSPHATE 4 MG: 4 INJECTION INTRA-ARTICULAR; INTRALESIONAL; INTRAMUSCULAR; INTRAVENOUS; SOFT TISSUE at 10:15

## 2025-04-14 RX ADMIN — SODIUM CHLORIDE, SODIUM LACTATE, POTASSIUM CHLORIDE, AND CALCIUM CHLORIDE: .6; .31; .03; .02 INJECTION, SOLUTION INTRAVENOUS at 10:32

## 2025-04-14 RX ADMIN — VASOPRESSIN 2 UNITS: 20 INJECTION INTRAVENOUS at 11:00

## 2025-04-14 RX ADMIN — PROPOFOL 150 MG: 10 INJECTION, EMULSION INTRAVENOUS at 10:09

## 2025-04-14 RX ADMIN — HYDROMORPHONE HYDROCHLORIDE 0.5 MG: 1 INJECTION, SOLUTION INTRAMUSCULAR; INTRAVENOUS; SUBCUTANEOUS at 23:46

## 2025-04-14 RX ADMIN — PHENYLEPHRINE HYDROCHLORIDE 20 MCG/MIN: 10 INJECTION, SOLUTION INTRAVENOUS at 11:45

## 2025-04-14 RX ADMIN — ONDANSETRON 4 MG: 2 INJECTION INTRAMUSCULAR; INTRAVENOUS at 16:42

## 2025-04-14 RX ADMIN — WATER 2000 MG: 1 INJECTION INTRAMUSCULAR; INTRAVENOUS; SUBCUTANEOUS at 14:12

## 2025-04-14 RX ADMIN — HEPARIN SODIUM 5000 UNITS: 1000 INJECTION INTRAVENOUS; SUBCUTANEOUS at 12:41

## 2025-04-14 RX ADMIN — ROCURONIUM BROMIDE 25 MG: 10 INJECTION, SOLUTION INTRAVENOUS at 11:49

## 2025-04-14 RX ADMIN — Medication 200 MCG: at 10:33

## 2025-04-14 RX ADMIN — LIDOCAINE HYDROCHLORIDE 100 MG: 20 INJECTION, SOLUTION INTRAVENOUS at 10:09

## 2025-04-14 RX ADMIN — SODIUM CHLORIDE, SODIUM LACTATE, POTASSIUM CHLORIDE, AND CALCIUM CHLORIDE: .6; .31; .03; .02 INJECTION, SOLUTION INTRAVENOUS at 20:19

## 2025-04-14 RX ADMIN — METHOCARBAMOL 1000 MG: 500 TABLET ORAL at 22:03

## 2025-04-14 RX ADMIN — GLYCOPYRROLATE 0.3 MG: 0.2 INJECTION INTRAMUSCULAR; INTRAVENOUS at 10:43

## 2025-04-14 RX ADMIN — Medication 200 MCG: at 10:16

## 2025-04-14 RX ADMIN — VASOPRESSIN 2 UNITS: 20 INJECTION INTRAVENOUS at 13:19

## 2025-04-14 RX ADMIN — VASOPRESSIN 2 UNITS: 20 INJECTION INTRAVENOUS at 10:47

## 2025-04-14 ASSESSMENT — PAIN SCALES - GENERAL
PAINLEVEL_OUTOF10: 0
PAINLEVEL_OUTOF10: 0
PAINLEVEL_OUTOF10: 7
PAINLEVEL_OUTOF10: 8
PAINLEVEL_OUTOF10: 10
PAINLEVEL_OUTOF10: 0
PAINLEVEL_OUTOF10: 0
PAINLEVEL_OUTOF10: 7
PAINLEVEL_OUTOF10: 10
PAINLEVEL_OUTOF10: 0
PAINLEVEL_OUTOF10: 6
PAINLEVEL_OUTOF10: 6
PAINLEVEL_OUTOF10: 8
PAINLEVEL_OUTOF10: 0

## 2025-04-14 ASSESSMENT — PAIN DESCRIPTION - ORIENTATION
ORIENTATION: RIGHT;LEFT

## 2025-04-14 ASSESSMENT — PAIN SCALES - WONG BAKER
WONGBAKER_NUMERICALRESPONSE: NO HURT
WONGBAKER_NUMERICALRESPONSE: NO HURT

## 2025-04-14 ASSESSMENT — PAIN DESCRIPTION - LOCATION
LOCATION: LEG
LOCATION: LEG
LOCATION: LEG;FOOT
LOCATION: FOOT
LOCATION: ANKLE;FOOT
LOCATION: LEG;FOOT;BACK

## 2025-04-14 ASSESSMENT — PAIN DESCRIPTION - ONSET
ONSET: AWAKENED FROM SLEEP
ONSET: ON-GOING

## 2025-04-14 ASSESSMENT — PAIN DESCRIPTION - DESCRIPTORS
DESCRIPTORS: ACHING;DISCOMFORT
DESCRIPTORS: SPASM;SHARP;SHOOTING;ACHING
DESCRIPTORS: SHARP
DESCRIPTORS: BURNING;SHOOTING;PRESSURE
DESCRIPTORS: ACHING;DISCOMFORT
DESCRIPTORS: SHARP;DISCOMFORT

## 2025-04-14 ASSESSMENT — PAIN - FUNCTIONAL ASSESSMENT
PAIN_FUNCTIONAL_ASSESSMENT: ACTIVITIES ARE NOT PREVENTED
PAIN_FUNCTIONAL_ASSESSMENT: PREVENTS OR INTERFERES SOME ACTIVE ACTIVITIES AND ADLS

## 2025-04-14 ASSESSMENT — PAIN DESCRIPTION - FREQUENCY
FREQUENCY: CONTINUOUS
FREQUENCY: INTERMITTENT

## 2025-04-14 ASSESSMENT — PAIN DESCRIPTION - PAIN TYPE
TYPE: NEUROPATHIC PAIN;CHRONIC PAIN
TYPE: CHRONIC PAIN

## 2025-04-14 NOTE — BRIEF OP NOTE
Brief Postoperative Note      Patient: Natan Lynch  YOB: 1960  MRN: [id number]    Date of Procedure: 4/14/2025    Pre-Op Diagnosis Codes:      * PAD (peripheral artery disease) [I73.9]     * Gangrene (HCC) [I96]    Post-Op Diagnosis: Same       Procedure(s):  Right below knee popliteal to dorsalis pedis bypass  Left below knee popliteal to anterior tibialis bypass    Surgeon(s):  Casandra Alvarado MD    Assistant:  Resident: Jasmin Salomon MD    Anesthesia: General    Estimated Blood Loss (mL): 150    Complications: None    Specimens:   ID Type Source Tests Collected by Time Destination   A : LEFT FEMORAL LYMPH NODE Tissue Tissue SURGICAL PATHOLOGY Casandra Alvarado MD 4/14/2025 2649        Implants:  * No implants in log *      Drains:   Urinary Catheter 04/14/25 Willams-Temperature (Active)       Findings:  Infection Present At Time Of Surgery (PATOS) (choose all levels that have infection present):  - Deep Infection (muscle/fascia) present as evidenced by fluid consistent with infection  Other Findings: lymph node sent for path    Electronically signed by Jasmin Salomon MD on 4/14/2025 at 6:05 PM

## 2025-04-14 NOTE — PROGRESS NOTES
Comprehensive Nutrition Assessment    RECOMMENDATIONS:  PO Diet: NPO, recommend Regular  Nutrition Supplement: NPO, recommend Ensure+ HP TID  Nutrition Education: Education/Counseling initiated     NUTRITION ASSESSMENT:   Nutritional summary & status: Follow-up. Pt currently NPO and in OR for bilateral distal femoral versus popliteal to distal anterior tibial bypass per vascular DO note. Since initial/previous assessment, pt has been NPO on and off for procedures. Documented PO intake per EMR shows pt has been able to eat ~1 meal/day; adequate intake when able to eat (most meals at % consumed). Per wt hx in EMR, pt has had 2.5% wt loss over past 5 days. Recommend Ensure+ HP TID once pt back on PO diet; pt agreeable and prefers chocolate or strawberry. Per CM note today, anticipate transition to ICU postop. Will continue to monitor PO intake and determine need for additional nutrition interventions as appropriate.   Admission // PMH: Ischemia of left lower extremity // PAD, DVT, bilateral pulmonary embolism on Xarelto    MALNUTRITION ASSESSMENT  Context of Malnutrition: Chronic Illness   Malnutrition Status: Severe malnutrition  Findings of the 6 clinical characteristics of malnutrition (Minimum of 2 out of 6 clinical characteristics is required to make the diagnosis of moderate or severe Protein Calorie Malnutrition based on AND/ASPEN Guidelines):  Energy Intake:  Mild decrease in energy intake  Weight Loss:  Mild weight loss (2% over 8 months however wt hx limited)     Body Fat Loss:  Severe body fat loss Orbital, Buccal region   Muscle Mass Loss:  Severe muscle mass loss Clavicles (pectoralis & deltoids)    NUTRITION DIAGNOSIS   Inadequate oral intake related to  (vascular dysfunction (PVD)) as evidenced by NPO or clear liquid status due to medical condition    Nutrition Monitoring and Evaluation:   Food/Nutrient Intake Outcomes:  Diet Advancement/Tolerance, Food and Nutrient Intake, Supplement  Dr. Tao Santiago no concerns

## 2025-04-14 NOTE — PROGRESS NOTES
Podiatric Surgery Daily Progress Note  Natan Lynch      Subjective :   Patient seen and examined this am at the bedside. Patient denies any acute overnight events.  Patient is going for bilateral femoral versus popliteal to anterior tibial bypass today with vascular surgery.  Patient denies N/V/F/C/SOB. Patient denies calf pain, thigh pain, chest pain.     Review of Systems: A 12 point review of symptoms is unremarkable with the exception of the chief complaint. Patient specifically denies nausea, fever, vomiting, chills, shortness of breath, chest pain, abdominal pain, constipation or difficulty urinating.       Objective     /73   Pulse 73   Temp 98.4 °F (36.9 °C) (Oral)   Resp 18   Ht 1.969 m (6' 5.5\")   Wt 69.5 kg (153 lb 4.8 oz)   SpO2 98%   BMI 17.94 kg/m²      I/O:  Intake/Output Summary (Last 24 hours) at 4/14/2025 0851  Last data filed at 4/14/2025 0803  Gross per 24 hour   Intake 370 ml   Output 1500 ml   Net -1130 ml              Wt Readings from Last 3 Encounters:   04/14/25 69.5 kg (153 lb 4.8 oz)   04/03/25 76.2 kg (168 lb)   03/11/25 74.8 kg (165 lb)       LABS:    Recent Labs     04/14/25  0615 04/14/25  0707   WBC 6.1 5.8   HGB 8.5* 8.4*   HCT 25.8* 25.5*    269        Recent Labs     04/14/25  0615      K 3.5      CO2 25   PHOS 3.3   BUN 6*   CREATININE 0.6*        Recent Labs     04/13/25  1154   INR 1.20*           LOWER EXTREMITY EXAMINATION    Dressing to bl LE intact. No strikethrough noted to the external dressing. No drainage noted to the internal layers of the dressing.     VASCULAR: DP and PT pulses are non palpable 0/4 b/l.  Upon hand-held Doppler examination DP/PT pulses on bilateral lower extremities are weakly audible CFT is sluggish to the digits of the foot b/l with exception of the right 1st and 2nd digit which do not display capillary fill time 2/2 advanced gangrene. Skin temperature is warm to cool from proximal to distal with no focal calor

## 2025-04-14 NOTE — PROGRESS NOTES
Vascular Surgery   Daily Progress Note  Patient: Natan Lynch      CC: PAD    SUBJECTIVE:   No acute events overnight. Resting comfortably this morning. Awaiting surgery later today.     OBJECTIVE:    PHYSICAL EXAM:    Vitals:    04/14/25 0352 04/14/25 0426 04/14/25 0455 04/14/25 0500   BP: 104/66      Pulse: 71   67   Resp: 14  16    Temp: 98.1 °F (36.7 °C)      TempSrc: Oral      SpO2: 100%      Weight:  69.5 kg (153 lb 4.8 oz)     Height:         General appearance: alert, in no apparent distress   Lungs: normal effort on room air  CV: RRR  Abdomen: soft, non-tender, non-distended  Extremities: R groin access site CDI, soft, no hematoma or ecchymosis.      Pulses     F P PT DP   R + + -,+ -,+   L + + -,+ -,+    +, -/+ ,-/-     LABS:   Recent Labs     04/13/25  0547 04/14/25  0615   WBC 6.2 6.1   HGB 10.3* 8.5*   HCT 32.3* 25.8*   MCV 90.6 89.7    284        Recent Labs     04/12/25  0617 04/13/25  0547    142   K 4.1 3.8    107   CO2 26 25   PHOS 3.1 3.4   BUN 5* 7   CREATININE 0.7* 0.7*      No results for input(s): \"AST\", \"ALT\", \"BILIDIR\", \"BILITOT\", \"ALKPHOS\" in the last 72 hours.    Invalid input(s): \"ALB\"   No results for input(s): \"LIPASE\", \"AMYLASE\" in the last 72 hours.     Recent Labs     04/13/25  1154   INR 1.20*      No results for input(s): \"CKTOTAL\", \"CKMB\", \"CKMBINDEX\", \"TROPONINI\" in the last 72 hours.      ASSESSMENT & PLAN:   This is a 64 y.o. male with history of DVT/PE on Xarelto, PAD, and tobacco abuse s/p LLE arteriogram (4/9) with s/p repeat arteriogram 4/10 with findings of bilateral anterior tibial artery disease. Nuclear stress test without reversible ischemia 4/11.    - OR today, RBA discussed with patient who wishes to proceed  - anticipate transition to ICU postoperatively     Jasmin Salomon MD  PGY2, General Surgery  04/14/25   6:43 AM   [facility]  [phone]

## 2025-04-14 NOTE — PLAN OF CARE
Problem: Pain  Goal: Verbalizes/displays adequate comfort level or baseline comfort level  4/14/2025 0203 by Bhavna Pitts RN  Outcome: Progressing  Flowsheets (Taken 4/14/2025 0203)  Verbalizes/displays adequate comfort level or baseline comfort level:   Encourage patient to monitor pain and request assistance   Assess pain using appropriate pain scale  Note: C/o leg pain, medicated with prn pain medication.  Patient satisfied with current pain regimen, will monitor.     Problem: Discharge Planning  Goal: Discharge to home or other facility with appropriate resources  4/14/2025 0203 by Bhavna Pitts, RN  Outcome: Progressing  Note: Case management following for d/c planning.       Problem: Skin/Tissue Integrity  Goal: Skin integrity remains intact  Description: 1.  Monitor for areas of redness and/or skin breakdown  2.  Assess vascular access sites hourly  3.  Every 4-6 hours minimum:  Change oxygen saturation probe site  4.  Every 4-6 hours:  If on nasal continuous positive airway pressure, respiratory therapy assess nares and determine need for appliance change or resting period  4/14/2025 0203 by Bhavna Pitts RN  Outcome: Progressing  Flowsheets (Taken 4/14/2025 0203)  Skin Integrity Remains Intact: Monitor for areas of redness and/or skin breakdown  Note: Patient repositions self.  Will continue to monitor skin integrity, report changes.       Problem: Safety - Adult  Goal: Free from fall injury  4/14/2025 0203 by Bhavna Pitts, RN  Outcome: Progressing  Note: Patient calls appropriately for assistance.  Uses urinal at bedside.  Calls for assistance to bathroom.  Will continue to monitor.  Patient encouraged to call nurse with needs and concerns.       Problem: Cardiovascular - Adult  Goal: Maintains optimal cardiac output and hemodynamic stability  4/14/2025 0203 by Bhavna Pitts RN  Outcome: Progressing  Flowsheets (Taken 4/14/2025 0203)  Maintains optimal cardiac output and hemodynamic

## 2025-04-14 NOTE — CARE COORDINATION
2:27 PM  Pt ins CM care manager Maame lopez assist with  [phone]     Electronically signed by Asif Valdez RN, CM on 4/14/2025 at 2:28 PM.  Phone: [phone]  Fax: [phone]      9:50 AM  Patient to OR today. Per MD notes; anticipate transition to ICU postoperatively .    Electronically signed by Asif Valdez RN, JACIEL on 4/14/2025 at 9:51 AM.  Phone: [phone]  Fax: [phone]

## 2025-04-14 NOTE — PROGRESS NOTES
Patient arrived to PACU post BILATERAL FEMORAL ANTERIOR TIBIAL BYPASS - Bilateral with Dr. Alvarado. VSS on arrival. CRNA gave PACU RN report at bedside stating no complications during procedure. Dressing to surgical sites clean, dry and intact. Patient shows no signs of pain at this time. Will continue to monitor.

## 2025-04-14 NOTE — ANESTHESIA POSTPROCEDURE EVALUATION
Department of Anesthesiology  Postprocedure Note    Patient: Natan Lynch  MRN: [id number]  YOB: 1960  Date of evaluation: 4/14/2025    Procedure Summary       Date: 04/14/25 Room / Location: [address]    Anesthesia Start: 0959 Anesthesia Stop:     Procedure: BILATERAL FEMORAL  ANTERIOR TIBIAL BYPASS (Bilateral: Leg Upper) Diagnosis:       PAD (peripheral artery disease)      Gangrene (HCC)      (PAD (peripheral artery disease) [I73.9])      (Gangrene (HCC) [I96])    Surgeons: Casandra Alvarado MD Responsible Provider: Arpit Moreno MD    Anesthesia Type: general ASA Status: 3            Anesthesia Type: No value filed.    Daniele Phase I: Daniele Score: 8    Daniele Phase II: Daniele Score: 10    Anesthesia Post Evaluation    Patient location during evaluation: PACU  Patient participation: complete - patient participated  Level of consciousness: awake  Pain score: 2  Airway patency: patent  Cardiovascular status: blood pressure returned to baseline  Respiratory status: acceptable  Hydration status: euvolemic  Pain management: adequate    No notable events documented.

## 2025-04-15 ENCOUNTER — HOSPITAL ENCOUNTER (INPATIENT)
Dept: VASCULAR LAB | Age: 65
Discharge: HOME | DRG: 197 | End: 2025-04-17
Attending: SURGERY
Payer: COMMERCIAL

## 2025-04-15 LAB
ALBUMIN SERPL-MCNC: 2.7 G/DL (ref 3.4–5)
ANION GAP SERPL CALCULATED.3IONS-SCNC: 6 MMOL/L (ref 3–16)
BASOPHILS # BLD: 0 K/UL (ref 0–0.2)
BASOPHILS # BLD: 0 K/UL (ref 0–0.2)
BASOPHILS NFR BLD: 0.3 %
BASOPHILS NFR BLD: 0.4 %
BUN SERPL-MCNC: 7 MG/DL (ref 7–20)
CALCIUM SERPL-MCNC: 8.9 MG/DL (ref 8.3–10.6)
CHLORIDE SERPL-SCNC: 103 MMOL/L (ref 99–110)
CO2 SERPL-SCNC: 29 MMOL/L (ref 21–32)
CREAT SERPL-MCNC: 0.7 MG/DL (ref 0.8–1.3)
CRP SERPL-MCNC: 48.5 MG/L (ref 0–5.1)
DEPRECATED RDW RBC AUTO: 12.3 % (ref 12.4–15.4)
DEPRECATED RDW RBC AUTO: 12.3 % (ref 12.4–15.4)
ECHO BSA: 1.99 M2
EOSINOPHIL # BLD: 0 K/UL (ref 0–0.6)
EOSINOPHIL # BLD: 0.1 K/UL (ref 0–0.6)
EOSINOPHIL NFR BLD: 0.2 %
EOSINOPHIL NFR BLD: 1.7 %
ERYTHROCYTE [SEDIMENTATION RATE] IN BLOOD BY WESTERGREN METHOD: 47 MM/HR (ref 0–20)
GFR SERPLBLD CREATININE-BSD FMLA CKD-EPI: >90 ML/MIN/{1.73_M2}
GLUCOSE SERPL-MCNC: 123 MG/DL (ref 70–99)
HBV SURFACE AB SERPL IA-ACNC: <3.5 MIU/ML
HCT VFR BLD AUTO: 21.1 % (ref 40.5–52.5)
HCT VFR BLD AUTO: 23.6 % (ref 40.5–52.5)
HCT VFR BLD AUTO: 25 % (ref 40.5–52.5)
HCV AB SERPL QL IA: NORMAL
HGB BLD-MCNC: 7.1 G/DL (ref 13.5–17.5)
HGB BLD-MCNC: 8.1 G/DL (ref 13.5–17.5)
HGB BLD-MCNC: 8.4 G/DL (ref 13.5–17.5)
LYMPHOCYTES # BLD: 1.6 K/UL (ref 1–5.1)
LYMPHOCYTES # BLD: 1.9 K/UL (ref 1–5.1)
LYMPHOCYTES NFR BLD: 23.6 %
LYMPHOCYTES NFR BLD: 27.4 %
MAGNESIUM SERPL-MCNC: 1.81 MG/DL (ref 1.8–2.4)
MCH RBC QN AUTO: 29.2 PG (ref 26–34)
MCH RBC QN AUTO: 29.5 PG (ref 26–34)
MCHC RBC AUTO-ENTMCNC: 33.6 G/DL (ref 31–36)
MCHC RBC AUTO-ENTMCNC: 33.7 G/DL (ref 31–36)
MCV RBC AUTO: 86.9 FL (ref 80–100)
MCV RBC AUTO: 87.4 FL (ref 80–100)
MONOCYTES # BLD: 0.7 K/UL (ref 0–1.3)
MONOCYTES # BLD: 0.9 K/UL (ref 0–1.3)
MONOCYTES NFR BLD: 10.6 %
MONOCYTES NFR BLD: 12.3 %
NEUTROPHILS # BLD: 4.2 K/UL (ref 1.7–7.7)
NEUTROPHILS # BLD: 4.4 K/UL (ref 1.7–7.7)
NEUTROPHILS NFR BLD: 60 %
NEUTROPHILS NFR BLD: 63.5 %
PHOSPHATE SERPL-MCNC: 3.4 MG/DL (ref 2.5–4.9)
PLATELET # BLD AUTO: 262 K/UL (ref 135–450)
PLATELET # BLD AUTO: 281 K/UL (ref 135–450)
PMV BLD AUTO: 6.7 FL (ref 5–10.5)
PMV BLD AUTO: 6.8 FL (ref 5–10.5)
POTASSIUM SERPL-SCNC: 3.9 MMOL/L (ref 3.5–5.1)
RBC # BLD AUTO: 2.43 M/UL (ref 4.2–5.9)
RBC # BLD AUTO: 2.86 M/UL (ref 4.2–5.9)
SODIUM SERPL-SCNC: 138 MMOL/L (ref 136–145)
TROPONIN, HIGH SENSITIVITY: 12 NG/L (ref 0–22)
WBC # BLD AUTO: 6.9 K/UL (ref 4–11)
WBC # BLD AUTO: 6.9 K/UL (ref 4–11)

## 2025-04-15 PROCEDURE — 2000000000 HC ICU R&B

## 2025-04-15 PROCEDURE — 85014 HEMATOCRIT: CPT

## 2025-04-15 PROCEDURE — 85025 COMPLETE CBC W/AUTO DIFF WBC: CPT

## 2025-04-15 PROCEDURE — 86803 HEPATITIS C AB TEST: CPT

## 2025-04-15 PROCEDURE — 86160 COMPLEMENT ANTIGEN: CPT

## 2025-04-15 PROCEDURE — 83735 ASSAY OF MAGNESIUM: CPT

## 2025-04-15 PROCEDURE — 80069 RENAL FUNCTION PANEL: CPT

## 2025-04-15 PROCEDURE — 84484 ASSAY OF TROPONIN QUANT: CPT

## 2025-04-15 PROCEDURE — 36415 COLL VENOUS BLD VENIPUNCTURE: CPT

## 2025-04-15 PROCEDURE — 93923 UPR/LXTR ART STDY 3+ LVLS: CPT

## 2025-04-15 PROCEDURE — 93923 UPR/LXTR ART STDY 3+ LVLS: CPT | Performed by: SURGERY

## 2025-04-15 PROCEDURE — 6370000000 HC RX 637 (ALT 250 FOR IP)

## 2025-04-15 PROCEDURE — 86706 HEP B SURFACE ANTIBODY: CPT

## 2025-04-15 PROCEDURE — 86038 ANTINUCLEAR ANTIBODIES: CPT

## 2025-04-15 PROCEDURE — 2580000003 HC RX 258

## 2025-04-15 PROCEDURE — 86140 C-REACTIVE PROTEIN: CPT

## 2025-04-15 PROCEDURE — 82595 ASSAY OF CRYOGLOBULIN: CPT

## 2025-04-15 PROCEDURE — 85652 RBC SED RATE AUTOMATED: CPT

## 2025-04-15 PROCEDURE — 85018 HEMOGLOBIN: CPT

## 2025-04-15 PROCEDURE — 2500000003 HC RX 250 WO HCPCS

## 2025-04-15 PROCEDURE — 36430 TRANSFUSION BLD/BLD COMPNT: CPT

## 2025-04-15 PROCEDURE — 6360000002 HC RX W HCPCS

## 2025-04-15 RX ORDER — SODIUM CHLORIDE, SODIUM LACTATE, POTASSIUM CHLORIDE, AND CALCIUM CHLORIDE .6; .31; .03; .02 G/100ML; G/100ML; G/100ML; G/100ML
1000 INJECTION, SOLUTION INTRAVENOUS ONCE
Status: COMPLETED | OUTPATIENT
Start: 2025-04-15 | End: 2025-04-15

## 2025-04-15 RX ORDER — MAGNESIUM SULFATE IN WATER 40 MG/ML
4000 INJECTION, SOLUTION INTRAVENOUS ONCE
Status: COMPLETED | OUTPATIENT
Start: 2025-04-15 | End: 2025-04-15

## 2025-04-15 RX ORDER — SODIUM CHLORIDE 9 MG/ML
INJECTION, SOLUTION INTRAVENOUS PRN
Status: DISCONTINUED | OUTPATIENT
Start: 2025-04-15 | End: 2025-04-24 | Stop reason: HOSPADM

## 2025-04-15 RX ORDER — ENOXAPARIN SODIUM 100 MG/ML
1 INJECTION SUBCUTANEOUS 2 TIMES DAILY
Status: DISCONTINUED | OUTPATIENT
Start: 2025-04-15 | End: 2025-04-17

## 2025-04-15 RX ORDER — DOPAMINE HYDROCHLORIDE 160 MG/100ML
1-20 INJECTION, SOLUTION INTRAVENOUS CONTINUOUS
Status: DISCONTINUED | OUTPATIENT
Start: 2025-04-15 | End: 2025-04-15

## 2025-04-15 RX ORDER — SODIUM CHLORIDE, SODIUM LACTATE, POTASSIUM CHLORIDE, AND CALCIUM CHLORIDE .6; .31; .03; .02 G/100ML; G/100ML; G/100ML; G/100ML
1000 INJECTION, SOLUTION INTRAVENOUS ONCE
Status: DISCONTINUED | OUTPATIENT
Start: 2025-04-15 | End: 2025-04-15

## 2025-04-15 RX ORDER — ACETAMINOPHEN 325 MG/1
650 TABLET ORAL EVERY 6 HOURS SCHEDULED
Status: DISCONTINUED | OUTPATIENT
Start: 2025-04-15 | End: 2025-04-24 | Stop reason: HOSPADM

## 2025-04-15 RX ORDER — METHOCARBAMOL 750 MG/1
750 TABLET, FILM COATED ORAL 3 TIMES DAILY
Status: DISCONTINUED | OUTPATIENT
Start: 2025-04-15 | End: 2025-04-24 | Stop reason: HOSPADM

## 2025-04-15 RX ADMIN — SODIUM CHLORIDE, SODIUM LACTATE, POTASSIUM CHLORIDE, AND CALCIUM CHLORIDE: .6; .31; .03; .02 INJECTION, SOLUTION INTRAVENOUS at 20:28

## 2025-04-15 RX ADMIN — METHOCARBAMOL 750 MG: 750 TABLET ORAL at 20:19

## 2025-04-15 RX ADMIN — SODIUM CHLORIDE, SODIUM LACTATE, POTASSIUM CHLORIDE, AND CALCIUM CHLORIDE 1000 ML: .6; .31; .03; .02 INJECTION, SOLUTION INTRAVENOUS at 00:26

## 2025-04-15 RX ADMIN — ACETAMINOPHEN 650 MG: 325 TABLET, FILM COATED ORAL at 23:42

## 2025-04-15 RX ADMIN — MAGNESIUM SULFATE HEPTAHYDRATE 4000 MG: 40 INJECTION, SOLUTION INTRAVENOUS at 03:07

## 2025-04-15 RX ADMIN — PIPERACILLIN AND TAZOBACTAM 3375 MG: 3; .375 INJECTION, POWDER, LYOPHILIZED, FOR SOLUTION INTRAVENOUS at 13:21

## 2025-04-15 RX ADMIN — ACETAMINOPHEN 650 MG: 325 TABLET, FILM COATED ORAL at 13:19

## 2025-04-15 RX ADMIN — OXYCODONE 5 MG: 5 TABLET ORAL at 22:58

## 2025-04-15 RX ADMIN — PIPERACILLIN AND TAZOBACTAM 3375 MG: 3; .375 INJECTION, POWDER, LYOPHILIZED, FOR SOLUTION INTRAVENOUS at 22:33

## 2025-04-15 RX ADMIN — DIAZEPAM 2.5 MG: 5 TABLET ORAL at 17:30

## 2025-04-15 RX ADMIN — PIPERACILLIN AND TAZOBACTAM 4500 MG: 4; .5 INJECTION, POWDER, LYOPHILIZED, FOR SOLUTION INTRAVENOUS at 08:15

## 2025-04-15 RX ADMIN — SODIUM CHLORIDE, SODIUM LACTATE, POTASSIUM CHLORIDE, AND CALCIUM CHLORIDE 1000 ML: .6; .31; .03; .02 INJECTION, SOLUTION INTRAVENOUS at 08:19

## 2025-04-15 RX ADMIN — SODIUM CHLORIDE, PRESERVATIVE FREE 10 ML: 5 INJECTION INTRAVENOUS at 20:18

## 2025-04-15 RX ADMIN — ACETAMINOPHEN 650 MG: 325 TABLET, FILM COATED ORAL at 02:58

## 2025-04-15 RX ADMIN — ASPIRIN 81 MG: 81 TABLET, COATED ORAL at 08:16

## 2025-04-15 RX ADMIN — SODIUM CHLORIDE, SODIUM LACTATE, POTASSIUM CHLORIDE, AND CALCIUM CHLORIDE: .6; .31; .03; .02 INJECTION, SOLUTION INTRAVENOUS at 01:02

## 2025-04-15 RX ADMIN — ENOXAPARIN SODIUM 70 MG: 100 INJECTION SUBCUTANEOUS at 08:21

## 2025-04-15 RX ADMIN — METHOCARBAMOL 750 MG: 750 TABLET ORAL at 08:16

## 2025-04-15 RX ADMIN — METHOCARBAMOL 750 MG: 750 TABLET ORAL at 13:19

## 2025-04-15 RX ADMIN — ENOXAPARIN SODIUM 70 MG: 100 INJECTION SUBCUTANEOUS at 20:19

## 2025-04-15 RX ADMIN — ACETAMINOPHEN 650 MG: 325 TABLET, FILM COATED ORAL at 17:30

## 2025-04-15 ASSESSMENT — PAIN DESCRIPTION - PAIN TYPE
TYPE: SURGICAL PAIN

## 2025-04-15 ASSESSMENT — PAIN DESCRIPTION - ONSET
ONSET: ON-GOING

## 2025-04-15 ASSESSMENT — PAIN DESCRIPTION - LOCATION
LOCATION: FOOT;LEG
LOCATION: LEG;FOOT
LOCATION: LEG;FOOT
LOCATION: LEG
LOCATION: LEG;FOOT
LOCATION: LEG;FOOT
LOCATION: LEG

## 2025-04-15 ASSESSMENT — PAIN DESCRIPTION - ORIENTATION
ORIENTATION: RIGHT;LEFT

## 2025-04-15 ASSESSMENT — PAIN DESCRIPTION - FREQUENCY
FREQUENCY: CONTINUOUS

## 2025-04-15 ASSESSMENT — PAIN DESCRIPTION - DESCRIPTORS
DESCRIPTORS: SHARP
DESCRIPTORS: ACHING;DISCOMFORT;SORE;SHOOTING
DESCRIPTORS: ACHING;DISCOMFORT;SORE
DESCRIPTORS: ACHING
DESCRIPTORS: ACHING;SHARP

## 2025-04-15 ASSESSMENT — PAIN - FUNCTIONAL ASSESSMENT
PAIN_FUNCTIONAL_ASSESSMENT: PREVENTS OR INTERFERES WITH ALL ACTIVE AND SOME PASSIVE ACTIVITIES
PAIN_FUNCTIONAL_ASSESSMENT: PREVENTS OR INTERFERES WITH MANY ACTIVE NOT PASSIVE ACTIVITIES

## 2025-04-15 ASSESSMENT — PAIN SCALES - GENERAL
PAINLEVEL_OUTOF10: 5
PAINLEVEL_OUTOF10: 10
PAINLEVEL_OUTOF10: 1
PAINLEVEL_OUTOF10: 4
PAINLEVEL_OUTOF10: 5
PAINLEVEL_OUTOF10: 10
PAINLEVEL_OUTOF10: 5
PAINLEVEL_OUTOF10: 1
PAINLEVEL_OUTOF10: 10
PAINLEVEL_OUTOF10: 8
PAINLEVEL_OUTOF10: 10
PAINLEVEL_OUTOF10: 5

## 2025-04-15 NOTE — PLAN OF CARE
Problem: Pain  Goal: Verbalizes/displays adequate comfort level or baseline comfort level  Outcome: Progressing  Flowsheets (Taken 4/14/2025 0203 by Bhavna Pitts, RN)  Verbalizes/displays adequate comfort level or baseline comfort level:   Encourage patient to monitor pain and request assistance   Assess pain using appropriate pain scale     Problem: Discharge Planning  Goal: Discharge to home or other facility with appropriate resources  Outcome: Progressing  Flowsheets (Taken 4/10/2025 1635 by Hung Danielson)  Discharge to home or other facility with appropriate resources:   Identify barriers to discharge with patient and caregiver   Identify discharge learning needs (meds, wound care, etc)   Arrange for needed discharge resources and transportation as appropriate     Problem: Skin/Tissue Integrity  Goal: Skin integrity remains intact  Description: 1.  Monitor for areas of redness and/or skin breakdown  2.  Assess vascular access sites hourly  3.  Every 4-6 hours minimum:  Change oxygen saturation probe site  4.  Every 4-6 hours:  If on nasal continuous positive airway pressure, respiratory therapy assess nares and determine need for appliance change or resting period  Outcome: Progressing  Flowsheets  Taken 4/15/2025 0700  Skin Integrity Remains Intact:   Monitor for areas of redness and/or skin breakdown   Assess vascular access sites hourly   Every 4-6 hours minimum:  Change oxygen saturation probe site  Taken 4/14/2025 2015  Skin Integrity Remains Intact:   Assess vascular access sites hourly   Monitor for areas of redness and/or skin breakdown   Every 4-6 hours minimum:  Change oxygen saturation probe site     Problem: Safety - Adult  Goal: Free from fall injury  Outcome: Progressing  Flowsheets (Taken 4/15/2025 0700)  Free From Fall Injury: Instruct family/caregiver on patient safety     Problem: ABCDS Injury Assessment  Goal: Absence of physical injury  Outcome: Progressing  Flowsheets (Taken 4/12/2025  1610 by Jodi Santos, RN)  Absence of Physical Injury: Implement safety measures based on patient assessment     Problem: Nutrition Deficit:  Goal: Optimize nutritional status  Outcome: Progressing  Flowsheets (Taken 4/12/2025 1610 by Jodi Santos, RN)  Nutrient intake appropriate for improving, restoring, or maintaining nutritional needs:   Recommend appropriate diets, oral nutritional supplements, and vitamin/mineral supplements   Assess nutritional status and recommend course of action   Monitor oral intake, labs, and treatment plans     Problem: Respiratory - Adult  Goal: Achieves optimal ventilation and oxygenation  Outcome: Progressing  Flowsheets (Taken 4/11/2025 1632 by Jodi Santos, RN)  Achieves optimal ventilation and oxygenation:   Assess for changes in respiratory status   Assess for changes in mentation and behavior   Position to facilitate oxygenation and minimize respiratory effort     Problem: Cardiovascular - Adult  Goal: Maintains optimal cardiac output and hemodynamic stability  Outcome: Progressing  Flowsheets (Taken 4/14/2025 0203 by Bhavna Pitts, RN)  Maintains optimal cardiac output and hemodynamic stability:   Monitor blood pressure and heart rate   Monitor urine output and notify Licensed Independent Practitioner for values outside of normal range   Assess for signs of decreased cardiac output

## 2025-04-15 NOTE — PROGRESS NOTES
Pt hypotensive post IVP dilaudid, 70s/40s. Also unable to locate PT pulse with doppler. Surgery resident made aware and at bedside. LR bolus started. Resident able to find pulse with doppler.

## 2025-04-15 NOTE — PROGRESS NOTES
Podiatric Surgery Daily Progress Note  Natan Lynch      Subjective :   Patient seen and examined this am at the bedside. Patient denies any acute overnight events.  Patient is POD #1 bilateral femoral versus popliteal to anterior tibial bypass with vascular surgery.  Patient denies N/V/F/C/SOB. Patient denies calf pain, thigh pain, chest pain.     Review of Systems: A 12 point review of symptoms is unremarkable with the exception of the chief complaint. Patient specifically denies nausea, fever, vomiting, chills, shortness of breath, chest pain, abdominal pain, constipation or difficulty urinating.       Objective     BP (!) 89/45   Pulse 81   Temp 98.8 °F (37.1 °C) (Temporal)   Resp 18   Ht 1.969 m (6' 5.5\")   Wt 69.5 kg (153 lb 4.8 oz)   SpO2 98%   BMI 17.94 kg/m²      I/O:  Intake/Output Summary (Last 24 hours) at 4/15/2025 1006  Last data filed at 4/15/2025 0658  Gross per 24 hour   Intake 4185.12 ml   Output 4135 ml   Net 50.12 ml              Wt Readings from Last 3 Encounters:   04/14/25 69.5 kg (153 lb 4.8 oz)   04/03/25 76.2 kg (168 lb)   03/11/25 74.8 kg (165 lb)       LABS:    Recent Labs     04/14/25  1949 04/15/25  0252   WBC 7.3 6.9   HGB 8.8* 8.4*   HCT 27.1* 25.0*    281        Recent Labs     04/15/25  0252      K 3.9      CO2 29   PHOS 3.4   BUN 7   CREATININE 0.7*        Recent Labs     04/13/25  1154   INR 1.20*           LOWER EXTREMITY EXAMINATION    Dressing to bl LE intact. No strikethrough noted to the external dressing. No drainage noted to the internal layers of the dressing.     VASCULAR: DP and PT pulses are non palpable 0/4 b/l.  Upon hand-held Doppler examination DP/PT pulses on bilateral lower extremities are weakly audible CFT is sluggish to the digits of the foot b/l with exception of the right 1st and 2nd digit which do not display capillary fill time 2/2 advanced gangrene. Skin temperature is warm to cool from proximal to distal with no focal calor  noted.  No edema noted. No pain with calf compression b/l.     NEUROLOGIC: Gross and epicritic sensation is diminished b/l. Protective sensation is diminished at all pedal sites b/l.     DERMATOLOGIC:     Right lower extremity:    Advanced gangrenous changes to the 1st and 2nd digit with the dry gangrenous changes on the first digit extending dorsally along the medial midfoot.  The second digit gangrene terminates at the metatarsal phalangeal joint.  No open wounds at this time.     Left lower extremity:    Full-thickness ulceration to the lateral midfoot/hindfoot.  Wound bed is completely ischemic in nature measuring roughly 5 cm x 3 cm with unstageable depth.  No evidence of purulence, fluctuance, or crepitations.     MUSCULOSKELETAL: Muscle strength is 5/5 for all pedal groups tested.  Moderate pain with palpation and manual manipulation to bilateral lower extremities specifically in the area surrounding the gangrenous changes. Ankle joint ROM is decreased in dorsiflexion with the knee extended. No obvious biomechanical abnormalities.     IMAGING:  X-ray, right foot (03/18/2025)  IMPRESSION:     1. No acute abnormality     X-ray, left foot (03/18/2025)  IMPRESSION:     1. No acute abnormality.     CTA abdominal aorta with bilateral runoff (03/18/2025)  IMPRESSION:     1. No hemodynamic significant stenosis. Scattered areas of calcified atherosclerotic plaque. Correlate with clinical symptoms and findings     ASSESSMENT/PLAN:   Gangrenous digits, right foot   2.  Gangrenous wound, left foot   3.  Peripheral arterial disease, bilateral lower extremities     -Patient seen and examined at bedside this a.m.  - Hypotensive otherwise VSS, no leukocytosis noted (WBC 6.9)  - and CRP 71.2  -Prealbumin 6.6, daily wound healing supplementation ordered  -Images reviewed, impression noted above  -Wound culture not obtained at this time 2/2 no active drainage.  - Local debridement deferred at this time 2/2 arterial

## 2025-04-15 NOTE — PROGRESS NOTES
4 Eyes Skin Assessment     NAME:  Natan Lynch  YOB: 1960  MEDICAL RECORD NUMBER:  [id number]    The patient is being assessed for  Transfer to New Unit    I agree that at least one RN has performed a thorough Head to Toe Skin Assessment on the patient. ALL assessment sites listed below have been assessed.      Areas assessed by both nurses:    Head, Face, Ears, Shoulders, Back, Chest, Arms, Elbows, Hands, Sacrum. Buttock, Coccyx, Ischium, Legs. Feet and Heels, and Under Medical Devices         Does the Patient have a Wound? No noted wound(s)       Antelmo Prevention initiated by RN: Yes  Wound Care Orders initiated by RN: No    Pressure Injury (Stage 3,4, Unstageable, DTI, NWPT, and Complex wounds) if present, place Wound referral order by RN under : No    New Ostomies, if present place, Ostomy referral order under : No     Nurse 1 eSignature: Electronically signed by Lindy Lobato RN on 4/14/25 at 8:57 PM EDT    **SHARE this note so that the co-signing nurse can place an eSignature**    Nurse 2 eSignature: Electronically signed by Rosalia Huston RN on 4/14/25 at 11:45 PM EDT

## 2025-04-15 NOTE — PROGRESS NOTES
Pt to room 4519 from PACU post  BILATERAL FEMORAL ANTERIOR TIBIAL BYPASS. VSS, room air. Pt oriented to room and movement limitations. Dressings intact with small amount of blood noted on left groin and left calf dressings. Pulses palpable. Skin assessment completed.

## 2025-04-15 NOTE — PROGRESS NOTES
ICU DAILY PROGRESS NOTE  SURGERY    SUBJECTIVE:   INTERVAL HISTORY/SUBJECTIVE:   Resting comfortably. In good spirits. Pain controlled.         OBJECTIVE:   VITALS: /64   Pulse 89   Temp 97.2 °F (36.2 °C) (Temporal)   Resp 16   Ht 1.969 m (6' 5.5\")   Wt 69.5 kg (153 lb 4.8 oz)   SpO2 97%   BMI 17.94 kg/m²     PHYSICAL EXAMINATION:   General appearance - no acute distress  HEENT - normocephalic, EOMI  Cardiac- regular rate and rhythm,   Pulmonary - normal respiratory effort on RA  Abdomen - soft, non tender, non-distended  : Willams in place  Extremities - LLE dressing CDI with slight strikethrough at groin and inferior aspect. RLE dressings CDI. Bilateral bypasses palpable.   Neurological - AxOx3, no focal deficits  Skin - warm and dry    INTAKE/OUTPUT:     Intake/Output Summary (Last 24 hours) at 4/14/2025 2214  Last data filed at 4/14/2025 1953  Gross per 24 hour   Intake 3783.33 ml   Output 4335 ml   Net -551.67 ml     I/O last 3 completed shifts:  In: 4023.3 [P.O.:360; I.V.:3663.3]  Out: 4385 [Urine:4235; Blood:150]    LABORATORY RESULTS:  CBC:   Recent Labs     04/14/25  0615 04/14/25  0707 04/14/25 1949   WBC 6.1 5.8 7.3   HGB 8.5* 8.4* 8.8*   HCT 25.8* 25.5* 27.1*    269 305       BMP:   Recent Labs     04/13/25  0547 04/14/25  0615 04/14/25 1949    138 136   K 3.8 3.5 3.9    108 102   CO2 25 25 25   BUN 7 6* 5*   CREATININE 0.7* 0.6* 0.6*   GLUCOSE 85 86 121*   PHOS 3.4 3.3 3.9     LFT's: No results for input(s): \"AST\", \"ALT\", \"BILITOT\", \"ALKPHOS\" in the last 72 hours.    Invalid input(s): \"ALB\"  Troponin: No results for input(s): \"TROPONINI\" in the last 72 hours.  BNP: No results for input(s): \"BNP\" in the last 72 hours.  ABGs: No results for input(s): \"PHART\", \"TPR7FSA\", \"PO2ART\" in the last 72 hours.  INR:   Recent Labs     04/13/25  1154   INR 1.20*       U/A:No results for input(s): \"NITRITE\", \"COLORU\", \"PHUR\", \"LABCAST\", \"WBCUA\", \"RBCUA\", \"MUCUS\", \"TRICHOMONAS\",

## 2025-04-15 NOTE — PROGRESS NOTES
Spiritual Health History and Assessment/Progress Note  [facility]    Spiritual/Emotional Needs,  ,  ,  Patient requested prayer for his health.     Name: Natan Lynch MRN: [id number]    Age: 64 y.o.     Sex: male   Language: English   Scientology: Unknown   Ischemia of left lower extremity     Date: 4/15/2025            Total Time Calculated: 13 min              Spiritual Assessment began in [facility] ICU        Referral/Consult From: Rounding   Encounter Overview/Reason: Spiritual/Emotional Needs  Service Provided For: Patient    Suzi, Belief, Meaning:   Patient has beliefs or practices that help with coping during difficult times  Family/Friends No family/friends present      Importance and Influence:  Patient has no beliefs influential to healthcare decision-making identified during this visit  Family/Friends No family/friends present    Community:  Patient feels well-supported. Support system includes: Extended family  Family/Friends No family/friends present    Assessment and Plan of Care:     Patient Interventions include: Facilitated expression of thoughts and feelings and Affirmed coping skills/support systems  Family/Friends Interventions include: No family/friends present    Patient Plan of Care: No spiritual needs identified for follow-up and Spiritual Care available upon further referral  Family/Friends Plan of Care: No family/friends present    Electronically signed by JULIANNE Velasquez on 4/15/2025 at 10:33 AM

## 2025-04-15 NOTE — PROGRESS NOTES
PACU Transfer to Floor Note    Procedure(s):  BILATERAL FEMORAL  ANTERIOR TIBIAL BYPASS    Current Allergies: Patient has no known allergies.    Pt meets criteria as per Fatuma Score and ASPAN Standards to transfer to next phase of care.     No results for input(s): \"POCGLU\" in the last 72 hours.    Vitals:    04/14/25 2011   BP:    Pulse:    Resp:    Temp: 97.2 °F (36.2 °C)   SpO2:      Vitals within 20% of pt's admission vitals as per FATUMA SCORE    SpO2: 96 %    O2 Flow Rate (L/min): 5 L/min      Intake/Output Summary (Last 24 hours) at 4/14/2025 2012  Last data filed at 4/14/2025 1953  Gross per 24 hour   Intake 4023.33 ml   Output 5035 ml   Net -1011.67 ml       Pain assessment:  present - adequately treated    Pain Level: 0 (resting comfortably).    Patient was assessed for alterations to skin integrity. There were not alterations observed.    Is patient incontinent: no    Handoff report given at bedside.   Family updated and directed to pt room 8535      4/14/2025 8:12 PM

## 2025-04-15 NOTE — CONSENT
Informed Consent for Blood Component Transfusion Note    I have discussed with the patient the rationale for blood component transfusion; its benefits in treating or preventing fatigue, organ damage, or death; and its risk which includes mild transfusion reactions, rare risk of blood borne infection, or more serious but rare reactions. I have discussed the alternatives to transfusion, including the risk and consequences of not receiving transfusion. The patient had an opportunity to ask questions and had agreed to proceed with transfusion of blood components. Blood consent is also obtained from the initial procedure consent form.     Electronically signed by Clair Turk DO on 4/15/25 at 4:57 PM EDT

## 2025-04-15 NOTE — PROGRESS NOTES
ICU DAILY PROGRESS NOTE  SURGERY    SUBJECTIVE:   INTERVAL HISTORY/SUBJECTIVE:   Resting comfortably. In good spirits. Pain controlled. Intermittent hypotension overnight, asymptomatic, not associated with tachycardia. Hgb stable.     OBJECTIVE:   VITALS: BP (!) 91/55   Pulse 82   Temp 98 °F (36.7 °C) (Temporal)   Resp 20   Ht 1.969 m (6' 5.5\")   Wt 69.5 kg (153 lb 4.8 oz)   SpO2 100%   BMI 17.94 kg/m²     PHYSICAL EXAMINATION:   General appearance - no acute distress  HEENT - normocephalic, EOMI  Cardiac- regular rate and rhythm,   Pulmonary - normal respiratory effort on RA  Abdomen - soft, non tender, non-distended  : Willams in place  Extremities - bilateral LE medial dressings with slight strikethrough. Bilateral bypasses palpable.   Neurological - AxOx3, no focal deficits  Skin - warm and dry    INTAKE/OUTPUT:     Intake/Output Summary (Last 24 hours) at 4/15/2025 0646  Last data filed at 4/15/2025 0620  Gross per 24 hour   Intake 3663.33 ml   Output 4660 ml   Net -996.67 ml     I/O last 3 completed shifts:  In: 4023.3 [P.O.:360; I.V.:3663.3]  Out: 4385 [Urine:4235; Blood:150]    LABORATORY RESULTS:  CBC:   Recent Labs     04/14/25  0707 04/14/25  1949 04/15/25  0252   WBC 5.8 7.3 6.9   HGB 8.4* 8.8* 8.4*   HCT 25.5* 27.1* 25.0*    305 281       BMP:   Recent Labs     04/14/25  0615 04/14/25  1949 04/15/25  0252    136 138   K 3.5 3.9 3.9    102 103   CO2 25 25 29   BUN 6* 5* 7   CREATININE 0.6* 0.6* 0.7*   GLUCOSE 86 121* 123*   PHOS 3.3 3.9 3.4     LFT's: No results for input(s): \"AST\", \"ALT\", \"BILITOT\", \"ALKPHOS\" in the last 72 hours.    Invalid input(s): \"ALB\"  Troponin: No results for input(s): \"TROPONINI\" in the last 72 hours.  BNP: No results for input(s): \"BNP\" in the last 72 hours.  ABGs: No results for input(s): \"PHART\", \"ASC6YLZ\", \"PO2ART\" in the last 72 hours.  INR:   Recent Labs     04/13/25  1154   INR 1.20*       U/A:No results for input(s): \"NITRITE\", \"COLORU\", \"PHUR\",

## 2025-04-15 NOTE — PLAN OF CARE
Problem: Pain  Goal: Verbalizes/displays adequate comfort level or baseline comfort level  4/15/2025 1018 by Zarina Gonzalez RN  Outcome: Progressing     Problem: Discharge Planning  Goal: Discharge to home or other facility with appropriate resources  4/15/2025 1018 by Zarina Gonzaelz RN  Outcome: Progressing     Problem: Skin/Tissue Integrity  Goal: Skin integrity remains intact  Description: 1.  Monitor for areas of redness and/or skin breakdown  2.  Assess vascular access sites hourly  3.  Every 4-6 hours minimum:  Change oxygen saturation probe site  4.  Every 4-6 hours:  If on nasal continuous positive airway pressure, respiratory therapy assess nares and determine need for appliance change or resting period  4/15/2025 1018 by Zarina Gonzalez RN  Outcome: Progressing     Problem: Safety - Adult  Goal: Free from fall injury  4/15/2025 1018 by Zarina Gonzalez RN  Outcome: Progressing     Problem: ABCDS Injury Assessment  Goal: Absence of physical injury  4/15/2025 1018 by Zarina Gonzalez RN  Outcome: Progressing     Problem: Nutrition Deficit:  Goal: Optimize nutritional status  4/15/2025 1018 by Zarina Gonzalez RN  Outcome: Progressing     Problem: Respiratory - Adult  Goal: Achieves optimal ventilation and oxygenation  4/15/2025 1018 by Zarina Gonzalez RN  Outcome: Progressing     Problem: Cardiovascular - Adult  Goal: Maintains optimal cardiac output and hemodynamic stability  4/15/2025 1018 by Zarina Gonzalez RN  Outcome: Progressing

## 2025-04-16 LAB
ALBUMIN SERPL-MCNC: 2.4 G/DL (ref 3.4–5)
ANA SER QL IA: NEGATIVE
ANION GAP SERPL CALCULATED.3IONS-SCNC: 6 MMOL/L (ref 3–16)
BASOPHILS # BLD: 0 K/UL (ref 0–0.2)
BASOPHILS NFR BLD: 0.3 %
BLOOD BANK DISPENSE STATUS: NORMAL
BLOOD BANK DISPENSE STATUS: NORMAL
BLOOD BANK PRODUCT CODE: NORMAL
BLOOD BANK PRODUCT CODE: NORMAL
BPU ID: NORMAL
BPU ID: NORMAL
BUN SERPL-MCNC: 7 MG/DL (ref 7–20)
CALCIUM SERPL-MCNC: 8.2 MG/DL (ref 8.3–10.6)
CHLORIDE SERPL-SCNC: 107 MMOL/L (ref 99–110)
CO2 SERPL-SCNC: 27 MMOL/L (ref 21–32)
CREAT SERPL-MCNC: 0.6 MG/DL (ref 0.8–1.3)
DEPRECATED RDW RBC AUTO: 12.2 % (ref 12.4–15.4)
DESCRIPTION BLOOD BANK: NORMAL
DESCRIPTION BLOOD BANK: NORMAL
EOSINOPHIL # BLD: 0.2 K/UL (ref 0–0.6)
EOSINOPHIL NFR BLD: 2.2 %
GFR SERPLBLD CREATININE-BSD FMLA CKD-EPI: >90 ML/MIN/{1.73_M2}
GLUCOSE SERPL-MCNC: 88 MG/DL (ref 70–99)
HCT VFR BLD AUTO: 22.6 % (ref 40.5–52.5)
HCT VFR BLD AUTO: 24.9 % (ref 40.5–52.5)
HGB BLD-MCNC: 7.5 G/DL (ref 13.5–17.5)
HGB BLD-MCNC: 8.5 G/DL (ref 13.5–17.5)
LYMPHOCYTES # BLD: 2.3 K/UL (ref 1–5.1)
LYMPHOCYTES NFR BLD: 28.7 %
MAGNESIUM SERPL-MCNC: 1.96 MG/DL (ref 1.8–2.4)
MCH RBC QN AUTO: 29.9 PG (ref 26–34)
MCHC RBC AUTO-ENTMCNC: 33.3 G/DL (ref 31–36)
MCV RBC AUTO: 89.7 FL (ref 80–100)
MONOCYTES # BLD: 0.8 K/UL (ref 0–1.3)
MONOCYTES NFR BLD: 10.5 %
NEUTROPHILS # BLD: 4.6 K/UL (ref 1.7–7.7)
NEUTROPHILS NFR BLD: 58.3 %
NT-PROBNP SERPL-MCNC: 412 PG/ML (ref 0–124)
NT-PROBNP SERPL-MCNC: 417 PG/ML (ref 0–124)
PHOSPHATE SERPL-MCNC: 3.1 MG/DL (ref 2.5–4.9)
PLATELET # BLD AUTO: 245 K/UL (ref 135–450)
PMV BLD AUTO: 7.3 FL (ref 5–10.5)
POTASSIUM SERPL-SCNC: 3.5 MMOL/L (ref 3.5–5.1)
RBC # BLD AUTO: 2.53 M/UL (ref 4.2–5.9)
SODIUM SERPL-SCNC: 140 MMOL/L (ref 136–145)
WBC # BLD AUTO: 7.9 K/UL (ref 4–11)

## 2025-04-16 PROCEDURE — 85018 HEMOGLOBIN: CPT

## 2025-04-16 PROCEDURE — 83880 ASSAY OF NATRIURETIC PEPTIDE: CPT

## 2025-04-16 PROCEDURE — 85025 COMPLETE CBC W/AUTO DIFF WBC: CPT

## 2025-04-16 PROCEDURE — 80069 RENAL FUNCTION PANEL: CPT

## 2025-04-16 PROCEDURE — 2000000000 HC ICU R&B

## 2025-04-16 PROCEDURE — 2580000003 HC RX 258

## 2025-04-16 PROCEDURE — 83735 ASSAY OF MAGNESIUM: CPT

## 2025-04-16 PROCEDURE — 85014 HEMATOCRIT: CPT

## 2025-04-16 PROCEDURE — 6360000002 HC RX W HCPCS

## 2025-04-16 PROCEDURE — 36430 TRANSFUSION BLD/BLD COMPNT: CPT

## 2025-04-16 PROCEDURE — 6370000000 HC RX 637 (ALT 250 FOR IP)

## 2025-04-16 PROCEDURE — 36415 COLL VENOUS BLD VENIPUNCTURE: CPT

## 2025-04-16 RX ORDER — SODIUM CHLORIDE 9 MG/ML
INJECTION, SOLUTION INTRAVENOUS PRN
Status: DISCONTINUED | OUTPATIENT
Start: 2025-04-16 | End: 2025-04-24 | Stop reason: HOSPADM

## 2025-04-16 RX ORDER — HYDROMORPHONE HYDROCHLORIDE 1 MG/ML
0.25 INJECTION, SOLUTION INTRAMUSCULAR; INTRAVENOUS; SUBCUTANEOUS ONCE
Status: DISCONTINUED | OUTPATIENT
Start: 2025-04-16 | End: 2025-04-16

## 2025-04-16 RX ORDER — DIAZEPAM 5 MG/1
2.5 TABLET ORAL ONCE
Status: COMPLETED | OUTPATIENT
Start: 2025-04-16 | End: 2025-04-16

## 2025-04-16 RX ADMIN — DIAZEPAM 2.5 MG: 5 TABLET ORAL at 18:02

## 2025-04-16 RX ADMIN — METHOCARBAMOL 750 MG: 750 TABLET ORAL at 13:12

## 2025-04-16 RX ADMIN — ENOXAPARIN SODIUM 70 MG: 100 INJECTION SUBCUTANEOUS at 08:15

## 2025-04-16 RX ADMIN — PIPERACILLIN AND TAZOBACTAM 3375 MG: 3; .375 INJECTION, POWDER, LYOPHILIZED, FOR SOLUTION INTRAVENOUS at 05:14

## 2025-04-16 RX ADMIN — PIPERACILLIN AND TAZOBACTAM 3375 MG: 3; .375 INJECTION, POWDER, LYOPHILIZED, FOR SOLUTION INTRAVENOUS at 20:24

## 2025-04-16 RX ADMIN — PIPERACILLIN AND TAZOBACTAM 3375 MG: 3; .375 INJECTION, POWDER, LYOPHILIZED, FOR SOLUTION INTRAVENOUS at 13:11

## 2025-04-16 RX ADMIN — METHOCARBAMOL 750 MG: 750 TABLET ORAL at 08:14

## 2025-04-16 RX ADMIN — ASPIRIN 81 MG: 81 TABLET, COATED ORAL at 08:14

## 2025-04-16 RX ADMIN — ACETAMINOPHEN 650 MG: 325 TABLET, FILM COATED ORAL at 05:43

## 2025-04-16 RX ADMIN — POLYETHYLENE GLYCOL 3350 17 G: 17 POWDER, FOR SOLUTION ORAL at 20:23

## 2025-04-16 RX ADMIN — OXYCODONE 10 MG: 5 TABLET ORAL at 03:17

## 2025-04-16 RX ADMIN — OXYCODONE 10 MG: 5 TABLET ORAL at 15:53

## 2025-04-16 RX ADMIN — OXYCODONE 10 MG: 5 TABLET ORAL at 08:15

## 2025-04-16 RX ADMIN — ACETAMINOPHEN 650 MG: 325 TABLET, FILM COATED ORAL at 12:17

## 2025-04-16 RX ADMIN — DIAZEPAM 2.5 MG: 5 TABLET ORAL at 10:53

## 2025-04-16 RX ADMIN — ENOXAPARIN SODIUM 70 MG: 100 INJECTION SUBCUTANEOUS at 20:23

## 2025-04-16 RX ADMIN — ACETAMINOPHEN 650 MG: 325 TABLET, FILM COATED ORAL at 18:02

## 2025-04-16 RX ADMIN — OXYCODONE 10 MG: 5 TABLET ORAL at 12:17

## 2025-04-16 RX ADMIN — METHOCARBAMOL 750 MG: 750 TABLET ORAL at 20:23

## 2025-04-16 ASSESSMENT — PAIN DESCRIPTION - PAIN TYPE
TYPE: SURGICAL PAIN

## 2025-04-16 ASSESSMENT — PAIN DESCRIPTION - LOCATION
LOCATION: FOOT
LOCATION: LEG;FOOT
LOCATION: FOOT
LOCATION: FOOT;LEG
LOCATION: FOOT;LEG

## 2025-04-16 ASSESSMENT — PAIN DESCRIPTION - FREQUENCY
FREQUENCY: CONTINUOUS

## 2025-04-16 ASSESSMENT — PAIN SCALES - GENERAL
PAINLEVEL_OUTOF10: 10
PAINLEVEL_OUTOF10: 0
PAINLEVEL_OUTOF10: 3
PAINLEVEL_OUTOF10: 10
PAINLEVEL_OUTOF10: 1
PAINLEVEL_OUTOF10: 2
PAINLEVEL_OUTOF10: 10
PAINLEVEL_OUTOF10: 5
PAINLEVEL_OUTOF10: 3

## 2025-04-16 ASSESSMENT — PAIN DESCRIPTION - ORIENTATION
ORIENTATION: RIGHT;LEFT;INNER;LOWER;MID;OUTER
ORIENTATION: RIGHT;LEFT;INNER;LOWER;MID;OUTER;UPPER

## 2025-04-16 ASSESSMENT — PAIN - FUNCTIONAL ASSESSMENT
PAIN_FUNCTIONAL_ASSESSMENT: PREVENTS OR INTERFERES WITH ALL ACTIVE AND SOME PASSIVE ACTIVITIES

## 2025-04-16 ASSESSMENT — PAIN DESCRIPTION - ONSET
ONSET: ON-GOING
ONSET: PROGRESSIVE
ONSET: ON-GOING

## 2025-04-16 ASSESSMENT — PAIN DESCRIPTION - DESCRIPTORS
DESCRIPTORS: SHARP
DESCRIPTORS: BURNING;SHARP
DESCRIPTORS: SHARP
DESCRIPTORS: SHARP
DESCRIPTORS: BURNING;SHARP

## 2025-04-16 NOTE — PLAN OF CARE
Problem: Pain  Goal: Verbalizes/displays adequate comfort level or baseline comfort level  4/15/2025 2148 by Hilda Martinez, RN  Outcome: Not Progressing  Flowsheets (Taken 4/15/2025 2000)  Verbalizes/displays adequate comfort level or baseline comfort level:   Encourage patient to monitor pain and request assistance   Assess pain using appropriate pain scale   Administer analgesics based on type and severity of pain and evaluate response   Implement non-pharmacological measures as appropriate and evaluate response  4/15/2025 1018 by Zarina Gonzalez, RN  Outcome: Progressing

## 2025-04-16 NOTE — PROGRESS NOTES
ICU DAILY PROGRESS NOTE  SURGERY    SUBJECTIVE:   INTERVAL HISTORY/SUBJECTIVE:   One u PRBC transfused overnight. Hgb this am drifted back to 7.5. Pain controlled, resting comfortably.     OBJECTIVE:   VITALS: BP 92/68   Pulse 78   Temp 97.2 °F (36.2 °C) (Temporal)   Resp 14   Ht 1.969 m (6' 5.5\")   Wt 78.4 kg (172 lb 13.5 oz)   SpO2 99%   BMI 20.23 kg/m²     PHYSICAL EXAMINATION:   General appearance - no acute distress  HEENT - normocephalic, EOMI  Cardiac- regular rate and rhythm,   Pulmonary - normal respiratory effort on RA  Abdomen - soft, non tender, non-distended  Extremities - bilateral LE medial dressings with slight strikethrough. Bilateral bypasses palpable.   Neurological - AxOx3, no focal deficits  Skin - warm and dry    INTAKE/OUTPUT:     Intake/Output Summary (Last 24 hours) at 4/16/2025 0642  Last data filed at 4/16/2025 0600  Gross per 24 hour   Intake 6965.97 ml   Output 1800 ml   Net 5165.97 ml     I/O last 3 completed shifts:  In: 8749.1 [P.O.:480; I.V.:8173.1; IV Piggyback:96]  Out: 5160 [Urine:5010; Blood:150]    LABORATORY RESULTS:  CBC:   Recent Labs     04/15/25  0252 04/15/25  1452 04/15/25  2339 04/16/25  0421   WBC 6.9 6.9  --  7.9   HGB 8.4* 7.1* 8.1* 7.5*   HCT 25.0* 21.1* 23.6* 22.6*    262  --  245       BMP:   Recent Labs     04/14/25  1949 04/15/25  0252 04/16/25  0421    138 140   K 3.9 3.9 3.5    103 107   CO2 25 29 27   BUN 5* 7 7   CREATININE 0.6* 0.7* 0.6*   GLUCOSE 121* 123* 88   PHOS 3.9 3.4 3.1     LFT's: No results for input(s): \"AST\", \"ALT\", \"BILITOT\", \"ALKPHOS\" in the last 72 hours.    Invalid input(s): \"ALB\"  Troponin: No results for input(s): \"TROPONINI\" in the last 72 hours.  BNP: No results for input(s): \"BNP\" in the last 72 hours.  ABGs: No results for input(s): \"PHART\", \"UPZ6WZA\", \"PO2ART\" in the last 72 hours.  INR:   Recent Labs     04/13/25  1154   INR 1.20*       U/A:No results for input(s): \"NITRITE\", \"COLORU\", \"PHUR\", \"LABCAST\",

## 2025-04-16 NOTE — DISCHARGE INSTRUCTIONS
Podiatry Wound Care Discharge Instructions  Please perform every day dressing changes to bilateral lower extremity as follows:    -Apply pea-sized amount of Santyl to the wound on the left lower extremity  -Next apply gauze to the top of the left foot, ankle, and leg. This step is critical as kerlix is abrasive and will rub wounds at the front of the ankle   -Next loosely wrap the left lower extremity with Kerlix starting from just in front of the toes and ending just above the ankle. Kerlix should be rolled on with absolutely no compression  - Tape in place taking care to not place tape directly on skin    -Apply Betadine to gauze and apply gauze to wounds on the right lower extremity.  - Next apply gauze to the top of the right foot, ankle, and anterior leg.  This step is critical as Kerlix is abrasive and will rub wounds at the front of the ankle.  - Next loosely wrapped the right lower extremity with Kerlix starting from just in front of the toes and ending just above the ankle.  Kerlix to be rolled along with absolutely no compression.  - Tape in place taking care to not place tape directly on skin      Please follow-up with Dr. Leon Acosta DPM for continued wound care    Diet:   May resume regular diet    Wound Care:   You have staples in place along both inner legs and over the front of your ankles. These will be removed for you at a follow up appointment. You have dressings in place that will need changing every 3 days or for saturation or soiling. Aquacel Ag or optifoam dressings.     Activity:   May ambulate.     Pain management:   For moderate to severe pain please take the Roxicodone that you were prescribed. If you take narcotics, note that they may cause constipation. For constipation take Colace up to two times a day as needed. If stools become loose, you may reduce the amount of colace you are taking or stop taking all together. Take as little narcotic pain medication as you can tolerate. No

## 2025-04-16 NOTE — PROGRESS NOTES
Hgb 7.1- vascular surgery wants Hgb above 8  Transfused 1 unit, tolerated well  Repeat post transfusion Hgb 8.1

## 2025-04-16 NOTE — PROGRESS NOTES
Physical Therapy/Occupational therapy  Attempt    Orders received, chart reviewed. Attempted therapy evals. Pt declines, stating he is in 10/10 pain B feet while at rest. RN aware. Pt received pain meds ~1hr ago. Offered OOB to chair - pt declines due to pain. Will f/u later this date pending pain control vs 4/17.    Leslye Moreno, PT, DPT  [id number]     Shawanda Alcantara, OTR/L [id number]

## 2025-04-16 NOTE — DISCHARGE INSTR - COC
Continuity of Care Form    Patient Name: Natan Lynch   :  1960  MRN:  [id number]    Admit date:  2025  Discharge date:  2025    Code Status Order: Full Code   Advance Directives:    Date/Time Healthcare Directive Type of Healthcare Directive Copy in Chart Healthcare Agent Appointed Healthcare Agent's Name Healthcare Agent's Phone Number    25 0916 No, patient does not have an advance directive for healthcare treatment  --  --  --  --  --             Admitting Physician:  Casandra Alvarado MD  PCP: Robbin Handley MD    Discharging Nurse: Abe  Discharging Hospital Unit/Room#: 4519/4519-01  Discharging Unit Phone Number: [phone]    Emergency Contact:   Extended Emergency Contact Information  Primary Emergency Contact: Nicolasa Lynch  Address: [address] [place] [address]  Home Phone: [phone]  Mobile Phone: [phone]  Relation: Brother/Sister    Past Surgical History:  Past Surgical History:   Procedure Laterality Date    ARTERIAL BYPASS SURGRY Bilateral 2025    BILATERAL FEMORAL  ANTERIOR TIBIAL BYPASS performed by Casandra Alvarado MD at [facility] OR    INVASIVE VASCULAR N/A 2025    Angiography lower ext left performed by Casandra Alvarado MD at [facility] CARDIAC CATH LAB    INVASIVE VASCULAR Left 2025    Angioplasty ant tib artery performed by Casandra Alvarado MD at [facility] CARDIAC CATH LAB       Immunization History:     There is no immunization history on file for this patient.    Active Problems:  Patient Active Problem List   Diagnosis Code    Bilateral pulmonary embolism (HCC) I26.99    Acute deep vein thrombosis (DVT) of right lower extremity I82.401    Nicotine dependence with current use F17.200    Edema of both feet R60.0    Peripheral vascular disease I73.9    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 I70.263    Ischemia of left lower extremity I99.8    Severe malnutrition E43  Labs or Other Treatments After Discharge:   Vascular Surgery wound care discharge instructions:  -staples in place over bilateral groins/medial legs, anterior aspect of foot/ankle, keep clean and dry. Follow up in vascular office for removal.  -dressings: aquacel AG or optifoam over medial thighs, mepilex 4x4 over anterior ankle incisions    Diet:   May resume regular diet    Wound Care:   You have staples in place along both inner legs and over the front of your ankles. These will be removed for you at a follow up appointment. You have dressings in place that will need changing every 3 days or for saturation or soiling. Aquacel Ag or optifoam dressings.     Activity:   May ambulate.     Pain management:   For moderate to severe pain please take the Roxicodone that you were prescribed. If you take narcotics, note that they may cause constipation. For constipation take Colace up to two times a day as needed. If stools become loose, you may reduce the amount of colace you are taking or stop taking all together. Take as little narcotic pain medication as you can tolerate. No driving while on narcotics.    For mild to moderate pain you may take over-the-counter Tylenol or Motrin as directed on the packaging.     Return if:   Call/ Return to ED for increased redness, worsening pain, drainage from wound, or fevers greater than 101.5 F.      Follow up with Dr. Alvarado. Please call the office to make an appointment.     Podiatry Wound Care Discharge Instructions  Please perform every day dressing changes to bilateral lower extremity as follows:    -Apply pea-sized amount of Santyl to the wound on the left lower extremity  -Next apply gauze to the top of the left foot, ankle, and leg. This step is critical as kerlix is abrasive and will rub wounds at the front of the ankle   -Next loosely wrap the left lower extremity with Kerlix starting from just in front of the toes and ending just above the ankle. Kerlix should be  normal...

## 2025-04-16 NOTE — PLAN OF CARE
Problem: Pain  Goal: Verbalizes/displays adequate comfort level or baseline comfort level  Outcome: Progressing  Flowsheets (Taken 4/16/2025 0800)  Verbalizes/displays adequate comfort level or baseline comfort level: Encourage patient to monitor pain and request assistance     Problem: Discharge Planning  Goal: Discharge to home or other facility with appropriate resources  Outcome: Progressing  Flowsheets (Taken 4/16/2025 0800)  Discharge to home or other facility with appropriate resources:   Identify barriers to discharge with patient and caregiver   Arrange for needed discharge resources and transportation as appropriate   Identify discharge learning needs (meds, wound care, etc)   Arrange for interpreters to assist at discharge as needed   Refer to discharge planning if patient needs post-hospital services based on physician order or complex needs related to functional status, cognitive ability or social support system     Problem: Skin/Tissue Integrity  Goal: Skin integrity remains intact  Description: 1.  Monitor for areas of redness and/or skin breakdown  2.  Assess vascular access sites hourly  3.  Every 4-6 hours minimum:  Change oxygen saturation probe site  4.  Every 4-6 hours:  If on nasal continuous positive airway pressure, respiratory therapy assess nares and determine need for appliance change or resting period  Outcome: Progressing  Flowsheets (Taken 4/16/2025 0800)  Skin Integrity Remains Intact: Monitor for areas of redness and/or skin breakdown     Problem: Safety - Adult  Goal: Free from fall injury  Outcome: Progressing     Problem: ABCDS Injury Assessment  Goal: Absence of physical injury  Outcome: Progressing     Problem: Nutrition Deficit:  Goal: Optimize nutritional status  Outcome: Progressing     Problem: Respiratory - Adult  Goal: Achieves optimal ventilation and oxygenation  Outcome: Progressing  Flowsheets (Taken 4/16/2025 0800)  Achieves optimal ventilation and oxygenation: Assess

## 2025-04-16 NOTE — PROGRESS NOTES
Podiatric Surgery Daily Progress Note  Natan Lynch      Subjective :   Patient seen and examined this am at the bedside. Patient denies any acute overnight events.  Patient is POD #2 bilateral femoral versus popliteal to anterior tibial bypass with vascular surgery.  Patient denies N/V/F/C/SOB. Patient denies calf pain, thigh pain, chest pain.     Review of Systems: A 12 point review of symptoms is unremarkable with the exception of the chief complaint. Patient specifically denies nausea, fever, vomiting, chills, shortness of breath, chest pain, abdominal pain, constipation or difficulty urinating.       Objective     /63   Pulse 74   Temp 97.7 °F (36.5 °C) (Temporal)   Resp 19   Ht 1.969 m (6' 5.5\")   Wt 78.4 kg (172 lb 13.5 oz)   SpO2 100%   BMI 20.23 kg/m²      I/O:  Intake/Output Summary (Last 24 hours) at 4/16/2025 0842  Last data filed at 4/16/2025 0706  Gross per 24 hour   Intake 5850.44 ml   Output 1800 ml   Net 4050.44 ml              Wt Readings from Last 3 Encounters:   04/16/25 78.4 kg (172 lb 13.5 oz)   04/03/25 76.2 kg (168 lb)   03/11/25 74.8 kg (165 lb)       LABS:    Recent Labs     04/15/25  1452 04/15/25  2339 04/16/25  0421   WBC 6.9  --  7.9   HGB 7.1* 8.1* 7.5*   HCT 21.1* 23.6* 22.6*     --  245        Recent Labs     04/16/25  0421      K 3.5      CO2 27   PHOS 3.1   BUN 7   CREATININE 0.6*        Recent Labs     04/13/25  1154   INR 1.20*           LOWER EXTREMITY EXAMINATION    Dressing to bl LE intact. No strikethrough noted to the external dressing. No drainage noted to the internal layers of the dressing.     VASCULAR: DP and PT pulses are non palpable 0/4 b/l.  Upon hand-held Doppler examination DP/PT pulses on bilateral lower extremities are weakly audible CFT is sluggish to the digits of the foot b/l with exception of the right 1st and 2nd digit which do not display capillary fill time 2/2 advanced gangrene. Skin temperature is warm to cool from  Patient stable to discharge from podiatric standpoint pending medical clearance.    Patient seen evaluated bedside with Dr. Leon Acosta DPM.    Adrien Green DPM   Podiatric Resident PGY-2  Pager [phone] or Perfect[employer]erve  4/16/2025, 8:42 AM

## 2025-04-17 LAB
ALBUMIN SERPL-MCNC: 2.6 G/DL (ref 3.4–5)
ANION GAP SERPL CALCULATED.3IONS-SCNC: 6 MMOL/L (ref 3–16)
BASOPHILS # BLD: 0 K/UL (ref 0–0.2)
BASOPHILS NFR BLD: 0.5 %
BUN SERPL-MCNC: 9 MG/DL (ref 7–20)
C3 SERPL-MCNC: 124 MG/DL (ref 90–180)
CALCIUM SERPL-MCNC: 8.5 MG/DL (ref 8.3–10.6)
CHLORIDE SERPL-SCNC: 107 MMOL/L (ref 99–110)
CO2 SERPL-SCNC: 25 MMOL/L (ref 21–32)
CREAT SERPL-MCNC: 0.6 MG/DL (ref 0.8–1.3)
DEPRECATED RDW RBC AUTO: 12.7 % (ref 12.4–15.4)
EOSINOPHIL # BLD: 0.3 K/UL (ref 0–0.6)
EOSINOPHIL NFR BLD: 3.8 %
GFR SERPLBLD CREATININE-BSD FMLA CKD-EPI: >90 ML/MIN/{1.73_M2}
GLUCOSE SERPL-MCNC: 98 MG/DL (ref 70–99)
HCT VFR BLD AUTO: 24.6 % (ref 40.5–52.5)
HGB BLD-MCNC: 8.4 G/DL (ref 13.5–17.5)
LYMPHOCYTES # BLD: 1.9 K/UL (ref 1–5.1)
LYMPHOCYTES NFR BLD: 24 %
MAGNESIUM SERPL-MCNC: 1.97 MG/DL (ref 1.8–2.4)
MCH RBC QN AUTO: 29.8 PG (ref 26–34)
MCHC RBC AUTO-ENTMCNC: 34.3 G/DL (ref 31–36)
MCV RBC AUTO: 86.9 FL (ref 80–100)
MONOCYTES # BLD: 0.8 K/UL (ref 0–1.3)
MONOCYTES NFR BLD: 10.1 %
NEUTROPHILS # BLD: 4.9 K/UL (ref 1.7–7.7)
NEUTROPHILS NFR BLD: 61.6 %
PHOSPHATE SERPL-MCNC: 3.4 MG/DL (ref 2.5–4.9)
PLATELET # BLD AUTO: 263 K/UL (ref 135–450)
PMV BLD AUTO: 6.6 FL (ref 5–10.5)
POTASSIUM SERPL-SCNC: 3.8 MMOL/L (ref 3.5–5.1)
RBC # BLD AUTO: 2.84 M/UL (ref 4.2–5.9)
SODIUM SERPL-SCNC: 138 MMOL/L (ref 136–145)
WBC # BLD AUTO: 7.9 K/UL (ref 4–11)

## 2025-04-17 PROCEDURE — 2500000003 HC RX 250 WO HCPCS

## 2025-04-17 PROCEDURE — 97166 OT EVAL MOD COMPLEX 45 MIN: CPT

## 2025-04-17 PROCEDURE — 97530 THERAPEUTIC ACTIVITIES: CPT

## 2025-04-17 PROCEDURE — 80069 RENAL FUNCTION PANEL: CPT

## 2025-04-17 PROCEDURE — 97162 PT EVAL MOD COMPLEX 30 MIN: CPT

## 2025-04-17 PROCEDURE — 2000000000 HC ICU R&B

## 2025-04-17 PROCEDURE — 6370000000 HC RX 637 (ALT 250 FOR IP)

## 2025-04-17 PROCEDURE — 85025 COMPLETE CBC W/AUTO DIFF WBC: CPT

## 2025-04-17 PROCEDURE — 2580000003 HC RX 258

## 2025-04-17 PROCEDURE — 83735 ASSAY OF MAGNESIUM: CPT

## 2025-04-17 PROCEDURE — 6360000002 HC RX W HCPCS

## 2025-04-17 PROCEDURE — 36415 COLL VENOUS BLD VENIPUNCTURE: CPT

## 2025-04-17 RX ORDER — ENOXAPARIN SODIUM 100 MG/ML
1 INJECTION SUBCUTANEOUS 2 TIMES DAILY
Status: COMPLETED | OUTPATIENT
Start: 2025-04-17 | End: 2025-04-17

## 2025-04-17 RX ADMIN — OXYCODONE 10 MG: 5 TABLET ORAL at 23:19

## 2025-04-17 RX ADMIN — SODIUM CHLORIDE, PRESERVATIVE FREE 10 ML: 5 INJECTION INTRAVENOUS at 21:23

## 2025-04-17 RX ADMIN — METHOCARBAMOL 750 MG: 750 TABLET ORAL at 08:50

## 2025-04-17 RX ADMIN — PIPERACILLIN AND TAZOBACTAM 3375 MG: 3; .375 INJECTION, POWDER, LYOPHILIZED, FOR SOLUTION INTRAVENOUS at 04:33

## 2025-04-17 RX ADMIN — ENOXAPARIN SODIUM 70 MG: 100 INJECTION SUBCUTANEOUS at 21:22

## 2025-04-17 RX ADMIN — ENOXAPARIN SODIUM 70 MG: 100 INJECTION SUBCUTANEOUS at 08:48

## 2025-04-17 RX ADMIN — PIPERACILLIN AND TAZOBACTAM 3375 MG: 3; .375 INJECTION, POWDER, LYOPHILIZED, FOR SOLUTION INTRAVENOUS at 13:09

## 2025-04-17 RX ADMIN — ACETAMINOPHEN 650 MG: 325 TABLET, FILM COATED ORAL at 13:03

## 2025-04-17 RX ADMIN — OXYCODONE 10 MG: 5 TABLET ORAL at 05:15

## 2025-04-17 RX ADMIN — DIAZEPAM 2.5 MG: 5 TABLET ORAL at 04:26

## 2025-04-17 RX ADMIN — OXYCODONE 10 MG: 5 TABLET ORAL at 08:49

## 2025-04-17 RX ADMIN — ACETAMINOPHEN 650 MG: 325 TABLET, FILM COATED ORAL at 04:25

## 2025-04-17 RX ADMIN — ASPIRIN 81 MG: 81 TABLET, COATED ORAL at 08:49

## 2025-04-17 RX ADMIN — COLLAGENASE SANTYL: 250 OINTMENT TOPICAL at 08:52

## 2025-04-17 RX ADMIN — METHOCARBAMOL 750 MG: 750 TABLET ORAL at 13:03

## 2025-04-17 RX ADMIN — DIAZEPAM 2.5 MG: 5 TABLET ORAL at 17:11

## 2025-04-17 RX ADMIN — PIPERACILLIN AND TAZOBACTAM 3375 MG: 3; .375 INJECTION, POWDER, LYOPHILIZED, FOR SOLUTION INTRAVENOUS at 21:27

## 2025-04-17 RX ADMIN — OXYCODONE 10 MG: 5 TABLET ORAL at 17:10

## 2025-04-17 RX ADMIN — POLYETHYLENE GLYCOL 3350 17 G: 17 POWDER, FOR SOLUTION ORAL at 21:22

## 2025-04-17 RX ADMIN — METHOCARBAMOL 750 MG: 750 TABLET ORAL at 21:22

## 2025-04-17 RX ADMIN — ACETAMINOPHEN 650 MG: 325 TABLET, FILM COATED ORAL at 17:09

## 2025-04-17 RX ADMIN — DOCUSATE SODIUM 100 MG: 100 CAPSULE, LIQUID FILLED ORAL at 21:22

## 2025-04-17 RX ADMIN — ACETAMINOPHEN 650 MG: 325 TABLET, FILM COATED ORAL at 23:20

## 2025-04-17 ASSESSMENT — PAIN - FUNCTIONAL ASSESSMENT
PAIN_FUNCTIONAL_ASSESSMENT: PREVENTS OR INTERFERES SOME ACTIVE ACTIVITIES AND ADLS
PAIN_FUNCTIONAL_ASSESSMENT: PREVENTS OR INTERFERES WITH MANY ACTIVE NOT PASSIVE ACTIVITIES
PAIN_FUNCTIONAL_ASSESSMENT: PREVENTS OR INTERFERES SOME ACTIVE ACTIVITIES AND ADLS
PAIN_FUNCTIONAL_ASSESSMENT: PREVENTS OR INTERFERES WITH MANY ACTIVE NOT PASSIVE ACTIVITIES

## 2025-04-17 ASSESSMENT — PAIN SCALES - GENERAL
PAINLEVEL_OUTOF10: 4
PAINLEVEL_OUTOF10: 9
PAINLEVEL_OUTOF10: 6
PAINLEVEL_OUTOF10: 0
PAINLEVEL_OUTOF10: 3
PAINLEVEL_OUTOF10: 9
PAINLEVEL_OUTOF10: 5
PAINLEVEL_OUTOF10: 6
PAINLEVEL_OUTOF10: 6
PAINLEVEL_OUTOF10: 10
PAINLEVEL_OUTOF10: 9
PAINLEVEL_OUTOF10: 10

## 2025-04-17 ASSESSMENT — PAIN DESCRIPTION - ORIENTATION
ORIENTATION: RIGHT;LEFT
ORIENTATION: RIGHT;LEFT
ORIENTATION: RIGHT;LOWER
ORIENTATION: LEFT;RIGHT
ORIENTATION: RIGHT
ORIENTATION: RIGHT;LEFT

## 2025-04-17 ASSESSMENT — PAIN DESCRIPTION - DESCRIPTORS
DESCRIPTORS: ACHING;STABBING;THROBBING
DESCRIPTORS: SHARP;SHOOTING
DESCRIPTORS: TINGLING;THROBBING;STABBING
DESCRIPTORS: ACHING;SHARP;SHOOTING
DESCRIPTORS: ACHING;SHARP;SHOOTING;DISCOMFORT
DESCRIPTORS: ACHING;SHARP;SHOOTING

## 2025-04-17 ASSESSMENT — PAIN DESCRIPTION - LOCATION
LOCATION: FOOT
LOCATION: FOOT;LEG
LOCATION: LEG;FOOT
LOCATION: FOOT
LOCATION: LEG
LOCATION: FOOT;LEG

## 2025-04-17 ASSESSMENT — PAIN DESCRIPTION - ONSET: ONSET: ON-GOING

## 2025-04-17 ASSESSMENT — PAIN DESCRIPTION - FREQUENCY: FREQUENCY: CONTINUOUS

## 2025-04-17 ASSESSMENT — PAIN DESCRIPTION - PAIN TYPE: TYPE: SURGICAL PAIN

## 2025-04-17 NOTE — PROGRESS NOTES
Podiatric Surgery Daily Progress Note  Natan Lynch      Subjective :   Patient seen and examined this am at the bedside. Patient denies any acute overnight events.  Patient is S/P R popliteal to DP bypass, L popliteal to AT bypass  with vascular surgery.  Patient denies N/V/F/C/SOB. Patient denies calf pain, thigh pain, chest pain.     Review of Systems: A 12 point review of symptoms is unremarkable with the exception of the chief complaint. Patient specifically denies nausea, fever, vomiting, chills, shortness of breath, chest pain, abdominal pain, constipation or difficulty urinating.       Objective     BP (!) 104/57   Pulse 79   Temp 97.8 °F (36.6 °C) (Temporal)   Resp 21   Ht 1.969 m (6' 5.5\")   Wt 80 kg (176 lb 5.9 oz)   SpO2 98%   BMI 20.65 kg/m²      I/O:  Intake/Output Summary (Last 24 hours) at 4/17/2025 0855  Last data filed at 4/17/2025 0823  Gross per 24 hour   Intake 1602.84 ml   Output 1675 ml   Net -72.16 ml              Wt Readings from Last 3 Encounters:   04/17/25 80 kg (176 lb 5.9 oz)   04/03/25 76.2 kg (168 lb)   03/11/25 74.8 kg (165 lb)       LABS:    Recent Labs     04/16/25  0421 04/16/25  1244 04/17/25  0705   WBC 7.9  --  7.9   HGB 7.5* 8.5* 8.4*   HCT 22.6* 24.9* 24.6*     --  263        Recent Labs     04/17/25  0705      K 3.8      CO2 25   PHOS 3.4   BUN 9   CREATININE 0.6*        No results for input(s): \"INR\", \"APTT\" in the last 72 hours.    Invalid input(s): \"PROT\"          LOWER EXTREMITY EXAMINATION    Dressing to bl LE intact. No strikethrough noted to the external dressing. No drainage noted to the internal layers of the dressing.     VASCULAR: DP and PT pulses are non palpable 0/4 b/l.  Upon hand-held Doppler examination DP/PT pulses on bilateral lower extremities are weakly audible CFT is sluggish to the digits of the foot b/l with exception of the right 1st and 2nd digit which do not display capillary fill time 2/2 advanced gangrene. Skin  intervention until terminal demarcation.  Patient stable to discharge from podiatric standpoint pending medical clearance.    Assessment and plan discussed with Dr. Leon Acosta DPM.    Adrien Green DPM   Podiatric Resident PGY-2  Pager [phone] or Perfect[employer]erve  4/17/2025, 8:55 AM

## 2025-04-17 NOTE — PLAN OF CARE
Problem: Pain  Goal: Verbalizes/displays adequate comfort level or baseline comfort level  Outcome: Progressing  Flowsheets  Taken 4/17/2025 1600  Verbalizes/displays adequate comfort level or baseline comfort level: Assess pain using appropriate pain scale  Taken 4/17/2025 0800  Verbalizes/displays adequate comfort level or baseline comfort level: Assess pain using appropriate pain scale     Problem: Discharge Planning  Goal: Discharge to home or other facility with appropriate resources  Outcome: Progressing     Problem: Skin/Tissue Integrity  Goal: Skin integrity remains intact  Description: 1.  Monitor for areas of redness and/or skin breakdown  2.  Assess vascular access sites hourly  3.  Every 4-6 hours minimum:  Change oxygen saturation probe site  4.  Every 4-6 hours:  If on nasal continuous positive airway pressure, respiratory therapy assess nares and determine need for appliance change or resting period  Outcome: Progressing  Flowsheets  Taken 4/17/2025 1600  Skin Integrity Remains Intact: Monitor for areas of redness and/or skin breakdown  Taken 4/17/2025 1200  Skin Integrity Remains Intact: Monitor for areas of redness and/or skin breakdown  Taken 4/17/2025 0800  Skin Integrity Remains Intact: Monitor for areas of redness and/or skin breakdown     Problem: Safety - Adult  Goal: Free from fall injury  Outcome: Progressing     Problem: ABCDS Injury Assessment  Goal: Absence of physical injury  Outcome: Progressing     Problem: Nutrition Deficit:  Goal: Optimize nutritional status  Outcome: Progressing  Flowsheets (Taken 4/17/2025 1311 by Shawanda Ott RD)  Nutrient intake appropriate for improving, restoring, or maintaining nutritional needs:   Monitor oral intake, labs, and treatment plans   Assess nutritional status and recommend course of action     Problem: Respiratory - Adult  Goal: Achieves optimal ventilation and oxygenation  Outcome: Progressing     Problem: Cardiovascular - Adult  Goal:

## 2025-04-17 NOTE — PROGRESS NOTES
ICU DAILY PROGRESS NOTE  SURGERY    SUBJECTIVE:   INTERVAL HISTORY/SUBJECTIVE:   Pain controlled overall; worst with movement.     OBJECTIVE:   VITALS: BP (!) 97/58   Pulse 78   Temp 97.8 °F (36.6 °C) (Temporal)   Resp 22   Ht 1.969 m (6' 5.5\")   Wt 80 kg (176 lb 5.9 oz)   SpO2 98%   BMI 20.65 kg/m²     PHYSICAL EXAMINATION:   General appearance - no acute distress  HEENT - normocephalic, EOMI  Cardiac- regular rate and rhythm,   Pulmonary - normal respiratory effort on RA  Abdomen - soft, non tender, non-distended  Extremities - bilateral LE medial dressings with slight strikethrough. Bilateral bypasses palpable.   Neurological - AxOx3, no focal deficits  Skin - warm and dry    INTAKE/OUTPUT:     Intake/Output Summary (Last 24 hours) at 4/17/2025 0719  Last data filed at 4/17/2025 0200  Gross per 24 hour   Intake 1489.91 ml   Output 1675 ml   Net -185.09 ml     I/O last 3 completed shifts:  In: 3386.4 [P.O.:1440; I.V.:1018.1; Blood:642.8; IV Piggyback:285.4]  Out: 2975 [Urine:2975]    LABORATORY RESULTS:  CBC:   Recent Labs     04/15/25  0252 04/15/25  1452 04/15/25  2339 04/16/25  0421 04/16/25  1244   WBC 6.9 6.9  --  7.9  --    HGB 8.4* 7.1* 8.1* 7.5* 8.5*   HCT 25.0* 21.1* 23.6* 22.6* 24.9*    262  --  245  --        BMP:   Recent Labs     04/14/25  1949 04/15/25  0252 04/16/25  0421    138 140   K 3.9 3.9 3.5    103 107   CO2 25 29 27   BUN 5* 7 7   CREATININE 0.6* 0.7* 0.6*   GLUCOSE 121* 123* 88   PHOS 3.9 3.4 3.1     LFT's: No results for input(s): \"AST\", \"ALT\", \"BILITOT\", \"ALKPHOS\" in the last 72 hours.    Invalid input(s): \"ALB\"  Troponin: No results for input(s): \"TROPONINI\" in the last 72 hours.  BNP: No results for input(s): \"BNP\" in the last 72 hours.  ABGs: No results for input(s): \"PHART\", \"EGG9LNS\", \"PO2ART\" in the last 72 hours.  INR:   No results for input(s): \"INR\" in the last 72 hours.      U/A:No results for input(s): \"NITRITE\", \"COLORU\", \"PHUR\", \"LABCAST\", \"WBCUA\",  MD at bedside.

## 2025-04-17 NOTE — CARE COORDINATION
Discharge Planning:    CM spoke with pt at bedside at length regarding DCP and placement options. Pt agreeable to SNF placement, wishes to stay close to home in [facility] [place] if possible. Agreeable to CM placing referrals and letting him know accepting facilities he can pick from. CM placed a few area referrals and will touch base with pt once word back from facilities.     Thank you  Cat Manuel RN, BSN,   ICU   [phone]

## 2025-04-17 NOTE — PROGRESS NOTES
Comprehensive Nutrition Assessment    RECOMMENDATIONS:  PO Diet: Modify to double protein  Nutrition Supplement: Continue Ensure TID  Nutrition Education: Education/Counseling initiated     NUTRITION ASSESSMENT:   Nutritional summary & status: Follow up: Pt transferred to ICU s/p right popliteal to DP bypass and left popliteal to AT bypass with GSV. On a Regular diet, intakes at 100% and drinking his Ensures at least 2/day. Added double protein to meals as pt is 6'5\". Had been refusing stool softeners, explained the importance of taking them while on narcotics and pt is receptive now.   Admission // PMH: Ischemia of left lower extremity // PAD, DVT, bilateral pulmonary embolism on Xarelto    MALNUTRITION ASSESSMENT  Context of Malnutrition: Chronic Illness   Malnutrition Status: Severe malnutrition  Findings of the 6 clinical characteristics of malnutrition (Minimum of 2 out of 6 clinical characteristics is required to make the diagnosis of moderate or severe Protein Calorie Malnutrition based on AND/ASPEN Guidelines):  Energy Intake:  Mild decrease in energy intake  Weight Loss:  Mild weight loss (2% over 8 months however wt hx limited)     Body Fat Loss:  Severe body fat loss Orbital, Buccal region   Muscle Mass Loss:  Severe muscle mass loss Clavicles (pectoralis & deltoids)    NUTRITION DIAGNOSIS   Severe malnutrition, in context of chronic illness related to inadequate protein-energy intake as evidenced by criteria as identified in malnutrition assessment    Nutrition Monitoring and Evaluation:   Food/Nutrient Intake Outcomes:  Food and Nutrient Intake, Supplement Intake  Physical Signs/Symptoms Outcomes:  Biochemical Data, Nutrition Focused Physical Findings, Skin, Weight     OBJECTIVE DATA: Significant to nutrition assessment  Nutrition Related Findings: +BM 4/12. +5.5L.  Wounds: Venous Stasis  Nutrition Goals: Meet at least 75% of estimated needs, within 2 days     CURRENT NUTRITION THERAPIES  ADULT ORAL  NUTRITION SUPPLEMENT; Breakfast, Lunch, Dinner; Standard High Calorie/High Protein Oral Supplement  ADULT DIET; Regular; Double Protein Portions  PO Intake: %   PO Supplement Intake:%  Additional Sources of Calories/IVF:n/a     COMPARATIVE STANDARDS  Energy (kcal):  2047 - 2339 (28 - 32 kcal/kg CBW)     Protein (g):  95 - 117 (1.3 - 1.6 gm/kg CBW)       Fluid (ml/day):  1 ml/kcal    ANTHROPOMETRICS  Current Height: 196.9 cm (6' 5.5\")  Current Weight - Scale: 80 kg (176 lb 5.9 oz)    Admission weight: 72.8 kg (160 lb 6.4 oz)    The patient will be monitored per nutrition standards of care. Consult dietitian if additional nutrition interventions are needed prior to RD reassessment.     Shawanda Ott RD, LD  [place]:  [phone]

## 2025-04-17 NOTE — PROGRESS NOTES
Physical Therapy  Facility/Department: [facility] ICU  Physical Therapy Initial Assessment and treatment    Name: Natan Lynch  : 1960  MRN: [id number]  Date of Service: 2025    Discharge Recommendations:  Subacute/Skilled Nursing Facility   PT Equipment Recommendations  Equipment Needed: No (defer)      Patient Diagnosis(es): The primary encounter diagnosis was Pre-operative cardiovascular examination. Diagnoses of PVD (peripheral vascular disease), Deep vein thrombosis (DVT) of left lower extremity, unspecified chronicity, unspecified vein, PAD (peripheral artery disease), Gangrene (HCC), and Ischemia of left lower extremity were also pertinent to this visit.  Past Medical History:  has a past medical history of Bilateral pulmonary embolism (HCC), DVT (deep venous thrombosis) (HCC), Peripheral artery disease, and Tobacco abuse.  Past Surgical History:  has a past surgical history that includes invasive vascular (N/A, 2025); invasive vascular (Left, 2025); and Arterial bypass surgry (Bilateral, 2025).    Assessment  Assessment: Pt presents s/p surgery for PVD. Typically he is independent with mobility and does not use AD. At this time pt is very tentative with mobility and requires a significant amount of time to perform minimal movement 2/2 pain and fear of pain. He required two person assist to sit EOB and then lie back down in bed and did not feel he could tolerate bed<>chair.  Pt would benefit from further IP PT to improve his independence and safety with mobility.  Treatment Diagnosis: impaired mobility 2/2 ischemia of L LE  Therapy Prognosis: Good  Decision Making: Medium Complexity  Requires PT Follow-Up: Yes  Activity Tolerance  Activity Tolerance: Patient limited by pain    Plan  Physical Therapy Plan  General Plan:  (2-5)  Current Treatment Recommendations: Strengthening, Balance training, Functional mobility training, Transfer training, Neuromuscular re-education, Gait training,

## 2025-04-17 NOTE — PROGRESS NOTES
Occupational Therapy  Facility/Department: [facility] ICU  Occupational Therapy Initial Assessment and Tx    Name: Natan Lynch  : 1960  MRN: [id number]  Date of Service: 2025    Discharge Recommendations:  Subacute/Skilled Nursing Facility  OT Equipment Recommendations  Other: defer       Patient Diagnosis(es): The primary encounter diagnosis was Pre-operative cardiovascular examination. Diagnoses of PVD (peripheral vascular disease), Deep vein thrombosis (DVT) of left lower extremity, unspecified chronicity, unspecified vein, PAD (peripheral artery disease), Gangrene (HCC), and Ischemia of left lower extremity were also pertinent to this visit.  Past Medical History:  has a past medical history of Bilateral pulmonary embolism (HCC), DVT (deep venous thrombosis) (HCC), Peripheral artery disease, and Tobacco abuse.  Past Surgical History:  has a past surgical history that includes invasive vascular (N/A, 2025); invasive vascular (Left, 2025); and Arterial bypass surgry (Bilateral, 2025).           Assessment  Performance deficits / Impairments: Decreased functional mobility ;Decreased safe awareness;Decreased ADL status;Decreased endurance;Decreased high-level IADLs;Decreased balance  Assessment: Pt is 64 y.o male who presents from home. Pt presents below baseline and demos decreased activity tolerance, global deconditioning, and decreased balance. Pt limited by increased pain to BLE. Pt requires assist of 2 for bed mob for managing BLE. Pt requires significantly increased time for all movement/activity + significant time for repositioning at end of session 2/2 pain. Pt will benefit from continued skilled OT to address above deficits and progress towards PLOF.  Prognosis: Fair  Decision Making: Medium Complexity  REQUIRES OT FOLLOW-UP: Yes  Activity Tolerance  Activity Tolerance: Patient limited by fatigue;Patient limited by pain     Plan  Occupational Therapy Plan  Times Per Week:

## 2025-04-18 LAB
ALBUMIN SERPL-MCNC: 2.7 G/DL (ref 3.4–5)
ANION GAP SERPL CALCULATED.3IONS-SCNC: 6 MMOL/L (ref 3–16)
BASOPHILS # BLD: 0 K/UL (ref 0–0.2)
BASOPHILS NFR BLD: 0.3 %
BUN SERPL-MCNC: 13 MG/DL (ref 7–20)
CALCIUM SERPL-MCNC: 8.8 MG/DL (ref 8.3–10.6)
CHLORIDE SERPL-SCNC: 105 MMOL/L (ref 99–110)
CO2 SERPL-SCNC: 27 MMOL/L (ref 21–32)
CREAT SERPL-MCNC: 0.6 MG/DL (ref 0.8–1.3)
DEPRECATED RDW RBC AUTO: 13 % (ref 12.4–15.4)
EOSINOPHIL # BLD: 0.3 K/UL (ref 0–0.6)
EOSINOPHIL NFR BLD: 4.2 %
GFR SERPLBLD CREATININE-BSD FMLA CKD-EPI: >90 ML/MIN/{1.73_M2}
GLUCOSE SERPL-MCNC: 92 MG/DL (ref 70–99)
HCT VFR BLD AUTO: 24.8 % (ref 40.5–52.5)
HGB BLD-MCNC: 8.4 G/DL (ref 13.5–17.5)
LYMPHOCYTES # BLD: 1.8 K/UL (ref 1–5.1)
LYMPHOCYTES NFR BLD: 25.7 %
MAGNESIUM SERPL-MCNC: 2.05 MG/DL (ref 1.8–2.4)
MCH RBC QN AUTO: 30 PG (ref 26–34)
MCHC RBC AUTO-ENTMCNC: 34 G/DL (ref 31–36)
MCV RBC AUTO: 88.1 FL (ref 80–100)
MONOCYTES # BLD: 0.8 K/UL (ref 0–1.3)
MONOCYTES NFR BLD: 11.7 %
NEUTROPHILS # BLD: 4.2 K/UL (ref 1.7–7.7)
NEUTROPHILS NFR BLD: 58.1 %
PHOSPHATE SERPL-MCNC: 3.9 MG/DL (ref 2.5–4.9)
PLATELET # BLD AUTO: 276 K/UL (ref 135–450)
PMV BLD AUTO: 6.6 FL (ref 5–10.5)
POTASSIUM SERPL-SCNC: 3.8 MMOL/L (ref 3.5–5.1)
RBC # BLD AUTO: 2.82 M/UL (ref 4.2–5.9)
SODIUM SERPL-SCNC: 138 MMOL/L (ref 136–145)
WBC # BLD AUTO: 7.2 K/UL (ref 4–11)

## 2025-04-18 PROCEDURE — 36415 COLL VENOUS BLD VENIPUNCTURE: CPT

## 2025-04-18 PROCEDURE — 2500000003 HC RX 250 WO HCPCS

## 2025-04-18 PROCEDURE — 2000000000 HC ICU R&B

## 2025-04-18 PROCEDURE — 6360000002 HC RX W HCPCS

## 2025-04-18 PROCEDURE — 83735 ASSAY OF MAGNESIUM: CPT

## 2025-04-18 PROCEDURE — 80069 RENAL FUNCTION PANEL: CPT

## 2025-04-18 PROCEDURE — 6370000000 HC RX 637 (ALT 250 FOR IP)

## 2025-04-18 PROCEDURE — 85025 COMPLETE CBC W/AUTO DIFF WBC: CPT

## 2025-04-18 PROCEDURE — 2580000003 HC RX 258

## 2025-04-18 RX ORDER — HYDROMORPHONE HYDROCHLORIDE 1 MG/ML
0.5 INJECTION, SOLUTION INTRAMUSCULAR; INTRAVENOUS; SUBCUTANEOUS ONCE
Refills: 0 | Status: COMPLETED | OUTPATIENT
Start: 2025-04-18 | End: 2025-04-18

## 2025-04-18 RX ADMIN — ACETAMINOPHEN 650 MG: 325 TABLET, FILM COATED ORAL at 06:28

## 2025-04-18 RX ADMIN — OXYCODONE 5 MG: 5 TABLET ORAL at 09:17

## 2025-04-18 RX ADMIN — DOCUSATE SODIUM 100 MG: 100 CAPSULE, LIQUID FILLED ORAL at 22:03

## 2025-04-18 RX ADMIN — POLYETHYLENE GLYCOL 3350 17 G: 17 POWDER, FOR SOLUTION ORAL at 08:10

## 2025-04-18 RX ADMIN — SODIUM CHLORIDE, PRESERVATIVE FREE 10 ML: 5 INJECTION INTRAVENOUS at 22:07

## 2025-04-18 RX ADMIN — OXYCODONE 10 MG: 5 TABLET ORAL at 22:00

## 2025-04-18 RX ADMIN — ACETAMINOPHEN 650 MG: 325 TABLET, FILM COATED ORAL at 11:37

## 2025-04-18 RX ADMIN — METHOCARBAMOL 750 MG: 750 TABLET ORAL at 21:59

## 2025-04-18 RX ADMIN — METHOCARBAMOL 750 MG: 750 TABLET ORAL at 13:54

## 2025-04-18 RX ADMIN — SODIUM CHLORIDE, PRESERVATIVE FREE 10 ML: 5 INJECTION INTRAVENOUS at 09:00

## 2025-04-18 RX ADMIN — RIVAROXABAN 20 MG: 20 TABLET, FILM COATED ORAL at 08:09

## 2025-04-18 RX ADMIN — ASPIRIN 81 MG: 81 TABLET, COATED ORAL at 08:10

## 2025-04-18 RX ADMIN — PIPERACILLIN AND TAZOBACTAM 3375 MG: 3; .375 INJECTION, POWDER, LYOPHILIZED, FOR SOLUTION INTRAVENOUS at 06:00

## 2025-04-18 RX ADMIN — DOCUSATE SODIUM 100 MG: 100 CAPSULE, LIQUID FILLED ORAL at 08:10

## 2025-04-18 RX ADMIN — OXYCODONE 10 MG: 5 TABLET ORAL at 03:34

## 2025-04-18 RX ADMIN — PIPERACILLIN AND TAZOBACTAM 3375 MG: 3; .375 INJECTION, POWDER, LYOPHILIZED, FOR SOLUTION INTRAVENOUS at 22:06

## 2025-04-18 RX ADMIN — HYDROMORPHONE HYDROCHLORIDE 0.5 MG: 1 INJECTION, SOLUTION INTRAMUSCULAR; INTRAVENOUS; SUBCUTANEOUS at 11:36

## 2025-04-18 RX ADMIN — DIAZEPAM 2.5 MG: 5 TABLET ORAL at 06:28

## 2025-04-18 RX ADMIN — COLLAGENASE SANTYL: 250 OINTMENT TOPICAL at 10:05

## 2025-04-18 RX ADMIN — OXYCODONE 5 MG: 5 TABLET ORAL at 08:10

## 2025-04-18 RX ADMIN — OXYCODONE 5 MG: 5 TABLET ORAL at 17:57

## 2025-04-18 RX ADMIN — DIAZEPAM 2.5 MG: 5 TABLET ORAL at 17:57

## 2025-04-18 RX ADMIN — ACETAMINOPHEN 650 MG: 325 TABLET, FILM COATED ORAL at 17:57

## 2025-04-18 RX ADMIN — METHOCARBAMOL 750 MG: 750 TABLET ORAL at 08:10

## 2025-04-18 RX ADMIN — PIPERACILLIN AND TAZOBACTAM 3375 MG: 3; .375 INJECTION, POWDER, LYOPHILIZED, FOR SOLUTION INTRAVENOUS at 12:41

## 2025-04-18 ASSESSMENT — PAIN DESCRIPTION - DESCRIPTORS
DESCRIPTORS: ACHING;DISCOMFORT

## 2025-04-18 ASSESSMENT — PAIN DESCRIPTION - ORIENTATION
ORIENTATION: RIGHT;LEFT
ORIENTATION: LEFT;RIGHT
ORIENTATION: RIGHT;LEFT

## 2025-04-18 ASSESSMENT — PAIN SCALES - GENERAL
PAINLEVEL_OUTOF10: 10
PAINLEVEL_OUTOF10: 3
PAINLEVEL_OUTOF10: 8
PAINLEVEL_OUTOF10: 6
PAINLEVEL_OUTOF10: 8
PAINLEVEL_OUTOF10: 2
PAINLEVEL_OUTOF10: 4
PAINLEVEL_OUTOF10: 2
PAINLEVEL_OUTOF10: 6
PAINLEVEL_OUTOF10: 5
PAINLEVEL_OUTOF10: 8
PAINLEVEL_OUTOF10: 2
PAINLEVEL_OUTOF10: 9
PAINLEVEL_OUTOF10: 4
PAINLEVEL_OUTOF10: 2
PAINLEVEL_OUTOF10: 9
PAINLEVEL_OUTOF10: 8
PAINLEVEL_OUTOF10: 8

## 2025-04-18 ASSESSMENT — PAIN DESCRIPTION - LOCATION
LOCATION: FOOT
LOCATION: FOOT
LOCATION: FOOT;LEG
LOCATION: FOOT

## 2025-04-18 ASSESSMENT — PAIN DESCRIPTION - ONSET
ONSET: ON-GOING

## 2025-04-18 ASSESSMENT — PAIN DESCRIPTION - PAIN TYPE
TYPE: SURGICAL PAIN;NEUROPATHIC PAIN

## 2025-04-18 ASSESSMENT — PAIN - FUNCTIONAL ASSESSMENT
PAIN_FUNCTIONAL_ASSESSMENT: PREVENTS OR INTERFERES WITH MANY ACTIVE NOT PASSIVE ACTIVITIES
PAIN_FUNCTIONAL_ASSESSMENT: PREVENTS OR INTERFERES WITH ALL ACTIVE AND SOME PASSIVE ACTIVITIES
PAIN_FUNCTIONAL_ASSESSMENT: PREVENTS OR INTERFERES WITH MANY ACTIVE NOT PASSIVE ACTIVITIES
PAIN_FUNCTIONAL_ASSESSMENT: ACTIVITIES ARE NOT PREVENTED
PAIN_FUNCTIONAL_ASSESSMENT: PREVENTS OR INTERFERES WITH MANY ACTIVE NOT PASSIVE ACTIVITIES
PAIN_FUNCTIONAL_ASSESSMENT: PREVENTS OR INTERFERES WITH MANY ACTIVE NOT PASSIVE ACTIVITIES

## 2025-04-18 ASSESSMENT — PAIN DESCRIPTION - FREQUENCY
FREQUENCY: INTERMITTENT

## 2025-04-18 NOTE — PROGRESS NOTES
Podiatric Surgery Daily Progress Note  Natan Lynch      Subjective :   Patient seen and examined this am at the bedside. Patient denies any acute overnight events.  Patient is S/P R popliteal to DP bypass, L popliteal to AT bypass  with vascular surgery.  Patient states he is feeling better today and is hopeful to discharge soon.  Patient denies N/V/F/C/SOB. Patient denies calf pain, thigh pain, chest pain.     Review of Systems: A 12 point review of symptoms is unremarkable with the exception of the chief complaint. Patient specifically denies nausea, fever, vomiting, chills, shortness of breath, chest pain, abdominal pain, constipation or difficulty urinating.       Objective     /67   Pulse 75   Temp 98 °F (36.7 °C) (Temporal)   Resp 19   Ht 1.969 m (6' 5.5\")   Wt 81.4 kg (179 lb 7.3 oz)   SpO2 98%   BMI 21.01 kg/m²      I/O:  Intake/Output Summary (Last 24 hours) at 4/18/2025 0819  Last data filed at 4/18/2025 0752  Gross per 24 hour   Intake 1277.1 ml   Output 2075 ml   Net -797.9 ml              Wt Readings from Last 3 Encounters:   04/18/25 81.4 kg (179 lb 7.3 oz)   04/03/25 76.2 kg (168 lb)   03/11/25 74.8 kg (165 lb)       LABS:    Recent Labs     04/17/25  0705 04/18/25  0616   WBC 7.9 7.2   HGB 8.4* 8.4*   HCT 24.6* 24.8*    276        Recent Labs     04/18/25  0615      K 3.8      CO2 27   PHOS 3.9   BUN 13   CREATININE 0.6*        No results for input(s): \"INR\", \"APTT\" in the last 72 hours.    Invalid input(s): \"PROT\"          LOWER EXTREMITY EXAMINATION    Dressing to bl LE intact. No strikethrough noted to the external dressing. No drainage noted to the internal layers of the dressing.     VASCULAR: DP and PT pulses are non palpable 0/4 b/l.  Upon hand-held Doppler examination DP/PT pulses on bilateral lower extremities are weakly audible CFT is sluggish to the digits of the foot b/l with exception of the right 1st and 2nd digit which do not display capillary fill

## 2025-04-18 NOTE — PROGRESS NOTES
ICU DAILY PROGRESS NOTE  SURGERY    SUBJECTIVE:   INTERVAL HISTORY/SUBJECTIVE:   Pain controlled overall. Normotensive. Worked with PT/OT    OBJECTIVE:   VITALS: /67   Pulse 75   Temp 98 °F (36.7 °C) (Temporal)   Resp 19   Ht 1.969 m (6' 5.5\")   Wt 81.4 kg (179 lb 7.3 oz)   SpO2 98%   BMI 21.01 kg/m²     PHYSICAL EXAMINATION:   General appearance - no acute distress  HEENT - normocephalic, EOMI  Cardiac- regular rate and rhythm,   Pulmonary - normal respiratory effort on RA  Abdomen - soft, non tender, non-distended  Extremities - bilateral LE medial dressings CDI. Bilateral bypasses palpable.   Neurological - AxOx3, no focal deficits  Skin - warm and dry    INTAKE/OUTPUT:     Intake/Output Summary (Last 24 hours) at 4/18/2025 0736  Last data filed at 4/18/2025 0600  Gross per 24 hour   Intake 1277.1 ml   Output 1725 ml   Net -447.9 ml     I/O last 3 completed shifts:  In: 1999.1 [P.O.:1500; I.V.:334; IV Piggyback:165.1]  Out: 2475 [Urine:2475]    LABORATORY RESULTS:  CBC:   Recent Labs     04/16/25  0421 04/16/25  1244 04/17/25  0705 04/18/25  0616   WBC 7.9  --  7.9 7.2   HGB 7.5* 8.5* 8.4* 8.4*   HCT 22.6* 24.9* 24.6* 24.8*     --  263 276       BMP:   Recent Labs     04/16/25  0421 04/17/25  0705 04/18/25  0615    138 138   K 3.5 3.8 3.8    107 105   CO2 27 25 27   BUN 7 9 13   CREATININE 0.6* 0.6* 0.6*   GLUCOSE 88 98 92   PHOS 3.1 3.4 3.9     LFT's: No results for input(s): \"AST\", \"ALT\", \"BILITOT\", \"ALKPHOS\" in the last 72 hours.    Invalid input(s): \"ALB\"  Troponin: No results for input(s): \"TROPONINI\" in the last 72 hours.  BNP: No results for input(s): \"BNP\" in the last 72 hours.  ABGs: No results for input(s): \"PHART\", \"ZGU9DKB\", \"PO2ART\" in the last 72 hours.  INR:   No results for input(s): \"INR\" in the last 72 hours.      U/A:No results for input(s): \"NITRITE\", \"COLORU\", \"PHUR\", \"LABCAST\", \"WBCUA\", \"RBCUA\", \"MUCUS\", \"TRICHOMONAS\", \"YEAST\", \"BACTERIA\", \"CLARITYU\",

## 2025-04-18 NOTE — CARE COORDINATION
Discharge Planning:    JACIEL spoke with pt at bedside regarding accepting facilities- preference of [facility]. JACIEL spoke with Elaine ([phone]) in admissions- she will submit for precert today.    Thank you  Cat Manuel RIDER, BSN,   ICU   [phone]

## 2025-04-18 NOTE — PLAN OF CARE
Problem: Pain  Goal: Verbalizes/displays adequate comfort level or baseline comfort level  Outcome: Progressing     Problem: Discharge Planning  Goal: Discharge to home or other facility with appropriate resources  Outcome: Progressing     Problem: Skin/Tissue Integrity  Goal: Skin integrity remains intact  Description: 1.  Monitor for areas of redness and/or skin breakdown  2.  Assess vascular access sites hourly  3.  Every 4-6 hours minimum:  Change oxygen saturation probe site  4.  Every 4-6 hours:  If on nasal continuous positive airway pressure, respiratory therapy assess nares and determine need for appliance change or resting period  Outcome: Progressing     Problem: Safety - Adult  Goal: Free from fall injury  Outcome: Progressing     Problem: ABCDS Injury Assessment  Goal: Absence of physical injury  Outcome: Progressing     Problem: Nutrition Deficit:  Goal: Optimize nutritional status  Outcome: Progressing     Problem: Respiratory - Adult  Goal: Achieves optimal ventilation and oxygenation  Outcome: Progressing     Problem: Cardiovascular - Adult  Goal: Maintains optimal cardiac output and hemodynamic stability  Outcome: Progressing     Problem: Cardiovascular - Adult  Goal: Absence of cardiac dysrhythmias or at baseline  Outcome: Progressing     Problem: Neurosensory - Adult  Goal: Achieves stable or improved neurological status  Outcome: Progressing     Problem: Neurosensory - Adult  Goal: Achieves maximal functionality and self care  Outcome: Progressing     Problem: Skin/Tissue Integrity - Adult  Goal: Skin integrity remains intact  Description: 1.  Monitor for areas of redness and/or skin breakdown  2.  Assess vascular access sites hourly  3.  Every 4-6 hours minimum:  Change oxygen saturation probe site  4.  Every 4-6 hours:  If on nasal continuous positive airway pressure, respiratory therapy assess nares and determine need for appliance change or resting period  Outcome: Progressing     Problem:

## 2025-04-19 LAB
ALBUMIN SERPL-MCNC: 2.8 G/DL (ref 3.4–5)
ANION GAP SERPL CALCULATED.3IONS-SCNC: 7 MMOL/L (ref 3–16)
BASOPHILS # BLD: 0 K/UL (ref 0–0.2)
BASOPHILS NFR BLD: 0.4 %
BUN SERPL-MCNC: 9 MG/DL (ref 7–20)
CALCIUM SERPL-MCNC: 9 MG/DL (ref 8.3–10.6)
CHLORIDE SERPL-SCNC: 105 MMOL/L (ref 99–110)
CO2 SERPL-SCNC: 26 MMOL/L (ref 21–32)
CREAT SERPL-MCNC: 0.6 MG/DL (ref 0.8–1.3)
DEPRECATED RDW RBC AUTO: 12.7 % (ref 12.4–15.4)
EOSINOPHIL # BLD: 0.3 K/UL (ref 0–0.6)
EOSINOPHIL NFR BLD: 3.9 %
GFR SERPLBLD CREATININE-BSD FMLA CKD-EPI: >90 ML/MIN/{1.73_M2}
GLUCOSE SERPL-MCNC: 90 MG/DL (ref 70–99)
HCT VFR BLD AUTO: 25.7 % (ref 40.5–52.5)
HGB BLD-MCNC: 8.7 G/DL (ref 13.5–17.5)
LYMPHOCYTES # BLD: 1.8 K/UL (ref 1–5.1)
LYMPHOCYTES NFR BLD: 21.4 %
MAGNESIUM SERPL-MCNC: 2.04 MG/DL (ref 1.8–2.4)
MCH RBC QN AUTO: 29.8 PG (ref 26–34)
MCHC RBC AUTO-ENTMCNC: 33.9 G/DL (ref 31–36)
MCV RBC AUTO: 87.9 FL (ref 80–100)
MONOCYTES # BLD: 0.7 K/UL (ref 0–1.3)
MONOCYTES NFR BLD: 8.8 %
NEUTROPHILS # BLD: 5.5 K/UL (ref 1.7–7.7)
NEUTROPHILS NFR BLD: 65.5 %
PHOSPHATE SERPL-MCNC: 4 MG/DL (ref 2.5–4.9)
PLATELET # BLD AUTO: 312 K/UL (ref 135–450)
PMV BLD AUTO: 6.5 FL (ref 5–10.5)
POTASSIUM SERPL-SCNC: 3.8 MMOL/L (ref 3.5–5.1)
RBC # BLD AUTO: 2.93 M/UL (ref 4.2–5.9)
SODIUM SERPL-SCNC: 138 MMOL/L (ref 136–145)
WBC # BLD AUTO: 8.4 K/UL (ref 4–11)

## 2025-04-19 PROCEDURE — 1200000000 HC SEMI PRIVATE

## 2025-04-19 PROCEDURE — 80069 RENAL FUNCTION PANEL: CPT

## 2025-04-19 PROCEDURE — 2500000003 HC RX 250 WO HCPCS

## 2025-04-19 PROCEDURE — 36415 COLL VENOUS BLD VENIPUNCTURE: CPT

## 2025-04-19 PROCEDURE — 6360000002 HC RX W HCPCS

## 2025-04-19 PROCEDURE — 2580000003 HC RX 258

## 2025-04-19 PROCEDURE — 6370000000 HC RX 637 (ALT 250 FOR IP)

## 2025-04-19 PROCEDURE — 85025 COMPLETE CBC W/AUTO DIFF WBC: CPT

## 2025-04-19 PROCEDURE — 83735 ASSAY OF MAGNESIUM: CPT

## 2025-04-19 RX ORDER — POLYETHYLENE GLYCOL 3350 17 G/17G
17 POWDER, FOR SOLUTION ORAL DAILY PRN
Status: DISCONTINUED | OUTPATIENT
Start: 2025-04-19 | End: 2025-04-24 | Stop reason: HOSPADM

## 2025-04-19 RX ADMIN — OXYCODONE 10 MG: 5 TABLET ORAL at 11:36

## 2025-04-19 RX ADMIN — DIAZEPAM 2.5 MG: 5 TABLET ORAL at 17:44

## 2025-04-19 RX ADMIN — PIPERACILLIN AND TAZOBACTAM 3375 MG: 3; .375 INJECTION, POWDER, LYOPHILIZED, FOR SOLUTION INTRAVENOUS at 05:07

## 2025-04-19 RX ADMIN — METHOCARBAMOL 750 MG: 750 TABLET ORAL at 07:40

## 2025-04-19 RX ADMIN — ACETAMINOPHEN 650 MG: 325 TABLET, FILM COATED ORAL at 01:26

## 2025-04-19 RX ADMIN — PIPERACILLIN AND TAZOBACTAM 3375 MG: 3; .375 INJECTION, POWDER, LYOPHILIZED, FOR SOLUTION INTRAVENOUS at 21:31

## 2025-04-19 RX ADMIN — ACETAMINOPHEN 650 MG: 325 TABLET, FILM COATED ORAL at 05:06

## 2025-04-19 RX ADMIN — METHOCARBAMOL 750 MG: 750 TABLET ORAL at 14:01

## 2025-04-19 RX ADMIN — SODIUM CHLORIDE, PRESERVATIVE FREE 10 ML: 5 INJECTION INTRAVENOUS at 20:39

## 2025-04-19 RX ADMIN — OXYCODONE 10 MG: 5 TABLET ORAL at 20:37

## 2025-04-19 RX ADMIN — METHOCARBAMOL 750 MG: 750 TABLET ORAL at 20:37

## 2025-04-19 RX ADMIN — ACETAMINOPHEN 650 MG: 325 TABLET, FILM COATED ORAL at 23:13

## 2025-04-19 RX ADMIN — ACETAMINOPHEN 650 MG: 325 TABLET, FILM COATED ORAL at 11:36

## 2025-04-19 RX ADMIN — ASPIRIN 81 MG: 81 TABLET, COATED ORAL at 07:40

## 2025-04-19 RX ADMIN — DIAZEPAM 2.5 MG: 5 TABLET ORAL at 05:06

## 2025-04-19 RX ADMIN — RIVAROXABAN 20 MG: 20 TABLET, FILM COATED ORAL at 09:09

## 2025-04-19 RX ADMIN — ACETAMINOPHEN 650 MG: 325 TABLET, FILM COATED ORAL at 17:44

## 2025-04-19 RX ADMIN — PIPERACILLIN AND TAZOBACTAM 3375 MG: 3; .375 INJECTION, POWDER, LYOPHILIZED, FOR SOLUTION INTRAVENOUS at 11:41

## 2025-04-19 RX ADMIN — OXYCODONE 10 MG: 5 TABLET ORAL at 07:40

## 2025-04-19 ASSESSMENT — PAIN SCALES - GENERAL
PAINLEVEL_OUTOF10: 10
PAINLEVEL_OUTOF10: 8
PAINLEVEL_OUTOF10: 0
PAINLEVEL_OUTOF10: 7
PAINLEVEL_OUTOF10: 0
PAINLEVEL_OUTOF10: 2
PAINLEVEL_OUTOF10: 0
PAINLEVEL_OUTOF10: 3

## 2025-04-19 ASSESSMENT — PAIN DESCRIPTION - LOCATION
LOCATION: LEG;TOE (COMMENT WHICH ONE);FOOT
LOCATION: LEG

## 2025-04-19 ASSESSMENT — PAIN - FUNCTIONAL ASSESSMENT: PAIN_FUNCTIONAL_ASSESSMENT: ACTIVITIES ARE NOT PREVENTED

## 2025-04-19 ASSESSMENT — PAIN SCALES - WONG BAKER: WONGBAKER_NUMERICALRESPONSE: NO HURT

## 2025-04-19 ASSESSMENT — PAIN DESCRIPTION - ORIENTATION: ORIENTATION: RIGHT;LEFT

## 2025-04-19 NOTE — PROGRESS NOTES
Pt's pain was controlled overnight, and pt rested on and off.   Pt expressed concerns about having bowel movements on a bed pan. I educated the patient on how it is unsafe to get him out of bed right now with his foot wounds and generalized weakness. Pt expressed that he, \"doesn't even want to eat\" because he'll have a BM. This RN educated patient on the importance of nutrition and protein to heal his wounds. Pt refused glycolax.

## 2025-04-19 NOTE — PROGRESS NOTES
ICU DAILY PROGRESS NOTE  SURGERY    SUBJECTIVE:   INTERVAL HISTORY/SUBJECTIVE:   Pain controlled. No complaints this morning. Doing well.     OBJECTIVE:   VITALS: BP 96/61   Pulse 78   Temp 97.6 °F (36.4 °C) (Temporal)   Resp 18   Ht 1.969 m (6' 5.5\")   Wt 81.4 kg (179 lb 7.3 oz)   SpO2 99%   BMI 21.01 kg/m²     PHYSICAL EXAMINATION:   General appearance - no acute distress  HEENT - normocephalic, EOMI  Cardiac- regular rate and rhythm,   Pulmonary - normal respiratory effort on RA  Abdomen - soft, non tender, non-distended  Extremities - bilateral LE medial dressings CDI. Bilateral bypasses palpable.   Neurological - AxOx3, no focal deficits  Skin - warm and dry    INTAKE/OUTPUT:     Intake/Output Summary (Last 24 hours) at 4/19/2025 0749  Last data filed at 4/19/2025 0611  Gross per 24 hour   Intake 900.1 ml   Output 2575 ml   Net -1674.9 ml     I/O last 3 completed shifts:  In: 1592.1 [P.O.:1180; I.V.:212; IV Piggyback:200.1]  Out: 3800 [Urine:3800]    LABORATORY RESULTS:  CBC:   Recent Labs     04/17/25  0705 04/18/25  0616 04/19/25  0534   WBC 7.9 7.2 8.4   HGB 8.4* 8.4* 8.7*   HCT 24.6* 24.8* 25.7*    276 312       BMP:   Recent Labs     04/17/25  0705 04/18/25  0615 04/19/25  0534    138 138   K 3.8 3.8 3.8    105 105   CO2 25 27 26   BUN 9 13 9   CREATININE 0.6* 0.6* 0.6*   GLUCOSE 98 92 90   PHOS 3.4 3.9 4.0     LFT's: No results for input(s): \"AST\", \"ALT\", \"BILITOT\", \"ALKPHOS\" in the last 72 hours.    Invalid input(s): \"ALB\"  Troponin: No results for input(s): \"TROPONINI\" in the last 72 hours.  BNP: No results for input(s): \"BNP\" in the last 72 hours.  ABGs: No results for input(s): \"PHART\", \"VPI2OWN\", \"PO2ART\" in the last 72 hours.  INR:   No results for input(s): \"INR\" in the last 72 hours.      U/A:No results for input(s): \"NITRITE\", \"COLORU\", \"PHUR\", \"LABCAST\", \"WBCUA\", \"RBCUA\", \"MUCUS\", \"TRICHOMONAS\", \"YEAST\", \"BACTERIA\", \"CLARITYU\", \"SPECGRAV\", \"LEUKOCYTESUR\",

## 2025-04-19 NOTE — PLAN OF CARE
Problem: Pain  Goal: Verbalizes/displays adequate comfort level or baseline comfort level  Outcome: Progressing     Problem: Discharge Planning  Goal: Discharge to home or other facility with appropriate resources  Outcome: Progressing     Problem: Skin/Tissue Integrity  Goal: Skin integrity remains intact  Description: 1.  Monitor for areas of redness and/or skin breakdown  2.  Assess vascular access sites hourly  3.  Every 4-6 hours minimum:  Change oxygen saturation probe site  4.  Every 4-6 hours:  If on nasal continuous positive airway pressure, respiratory therapy assess nares and determine need for appliance change or resting period  Outcome: Progressing  Flowsheets  Taken 4/19/2025 0000  Skin Integrity Remains Intact: Monitor for areas of redness and/or skin breakdown  Taken 4/18/2025 2000  Skin Integrity Remains Intact: Monitor for areas of redness and/or skin breakdown     Problem: Safety - Adult  Goal: Free from fall injury  Outcome: Progressing

## 2025-04-19 NOTE — PROGRESS NOTES
Patient arrived from ICU as a transfer.  Patient alert and oriented.  Denies pain.  Both feet bilateral wrapped with dry guaze and ace wrap, cdi. Patient with episode of loose stool, requested miralax and stool softener d/c'd.  Oriented to room, call light, tv, bed, lights.  Reviewed safety and plan of care.  Bed alarm on.

## 2025-04-19 NOTE — PLAN OF CARE
Problem: Pain  Goal: Verbalizes/displays adequate comfort level or baseline comfort level  Outcome: Progressing     Problem: Discharge Planning  Goal: Discharge to home or other facility with appropriate resources  Outcome: Progressing     Problem: Skin/Tissue Integrity  Goal: Skin integrity remains intact  Description: 1.  Monitor for areas of redness and/or skin breakdown  2.  Assess vascular access sites hourly  3.  Every 4-6 hours minimum:  Change oxygen saturation probe site  4.  Every 4-6 hours:  If on nasal continuous positive airway pressure, respiratory therapy assess nares and determine need for appliance change or resting period  Outcome: Progressing  Flowsheets  Taken 4/19/2025 1731 by Puja Hammond RN  Skin Integrity Remains Intact:   Monitor for areas of redness and/or skin breakdown   Assess vascular access sites hourly   Every 4-6 hours minimum:  Change oxygen saturation probe site   Every 4-6 hours:  If on nasal continuous positive airway pressure, assess nares and determine need for appliance change or resting period  Taken 4/19/2025 0800 by Puja Hammond RN  Skin Integrity Remains Intact:   Monitor for areas of redness and/or skin breakdown   Every 4-6 hours minimum:  Change oxygen saturation probe site   Assess vascular access sites hourly   Every 4-6 hours:  If on nasal continuous positive airway pressure, assess nares and determine need for appliance change or resting period  Taken 4/19/2025 0400 by Santos Steen RN  Skin Integrity Remains Intact: Monitor for areas of redness and/or skin breakdown     Problem: Safety - Adult  Goal: Free from fall injury  Outcome: Progressing  Flowsheets (Taken 4/19/2025 1731)  Free From Fall Injury:   Instruct family/caregiver on patient safety   Based on caregiver fall risk screen, instruct family/caregiver to ask for assistance with transferring infant if caregiver noted to have fall risk factors     Problem: ABCDS Injury Assessment  Goal:

## 2025-04-19 NOTE — PROGRESS NOTES
4 Eyes Skin Assessment     NAME:  Natan Lynch  YOB: 1960  MEDICAL RECORD NUMBER:  [id number]    The patient is being assessed for  Transfer to New Unit    I agree that at least one RN has performed a thorough Head to Toe Skin Assessment on the patient. ALL assessment sites listed below have been assessed.      Areas assessed by both nurses:    Head, Face, Ears, Shoulders, Back, Chest, Arms, Elbows, Hands, Sacrum. Buttock, Coccyx, Ischium, Legs. Feet and Heels, Under Medical Devices , and Other          Does the Patient have a Wound? Yes wound(s) were present on assessment. LDA wound assessment was Initiated and completed by RN       [place]elmo Prevention initiated by RN: Yes  Wound Care Orders initiated by RN: Yes    Pressure Injury (Stage 3,4, Unstageable, DTI, NWPT, and Complex wounds) if present, place Wound referral order by RN under : Yes    New Ostomies, if present place, Ostomy referral order under : No     Nurse 1 eSignature: Electronically signed by Nelson Alex RN on 4/19/25 at 6:22 PM EDT    **SHARE this note so that the co-signing nurse can place an eSignature**    Nurse 2 eSignature: Electronically signed by Tracy Bee RN on 4/19/25 at 6:25 PM EDT    stated

## 2025-04-20 LAB
ALBUMIN SERPL-MCNC: 2.9 G/DL (ref 3.4–5)
ANION GAP SERPL CALCULATED.3IONS-SCNC: 5 MMOL/L (ref 3–16)
BASOPHILS # BLD: 0 K/UL (ref 0–0.2)
BASOPHILS NFR BLD: 0.5 %
BUN SERPL-MCNC: 10 MG/DL (ref 7–20)
CALCIUM SERPL-MCNC: 9 MG/DL (ref 8.3–10.6)
CHLORIDE SERPL-SCNC: 105 MMOL/L (ref 99–110)
CO2 SERPL-SCNC: 27 MMOL/L (ref 21–32)
CREAT SERPL-MCNC: 0.7 MG/DL (ref 0.8–1.3)
DEPRECATED RDW RBC AUTO: 13 % (ref 12.4–15.4)
EOSINOPHIL # BLD: 0.3 K/UL (ref 0–0.6)
EOSINOPHIL NFR BLD: 4.5 %
GFR SERPLBLD CREATININE-BSD FMLA CKD-EPI: >90 ML/MIN/{1.73_M2}
GLUCOSE BLD-MCNC: 110 MG/DL (ref 70–99)
GLUCOSE SERPL-MCNC: 105 MG/DL (ref 70–99)
HCT VFR BLD AUTO: 25.4 % (ref 40.5–52.5)
HGB BLD-MCNC: 8.5 G/DL (ref 13.5–17.5)
LYMPHOCYTES # BLD: 1.6 K/UL (ref 1–5.1)
LYMPHOCYTES NFR BLD: 24.1 %
MAGNESIUM SERPL-MCNC: 2.09 MG/DL (ref 1.8–2.4)
MCH RBC QN AUTO: 30.2 PG (ref 26–34)
MCHC RBC AUTO-ENTMCNC: 33.3 G/DL (ref 31–36)
MCV RBC AUTO: 90.5 FL (ref 80–100)
MONOCYTES # BLD: 0.7 K/UL (ref 0–1.3)
MONOCYTES NFR BLD: 10.4 %
NEUTROPHILS # BLD: 3.9 K/UL (ref 1.7–7.7)
NEUTROPHILS NFR BLD: 60.5 %
PERFORMED ON: ABNORMAL
PHOSPHATE SERPL-MCNC: 3.9 MG/DL (ref 2.5–4.9)
PLATELET # BLD AUTO: 305 K/UL (ref 135–450)
PMV BLD AUTO: 6.4 FL (ref 5–10.5)
POTASSIUM SERPL-SCNC: 3.6 MMOL/L (ref 3.5–5.1)
RBC # BLD AUTO: 2.8 M/UL (ref 4.2–5.9)
SODIUM SERPL-SCNC: 137 MMOL/L (ref 136–145)
WBC # BLD AUTO: 6.5 K/UL (ref 4–11)

## 2025-04-20 PROCEDURE — 83735 ASSAY OF MAGNESIUM: CPT

## 2025-04-20 PROCEDURE — 2500000003 HC RX 250 WO HCPCS

## 2025-04-20 PROCEDURE — 6370000000 HC RX 637 (ALT 250 FOR IP)

## 2025-04-20 PROCEDURE — 36415 COLL VENOUS BLD VENIPUNCTURE: CPT

## 2025-04-20 PROCEDURE — 85025 COMPLETE CBC W/AUTO DIFF WBC: CPT

## 2025-04-20 PROCEDURE — 6360000002 HC RX W HCPCS

## 2025-04-20 PROCEDURE — 80069 RENAL FUNCTION PANEL: CPT

## 2025-04-20 PROCEDURE — 1200000000 HC SEMI PRIVATE

## 2025-04-20 PROCEDURE — 2580000003 HC RX 258

## 2025-04-20 RX ORDER — LIDOCAINE 4 G/G
1 PATCH TOPICAL DAILY
Status: DISCONTINUED | OUTPATIENT
Start: 2025-04-20 | End: 2025-04-24 | Stop reason: HOSPADM

## 2025-04-20 RX ORDER — GABAPENTIN 100 MG/1
100 CAPSULE ORAL 2 TIMES DAILY PRN
Status: DISCONTINUED | OUTPATIENT
Start: 2025-04-20 | End: 2025-04-24 | Stop reason: HOSPADM

## 2025-04-20 RX ORDER — POTASSIUM CHLORIDE 1500 MG/1
40 TABLET, EXTENDED RELEASE ORAL ONCE
Status: COMPLETED | OUTPATIENT
Start: 2025-04-20 | End: 2025-04-20

## 2025-04-20 RX ADMIN — COLLAGENASE SANTYL: 250 OINTMENT TOPICAL at 08:10

## 2025-04-20 RX ADMIN — DIAZEPAM 2.5 MG: 5 TABLET ORAL at 05:28

## 2025-04-20 RX ADMIN — GABAPENTIN 100 MG: 100 CAPSULE ORAL at 21:37

## 2025-04-20 RX ADMIN — SODIUM CHLORIDE, PRESERVATIVE FREE 10 ML: 5 INJECTION INTRAVENOUS at 08:11

## 2025-04-20 RX ADMIN — METHOCARBAMOL 750 MG: 750 TABLET ORAL at 21:38

## 2025-04-20 RX ADMIN — METHOCARBAMOL 750 MG: 750 TABLET ORAL at 13:32

## 2025-04-20 RX ADMIN — PIPERACILLIN AND TAZOBACTAM 3375 MG: 3; .375 INJECTION, POWDER, LYOPHILIZED, FOR SOLUTION INTRAVENOUS at 05:49

## 2025-04-20 RX ADMIN — OXYCODONE 10 MG: 5 TABLET ORAL at 11:10

## 2025-04-20 RX ADMIN — ACETAMINOPHEN 650 MG: 325 TABLET, FILM COATED ORAL at 05:28

## 2025-04-20 RX ADMIN — POTASSIUM CHLORIDE 40 MEQ: 1500 TABLET, EXTENDED RELEASE ORAL at 13:32

## 2025-04-20 RX ADMIN — ACETAMINOPHEN 650 MG: 325 TABLET, FILM COATED ORAL at 18:11

## 2025-04-20 RX ADMIN — OXYCODONE 10 MG: 5 TABLET ORAL at 05:54

## 2025-04-20 RX ADMIN — SODIUM CHLORIDE, PRESERVATIVE FREE 10 ML: 5 INJECTION INTRAVENOUS at 21:00

## 2025-04-20 RX ADMIN — OXYCODONE 10 MG: 5 TABLET ORAL at 16:40

## 2025-04-20 RX ADMIN — PIPERACILLIN AND TAZOBACTAM 3375 MG: 3; .375 INJECTION, POWDER, LYOPHILIZED, FOR SOLUTION INTRAVENOUS at 21:39

## 2025-04-20 RX ADMIN — PIPERACILLIN AND TAZOBACTAM 3375 MG: 3; .375 INJECTION, POWDER, LYOPHILIZED, FOR SOLUTION INTRAVENOUS at 13:32

## 2025-04-20 RX ADMIN — DIAZEPAM 2.5 MG: 5 TABLET ORAL at 18:12

## 2025-04-20 RX ADMIN — METHOCARBAMOL 750 MG: 750 TABLET ORAL at 08:12

## 2025-04-20 RX ADMIN — ACETAMINOPHEN 650 MG: 325 TABLET, FILM COATED ORAL at 11:10

## 2025-04-20 RX ADMIN — RIVAROXABAN 20 MG: 20 TABLET, FILM COATED ORAL at 08:12

## 2025-04-20 RX ADMIN — ASPIRIN 81 MG: 81 TABLET, COATED ORAL at 08:12

## 2025-04-20 RX ADMIN — OXYCODONE 10 MG: 5 TABLET ORAL at 00:36

## 2025-04-20 ASSESSMENT — PAIN DESCRIPTION - DESCRIPTORS
DESCRIPTORS: ACHING;BURNING
DESCRIPTORS: ACHING

## 2025-04-20 ASSESSMENT — PAIN SCALES - GENERAL
PAINLEVEL_OUTOF10: 2
PAINLEVEL_OUTOF10: 8
PAINLEVEL_OUTOF10: 8
PAINLEVEL_OUTOF10: 2
PAINLEVEL_OUTOF10: 2
PAINLEVEL_OUTOF10: 8

## 2025-04-20 ASSESSMENT — PAIN DESCRIPTION - LOCATION
LOCATION: LEG
LOCATION: ABDOMEN
LOCATION: LEG

## 2025-04-20 ASSESSMENT — PAIN - FUNCTIONAL ASSESSMENT
PAIN_FUNCTIONAL_ASSESSMENT: ACTIVITIES ARE NOT PREVENTED
PAIN_FUNCTIONAL_ASSESSMENT: ACTIVITIES ARE NOT PREVENTED

## 2025-04-20 ASSESSMENT — PAIN DESCRIPTION - FREQUENCY: FREQUENCY: INTERMITTENT

## 2025-04-20 ASSESSMENT — PAIN DESCRIPTION - ORIENTATION
ORIENTATION: MID
ORIENTATION: MID
ORIENTATION: LEFT;RIGHT;MID

## 2025-04-20 ASSESSMENT — PAIN DESCRIPTION - PAIN TYPE: TYPE: SURGICAL PAIN

## 2025-04-20 ASSESSMENT — PAIN DESCRIPTION - ONSET: ONSET: ON-GOING

## 2025-04-20 NOTE — PROGRESS NOTES
Vascular Surgery Daily Progress Note  Patient: Natan Lynch    CC: PAD    Subjective :  No acute events overnight. Complaints of ongoing pain. Had diarrhea.     ROS: A 14 point review of systems was conducted, significant findings as noted above. All other systems negative.    Objective :   Infusions:   sodium chloride      sodium chloride      sodium chloride      sodium chloride          I/O:I/O last 3 completed shifts:  In: 1000.1 [P.O.:600; I.V.:200; IV Piggyback:200.1]  Out: 3950 [Urine:3950]           Wt Readings from Last 1 Encounters:   04/20/25 81.1 kg (178 lb 12.8 oz)       Exam:BP (!) 94/58   Pulse 74   Temp 98 °F (36.7 °C) (Oral)   Resp 20   Ht 1.969 m (6' 5.5\")   Wt 81.1 kg (178 lb 12.8 oz)   SpO2 98%   BMI 20.93 kg/m²     General appearance - no acute distress  HEENT - normocephalic, EOMI  Cardiac- regular rate and rhythm,   Pulmonary - normal respiratory effort on RA  Abdomen - soft, non tender, non-distended  Extremities - bilateral LE medial dressings CDI. Bilateral bypasses palpable.   Neurological - AxOx3, no focal deficits  Skin - warm and dry          LABS:   Recent Labs     04/19/25  0534 04/20/25  0456   WBC 8.4 6.5   HGB 8.7* 8.5*   HCT 25.7* 25.4*   MCV 87.9 90.5    305        Recent Labs     04/19/25  0534 04/20/25  0456    137   K 3.8 3.6    105   CO2 26 27   PHOS 4.0 3.9   BUN 9 10   CREATININE 0.6* 0.7*      No results for input(s): \"AST\", \"ALT\", \"BILIDIR\", \"BILITOT\", \"ALKPHOS\" in the last 72 hours.    Invalid input(s): \"ALB\"   No results for input(s): \"LIPASE\", \"AMYLASE\" in the last 72 hours.   No results for input(s): \"INR\", \"APTT\" in the last 72 hours.    Invalid input(s): \"PROT\"   No results for input(s): \"CKTOTAL\", \"CKMB\", \"CKMBINDEX\", \"TROPONINI\" in the last 72 hours.    ASSESSMENT/PLAN: Pt. is a 64 y.o. male history of DVT/PE on Xarelto, PAD, and tobacco abuse s/p LLE arteriogram (4/9) with s/p repeat arteriogram 4/10 with findings of bilateral anterior

## 2025-04-20 NOTE — PLAN OF CARE
Every 4-6 hours minimum:  Change oxygen saturation probe site   Assess vascular access sites hourly   Every 4-6 hours:  If on nasal continuous positive airway pressure, assess nares and determine need for appliance change or resting period  Taken 4/19/2025 0400 by Santos Steen RN  Skin Integrity Remains Intact: Monitor for areas of redness and/or skin breakdown  Goal: Incisions, wounds, or drain sites healing without S/S of infection  4/20/2025 0143 by Casandra Bhatt RN  Outcome: Progressing  Flowsheets  Taken 4/20/2025 0028  Incisions, Wounds, or Drain Sites Healing Without Sign and Symptoms of Infection: ADMISSION and DAILY: Assess and document risk factors for pressure ulcer development  Taken 4/19/2025 2037  Incisions, Wounds, or Drain Sites Healing Without Sign and Symptoms of Infection: ADMISSION and DAILY: Assess and document risk factors for pressure ulcer development  4/19/2025 1754 by Puja Hammond RN  Outcome: Progressing  Flowsheets  Taken 4/19/2025 1731 by Puja Hammond RN  Incisions, Wounds, or Drain Sites Healing Without Sign and Symptoms of Infection:   ADMISSION and DAILY: Assess and document risk factors for pressure ulcer development   TWICE DAILY: Assess and document skin integrity   TWICE DAILY: Assess and document dressing/incision, wound bed, drain sites and surrounding tissue   Implement wound care per orders   Initiate isolation precautions as appropriate   Initiate pressure ulcer prevention bundle as indicated  Taken 4/19/2025 0800 by Puja Hammond RN  Incisions, Wounds, or Drain Sites Healing Without Sign and Symptoms of Infection:   ADMISSION and DAILY: Assess and document risk factors for pressure ulcer development   TWICE DAILY: Assess and document skin integrity   TWICE DAILY: Assess and document dressing/incision, wound bed, drain sites and surrounding tissue   Implement wound care per orders  Taken 4/19/2025 0400 by Santos Steen RN  Incisions, Wounds, or Drain Sites  integrity   TWICE DAILY: Assess and document dressing/incision, wound bed, drain sites and surrounding tissue   Implement wound care per orders   Initiate isolation precautions as appropriate   Initiate pressure ulcer prevention bundle as indicated  Taken 4/19/2025 0800 by Puja Hammond RN  Incisions, Wounds, or Drain Sites Healing Without Sign and Symptoms of Infection:   ADMISSION and DAILY: Assess and document risk factors for pressure ulcer development   TWICE DAILY: Assess and document skin integrity   TWICE DAILY: Assess and document dressing/incision, wound bed, drain sites and surrounding tissue   Implement wound care per orders  Taken 4/19/2025 0400 by Santos Steen RN  Incisions, Wounds, or Drain Sites Healing Without Sign and Symptoms of Infection: ADMISSION and DAILY: Assess and document risk factors for pressure ulcer development     Problem: Skin/Tissue Integrity - Adult  Goal: Oral mucous membranes remain intact  4/20/2025 0143 by Casandra Bhatt RN  Outcome: Progressing  Flowsheets  Taken 4/20/2025 0028  Oral Mucous Membranes Remain Intact: Assess oral mucosa and hygiene practices  Taken 4/19/2025 2037  Oral Mucous Membranes Remain Intact: Assess oral mucosa and hygiene practices  4/19/2025 1754 by Puja Hammond RN  Outcome: Progressing  Flowsheets  Taken 4/19/2025 1731 by Puja Hammond RN  Oral Mucous Membranes Remain Intact:   Assess oral mucosa and hygiene practices   Implement preventative oral hygiene regimen   Implement oral medicated treatments as ordered  Taken 4/19/2025 0800 by Puja Hammond RN  Oral Mucous Membranes Remain Intact:   Assess oral mucosa and hygiene practices   Implement preventative oral hygiene regimen  Taken 4/19/2025 0400 by Santos Steen RN  Oral Mucous Membranes Remain Intact: Assess oral mucosa and hygiene practices

## 2025-04-20 NOTE — PROGRESS NOTES
Patient having breakthrough bleeding on L thigh. Surgery made aware. Waiting for response at this time.

## 2025-04-20 NOTE — PROGRESS NOTES
Surgical site clean dry intact at this time.   Pain managed with PRN oxycodone.   No BM noted.   IV antibiotics given per schedule.

## 2025-04-20 NOTE — PROGRESS NOTES
Physician Progress Note      PATIENT:               PATRICIA SHANKAR  CSN #:                  [id number]  :                       1960  ADMIT DATE:       2025 10:31 AM  DISCH DATE:  RESPONDING  PROVIDER #:        MACY MARR          QUERY TEXT:    Please clarify the patient?s nutritional status:    The clinical indicators include:  Age 64 yrs decreased appetite r/t pain from PAD/ischemia.    Nutrition Progress Notes 04/10-  MALNUTRITION ASSESSMENT  Context of Malnutrition: Chronic Illness  Malnutrition Status: Severe malnutrition  Findings of the 6 clinical characteristics of malnutrition (Minimum of 2 out   of 6 clinical characteristics is required to make the diagnosis of moderate or   severe Protein Calorie Malnutrition based on AND/ASPEN Guidelines):  Energy Intake:  Mild decrease in energy intake  Weight Loss:  Mild weight loss (2% over 8 months however wt hx limited)  Body Fat Loss:  Severe body fat loss Orbital, Buccal region  Muscle Mass Loss:  Severe muscle mass loss Clavicles (pectoralis & deltoids)  Severe malnutrition, in context of chronic illness related to inadequate   protein-energy intake as evidenced by criteria as identified in malnutrition   assessment  BMI 18.87 kg/m?  Lab report 4/10- Albumin (g/dL) 2.8L    Dietitian consult, Daily wts. Continue to monitor readiness for diet   advancement or nutrition support, labs.    Thank you  Alessio Ybarra  CDS.  Options provided:  -- Protein calorie malnutrition severe  -- Other - I will add my own diagnosis  -- Disagree - Not applicable / Not valid  -- Disagree - Clinically unable to determine / Unknown  -- Refer to Clinical Documentation Reviewer    PROVIDER RESPONSE TEXT:    This patient has severe protein calorie malnutrition.    Query created by: Alessio Ybarra on 2025 3:26 AM      Electronically signed by:  MACY MARR 2025 7:26 AM

## 2025-04-21 LAB
ALBUMIN SERPL-MCNC: 3.4 G/DL (ref 3.4–5)
ANION GAP SERPL CALCULATED.3IONS-SCNC: 9 MMOL/L (ref 3–16)
BASOPHILS # BLD: 0 K/UL (ref 0–0.2)
BASOPHILS NFR BLD: 0.4 %
BUN SERPL-MCNC: 9 MG/DL (ref 7–20)
CALCIUM SERPL-MCNC: 9.7 MG/DL (ref 8.3–10.6)
CHLORIDE SERPL-SCNC: 104 MMOL/L (ref 99–110)
CO2 SERPL-SCNC: 27 MMOL/L (ref 21–32)
CREAT SERPL-MCNC: 0.7 MG/DL (ref 0.8–1.3)
DEPRECATED RDW RBC AUTO: 12.9 % (ref 12.4–15.4)
EOSINOPHIL # BLD: 0.3 K/UL (ref 0–0.6)
EOSINOPHIL NFR BLD: 4.4 %
GFR SERPLBLD CREATININE-BSD FMLA CKD-EPI: >90 ML/MIN/{1.73_M2}
GLUCOSE SERPL-MCNC: 127 MG/DL (ref 70–99)
HCT VFR BLD AUTO: 26.8 % (ref 40.5–52.5)
HGB BLD-MCNC: 9.1 G/DL (ref 13.5–17.5)
LYMPHOCYTES # BLD: 2.2 K/UL (ref 1–5.1)
LYMPHOCYTES NFR BLD: 29.3 %
MAGNESIUM SERPL-MCNC: 2.11 MG/DL (ref 1.8–2.4)
MCH RBC QN AUTO: 30 PG (ref 26–34)
MCHC RBC AUTO-ENTMCNC: 33.9 G/DL (ref 31–36)
MCV RBC AUTO: 88.6 FL (ref 80–100)
MONOCYTES # BLD: 0.5 K/UL (ref 0–1.3)
MONOCYTES NFR BLD: 7.1 %
NEUTROPHILS # BLD: 4.3 K/UL (ref 1.7–7.7)
NEUTROPHILS NFR BLD: 58.8 %
PHOSPHATE SERPL-MCNC: 4 MG/DL (ref 2.5–4.9)
PLATELET # BLD AUTO: 420 K/UL (ref 135–450)
PMV BLD AUTO: 6.8 FL (ref 5–10.5)
POTASSIUM SERPL-SCNC: 3.5 MMOL/L (ref 3.5–5.1)
RBC # BLD AUTO: 3.03 M/UL (ref 4.2–5.9)
SODIUM SERPL-SCNC: 140 MMOL/L (ref 136–145)
WBC # BLD AUTO: 7.3 K/UL (ref 4–11)

## 2025-04-21 PROCEDURE — 1200000000 HC SEMI PRIVATE

## 2025-04-21 PROCEDURE — 6360000002 HC RX W HCPCS

## 2025-04-21 PROCEDURE — 2580000003 HC RX 258

## 2025-04-21 PROCEDURE — 80069 RENAL FUNCTION PANEL: CPT

## 2025-04-21 PROCEDURE — 97530 THERAPEUTIC ACTIVITIES: CPT

## 2025-04-21 PROCEDURE — 83735 ASSAY OF MAGNESIUM: CPT

## 2025-04-21 PROCEDURE — 85025 COMPLETE CBC W/AUTO DIFF WBC: CPT

## 2025-04-21 PROCEDURE — 6370000000 HC RX 637 (ALT 250 FOR IP)

## 2025-04-21 PROCEDURE — 36415 COLL VENOUS BLD VENIPUNCTURE: CPT

## 2025-04-21 RX ORDER — DOXYCYCLINE 50 MG/1
100 CAPSULE ORAL EVERY 12 HOURS SCHEDULED
Status: DISCONTINUED | OUTPATIENT
Start: 2025-04-22 | End: 2025-04-24 | Stop reason: HOSPADM

## 2025-04-21 RX ADMIN — METHOCARBAMOL 750 MG: 750 TABLET ORAL at 15:07

## 2025-04-21 RX ADMIN — ACETAMINOPHEN 650 MG: 325 TABLET, FILM COATED ORAL at 11:58

## 2025-04-21 RX ADMIN — PIPERACILLIN AND TAZOBACTAM 3375 MG: 3; .375 INJECTION, POWDER, LYOPHILIZED, FOR SOLUTION INTRAVENOUS at 21:28

## 2025-04-21 RX ADMIN — RIVAROXABAN 20 MG: 20 TABLET, FILM COATED ORAL at 08:17

## 2025-04-21 RX ADMIN — OXYCODONE 10 MG: 5 TABLET ORAL at 08:21

## 2025-04-21 RX ADMIN — GABAPENTIN 100 MG: 100 CAPSULE ORAL at 06:30

## 2025-04-21 RX ADMIN — METHOCARBAMOL 750 MG: 750 TABLET ORAL at 08:17

## 2025-04-21 RX ADMIN — METHOCARBAMOL 750 MG: 750 TABLET ORAL at 21:25

## 2025-04-21 RX ADMIN — OXYCODONE 10 MG: 5 TABLET ORAL at 01:30

## 2025-04-21 RX ADMIN — ACETAMINOPHEN 650 MG: 325 TABLET, FILM COATED ORAL at 06:29

## 2025-04-21 RX ADMIN — DIAZEPAM 2.5 MG: 5 TABLET ORAL at 21:26

## 2025-04-21 RX ADMIN — PIPERACILLIN AND TAZOBACTAM 3375 MG: 3; .375 INJECTION, POWDER, LYOPHILIZED, FOR SOLUTION INTRAVENOUS at 06:29

## 2025-04-21 RX ADMIN — ASPIRIN 81 MG: 81 TABLET, COATED ORAL at 08:17

## 2025-04-21 RX ADMIN — ACETAMINOPHEN 650 MG: 325 TABLET, FILM COATED ORAL at 21:25

## 2025-04-21 RX ADMIN — ACETAMINOPHEN 650 MG: 325 TABLET, FILM COATED ORAL at 01:30

## 2025-04-21 RX ADMIN — COLLAGENASE SANTYL: 250 OINTMENT TOPICAL at 08:23

## 2025-04-21 RX ADMIN — DIAZEPAM 2.5 MG: 5 TABLET ORAL at 06:30

## 2025-04-21 RX ADMIN — PIPERACILLIN AND TAZOBACTAM 3375 MG: 3; .375 INJECTION, POWDER, LYOPHILIZED, FOR SOLUTION INTRAVENOUS at 15:07

## 2025-04-21 ASSESSMENT — PAIN SCALES - GENERAL
PAINLEVEL_OUTOF10: 0
PAINLEVEL_OUTOF10: 6
PAINLEVEL_OUTOF10: 0
PAINLEVEL_OUTOF10: 0
PAINLEVEL_OUTOF10: 2
PAINLEVEL_OUTOF10: 0
PAINLEVEL_OUTOF10: 8

## 2025-04-21 ASSESSMENT — PAIN DESCRIPTION - LOCATION: LOCATION: FOOT

## 2025-04-21 ASSESSMENT — PAIN DESCRIPTION - DESCRIPTORS: DESCRIPTORS: BURNING;CRAMPING

## 2025-04-21 ASSESSMENT — PAIN DESCRIPTION - ORIENTATION: ORIENTATION: RIGHT;LEFT

## 2025-04-21 NOTE — PROGRESS NOTES
Vascular Surgery Daily Progress Note  Patient: Natan Lynch    CC: PAD    Subjective :  No acute events overnight. Pain improved with addition of gabapentin. No further diarrhea.     ROS: A 14 point review of systems was conducted, significant findings as noted above. All other systems negative.    Objective :   Infusions:   sodium chloride      sodium chloride      sodium chloride      sodium chloride          I/O:I/O last 3 completed shifts:  In: 234.9 [IV Piggyback:234.9]  Out: 3300 [Urine:3300]           Wt Readings from Last 1 Encounters:   04/20/25 81.1 kg (178 lb 12.8 oz)       Exam:/71   Pulse 75   Temp 97.9 °F (36.6 °C) (Oral)   Resp 18   Ht 1.969 m (6' 5.5\")   Wt 81.1 kg (178 lb 12.8 oz)   SpO2 100%   BMI 20.93 kg/m²     General appearance - no acute distress  HEENT - normocephalic, EOMI  Cardiac- regular rate and rhythm,   Pulmonary - normal respiratory effort on RA  Abdomen - soft, non tender, non-distended  Extremities - bilateral LE medial dressings CDI. Bilateral bypasses palpable.   Neurological - AxOx3, no focal deficits  Skin - warm and dry          LABS:   Recent Labs     04/19/25  0534 04/20/25  0456   WBC 8.4 6.5   HGB 8.7* 8.5*   HCT 25.7* 25.4*   MCV 87.9 90.5    305        Recent Labs     04/19/25  0534 04/20/25  0456    137   K 3.8 3.6    105   CO2 26 27   PHOS 4.0 3.9   BUN 9 10   CREATININE 0.6* 0.7*      No results for input(s): \"AST\", \"ALT\", \"BILIDIR\", \"BILITOT\", \"ALKPHOS\" in the last 72 hours.    Invalid input(s): \"ALB\"   No results for input(s): \"LIPASE\", \"AMYLASE\" in the last 72 hours.   No results for input(s): \"INR\", \"APTT\" in the last 72 hours.    Invalid input(s): \"PROT\"   No results for input(s): \"CKTOTAL\", \"CKMB\", \"CKMBINDEX\", \"TROPONINI\" in the last 72 hours.    ASSESSMENT/PLAN: Pt. is a 64 y.o. male history of DVT/PE on Xarelto, PAD, and tobacco abuse s/p LLE arteriogram (4/9) with s/p repeat arteriogram 4/10 with findings of bilateral

## 2025-04-21 NOTE — CARE COORDINATION
Called [facility] and sumanth is still pending at this time. Updated patient with this information at bedside.     Electronically signed by January Joseph RN on 4/21/2025 at 2:45 PM  [phone]

## 2025-04-21 NOTE — PLAN OF CARE
Problem: Cardiovascular - Adult  Goal: Maintains optimal cardiac output and hemodynamic stability  4/21/2025 1849 by Darren Ocasio RN  Flowsheets (Taken 4/21/2025 1849)  Maintains optimal cardiac output and hemodynamic stability:   Monitor blood pressure and heart rate   Monitor urine output and notify Licensed Independent Practitioner for values outside of normal range     Problem: Neurosensory - Adult  Goal: Achieves stable or improved neurological status  4/21/2025 1849 by Darren Ocasio RN  Flowsheets (Taken 4/21/2025 1849)  Achieves stable or improved neurological status:   Assess for and report changes in neurological status   Maintain blood pressure and fluid volume within ordered parameters to optimize cerebral perfusion and minimize risk of hemorrhage     Problem: Hematologic - Adult  Goal: Maintains hematologic stability  4/21/2025 1849 by Darren Ocasio RN  Flowsheets (Taken 4/21/2025 1849)  Maintains hematologic stability:   Assess for signs and symptoms of bleeding or hemorrhage   Monitor labs for bleeding or clotting disorders

## 2025-04-21 NOTE — PROGRESS NOTES
Physical Therapy  Facility/Department: [address]  Daily Treatment Note  NAME: Natan Lynch  : 1960  MRN: [id number]    Date of Service: 2025    Discharge Recommendations:  Subacute/Skilled Nursing Facility   PT Equipment Recommendations  Other: defer to next level of care    Patient Diagnosis(es): The primary encounter diagnosis was Pre-operative cardiovascular examination. Diagnoses of PVD (peripheral vascular disease), Deep vein thrombosis (DVT) of left lower extremity, unspecified chronicity, unspecified vein, PAD (peripheral artery disease), Gangrene (HCC), and Ischemia of left lower extremity were also pertinent to this visit.    Assessment  Assessment: Pt demosntrates progression this date through decreased assist required for sarasteady transfer. Cotreatment utilized to safely advance mobility. Pt refusing  socks this date due to concen with ruining dressings. B surgical shoes discussed for following visit to prepare for attempting to take steps. Pt would benefit from continued skilled IP PT in order to safely advance independence with functional mobility.  Activity Tolerance: Patient limited by pain  Other: defer to next level of care    Plan  Physical Therapy Plan  General Plan:  (2-5)  Current Treatment Recommendations: Strengthening;Balance training;Functional mobility training;Transfer training;Neuromuscular re-education;Gait training;Safety education & training;Patient/Caregiver education & training;Therapeutic activities    Restrictions  Position Activity Restriction  Other Position/Activity Restrictions: no weight bearing restrictions per podiatry; HOB 30 deg; per vasc note : OOB     Subjective   Subjective  Subjective: Pt supine in bed upon approach an agreeable to PT/OT  Pain: Denies pain at rest. 4/10 pain in stance in L LE and 9/10 pain post stand>sit in B thighs. RN aware and is adressing with pain medicine. Positioned to comfort at end of session with pt

## 2025-04-21 NOTE — PLAN OF CARE
Problem: Pain  Goal: Verbalizes/displays adequate comfort level or baseline comfort level  Outcome: Progressing  Flowsheets  Taken 4/21/2025 0045  Verbalizes/displays adequate comfort level or baseline comfort level:   Encourage patient to monitor pain and request assistance   Assess pain using appropriate pain scale  Taken 4/20/2025 2015  Verbalizes/displays adequate comfort level or baseline comfort level:   Assess pain using appropriate pain scale   Encourage patient to monitor pain and request assistance     Problem: Discharge Planning  Goal: Discharge to home or other facility with appropriate resources  Outcome: Progressing     Problem: Skin/Tissue Integrity  Goal: Skin integrity remains intact  Description: 1.  Monitor for areas of redness and/or skin breakdown  2.  Assess vascular access sites hourly  3.  Every 4-6 hours minimum:  Change oxygen saturation probe site  4.  Every 4-6 hours:  If on nasal continuous positive airway pressure, respiratory therapy assess nares and determine need for appliance change or resting period  Outcome: Progressing  Flowsheets (Taken 4/21/2025 0433)  Skin Integrity Remains Intact:   Monitor for areas of redness and/or skin breakdown   Assess vascular access sites hourly     Problem: Safety - Adult  Goal: Free from fall injury  Outcome: Progressing  Flowsheets (Taken 4/21/2025 0433)  Free From Fall Injury: Instruct family/caregiver on patient safety     Problem: ABCDS Injury Assessment  Goal: Absence of physical injury  Outcome: Progressing  Flowsheets (Taken 4/21/2025 0433)  Absence of Physical Injury: Implement safety measures based on patient assessment     Problem: Nutrition Deficit:  Goal: Optimize nutritional status  Outcome: Progressing     Problem: Respiratory - Adult  Goal: Achieves optimal ventilation and oxygenation  Outcome: Progressing     Problem: Cardiovascular - Adult  Goal: Maintains optimal cardiac output and hemodynamic stability  Outcome:

## 2025-04-21 NOTE — PROGRESS NOTES
Podiatric Surgery Daily Progress Note  Natan Lynch      Subjective :   Patient seen and examined this am at the bedside. Patient denies any acute overnight events.  Extensive conversation held with patient regarding daily wound changes.  Patient expressed concerned with his progress and healing potential.  Patient greatly concerned about his healing timeline for both bilateral lower extremities and pain he is experiencing with his bilateral legs.    Review of Systems: A 12 point review of symptoms is unremarkable with the exception of the chief complaint. Patient specifically denies nausea, fever, vomiting, chills, shortness of breath, chest pain, abdominal pain, constipation or difficulty urinating.       Objective     /61   Pulse 76   Temp 98.1 °F (36.7 °C) (Oral)   Resp 20   Ht 1.969 m (6' 5.5\")   Wt 81.1 kg (178 lb 12.8 oz)   SpO2 100%   BMI 20.93 kg/m²      I/O:  Intake/Output Summary (Last 24 hours) at 4/21/2025 0543  Last data filed at 4/20/2025 2319  Gross per 24 hour   Intake --   Output 1700 ml   Net -1700 ml              Wt Readings from Last 3 Encounters:   04/20/25 81.1 kg (178 lb 12.8 oz)   04/03/25 76.2 kg (168 lb)   03/11/25 74.8 kg (165 lb)       LABS:    Recent Labs     04/19/25  0534 04/20/25  0456   WBC 8.4 6.5   HGB 8.7* 8.5*   HCT 25.7* 25.4*    305        Recent Labs     04/20/25  0456      K 3.6      CO2 27   PHOS 3.9   BUN 10   CREATININE 0.7*        No results for input(s): \"INR\", \"APTT\" in the last 72 hours.    Invalid input(s): \"PROT\"          LOWER EXTREMITY EXAMINATION    Dressing to bl LE intact. No strikethrough noted to the external dressing. No drainage noted to the internal layers of the dressing.     VASCULAR: DP and PT pulses are non palpable 0/4 b/l.  Upon hand-held Doppler examination DP/PT pulses on bilateral lower extremities are weakly audible CFT is sluggish to the digits of the foot b/l with exception of the right 1st and 2nd digit which

## 2025-04-21 NOTE — PROGRESS NOTES
Occupational Therapy  Facility/Department: [facility]  Occupational Therapy Treatment    Name: Natan Lynch  : 1960  MRN: [id number]  Date of Service: 2025    Discharge Recommendations:  Subacute/Skilled Nursing Facility  OT Equipment Recommendations  Equipment Needed: No  Other: defer       Patient Diagnosis(es): The primary encounter diagnosis was Pre-operative cardiovascular examination. Diagnoses of PVD (peripheral vascular disease), Deep vein thrombosis (DVT) of left lower extremity, unspecified chronicity, unspecified vein, PAD (peripheral artery disease), Gangrene (HCC), and Ischemia of left lower extremity were also pertinent to this visit.  Past Medical History:  has a past medical history of Bilateral pulmonary embolism (HCC), DVT (deep venous thrombosis) (HCC), Peripheral artery disease, and Tobacco abuse.  Past Surgical History:  has a past surgical history that includes invasive vascular (N/A, 2025); invasive vascular (Left, 2025); and Arterial bypass surgry (Bilateral, 2025).    Treatment Diagnosis: Impaired ADL and functional mobility      Assessment  Assessment: Pt is pleasant and cooperative, but req much reassurance.  Talkative and ask appropriate questions regarding activity.  Pt with bulky dressings on bilateral feet and unable to don XXXL socks.  Surgical shoes might be appropriate for stance and transfers.  Pt tolerated transfer with Megan Carranza.  Cont OT tx per plan of care.  Treatment Diagnosis: Impaired ADL and functional mobility  Prognosis: Fair  REQUIRES OT FOLLOW-UP: Yes  Activity Tolerance  Activity Tolerance: Patient limited by pain     Plan  Occupational Therapy Plan  Times Per Week: 2-5x  Current Treatment Recommendations: Strengthening, Balance training, Functional mobility training, Endurance training, Equipment evaluation, education, & procurement, Self-Care / ADL, Patient/Caregiver education & training, Safety education &  training    Restrictions  Position Activity Restriction  Other Position/Activity Restrictions: no weight bearing restrictions per podiatry; HOB 30 deg; per vasc note 4/16: OOB    Subjective  General  Chart Reviewed: Yes  Patient assessed for rehabilitation services?: Yes  Additional Pertinent Hx: 64 y.o. male history of DVT/PE on Xarelto, PAD, and tobacco abuse s/p LLE arteriogram (4/9) with s/p repeat arteriogram 4/10 with findings of bilateral anterior tibial artery disease now s/p right popliteal to DP bypass and left popliteal to AT bypass with GSV 4/14  Family / Caregiver Present: No  Referring Practitioner: Jasmin Salomon MD  Diagnosis: PVD  Subjective  Subjective: Pt in bed upon entry.  Pt with many approrpiate questions regarding activity.    Pain: 7/10, nurse aware     Social/Functional History  Social/Functional History  Lives With: Alone (24 hrA if needed)  Type of Home: Apartment  Home Layout: One level  Home Access: Stairs to enter with rails  Entrance Stairs - Number of Steps: 3rd floor apt with 3 flights to apt - L railing  Bathroom Shower/Tub: Tub/Shower unit  Bathroom Toilet: Standard  Bathroom Equipment: Grab bars in shower  Home Equipment: None  Has the patient had two or more falls in the past year or any fall with injury in the past year?: No  Prior Level of Assist for ADLs: Independent  Prior Level of Assist for Homemaking: Independent  Prior Level of Assist for Ambulation: Independent community ambulator, with or without device, Independent household ambulator, with or without device  Prior Level of Assist for Transfers: Independent  Active : Yes  Type of Occupation: until august of last year was  for [employer] service    Objective     Safety Devices  Type of Devices: Left in chair;Call light within reach;Chair alarm in place;Nurse notified (Breakfast tray in place)           ADL  Feeding: Independent  Grooming: Setup (to wipe face, seated in chair)  Putting On/Taking Off

## 2025-04-22 PROCEDURE — 2500000003 HC RX 250 WO HCPCS

## 2025-04-22 PROCEDURE — 6370000000 HC RX 637 (ALT 250 FOR IP)

## 2025-04-22 PROCEDURE — 1200000000 HC SEMI PRIVATE

## 2025-04-22 RX ADMIN — OXYCODONE 10 MG: 5 TABLET ORAL at 00:46

## 2025-04-22 RX ADMIN — SODIUM CHLORIDE, PRESERVATIVE FREE 5 ML: 5 INJECTION INTRAVENOUS at 10:12

## 2025-04-22 RX ADMIN — DIAZEPAM 2.5 MG: 5 TABLET ORAL at 09:00

## 2025-04-22 RX ADMIN — RIVAROXABAN 20 MG: 20 TABLET, FILM COATED ORAL at 09:00

## 2025-04-22 RX ADMIN — METHOCARBAMOL 750 MG: 750 TABLET ORAL at 12:17

## 2025-04-22 RX ADMIN — COLLAGENASE SANTYL: 250 OINTMENT TOPICAL at 09:01

## 2025-04-22 RX ADMIN — DOXYCYCLINE 100 MG: 50 CAPSULE ORAL at 19:53

## 2025-04-22 RX ADMIN — SODIUM CHLORIDE, PRESERVATIVE FREE 10 ML: 5 INJECTION INTRAVENOUS at 19:58

## 2025-04-22 RX ADMIN — DIAZEPAM 2.5 MG: 5 TABLET ORAL at 19:53

## 2025-04-22 RX ADMIN — ACETAMINOPHEN 650 MG: 325 TABLET, FILM COATED ORAL at 06:30

## 2025-04-22 RX ADMIN — METHOCARBAMOL 750 MG: 750 TABLET ORAL at 09:00

## 2025-04-22 RX ADMIN — METHOCARBAMOL 750 MG: 750 TABLET ORAL at 19:53

## 2025-04-22 RX ADMIN — ACETAMINOPHEN 650 MG: 325 TABLET, FILM COATED ORAL at 12:17

## 2025-04-22 RX ADMIN — GABAPENTIN 100 MG: 100 CAPSULE ORAL at 06:30

## 2025-04-22 RX ADMIN — DOXYCYCLINE 100 MG: 50 CAPSULE ORAL at 09:00

## 2025-04-22 RX ADMIN — ASPIRIN 81 MG: 81 TABLET, COATED ORAL at 09:00

## 2025-04-22 RX ADMIN — ACETAMINOPHEN 650 MG: 325 TABLET, FILM COATED ORAL at 17:12

## 2025-04-22 RX ADMIN — OXYCODONE 10 MG: 5 TABLET ORAL at 23:42

## 2025-04-22 ASSESSMENT — PAIN DESCRIPTION - ORIENTATION
ORIENTATION: RIGHT;LEFT
ORIENTATION: RIGHT;LEFT

## 2025-04-22 ASSESSMENT — PAIN DESCRIPTION - LOCATION
LOCATION: FOOT;LEG
LOCATION: LEG;FOOT

## 2025-04-22 ASSESSMENT — PAIN SCALES - GENERAL: PAINLEVEL_OUTOF10: 10

## 2025-04-22 ASSESSMENT — PAIN - FUNCTIONAL ASSESSMENT: PAIN_FUNCTIONAL_ASSESSMENT: ACTIVITIES ARE NOT PREVENTED

## 2025-04-22 ASSESSMENT — PAIN DESCRIPTION - PAIN TYPE: TYPE: SURGICAL PAIN

## 2025-04-22 ASSESSMENT — PAIN DESCRIPTION - FREQUENCY: FREQUENCY: CONTINUOUS

## 2025-04-22 ASSESSMENT — PAIN DESCRIPTION - ONSET: ONSET: ON-GOING

## 2025-04-22 ASSESSMENT — PAIN DESCRIPTION - DESCRIPTORS
DESCRIPTORS: THROBBING;SORE
DESCRIPTORS: THROBBING

## 2025-04-22 NOTE — PROGRESS NOTES
Podiatric Surgery Daily Progress Note  Natan Lynch      Subjective :   Patient seen and examined this am at the bedside. Patient denies any acute overnight events.    Review of Systems: A 12 point review of symptoms is unremarkable with the exception of the chief complaint. Patient specifically denies nausea, fever, vomiting, chills, shortness of breath, chest pain, abdominal pain, constipation or difficulty urinating.       Objective     /62   Pulse 68   Temp 97.7 °F (36.5 °C) (Oral)   Resp 16   Ht 1.969 m (6' 5.5\")   Wt 81.1 kg (178 lb 12.8 oz)   SpO2 100%   BMI 20.93 kg/m²      I/O:  Intake/Output Summary (Last 24 hours) at 4/22/2025 1020  Last data filed at 4/22/2025 1011  Gross per 24 hour   Intake 931.04 ml   Output 800 ml   Net 131.04 ml              Wt Readings from Last 3 Encounters:   04/20/25 81.1 kg (178 lb 12.8 oz)   04/03/25 76.2 kg (168 lb)   03/11/25 74.8 kg (165 lb)       LABS:    Recent Labs     04/20/25  0456 04/21/25  1039   WBC 6.5 7.3   HGB 8.5* 9.1*   HCT 25.4* 26.8*    420        Recent Labs     04/21/25  1039      K 3.5      CO2 27   PHOS 4.0   BUN 9   CREATININE 0.7*        No results for input(s): \"INR\", \"APTT\" in the last 72 hours.    Invalid input(s): \"PROT\"          LOWER EXTREMITY EXAMINATION    Dressing to bl LE intact. No strikethrough noted to the external dressing. No drainage noted to the internal layers of the dressing.     VASCULAR: DP and PT pulses are palpable 1/4 b/l.  Upon hand-held Doppler examination DP/PT pulses on bilateral lower extremities are weakly audible CFT is sluggish to the digits of the foot b/l with exception of the right 1st and 2nd digit which do not display capillary fill time 2/2 advanced gangrene. Skin temperature is warm to cool from proximal to distal with no focal calor noted.  No edema noted. No pain with calf compression b/l.     NEUROLOGIC: Gross and epicritic sensation is diminished b/l. Protective sensation is

## 2025-04-22 NOTE — PROGRESS NOTES
Vascular Surgery Daily Progress Note  Patient: Natan Lynch    CC: PAD    Subjective :  No acute events overnight. Pain controlled.     ROS: A 14 point review of systems was conducted, significant findings as noted above. All other systems negative.    Objective :   Infusions:   sodium chloride      sodium chloride      sodium chloride      sodium chloride          I/O:I/O last 3 completed shifts:  In: 766 [P.O.:480; IV Piggyback:286]  Out: 2025 [Urine:2025]           Wt Readings from Last 1 Encounters:   04/20/25 81.1 kg (178 lb 12.8 oz)       Exam:/62   Pulse 68   Temp 97.7 °F (36.5 °C) (Oral)   Resp 16   Ht 1.969 m (6' 5.5\")   Wt 81.1 kg (178 lb 12.8 oz)   SpO2 100%   BMI 20.93 kg/m²     General appearance - no acute distress  HEENT - normocephalic, EOMI  Cardiac- regular rate and rhythm,   Pulmonary - normal respiratory effort on RA  Abdomen - soft, non tender, non-distended  Extremities - bilateral LE medial dressings CDI. Bilateral bypasses palpable.   Neurological - AxOx3, no focal deficits  Skin - warm and dry          LABS:   Recent Labs     04/20/25  0456 04/21/25  1039   WBC 6.5 7.3   HGB 8.5* 9.1*   HCT 25.4* 26.8*   MCV 90.5 88.6    420        Recent Labs     04/20/25  0456 04/21/25  1039    140   K 3.6 3.5    104   CO2 27 27   PHOS 3.9 4.0   BUN 10 9   CREATININE 0.7* 0.7*      No results for input(s): \"AST\", \"ALT\", \"BILIDIR\", \"BILITOT\", \"ALKPHOS\" in the last 72 hours.    Invalid input(s): \"ALB\"   No results for input(s): \"LIPASE\", \"AMYLASE\" in the last 72 hours.   No results for input(s): \"INR\", \"APTT\" in the last 72 hours.    Invalid input(s): \"PROT\"   No results for input(s): \"CKTOTAL\", \"CKMB\", \"CKMBINDEX\", \"TROPONINI\" in the last 72 hours.    ASSESSMENT/PLAN: Pt. is a 64 y.o. male history of DVT/PE on Xarelto, PAD, and tobacco abuse s/p LLE arteriogram (4/9) with s/p repeat arteriogram 4/10 with findings of bilateral anterior tibial artery disease now b/l popliteal

## 2025-04-22 NOTE — CARE COORDINATION
Patient is from home alone and will need rehab at d/c. He has agreed to go to [facility] for rehab at d/ and they are able to accept. Precert was initiated on 4/18 and is still pending.     Electronically signed by January Joseph RN on 4/22/2025 at 10:11 AM  [phone]

## 2025-04-22 NOTE — PLAN OF CARE
Problem: Pain  Goal: Verbalizes/displays adequate comfort level or baseline comfort level  4/22/2025 1010 by Ramona Li RN  Outcome: Progressing  Flowsheets (Taken 4/22/2025 1010)  Verbalizes/displays adequate comfort level or baseline comfort level:   Encourage patient to monitor pain and request assistance   Assess pain using appropriate pain scale   Administer analgesics based on type and severity of pain and evaluate response   Implement non-pharmacological measures as appropriate and evaluate response    Problem: Skin/Tissue Integrity  Goal: Skin integrity remains intact  Description: 1.  Monitor for areas of redness and/or skin breakdown  2.  Assess vascular access sites hourly  3.  Every 4-6 hours minimum:  Change oxygen saturation probe site  4.  Every 4-6 hours:  If on nasal continuous positive airway pressure, respiratory therapy assess nares and determine need for appliance change or resting period  4/22/2025 1010 by Ramona Li RN  Outcome: Progressing  Flowsheets  Taken 4/22/2025 1010 by Ramona Li RN  Skin Integrity Remains Intact:   Monitor for areas of redness and/or skin breakdown   Turn and reposition as indicated    Problem: Safety - Adult  Goal: Free from fall injury  4/22/2025 1010 by Ramona Li RN  Outcome: Progressing  Flowsheets (Taken 4/22/2025 1010)  Free From Fall Injury: Instruct family/caregiver on patient safety     Problem: Skin/Tissue Integrity - Adult  Goal: Skin integrity remains intact  Description: 1.  Monitor for areas of redness and/or skin breakdown  2.  Assess vascular access sites hourly  3.  Every 4-6 hours minimum:  Change oxygen saturation probe site  4.  Every 4-6 hours:  If on nasal continuous positive airway pressure, respiratory therapy assess nares and determine need for appliance change or resting period  4/22/2025 1010 by Ramona Li RN  Outcome: Progressing

## 2025-04-22 NOTE — PLAN OF CARE
Problem: Skin/Tissue Integrity  Goal: Skin integrity remains intact  Description: 1.  Monitor for areas of redness and/or skin breakdown  2.  Assess vascular access sites hourly  3.  Every 4-6 hours minimum:  Change oxygen saturation probe site  4.  Every 4-6 hours:  If on nasal continuous positive airway pressure, respiratory therapy assess nares and determine need for appliance change or resting period  Outcome: Progressing  Flowsheets (Taken 4/21/2025 1950)  Skin Integrity Remains Intact:   Monitor for areas of redness and/or skin breakdown   Assess vascular access sites hourly   Turn and reposition as indicated     Problem: Safety - Adult  Goal: Free from fall injury  Outcome: Progressing     Problem: ABCDS Injury Assessment  Goal: Absence of physical injury  Outcome: Progressing     Problem: Neurosensory - Adult  Goal: Achieves stable or improved neurological status  4/22/2025 0049 by Bishnu Amin RN  Outcome: Progressing  Flowsheets (Taken 4/21/2025 1950)  Achieves stable or improved neurological status: Assess for and report changes in neurological status  4/21/2025 1849 by Darren Ocasio RN  Flowsheets (Taken 4/21/2025 1849)  Achieves stable or improved neurological status:   Assess for and report changes in neurological status   Maintain blood pressure and fluid volume within ordered parameters to optimize cerebral perfusion and minimize risk of hemorrhage     Problem: Skin/Tissue Integrity - Adult  Goal: Skin integrity remains intact  Description: 1.  Monitor for areas of redness and/or skin breakdown  2.  Assess vascular access sites hourly  3.  Every 4-6 hours minimum:  Change oxygen saturation probe site  4.  Every 4-6 hours:  If on nasal continuous positive airway pressure, respiratory therapy assess nares and determine need for appliance change or resting period  Outcome: Progressing  Flowsheets (Taken 4/21/2025 1950)  Skin Integrity Remains Intact:   Monitor for areas of redness and/or skin

## 2025-04-23 ENCOUNTER — TELEPHONE (OUTPATIENT)
Dept: VASCULAR SURGERY | Age: 65
End: 2025-04-23

## 2025-04-23 LAB
ALBUMIN SERPL-MCNC: 3.1 G/DL (ref 3.4–5)
ANION GAP SERPL CALCULATED.3IONS-SCNC: 7 MMOL/L (ref 3–16)
BASOPHILS # BLD: 0 K/UL (ref 0–0.2)
BASOPHILS NFR BLD: 0.6 %
BUN SERPL-MCNC: 10 MG/DL (ref 7–20)
CALCIUM SERPL-MCNC: 9.5 MG/DL (ref 8.3–10.6)
CHLORIDE SERPL-SCNC: 105 MMOL/L (ref 99–110)
CO2 SERPL-SCNC: 26 MMOL/L (ref 21–32)
CREAT SERPL-MCNC: 0.6 MG/DL (ref 0.8–1.3)
CRYOGLOB SER QL: NORMAL
DEPRECATED RDW RBC AUTO: 13.1 % (ref 12.4–15.4)
EOSINOPHIL # BLD: 0.5 K/UL (ref 0–0.6)
EOSINOPHIL NFR BLD: 5.5 %
GFR SERPLBLD CREATININE-BSD FMLA CKD-EPI: >90 ML/MIN/{1.73_M2}
GLUCOSE SERPL-MCNC: 90 MG/DL (ref 70–99)
HCT VFR BLD AUTO: 24.4 % (ref 40.5–52.5)
HGB BLD-MCNC: 8.5 G/DL (ref 13.5–17.5)
LYMPHOCYTES # BLD: 2.6 K/UL (ref 1–5.1)
LYMPHOCYTES NFR BLD: 31.9 %
MAGNESIUM SERPL-MCNC: 1.89 MG/DL (ref 1.8–2.4)
MCH RBC QN AUTO: 30.8 PG (ref 26–34)
MCHC RBC AUTO-ENTMCNC: 34.8 G/DL (ref 31–36)
MCV RBC AUTO: 88.5 FL (ref 80–100)
MONOCYTES # BLD: 0.7 K/UL (ref 0–1.3)
MONOCYTES NFR BLD: 8.6 %
NEUTROPHILS # BLD: 4.4 K/UL (ref 1.7–7.7)
NEUTROPHILS NFR BLD: 53.4 %
PHOSPHATE SERPL-MCNC: 3.7 MG/DL (ref 2.5–4.9)
PLATELET # BLD AUTO: 396 K/UL (ref 135–450)
PMV BLD AUTO: 6.9 FL (ref 5–10.5)
POTASSIUM SERPL-SCNC: 4 MMOL/L (ref 3.5–5.1)
RBC # BLD AUTO: 2.76 M/UL (ref 4.2–5.9)
SODIUM SERPL-SCNC: 138 MMOL/L (ref 136–145)
WBC # BLD AUTO: 8.2 K/UL (ref 4–11)

## 2025-04-23 PROCEDURE — 83735 ASSAY OF MAGNESIUM: CPT

## 2025-04-23 PROCEDURE — 6370000000 HC RX 637 (ALT 250 FOR IP)

## 2025-04-23 PROCEDURE — 1200000000 HC SEMI PRIVATE

## 2025-04-23 PROCEDURE — 36415 COLL VENOUS BLD VENIPUNCTURE: CPT

## 2025-04-23 PROCEDURE — 80069 RENAL FUNCTION PANEL: CPT

## 2025-04-23 PROCEDURE — 85025 COMPLETE CBC W/AUTO DIFF WBC: CPT

## 2025-04-23 PROCEDURE — 2500000003 HC RX 250 WO HCPCS

## 2025-04-23 RX ADMIN — ACETAMINOPHEN 650 MG: 325 TABLET, FILM COATED ORAL at 20:19

## 2025-04-23 RX ADMIN — DIAZEPAM 2.5 MG: 5 TABLET ORAL at 20:19

## 2025-04-23 RX ADMIN — METHOCARBAMOL 750 MG: 750 TABLET ORAL at 15:11

## 2025-04-23 RX ADMIN — COLLAGENASE SANTYL: 250 OINTMENT TOPICAL at 08:43

## 2025-04-23 RX ADMIN — DOXYCYCLINE 100 MG: 50 CAPSULE ORAL at 20:19

## 2025-04-23 RX ADMIN — METHOCARBAMOL 750 MG: 750 TABLET ORAL at 08:41

## 2025-04-23 RX ADMIN — ACETAMINOPHEN 650 MG: 325 TABLET, FILM COATED ORAL at 08:40

## 2025-04-23 RX ADMIN — METHOCARBAMOL 750 MG: 750 TABLET ORAL at 20:19

## 2025-04-23 RX ADMIN — OXYCODONE 10 MG: 5 TABLET ORAL at 17:20

## 2025-04-23 RX ADMIN — ACETAMINOPHEN 650 MG: 325 TABLET, FILM COATED ORAL at 15:11

## 2025-04-23 RX ADMIN — GABAPENTIN 100 MG: 100 CAPSULE ORAL at 02:46

## 2025-04-23 RX ADMIN — SODIUM CHLORIDE, PRESERVATIVE FREE 10 ML: 5 INJECTION INTRAVENOUS at 08:43

## 2025-04-23 RX ADMIN — DOXYCYCLINE 100 MG: 50 CAPSULE ORAL at 08:41

## 2025-04-23 RX ADMIN — ASPIRIN 81 MG: 81 TABLET, COATED ORAL at 08:40

## 2025-04-23 RX ADMIN — RIVAROXABAN 20 MG: 20 TABLET, FILM COATED ORAL at 08:41

## 2025-04-23 RX ADMIN — SODIUM CHLORIDE, PRESERVATIVE FREE 10 ML: 5 INJECTION INTRAVENOUS at 20:22

## 2025-04-23 ASSESSMENT — PAIN DESCRIPTION - ORIENTATION: ORIENTATION: RIGHT;LEFT

## 2025-04-23 ASSESSMENT — PAIN - FUNCTIONAL ASSESSMENT: PAIN_FUNCTIONAL_ASSESSMENT: ACTIVITIES ARE NOT PREVENTED

## 2025-04-23 ASSESSMENT — PAIN SCALES - GENERAL
PAINLEVEL_OUTOF10: 10
PAINLEVEL_OUTOF10: 7
PAINLEVEL_OUTOF10: 5

## 2025-04-23 ASSESSMENT — PAIN DESCRIPTION - DESCRIPTORS: DESCRIPTORS: SORE;TENDER

## 2025-04-23 ASSESSMENT — PAIN DESCRIPTION - LOCATION: LOCATION: LEG;FOOT

## 2025-04-23 NOTE — PROGRESS NOTES
This RN educated pt q2 turns are necessary to relieve pressure from sacrum. Pt compliant with q2 turns.     Electronically signed by Abe Harvey RN on 4/23/2025 at 12:36 PM

## 2025-04-23 NOTE — PROGRESS NOTES
Pt alert and oriented. VSS. Pt dressings CDI. Pt reporting surgical pain that is being controlled with PRN Roxicodone, see MAR. Pt tolerating diet. Pt having BMs. Pt voiding freely. Pt has call light within reach, bed in lowest position with wheels locked, 2/4 side rails up, and bed alarm on.

## 2025-04-23 NOTE — PROGRESS NOTES
Podiatric Surgery Daily Progress Note  Natan Lynch      Subjective :   Patient seen and examined this am at the bedside. Patient denies any acute overnight events.    Review of Systems: A 12 point review of symptoms is unremarkable with the exception of the chief complaint. Patient specifically denies nausea, fever, vomiting, chills, shortness of breath, chest pain, abdominal pain, constipation or difficulty urinating.       Objective     BP (!) 96/52   Pulse 74   Temp 98.1 °F (36.7 °C) (Oral)   Resp 16   Ht 1.969 m (6' 5.5\")   Wt 81.1 kg (178 lb 12.8 oz)   SpO2 100%   BMI 20.93 kg/m²      I/O:  Intake/Output Summary (Last 24 hours) at 4/23/2025 0949  Last data filed at 4/23/2025 0253  Gross per 24 hour   Intake 1160 ml   Output 1575 ml   Net -415 ml              Wt Readings from Last 3 Encounters:   04/20/25 81.1 kg (178 lb 12.8 oz)   04/03/25 76.2 kg (168 lb)   03/11/25 74.8 kg (165 lb)       LABS:    Recent Labs     04/21/25  1039 04/23/25  0828   WBC 7.3 8.2   HGB 9.1* 8.5*   HCT 26.8* 24.4*    396        Recent Labs     04/23/25  0828      K 4.0      CO2 26   PHOS 3.7   BUN 10   CREATININE 0.6*        No results for input(s): \"INR\", \"APTT\" in the last 72 hours.    Invalid input(s): \"PROT\"          LOWER EXTREMITY EXAMINATION    Dressing to bl LE intact. No strikethrough noted to the external dressing. No drainage noted to the internal layers of the dressing.     VASCULAR: DP and PT pulses are palpable 1/4 b/l.  Upon prior hand-held Doppler examination DP/PT pulses on bilateral lower extremities are weakly audible CFT is sluggish to the digits of the foot b/l with exception of the right 1st and 2nd digit which do not display capillary fill time 2/2 advanced gangrene. Skin temperature is warm to cool from proximal to distal with no focal calor noted.  No edema noted. No pain with calf compression b/l.     NEUROLOGIC: Gross and epicritic sensation is diminished b/l. Protective  sensation is diminished at all pedal sites b/l.     DERMATOLOGIC:   Right lower extremity:  Consent obtained for photos today    Advanced gangrenous changes to the 1st and 2nd digit with the dry gangrenous changes on the first digit extending dorsally along the medial midfoot.  The second digit gangrene terminates at the metatarsal phalangeal joint.  Digits 3-5 continue to have turgor and texture, do not appear gangrenous at this time. Digit 3 is showing early signs of possible skin sloughing. No open wounds.     Left lower extremity:    Full-thickness ulceration to the lateral midfoot/hindfoot.  Wound bed is ischemic in nature measuring roughly 5 cm x 3 cm with unstageable depth.  Evidence of granular tissue around the periphery of the adhered eschar. wound total area measuring 5 cm².  No evidence of purulence, fluctuance, or crepitations. No signs of acute infection.     MUSCULOSKELETAL: Muscle strength is 4/5 for all pedal groups tested most likely 2/2 pain, however is improving.  Moderate pain with palpation and manual manipulation to bilateral lower extremities specifically in the area surrounding the gangrenous changes. Ankle joint ROM is decreased in dorsiflexion with the knee extended. No obvious biomechanical abnormalities.     IMAGING:  X-ray, right foot (03/18/2025)  IMPRESSION:     1. No acute abnormality     X-ray, left foot (03/18/2025)  IMPRESSION:     1. No acute abnormality.     CTA abdominal aorta with bilateral runoff (03/18/2025)  IMPRESSION:     1. No hemodynamic significant stenosis. Scattered areas of calcified atherosclerotic plaque. Correlate with clinical symptoms and findings    Vascular Doppler, bilateral lower extremities (4/17/2025)  Right side findings: Right PVR waveforms: Normal - ankle and metatarsal region. Laser skin perfusion study at the lateral plantar is 38 mmHg; wound healing is in the cautionary zone. Laser skin perfusion study at the medial plantar is 32 mmHg; wound healing

## 2025-04-23 NOTE — TELEPHONE ENCOUNTER
Spoke with Mr. Lynch, we did change his pot-op appt to 5/6 at 1115a. I informed him depending on when he got discharged if we needed to change it we could. Also discussed him going to rehab facility and I informed him we will call them once he is there to get transport set up to get him to appt thru the facility.

## 2025-04-23 NOTE — PLAN OF CARE
Problem: Discharge Planning  Goal: Discharge to home or other facility with appropriate resources  4/22/2025 2138 by Liss Burrell, RN  Outcome: Progressing     Problem: Skin/Tissue Integrity  Goal: Skin integrity remains intact  Description: 1.  Monitor for areas of redness and/or skin breakdown  2.  Assess vascular access sites hourly  3.  Every 4-6 hours minimum:  Change oxygen saturation probe site  4.  Every 4-6 hours:  If on nasal continuous positive airway pressure, respiratory therapy assess nares and determine need for appliance change or resting period  4/22/2025 2138 by Liss Burrell, RN  Outcome: Progressing

## 2025-04-23 NOTE — PROGRESS NOTES
Comprehensive Nutrition Assessment    RECOMMENDATIONS:  PO Diet: Regular, continue double protein  Nutrition Supplement: Continue Ensure TID  Nutrition Education: Education/Counseling initiated     NUTRITION ASSESSMENT:   Nutritional summary & status: Follow up. Patient out of ICU and tolerating a reular diet with good po intake of meals and ONS ordered. Per podiatry no surgical intervention planned at this time. Pt also had a two BM's overnight. Awaiting pre-cert to rehab facility, RD will continue to monitor nutritional status and provide intervention as needed.   Admission // PMH: Ischemia of left lower extremity // PAD, DVT, bilateral pulmonary embolism on Xarelto    MALNUTRITION ASSESSMENT  Context of Malnutrition: Chronic Illness   Malnutrition Status: Severe malnutrition  Findings of the 6 clinical characteristics of malnutrition (Minimum of 2 out of 6 clinical characteristics is required to make the diagnosis of moderate or severe Protein Calorie Malnutrition based on AND/ASPEN Guidelines):  Energy Intake:  Mild decrease in energy intake  Weight Loss:  Mild weight loss (2% over 8 months however wt hx limited)     Body Fat Loss:  Severe body fat loss Orbital, Buccal region   Muscle Mass Loss:  Severe muscle mass loss Clavicles (pectoralis & deltoids)    NUTRITION DIAGNOSIS   Increased nutrient needs related to catabolic illness as evidenced by wounds    Nutrition Monitoring and Evaluation:   Food/Nutrient Intake Outcomes:  Food and Nutrient Intake, Supplement Intake  Physical Signs/Symptoms Outcomes:  Biochemical Data, Nutrition Focused Physical Findings, Skin, Weight     OBJECTIVE DATA: Significant to nutrition assessment  Nutrition Related Findings: +BM 4/23, BG 90  Wounds: Venous Stasis  Nutrition Goals: Meet at least 75% of estimated needs, within 2 days     CURRENT NUTRITION THERAPIES  ADULT ORAL NUTRITION SUPPLEMENT; Breakfast, Lunch, Dinner; Standard High Calorie/High Protein Oral Supplement  ADULT

## 2025-04-23 NOTE — TELEPHONE ENCOUNTER
-Pt requesting to speak with Nurse Victor about his 4/24/25 Post Op appt.    -Pt is still currently at [facility], room 5311.  -Please call [phone]    4/14/25 Surgery with Dr. Alvarado  BILATERAL FEMORAL  ANTERIOR TIBIAL BYPASS

## 2025-04-23 NOTE — PROGRESS NOTES
Vascular Surgery Daily Progress Note  Patient: Natan Lynch    CC: PAD    Subjective :  No acute events overnight. Pain controlled.     ROS: A 14 point review of systems was conducted, significant findings as noted above. All other systems negative.    Objective :   Infusions:   sodium chloride      sodium chloride      sodium chloride      sodium chloride          I/O:I/O last 3 completed shifts:  In: 1640 [P.O.:1640]  Out: 2375 [Urine:2375]           Wt Readings from Last 1 Encounters:   04/20/25 81.1 kg (178 lb 12.8 oz)       Exam:/63   Pulse 73   Temp 98.2 °F (36.8 °C) (Oral)   Resp 16   Ht 1.969 m (6' 5.5\")   Wt 81.1 kg (178 lb 12.8 oz)   SpO2 98%   BMI 20.93 kg/m²     General appearance - no acute distress  HEENT - normocephalic, EOMI  Cardiac- regular rate and rhythm,   Pulmonary - normal respiratory effort on RA  Abdomen - soft, non tender, non-distended  Extremities - bilateral LE medial dressings CDI, some strikethrough at L medial leg. Bilateral bypasses palpable.   Neurological - AxOx3, no focal deficits  Skin - warm and dry          LABS:   Recent Labs     04/21/25  1039   WBC 7.3   HGB 9.1*   HCT 26.8*   MCV 88.6           Recent Labs     04/21/25  1039      K 3.5      CO2 27   PHOS 4.0   BUN 9   CREATININE 0.7*      No results for input(s): \"AST\", \"ALT\", \"BILIDIR\", \"BILITOT\", \"ALKPHOS\" in the last 72 hours.    Invalid input(s): \"ALB\"   No results for input(s): \"LIPASE\", \"AMYLASE\" in the last 72 hours.   No results for input(s): \"INR\", \"APTT\" in the last 72 hours.    Invalid input(s): \"PROT\"   No results for input(s): \"CKTOTAL\", \"CKMB\", \"CKMBINDEX\", \"TROPONINI\" in the last 72 hours.    ASSESSMENT/PLAN: Pt. is a 64 y.o. male history of DVT/PE on Xarelto, PAD, and tobacco abuse s/p LLE arteriogram (4/9) with s/p repeat arteriogram 4/10 with findings of bilateral anterior tibial artery disease now b/l popliteal to DP bypass with reversed GSV 4/14     -pain: valium,

## 2025-04-23 NOTE — CARE COORDINATION
ADDENDUM:  HENS submitted [id number].   Electronically signed by January Joseph RN on 4/23/2025 at 2:57 PM  [phone]    Called Anita Handley to check on precert status. Elaine is going to check into this and will call me back with updates.     Electronically signed by January Joseph RN on 4/23/2025 at 12:04 PM  [phone]

## 2025-04-24 VITALS
DIASTOLIC BLOOD PRESSURE: 66 MMHG | RESPIRATION RATE: 16 BRPM | HEART RATE: 82 BPM | TEMPERATURE: 98.3 F | BODY MASS INDEX: 20.69 KG/M2 | HEIGHT: 78 IN | SYSTOLIC BLOOD PRESSURE: 110 MMHG | OXYGEN SATURATION: 100 % | WEIGHT: 178.8 LBS

## 2025-04-24 LAB
ALBUMIN SERPL-MCNC: 3.2 G/DL (ref 3.4–5)
ANION GAP SERPL CALCULATED.3IONS-SCNC: 7 MMOL/L (ref 3–16)
BASOPHILS # BLD: 0 K/UL (ref 0–0.2)
BASOPHILS NFR BLD: 0.3 %
BUN SERPL-MCNC: 12 MG/DL (ref 7–20)
CALCIUM SERPL-MCNC: 9.5 MG/DL (ref 8.3–10.6)
CHLORIDE SERPL-SCNC: 103 MMOL/L (ref 99–110)
CO2 SERPL-SCNC: 26 MMOL/L (ref 21–32)
CREAT SERPL-MCNC: 0.6 MG/DL (ref 0.8–1.3)
DEPRECATED RDW RBC AUTO: 13.4 % (ref 12.4–15.4)
EOSINOPHIL # BLD: 0.4 K/UL (ref 0–0.6)
EOSINOPHIL NFR BLD: 5.9 %
GFR SERPLBLD CREATININE-BSD FMLA CKD-EPI: >90 ML/MIN/{1.73_M2}
GLUCOSE SERPL-MCNC: 94 MG/DL (ref 70–99)
HCT VFR BLD AUTO: 25.5 % (ref 40.5–52.5)
HGB BLD-MCNC: 8.7 G/DL (ref 13.5–17.5)
LYMPHOCYTES # BLD: 2.3 K/UL (ref 1–5.1)
LYMPHOCYTES NFR BLD: 31.5 %
MAGNESIUM SERPL-MCNC: 1.88 MG/DL (ref 1.8–2.4)
MCH RBC QN AUTO: 30.5 PG (ref 26–34)
MCHC RBC AUTO-ENTMCNC: 34.2 G/DL (ref 31–36)
MCV RBC AUTO: 89.1 FL (ref 80–100)
MONOCYTES # BLD: 0.7 K/UL (ref 0–1.3)
MONOCYTES NFR BLD: 9.1 %
NEUTROPHILS # BLD: 3.9 K/UL (ref 1.7–7.7)
NEUTROPHILS NFR BLD: 53.2 %
PHOSPHATE SERPL-MCNC: 4.2 MG/DL (ref 2.5–4.9)
PLATELET # BLD AUTO: 427 K/UL (ref 135–450)
PMV BLD AUTO: 6.8 FL (ref 5–10.5)
POTASSIUM SERPL-SCNC: 3.8 MMOL/L (ref 3.5–5.1)
RBC # BLD AUTO: 2.86 M/UL (ref 4.2–5.9)
SODIUM SERPL-SCNC: 136 MMOL/L (ref 136–145)
WBC # BLD AUTO: 7.3 K/UL (ref 4–11)

## 2025-04-24 PROCEDURE — 97535 SELF CARE MNGMENT TRAINING: CPT

## 2025-04-24 PROCEDURE — 6370000000 HC RX 637 (ALT 250 FOR IP)

## 2025-04-24 PROCEDURE — 85025 COMPLETE CBC W/AUTO DIFF WBC: CPT

## 2025-04-24 PROCEDURE — 36415 COLL VENOUS BLD VENIPUNCTURE: CPT

## 2025-04-24 PROCEDURE — 97530 THERAPEUTIC ACTIVITIES: CPT

## 2025-04-24 PROCEDURE — 97110 THERAPEUTIC EXERCISES: CPT

## 2025-04-24 PROCEDURE — 80069 RENAL FUNCTION PANEL: CPT

## 2025-04-24 PROCEDURE — 83735 ASSAY OF MAGNESIUM: CPT

## 2025-04-24 PROCEDURE — 2500000003 HC RX 250 WO HCPCS

## 2025-04-24 RX ORDER — ASPIRIN 81 MG/1
81 TABLET ORAL DAILY
Qty: 30 TABLET | Refills: 3 | Status: SHIPPED | OUTPATIENT
Start: 2025-04-24

## 2025-04-24 RX ORDER — OXYCODONE HYDROCHLORIDE 5 MG/1
5 TABLET ORAL EVERY 6 HOURS PRN
Qty: 20 TABLET | Refills: 0 | Status: SHIPPED | OUTPATIENT
Start: 2025-04-24 | End: 2025-04-29

## 2025-04-24 RX ORDER — METHOCARBAMOL 500 MG/1
500 TABLET, FILM COATED ORAL 3 TIMES DAILY
Qty: 30 TABLET | Refills: 0 | Status: SHIPPED | OUTPATIENT
Start: 2025-04-24 | End: 2025-05-04

## 2025-04-24 RX ORDER — GABAPENTIN 100 MG/1
100 CAPSULE ORAL 3 TIMES DAILY PRN
Qty: 21 CAPSULE | Refills: 0 | Status: SHIPPED | OUTPATIENT
Start: 2025-04-24 | End: 2025-05-01

## 2025-04-24 RX ORDER — DOXYCYCLINE 100 MG/1
100 TABLET ORAL 2 TIMES DAILY
Qty: 22 TABLET | Refills: 0 | Status: SHIPPED | OUTPATIENT
Start: 2025-04-24 | End: 2025-05-05

## 2025-04-24 RX ADMIN — OXYCODONE 5 MG: 5 TABLET ORAL at 14:18

## 2025-04-24 RX ADMIN — GABAPENTIN 100 MG: 100 CAPSULE ORAL at 11:44

## 2025-04-24 RX ADMIN — ACETAMINOPHEN 650 MG: 325 TABLET, FILM COATED ORAL at 14:18

## 2025-04-24 RX ADMIN — METHOCARBAMOL 750 MG: 750 TABLET ORAL at 08:23

## 2025-04-24 RX ADMIN — COLLAGENASE SANTYL: 250 OINTMENT TOPICAL at 08:24

## 2025-04-24 RX ADMIN — SODIUM CHLORIDE, PRESERVATIVE FREE 10 ML: 5 INJECTION INTRAVENOUS at 08:25

## 2025-04-24 RX ADMIN — ASPIRIN 81 MG: 81 TABLET, COATED ORAL at 08:23

## 2025-04-24 RX ADMIN — METHOCARBAMOL 750 MG: 750 TABLET ORAL at 13:00

## 2025-04-24 RX ADMIN — OXYCODONE 10 MG: 5 TABLET ORAL at 04:11

## 2025-04-24 RX ADMIN — RIVAROXABAN 20 MG: 20 TABLET, FILM COATED ORAL at 08:23

## 2025-04-24 RX ADMIN — DOXYCYCLINE 100 MG: 50 CAPSULE ORAL at 08:23

## 2025-04-24 RX ADMIN — ACETAMINOPHEN 650 MG: 325 TABLET, FILM COATED ORAL at 08:23

## 2025-04-24 ASSESSMENT — PAIN DESCRIPTION - FREQUENCY: FREQUENCY: CONTINUOUS

## 2025-04-24 ASSESSMENT — PAIN DESCRIPTION - ORIENTATION: ORIENTATION: RIGHT;LEFT

## 2025-04-24 ASSESSMENT — PAIN SCALES - GENERAL
PAINLEVEL_OUTOF10: 6
PAINLEVEL_OUTOF10: 10

## 2025-04-24 ASSESSMENT — PAIN DESCRIPTION - DESCRIPTORS: DESCRIPTORS: ACHING

## 2025-04-24 ASSESSMENT — PAIN DESCRIPTION - ONSET: ONSET: ON-GOING

## 2025-04-24 ASSESSMENT — PAIN - FUNCTIONAL ASSESSMENT: PAIN_FUNCTIONAL_ASSESSMENT: ACTIVITIES ARE NOT PREVENTED

## 2025-04-24 ASSESSMENT — PAIN DESCRIPTION - LOCATION: LOCATION: FOOT;LEG

## 2025-04-24 ASSESSMENT — PAIN DESCRIPTION - PAIN TYPE: TYPE: SURGICAL PAIN

## 2025-04-24 NOTE — CARE COORDINATION
Patient's daughter Amelia called [phone] and asked for a return call to find out where her father was going for rehab. I asked the patient if I could call her and he said he did not want me to call her and that she just called him. He will tell her where he is when he gets there.   Electronically signed by January Joseph RN on 4/24/2025 at 2:10 PM  [phone]         Case Management Assessment            Discharge Note                    Date / Time of Note: 4/24/2025 11:44 AM                  Discharge Note Completed by: January Joseph RN    Patient Name: Natan Lynch   YOB: 1960  Diagnosis: PVD (peripheral vascular disease) [I73.9]  Ischemia of left lower extremity [I99.8]   Date / Time: 4/9/2025 10:31 AM    Current PCP: Robbin Handley MD  Clinic patient: No    Hospitalization in the last 30 days: No       Advance Directives:  Code Status: Full Code  [place] DNR form completed and on chart: No    Financial:  Payor: [employer] [place] / Plan: [employer] [place] / Product Type: *No Product type* /      Pharmacy:    [id number] - [place], [place] - [address]  - P [phone] - F [phone]  [address] [facility] [address]  Phone: [phone] Fax: [phone]      Assistance purchasing medications?: Potential Assistance Purchasing Medications: No  Assistance provided by Case Management: None at this time    Does patient want to participate in local refill/ meds to beds program?: Yes    Meds To Beds General Rules:  1. Can ONLY be done Monday- Friday between 8:30am-5pm  2. Prescription(s) must be in pharmacy by 3pm to be filled same day  3.Copy of patient's insurance/ prescription drug card and patient face sheet must be sent along with the prescription(s)  4. Cost of Rx cannot be added to hospital bill. If financial assistance is needed, please contact unit  or ;  or  CANNOT provide pharmacy  Need for prophylactic measure

## 2025-04-24 NOTE — PROGRESS NOTES
Patient discharge education complete. IV removed and dressing placed. Patient verbalized understanding of medications and side effects at time of discharge. All questions answered at this time.     Report given to TERESO Tidwell at [facility].    Electronically signed by Abe Harvey RN on 4/24/2025 at 2:44 PM

## 2025-04-24 NOTE — PROGRESS NOTES
Pt alert and oriented. VSS. Pt surgical sites CDI. Pt tolerating diet. Pt having BM and voiding this shift.

## 2025-04-24 NOTE — PROGRESS NOTES
Occupational Therapy  Facility/Department: [facility] [address]  Occupational Therapy Treatment    Name: Natan Lynch  : 1960  MRN: [id number]  Date of Service: 2025    Discharge Recommendations:  Subacute/Skilled Nursing Facility  OT Equipment Recommendations  Equipment Needed: No  Other: defer       Patient Diagnosis(es): The primary encounter diagnosis was Pre-operative cardiovascular examination. Diagnoses of PVD (peripheral vascular disease), Deep vein thrombosis (DVT) of left lower extremity, unspecified chronicity, unspecified vein (HCC), PAD (peripheral artery disease), Gangrene (HCC), and Ischemia of left lower extremity were also pertinent to this visit.  Past Medical History:  has a past medical history of Bilateral pulmonary embolism (HCC), DVT (deep venous thrombosis) (HCC), Peripheral artery disease, and Tobacco abuse.  Past Surgical History:  has a past surgical history that includes invasive vascular (N/A, 2025); invasive vascular (Left, 2025); and Arterial bypass surgry (Bilateral, 2025).    Treatment Diagnosis: Impaired ADL and functional mobility      Assessment  Performance deficits / Impairments: Decreased functional mobility ;Decreased safe awareness;Decreased ADL status;Decreased endurance;Decreased high-level IADLs;Decreased balance  Assessment: Pt is pleasant and cooperative.  Pt agreeable to try surgical shoes and was able to transfer with walker to chair with mod assist.  Max assist for lower body dressing.  Pt to be discharged to SNF this PM.  Treatment Diagnosis: Impaired ADL and functional mobility  Prognosis: Good  REQUIRES OT FOLLOW-UP: Yes  Activity Tolerance  Activity Tolerance: Patient Tolerated treatment well     Plan  Occupational Therapy Plan  Times Per Week: 2-5x  Current Treatment Recommendations: Strengthening, Balance training, Functional mobility training, Endurance training, Equipment evaluation, education, & procurement, Self-Care / ADL,

## 2025-04-24 NOTE — DISCHARGE SUMMARY
Discharge Summary      Patient:  Natan Lynch    Admit Date: 4/9/2025 10:31 AM    Discharge Date: No discharge date for patient encounter.    Admitting Physician: Casandra Alvarado MD     Discharge Physician: same    Admitting Diagnosis:  Ischemia of bilateral lower extremities    Discharge Diagnosis: same     Past Medical History:   Diagnosis Date    Bilateral pulmonary embolism (HCC)     DVT (deep venous thrombosis) (HCC)     Peripheral artery disease     Tobacco abuse         Indication for Admission:   Natan Lynch is a 64 y.o. male with history of DVT/PE on Xarelto, PAD, and tobacco abuse who initially presented to [facility] cath lab for elective LLE arteriogram on 4/9. Patient was unable to tolerate the procedure due to continued movement despite deep sedation.     Admitted with plan to return for additional angiogram under general anesthesia.     Hospital Course:   The patient returned to the cath lab on 4/10 and underwent an ultrasound guided access right common femoral artery, 3rd order selective catheterization left anterior tibial artery, ultrasound guided access left anterior tibial artery, and completion right lower extremity arteriogram. Unable to complete tibial artery angioplasty.    The patient completed a cardiac stress test and bilateral vein mapping in preparation for bypass planning. The patient was taken to the operating room on 4/14/25 and underwent bilateral below knee popliteal artery to dorsalis pedis artery bypasses.     POD 0 into 1 was notable for intermittent hypotension. He received a transfusion for hgb <8, to which he had an appropriate response and his blood pressures improved.    The patient worked with PT/OT, who recommended SNF placement. He underwent podiatry evaluation and wound care. Precert approved on 4/24.     By day of discharge, patient was voiding independently, pain controlled on oral medications, and no acute issues. Stable for discharge to SNF.    Consulting  Pharmacy #[id number] - [facility] [address] - P [phone] - F [phone] [address] [facility] [address]      Phone: [phone]   aspirin 81 MG EC tablet  collagenase 250 UNIT/GM ointment  doxycycline monohydrate 100 MG tablet  gabapentin 100 MG capsule       You can get these medications from any pharmacy    Bring a paper prescription for each of these medications  methocarbamol 500 MG tablet  oxyCODONE 5 MG immediate release tablet         Jasmin Salomon MD  04/24/25  10:31 AM

## 2025-04-24 NOTE — PROGRESS NOTES
Vascular Surgery Daily Progress Note  Patient: Natan Lynch    CC: PAD    Subjective :  No acute events overnight. Pain controlled. Just bored of being hospitalized.     ROS: A 14 point review of systems was conducted, significant findings as noted above. All other systems negative.    Objective :   Infusions:   sodium chloride      sodium chloride      sodium chloride      sodium chloride          I/O:I/O last 3 completed shifts:  In: 1440 [P.O.:1440]  Out: 1775 [Urine:1775]           Wt Readings from Last 1 Encounters:   04/20/25 81.1 kg (178 lb 12.8 oz)       Exam:/68   Pulse 76   Temp 98.2 °F (36.8 °C) (Oral)   Resp 16   Ht 1.969 m (6' 5.5\")   Wt 81.1 kg (178 lb 12.8 oz)   SpO2 100%   BMI 20.93 kg/m²     General appearance - no acute distress  HEENT - normocephalic, EOMI  Cardiac- regular rate and rhythm,   Pulmonary - normal respiratory effort on RA  Abdomen - soft, non tender, non-distended  Extremities - bilateral LE medial dressings CDI. Bilateral bypasses palpable.   Neurological - AxOx3, no focal deficits  Skin - warm and dry          LABS:   Recent Labs     04/23/25  0828 04/24/25  0411   WBC 8.2 7.3   HGB 8.5* 8.7*   HCT 24.4* 25.5*   MCV 88.5 89.1    427        Recent Labs     04/23/25  0828 04/24/25  0411    136   K 4.0 3.8    103   CO2 26 26   PHOS 3.7 4.2   BUN 10 12   CREATININE 0.6* 0.6*      No results for input(s): \"AST\", \"ALT\", \"BILIDIR\", \"BILITOT\", \"ALKPHOS\" in the last 72 hours.    Invalid input(s): \"ALB\"   No results for input(s): \"LIPASE\", \"AMYLASE\" in the last 72 hours.   No results for input(s): \"INR\", \"APTT\" in the last 72 hours.    Invalid input(s): \"PROT\"   No results for input(s): \"CKTOTAL\", \"CKMB\", \"CKMBINDEX\", \"TROPONINI\" in the last 72 hours.    ASSESSMENT/PLAN: Pt. is a 64 y.o. male history of DVT/PE on Xarelto, PAD, and tobacco abuse s/p LLE arteriogram (4/9) with s/p repeat arteriogram 4/10 with findings of bilateral anterior tibial artery

## 2025-04-24 NOTE — CARE COORDINATION
HENS submitted [id number]. Waiting for insurance approval for [facility].     Electronically signed by January Joseph RN on 4/24/2025 at 8:30 AM  [phone]

## 2025-04-24 NOTE — PROGRESS NOTES
Podiatric Surgery Daily Progress Note  Natan Lynch      Subjective :   Patient seen and examined this am at the bedside. Patient denies any acute overnight events.    Review of Systems: A 12 point review of symptoms is unremarkable with the exception of the chief complaint. Patient specifically denies nausea, fever, vomiting, chills, shortness of breath, chest pain, abdominal pain, constipation or difficulty urinating.       Objective     BP 98/64   Pulse 86   Temp 98.1 °F (36.7 °C) (Oral)   Resp 16   Ht 1.969 m (6' 5.5\")   Wt 81.1 kg (178 lb 12.8 oz)   SpO2 100%   BMI 20.93 kg/m²      I/O:  Intake/Output Summary (Last 24 hours) at 4/24/2025 1009  Last data filed at 4/24/2025 0902  Gross per 24 hour   Intake 1080 ml   Output 1160 ml   Net -80 ml              Wt Readings from Last 3 Encounters:   04/20/25 81.1 kg (178 lb 12.8 oz)   04/03/25 76.2 kg (168 lb)   03/11/25 74.8 kg (165 lb)       LABS:    Recent Labs     04/23/25  0828 04/24/25  0411   WBC 8.2 7.3   HGB 8.5* 8.7*   HCT 24.4* 25.5*    427        Recent Labs     04/24/25  0411      K 3.8      CO2 26   PHOS 4.2   BUN 12   CREATININE 0.6*        No results for input(s): \"INR\", \"APTT\" in the last 72 hours.    Invalid input(s): \"PROT\"          LOWER EXTREMITY EXAMINATION    Dressing to bl LE intact. No strikethrough noted to the external dressing. No drainage noted to the internal layers of the dressing.     VASCULAR: DP and PT pulses are palpable 1/4 b/l.  Upon prior hand-held Doppler examination DP/PT pulses on bilateral lower extremities are weakly audible CFT is sluggish to the digits of the foot b/l with exception of the right 1st and 2nd digit which do not display capillary fill time 2/2 advanced gangrene. Skin temperature is warm to cool from proximal to distal with no focal calor noted.  No edema noted. No pain with calf compression b/l.     NEUROLOGIC: Gross and epicritic sensation is diminished b/l. Protective sensation is

## 2025-04-24 NOTE — PROGRESS NOTES
Physical Therapy  Facility/Department: [address]  Daily Treatment Note  NAME: Natan Lynch  : 1960  MRN: [id number]    Date of Service: 2025    Discharge Recommendations:  Subacute/Skilled Nursing Facility   PT Equipment Recommendations  Equipment Needed: No  Other: defer to next level of care    Patient Diagnosis(es): The primary encounter diagnosis was Pre-operative cardiovascular examination. Diagnoses of PVD (peripheral vascular disease), Deep vein thrombosis (DVT) of left lower extremity, unspecified chronicity, unspecified vein (HCC), PAD (peripheral artery disease), Gangrene (HCC), and Ischemia of left lower extremity were also pertinent to this visit.    Assessment  Assessment: Pt demonstrates progression in functional mobility and activity tolerance this date through ability to complete stand pivot with RW. Decreased pain with WBing and position changes compared to previous session.  Cotreatment utilized to safely advance mobility. Surgical shoes used on B feet in order to safely take steps due to  socks not able to fit over B wound dressings with approval from RN.  Pt would benefit from continued skilled IP PT in order to safely advance independence with functional mobility.  Activity Tolerance: Patient limited by pain  Equipment Needed: No  Other: defer to next level of care    Plan  Physical Therapy Plan  General Plan:  (2-5)  Current Treatment Recommendations: Strengthening;Balance training;Functional mobility training;Transfer training;Neuromuscular re-education;Gait training;Safety education & training;Patient/Caregiver education & training;Therapeutic activities    Restrictions  Position Activity Restriction  Other Position/Activity Restrictions: no weight bearing restrictions per podiatry; HOB 30 deg; per vasc note : OOB     Subjective   Subjective  Subjective: Pt supine in bed upon approach an agreeable to PT/OT  Pain: Denies pain at rest. Reports discomfort in L

## 2025-04-28 ENCOUNTER — TELEPHONE (OUTPATIENT)
Dept: VASCULAR SURGERY | Age: 65
End: 2025-04-28

## 2025-04-28 NOTE — TELEPHONE ENCOUNTER
Spoke with Mr. Lynch, he is concerned about being a [facility] and the care he is getting. He stated dressings on groin and ankle area have not been changed. They are doing the dressings on his foot for podiatry but not the others. I went over the dressing changes with him so he is aware and also informed him I will email a copy of his discharge instructions to him. I let him know about his post-op appt and time so he is aware of that. I also told him I would call and speak with his nurse to go over the dressing changes with her and make sure they are aware of post-op appt so they can set up transport. He would like to speak with Dr. Alvarado which I told him I would send her a message. I spoke with Tri at [facility] and went over the dressing changes for vascular and informed her of post-op appt. She stated she had not changed dressings yet today and it was her first time having him but she got in report they looked good. I also faxed a copy of his discharge instructions to them and she was going to make sure transport was set up for his appt.

## 2025-04-30 ENCOUNTER — HOSPITAL ENCOUNTER (EMERGENCY)
Age: 65
Discharge: HOME | End: 2025-04-30
Attending: EMERGENCY MEDICINE
Payer: COMMERCIAL

## 2025-04-30 VITALS
RESPIRATION RATE: 13 BRPM | BODY MASS INDEX: 19.71 KG/M2 | HEIGHT: 77 IN | DIASTOLIC BLOOD PRESSURE: 54 MMHG | WEIGHT: 166.9 LBS | OXYGEN SATURATION: 100 % | TEMPERATURE: 98.4 F | HEART RATE: 67 BPM | SYSTOLIC BLOOD PRESSURE: 99 MMHG

## 2025-04-30 DIAGNOSIS — D64.9 ANEMIA, UNSPECIFIED TYPE: Primary | ICD-10-CM

## 2025-04-30 LAB
ANION GAP SERPL CALCULATED.3IONS-SCNC: 10 MMOL/L (ref 3–16)
BASOPHILS # BLD: 0 K/UL (ref 0–0.2)
BASOPHILS NFR BLD: 0.3 %
BUN SERPL-MCNC: 8 MG/DL (ref 7–20)
CALCIUM SERPL-MCNC: 9.6 MG/DL (ref 8.3–10.6)
CHLORIDE SERPL-SCNC: 103 MMOL/L (ref 99–110)
CO2 SERPL-SCNC: 25 MMOL/L (ref 21–32)
CREAT SERPL-MCNC: 0.7 MG/DL (ref 0.8–1.3)
DEPRECATED RDW RBC AUTO: 13.4 % (ref 12.4–15.4)
EOSINOPHIL # BLD: 0.3 K/UL (ref 0–0.6)
EOSINOPHIL NFR BLD: 3.6 %
GFR SERPLBLD CREATININE-BSD FMLA CKD-EPI: >90 ML/MIN/{1.73_M2}
GLUCOSE SERPL-MCNC: 108 MG/DL (ref 70–99)
HCT VFR BLD AUTO: 26.8 % (ref 40.5–52.5)
HGB BLD-MCNC: 8.9 G/DL (ref 13.5–17.5)
LYMPHOCYTES # BLD: 2.4 K/UL (ref 1–5.1)
LYMPHOCYTES NFR BLD: 30.9 %
MCH RBC QN AUTO: 29.8 PG (ref 26–34)
MCHC RBC AUTO-ENTMCNC: 33.2 G/DL (ref 31–36)
MCV RBC AUTO: 89.7 FL (ref 80–100)
MONOCYTES # BLD: 0.9 K/UL (ref 0–1.3)
MONOCYTES NFR BLD: 11.3 %
NEUTROPHILS # BLD: 4.2 K/UL (ref 1.7–7.7)
NEUTROPHILS NFR BLD: 53.9 %
PLATELET # BLD AUTO: 513 K/UL (ref 135–450)
PMV BLD AUTO: 6.8 FL (ref 5–10.5)
POTASSIUM SERPL-SCNC: 4 MMOL/L (ref 3.5–5.1)
RBC # BLD AUTO: 2.99 M/UL (ref 4.2–5.9)
SODIUM SERPL-SCNC: 138 MMOL/L (ref 136–145)
WBC # BLD AUTO: 7.8 K/UL (ref 4–11)

## 2025-04-30 PROCEDURE — 80048 BASIC METABOLIC PNL TOTAL CA: CPT

## 2025-04-30 PROCEDURE — 99283 EMERGENCY DEPT VISIT LOW MDM: CPT

## 2025-04-30 PROCEDURE — 85025 COMPLETE CBC W/AUTO DIFF WBC: CPT

## 2025-04-30 ASSESSMENT — ENCOUNTER SYMPTOMS
CHEST TIGHTNESS: 0
COUGH: 0
ABDOMINAL PAIN: 0
NAUSEA: 0
VOMITING: 0
SHORTNESS OF BREATH: 0
DIARRHEA: 0

## 2025-04-30 ASSESSMENT — PAIN - FUNCTIONAL ASSESSMENT: PAIN_FUNCTIONAL_ASSESSMENT: NONE - DENIES PAIN

## 2025-04-30 NOTE — ED PROVIDER NOTES
ED Attending Attestation Note     Date of evaluation: 4/30/2025    This patient was seen by the advance practice provider.  I have seen and examined the patient, agree with the workup, evaluation, management and diagnosis. The care plan has been discussed.      Patient History     Chief Complaint: Abnormal Lab (Low Hgb, per nursing home document, Hgb 8.1)      HPI: Natan Lynch is a 64 y.o. who presents to the Emergency Department from an acute rehab facility reportedly with low hemoglobin.  According to nursing documentation, there is an order for the patient to be sent to the emergency department if his hemoglobin is less than 8.1.  He reports the blood was drawn yesterday, but he does not know what the results are.  Patient states that he is feeling generally very well, denies dizziness or lightheadedness, chest pain or shortness of breath, numbness or weakness of his extremities, or any other acute complaints.  He was recently admitted in a somewhat prolonged fashion after a bilateral lower extremity arterial bypass for peripheral arterial disease, and did have anemia requiring blood transfusion during that hospitalization.  He states that there is been no significant bleeding from any of his wounds, denies any hematochezia or melena, or any other sources of blood loss to his knowledge.    He has a past medical history of Bilateral pulmonary embolism (HCC), DVT (deep venous thrombosis) (HCC), Peripheral artery disease, and Tobacco abuse.    He has a past surgical history that includes invasive vascular (N/A, 4/9/2025); invasive vascular (Left, 4/9/2025); and Arterial bypass surgry (Bilateral, 4/14/2025).    family history is not on file.    He reports that he has been smoking cigarettes. He has a 22.5 pack-year smoking history. He has been exposed to tobacco smoke. He has never used smokeless tobacco. He reports current alcohol use. He reports that he does not currently use drugs.    Pertinent Physical Exam

## 2025-04-30 NOTE — ED TRIAGE NOTES
Pt reports nursing home sent him to ED dt low Hgb. Pt denies any symptoms. Pt reports low hgb in latest admission requiring 2 units of blood transfusion. Pt unsure of how low the hgb was reported to be.

## 2025-04-30 NOTE — DISCHARGE INSTRUCTIONS
As discussed, your hemoglobin today is very stable. It is 8.9 today, and was 8.7 a few days ago when you were discharged. You do not need a blood transfusion.  Please continue to advocate for your health, and participate in your physical therapy, and follow-up with your vascular surgeon, your podiatry team, and your primary care doctor as needed.  Call your doctor, or return to the emergency department if you develop any concerning symptoms or other concerns.

## 2025-04-30 NOTE — ED PROVIDER NOTES
THE [facility]  EMERGENCY DEPARTMENT ENCOUNTER          PHYSICIAN ASSISTANT NOTE       Date of evaluation: 4/30/2025    Chief Complaint     Abnormal Lab (Low Hgb, per nursing home document, Hgb 8.1)      History of Present Illness     Natan Lynch is a 64 y.o. male with a past medical history as detailed in his chart including gangrene of the great toe of his right foot, PE, DVT  status post left lower extremity angiography 4/10/2025, arterial bypass surgery 4/14/2025 and inpatient rehabilitation at [facility] who comes in by squad after reportedly having a hemoglobin of less than 8.1 at his facility.  I see an order in his paperwork that he brings for him to be sent to the emergency department if he has a hemoglobin less than this value.  The patient himself tells me that he is asymptomatic and is having no chest pain, shortness of breath, fevers.  He tells me \"I feel fine.\"  He also tells me that staff have been dressing his postop site and feet appropriately and he has no concerns with his current care at the rehab facility.    Review of Systems     Review of Systems   Constitutional:  Negative for chills and fever.   Respiratory:  Negative for cough, chest tightness and shortness of breath.    Cardiovascular:  Negative for chest pain.   Gastrointestinal:  Negative for abdominal pain, diarrhea, nausea and vomiting.   Genitourinary:  Negative for dysuria.   Neurological:  Negative for light-headedness and headaches.       Past Medical, Surgical, Family, and Social History     He has a past medical history of Bilateral pulmonary embolism (HCC), DVT (deep venous thrombosis) (HCC), Peripheral artery disease, and Tobacco abuse.  He has a past surgical history that includes invasive vascular (N/A, 4/9/2025); invasive vascular (Left, 4/9/2025); and Arterial bypass surgry (Bilateral, 4/14/2025).  His family history is not on file.  He reports that he has been smoking cigarettes. He has a 22.5 pack-year smoking

## 2025-05-01 PROBLEM — I96 GANGRENE (HCC): Status: ACTIVE | Noted: 2025-05-01

## 2025-05-01 LAB — ECHO BSA: 1.99 M2

## 2025-05-02 ENCOUNTER — TELEPHONE (OUTPATIENT)
Dept: VASCULAR SURGERY | Age: 65
End: 2025-05-02

## 2025-05-02 NOTE — TELEPHONE ENCOUNTER
Patient called in stating he was told to reach out and schedule an apt with  SABRA LAI the number he has and listed on provider finder just rings once and hangs up.    Please reach out to the patient with additional information about this.

## 2025-05-06 ENCOUNTER — OFFICE VISIT (OUTPATIENT)
Dept: VASCULAR SURGERY | Age: 65
End: 2025-05-06

## 2025-05-06 VITALS
BODY MASS INDEX: 18.54 KG/M2 | OXYGEN SATURATION: 98 % | SYSTOLIC BLOOD PRESSURE: 82 MMHG | DIASTOLIC BLOOD PRESSURE: 54 MMHG | HEART RATE: 86 BPM | WEIGHT: 157 LBS | HEIGHT: 77 IN

## 2025-05-06 DIAGNOSIS — I70.263 ATHEROSCLEROSIS OF NATIVE ARTERY OF BOTH LOWER EXTREMITIES WITH GANGRENE (HCC): Primary | ICD-10-CM

## 2025-05-06 DIAGNOSIS — E43 SEVERE MALNUTRITION: Chronic | ICD-10-CM

## 2025-05-06 PROCEDURE — 99024 POSTOP FOLLOW-UP VISIT: CPT | Performed by: SURGERY

## 2025-05-06 RX ORDER — ASPIRIN 325 MG
325 TABLET ORAL DAILY
COMMUNITY

## 2025-05-06 NOTE — PROGRESS NOTES
2025     Natan Lynch (:  1960) is a 64 y.o. male,Established patient, here for evaluation of the following chief complaint(s):  Post-Op Check (1st post-op s/p left leg arteriogram )        ASSESSMENT/PLAN:  1. 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 Severe malnutrition    Dressings were reapplied.  He will need to follow up with Podiatry--will reach out to the Pod Clinic today to help get appt scheduled.    Follow up with me in 7-10 days.       SUBJECTIVE/OBJECTIVE:  POV#1 Bilateral femoral to anterior tibial artery bypass with transposed vein  Overall, foot pain is improved.  Complains of his SNF.  States they are doing too much with PT.  Not happy with the wound care.  States he doesn't have follow up with Podiatry.    Exam:  Bilateral thigh and calf incisions c/d/I--no drainage or swelling and staples were removed today.  Bilateral anterior ankle incisions healing with staples and sutures in place.  These were not removed.  There is no dehiscence or drainage.  Slight crusting over the incisions, not unexpectedly.  Excellent pulse and signal in the bypass graft and biphasic signal in the DP, distal to the distal anastomosis.  The left lateral foot has stable black eschar with some pink underlying bed notable at the edges.  Right foot with dry gangrene and mummification of the 1st and 2nd toes.  Black stable eschar along the anterior dorsal medial surface of the foot.  The remainder of the foot is warm and well-perfused.    Physical Exam  Vitals:    25 1112   BP: (!) 82/54   BP Site: Right Upper Arm   Patient Position: Sitting   BP Cuff Size: Small Adult   Pulse: 86   SpO2: 98%   Weight: 71.2 kg (157 lb)   Height: 1.956 m (6' 5.01\")         An electronic signature was used to authenticate this note.    --Casandra Alvarado MD

## 2025-05-09 ENCOUNTER — OFFICE VISIT (OUTPATIENT)
Dept: INTERNAL MEDICINE CLINIC | Age: 65
End: 2025-05-09
Payer: COMMERCIAL

## 2025-05-09 DIAGNOSIS — Z98.890 STATUS POST VASCULAR SURGERY: ICD-10-CM

## 2025-05-09 DIAGNOSIS — Z86.718 HISTORY OF DVT (DEEP VEIN THROMBOSIS): ICD-10-CM

## 2025-05-09 DIAGNOSIS — I73.9 PERIPHERAL ARTERIAL DISEASE: ICD-10-CM

## 2025-05-09 DIAGNOSIS — R23.4 ESCHAR OF FOOT: ICD-10-CM

## 2025-05-09 DIAGNOSIS — I96 GANGRENE OF RIGHT FOOT (HCC): Primary | ICD-10-CM

## 2025-05-09 DIAGNOSIS — Z72.0 TOBACCO ABUSE DISORDER: ICD-10-CM

## 2025-05-09 DIAGNOSIS — Z87.891 SMOKING HISTORY: ICD-10-CM

## 2025-05-09 PROCEDURE — 99213 OFFICE O/P EST LOW 20 MIN: CPT

## 2025-05-09 NOTE — OP NOTE
Operative Note      Patient: Natan Lynch  YOB: 1960  MRN: [id number]     Date of Procedure: 4/14/2025     Pre-Op Diagnosis Codes:      * PAD (peripheral artery disease) [I73.9]     * Gangrene (HCC) [I96]     Post-Op Diagnosis: Same       Procedure(s):  Right below knee popliteal to dorsalis pedis bypass with right great saphenous vein  Left below knee popliteal to anterior tibialis bypass with left great saphenous vein     Surgeon(s):  Casandra Alvarado MD     Assistant:  Resident: Jasmin Salomon MD     Anesthesia: General     Estimated Blood Loss (mL): 150     Complications: None     Specimens:   ID Type Source Tests Collected by Time Destination   A : LEFT FEMORAL LYMPH NODE Tissue Tissue SURGICAL PATHOLOGY Casandra Alvarado MD 4/14/2025 4302           Implants:  * No implants in log *      Drains:   Urinary Catheter 04/14/25 Willams-Temperature (Active)         Findings:  Infection Present At Time Of Surgery (PATOS) (choose all levels that have infection present):  - Deep Infection (muscle/fascia) present as evidenced by fluid consistent with infection  Other Findings: lymph node sent for path       Detailed Description of Procedure:   In the was brought to the operating room and placed in supine position prepped and draped in the usual sterile fashion using Betadine solution.  Timeout was called and the patient and operative site were appropriate identified.  He was given IV antibiotics prior to the procedure.    Attention was first turned to the left lower extremity.  An incision was made from the left groin to the proximal calf, in the medial aspect, centered over the left great saphenous vein.  The tissues were dissected down through the skin and subcutaneous tissue with electrocautery.  The vein was harvested throughout the length of the incision, ligating all side branches with a combination of 2-0 and 3-0 silk sutures and then transected.  The vein was then divided in the distal portion of the

## 2025-05-12 NOTE — PROGRESS NOTES
Department of Podiatry  Resident Progress Note    Natan Lynch  Allergies: Patient has no known allergies.    SUBJECTIVE  The patient is a 64 y.o. male who presents to [facility] podiatry clinic after being hospitalized in April 2025 with revascularization done by Dr. Casandra Alvarado while in house.  Patient with continued terminal demarcation of right lower extremity as well as chronic nonhealing ulceration along left lower extremity.  Patient states that he has been having the right lower extremity wound dressed via Betadine DSD daily from his facility and the left is being dressed by collagenase DSD daily at the facility.  He recently had his POV #1 S/P  Bilateral femoral to anterior tibial artery bypass with transposed vein with Dr. Alvarado on 05/06/2025 and is doing well overall. Patient denies any other pedal complaints today. Patient denies any N/F/V/SOB/CP.     Past Medical History:    PMH of PAD, PVD, DVT/PE on ATC     REVIEW OF SYSTEMS:  A 12 point review of systems is unremarkable with the exception of the chief complaint. Patient specifically denies nausea, vomiting, fever, chills, shortness of breath, chest pain, abdominal pain, constipation, or difficulty urinating.     OBJECTIVE  Patient presents to clinic today unaccompanied and unassisted wearing boots. Patient is Aox3 and appears in NAD.    VASCULAR: DP and PT pulses are faintly palpable 1/2 b/l.  Upon prior Doppler examination DP and PT pulses bilateral are found to be monophasic b/l however this was done prior to revascularization.  CFT is delayed to the digits of the foot b/l. Skin temperature is cool to cool from proximal to distal with no focal calor.  Significant coolness appreciated to the right distal forefoot compared to the left foot.  No edema. No pain with calf compression b/l.     NEUROLOGIC: Gross and epicritic sensation is diminished b/l. Protective sensation is diminished at several pedal sites b/l.     DERMATOLOGIC:   Patient provided

## 2025-05-13 ENCOUNTER — OFFICE VISIT (OUTPATIENT)
Dept: VASCULAR SURGERY | Age: 65
End: 2025-05-13

## 2025-05-13 VITALS
HEIGHT: 77 IN | DIASTOLIC BLOOD PRESSURE: 60 MMHG | HEART RATE: 79 BPM | OXYGEN SATURATION: 94 % | WEIGHT: 164 LBS | SYSTOLIC BLOOD PRESSURE: 104 MMHG | BODY MASS INDEX: 19.36 KG/M2

## 2025-05-13 DIAGNOSIS — I70.263 ATHEROSCLEROSIS OF NATIVE ARTERY OF BOTH LOWER EXTREMITIES WITH GANGRENE (HCC): Primary | ICD-10-CM

## 2025-05-13 DIAGNOSIS — F17.200 NICOTINE DEPENDENCE WITH CURRENT USE: ICD-10-CM

## 2025-05-13 PROCEDURE — 99024 POSTOP FOLLOW-UP VISIT: CPT | Performed by: SURGERY

## 2025-05-13 RX ORDER — OXYCODONE HYDROCHLORIDE 5 MG/1
5 TABLET ORAL EVERY 4 HOURS PRN
COMMUNITY

## 2025-05-13 RX ORDER — SELENIUM 50 MCG
1 TABLET ORAL DAILY
COMMUNITY

## 2025-05-13 RX ORDER — LOPERAMIDE HYDROCHLORIDE 2 MG/1
2 CAPSULE ORAL 4 TIMES DAILY PRN
COMMUNITY

## 2025-05-15 ENCOUNTER — TELEPHONE (OUTPATIENT)
Dept: INTERNAL MEDICINE CLINIC | Age: 65
End: 2025-05-15

## 2025-05-15 NOTE — TELEPHONE ENCOUNTER
Received message from  patient calll about dressing changes . I tried to call him back several times . Got busy signal . He has an appointment tomorrow in Podiatry clinic.

## 2025-05-16 ENCOUNTER — OFFICE VISIT (OUTPATIENT)
Dept: INTERNAL MEDICINE CLINIC | Age: 65
End: 2025-05-16
Payer: COMMERCIAL

## 2025-05-16 DIAGNOSIS — R23.4 ESCHAR OF FOOT: ICD-10-CM

## 2025-05-16 DIAGNOSIS — Z72.0 TOBACCO ABUSE DISORDER: ICD-10-CM

## 2025-05-16 DIAGNOSIS — I96 GANGRENE OF RIGHT FOOT (HCC): Primary | ICD-10-CM

## 2025-05-16 DIAGNOSIS — I73.9 PERIPHERAL ARTERIAL DISEASE: ICD-10-CM

## 2025-05-16 PROCEDURE — 99213 OFFICE O/P EST LOW 20 MIN: CPT

## 2025-05-18 NOTE — PROGRESS NOTES
Department of Podiatry  Resident Progress Note    Natan Lynch  Allergies: Patient has no known allergies.    SUBJECTIVE  The patient is a 64 y.o. male who presents to [facility] podiatry clinic after being hospitalized in April 2025 with revascularization done by Dr. Casandra Alvarado while in house.  Patient is currently residing in assisted living facility in which she states he does not get the appropriate care and is currently searching for a new place.  He states the current place has issues dressing his wounds with the appropriate dressings daily.  He currently has no pain, however will get burning sensations from his toes on the right foot.  He is anxious to move forward with operation of his right foot and be able to walk again.  No other pedal complaints.  Denies N/V/CP/SOB.    Past Medical History:    PMH of PAD, PVD, DVT/PE on ATC     REVIEW OF SYSTEMS:  A 12 point review of systems is unremarkable with the exception of the chief complaint. Patient specifically denies nausea, vomiting, fever, chills, shortness of breath, chest pain, abdominal pain, constipation, or difficulty urinating.     OBJECTIVE  Patient presents to clinic today unaccompanied and unassisted wearing boots. Patient is Aox3 and appears in NAD.    VASCULAR: DP and PT pulses are faintly palpable 1/2 b/l.  Upon prior Doppler examination DP and PT pulses bilateral are found to be monophasic b/l however this was done prior to revascularization.  CFT is delayed to the digits of the foot b/l. Skin temperature is cool to cool from proximal to distal with no focal calor.  Significant coolness appreciated to the right distal forefoot compared to the left foot.  No edema. No pain with calf compression b/l.     NEUROLOGIC: Gross and epicritic sensation is diminished b/l. Protective sensation is diminished at several pedal sites b/l.     DERMATOLOGIC:   Patient provided verbal consent for photos to be obtained today:     Right lower extremity    Dry

## 2025-05-19 NOTE — PROGRESS NOTES
2025    Natan Lynch (:  1960) is a 64 y.o. male,Established patient, here for evaluation of the following chief complaint(s):  Post-Op Check ( - BILATERAL FEMORAL  ANTERIOR TIBIAL BYPASS)        ASSESSMENT/PLAN:  1. 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 Nicotine dependence with current use  Follow up with Podiatry clinic.  Follow up with me in 7-10 days.     No follow-ups on file.       SUBJECTIVE/OBJECTIVE:  POV#2  Feeling better.  No new complaints.    Left foot wound with some bleeding, healthy appearing edges.  Palpable bypass pulses and palpable DP distal to bypass bilaterally.  Staples removed from thigh incisions.  Left staples in place on anterior ankle incisions.     Physical Exam  Vitals:    25 1308   BP: 104/60   BP Site: Left Upper Arm   Patient Position: Sitting   BP Cuff Size: Small Adult   Pulse: 79   SpO2: 94%   Weight: 74.4 kg (164 lb)   Height: 1.956 m (6' 5.01\")         An electronic signature was used to authenticate this note.    --Casandra Alvarado MD

## 2025-05-27 ENCOUNTER — TELEPHONE (OUTPATIENT)
Dept: VASCULAR SURGERY | Age: 65
End: 2025-05-27

## 2025-05-27 NOTE — TELEPHONE ENCOUNTER
Mr. Lynch called stating since starting chantix his taste buds have been over and having trouble eating. Concerned cause he's lost so much wait even before taking the chantix. Informed him I would message Dr. Alvarado but she is out today and it might not be till tomorrow when I call him back and he was fine with that.

## 2025-05-30 ENCOUNTER — OFFICE VISIT (OUTPATIENT)
Dept: INTERNAL MEDICINE CLINIC | Age: 65
End: 2025-05-30
Payer: COMMERCIAL

## 2025-05-30 VITALS — TEMPERATURE: 97.3 F

## 2025-05-30 VITALS
SYSTOLIC BLOOD PRESSURE: 118 MMHG | OXYGEN SATURATION: 100 % | DIASTOLIC BLOOD PRESSURE: 72 MMHG | BODY MASS INDEX: 18.71 KG/M2 | TEMPERATURE: 97.5 F | HEART RATE: 76 BPM | RESPIRATION RATE: 18 BRPM | WEIGHT: 158.5 LBS | HEIGHT: 77 IN

## 2025-05-30 DIAGNOSIS — Z01.818 PRE-OP TESTING: Primary | ICD-10-CM

## 2025-05-30 DIAGNOSIS — I96 GANGRENE OF RIGHT FOOT (HCC): Primary | ICD-10-CM

## 2025-05-30 DIAGNOSIS — Z72.0 TOBACCO ABUSE DISORDER: ICD-10-CM

## 2025-05-30 DIAGNOSIS — R23.4 ESCHAR OF FOOT: ICD-10-CM

## 2025-05-30 DIAGNOSIS — I73.9 PERIPHERAL ARTERIAL DISEASE: ICD-10-CM

## 2025-05-30 PROCEDURE — 99213 OFFICE O/P EST LOW 20 MIN: CPT

## 2025-05-30 ASSESSMENT — ENCOUNTER SYMPTOMS
NAUSEA: 0
VOMITING: 0
CONSTIPATION: 0
DIARRHEA: 0
RHINORRHEA: 0
SINUS PRESSURE: 0
SORE THROAT: 0
COUGH: 0
CHEST TIGHTNESS: 0
ABDOMINAL PAIN: 0
SHORTNESS OF BREATH: 0

## 2025-05-30 NOTE — PROGRESS NOTES
[facility]    PRE-OP NOTE      Natan Lynch                                               : 1960  Age: 64 y.o.  MRN: [id number]  Date : 2025    Referring Physician: Leon Acosta DPM    Procedure: LISFRANC AMPUTATION-RIGHT FOOT, TENDO-ACHILLES LENGHTENING- RIGHT LOWER LEG, APPLICATION OF BELOW KNEE SPLINT- RIGHT LOWER LEG     Date of procedure: 25    History of Present Illness     The patient is a 64 y.o. male who presents for preoperative examination.    Planned anesthesia:  General  Known anesthesia problems: None  Bleeding risk:  On Xarelto, currently off for blood in th stool  Personal or FH of DVT/PE:  Yes; DVT and PE  Patient objection to receiving blood products: No  ASA(American Society of Anesthesiologist) Physical Status: ASA III  [facility] Activity Status Index Questionnaire: 5.45  NSQIP for ASA greater or equal to III: Below average risk  Modified Caprini Risk Assessment for VTE:  11    Past Medical History:        Diagnosis Date    Bilateral pulmonary embolism (HCC)     DVT (deep venous thrombosis) (HCC)     Peripheral artery disease     Tobacco abuse        Past Surgical History:        Procedure Laterality Date    ARTERIAL BYPASS SURGRY Bilateral 2025    BILATERAL FEMORAL  ANTERIOR TIBIAL BYPASS performed by Casandra Alvarado MD at [facility] OR    INVASIVE VASCULAR N/A 2025    Angiography lower ext left performed by Casandra Alvarado MD at [facility] CARDIAC CATH LAB    INVASIVE VASCULAR Left 2025    Angioplasty ant tib artery performed by Casandra Alvarado MD at [facility] CARDIAC CATH LAB    INVASIVE VASCULAR N/A 4/10/2025    Angiography lower ext left performed by Casandra Alvarado MD at [facility] CARDIAC CATH LAB       Family History:   No family history on file.    Social History:   TOBACCO:   reports that he has been smoking cigarettes. He has a 22.5 pack-year smoking history. He has been exposed to tobacco smoke. He has never used smokeless

## 2025-05-30 NOTE — PATIENT INSTRUCTIONS
Surgery scheduled at [facility]    June 20th @ 8:30 AM     [place] @ 6:30 AM  [address]    [id number]  [phone]      ALL PRE-OP PAPERWORK WAS SENT HOME WITH YOU TODAY/ PLEASE SHARE  ALL INFORMATION WITH STAFF AT [facility]    your first post-op appt will be June 27th Friday at 12:30pm

## 2025-05-30 NOTE — PATIENT INSTRUCTIONS
Thanks for visiting the clinic today!    Please get lab work done after 6/6/25. And please stop Xaretlo 3 days before surgery.    Please follow-up after surgery.

## 2025-05-30 NOTE — PROGRESS NOTES
Department of Podiatry  Resident Progress Note    Natan Lynch  Allergies: Patient has no known allergies.    SUBJECTIVE  The patient is a 64 y.o. male who presents to [facility] podiatry clinic for a preoperative evaluation after being hospitalized in April 2025 with revascularization done by Dr. Casandra Alvarado while in house.  Patient is currently residing in assisted living facility in which she states he does not get the appropriate care and is currently searching for a new place.  He states the current place has issues dressing his wounds with the appropriate dressings daily.  He currently has no pain, however will get burning sensations from his toes on the right foot.  He is anxious to move forward with operation of his right foot and be able to walk again.  No other pedal complaints.  Denies N/V/CP/SOB.    Past Medical History:    PMH of PAD, PVD, DVT/PE on ATC     REVIEW OF SYSTEMS:  A 12 point review of systems is unremarkable with the exception of the chief complaint. Patient specifically denies nausea, vomiting, fever, chills, shortness of breath, chest pain, abdominal pain, constipation, or difficulty urinating.     OBJECTIVE  Patient presents to clinic today unaccompanied and unassisted wearing boots. Patient is Aox3 and appears in NAD.    VASCULAR: DP and PT pulses are faintly palpable 1/2 b/l.  Upon prior Doppler examination DP and PT pulses bilateral are found to be monophasic b/l however this was done prior to revascularization.  CFT is delayed to the digits of the foot b/l. Skin temperature is cool to cool from proximal to distal with no focal calor.  Significant coolness appreciated to the right distal forefoot compared to the left foot.  No edema. No pain with calf compression b/l.     NEUROLOGIC: Gross and epicritic sensation is diminished b/l. Protective sensation is diminished at several pedal sites b/l.     DERMATOLOGIC:   Patient provided verbal consent for photos to be obtained today:     Right      PLAN  -Evaluation and management x 15 minutes and greater than 50% of the time spent explaining the etiology and treatment with the patient.     -preoperative paperwork and consent discussed and signed at today's visit.  Lisfranc amputation w/ GUIDO planned for 6/20/2025.  All questions answered to patient's satisfaction    -Eschar on left foot was sharply debrided with a #10 blade down to including and including subcutaneous tissue to reveal healthy bleeding tissue below.  Patient tolerated procedure well    - Right lower extremity dressed via Betadine DSD and left lower extremity dressed via saline wet-to-dry and Santyl.  Letter written dispensed for patient facility to continue with daily dressing changes.  Instructed patient to have a copy of this in case assisted living facility loses original copy.    -Instructed patient that wet-to-dry's work as a mechanical debridement and that it may be painful removal, however is necessary for the wound at this time.    -Patient instructed to keep feet clean and dry and avoid getting feet wet when showering. He gave verbal understanding of this importance     -Discussed with patient the importance of smoking cessation and the complications associated with continued smoking such as delayed wound healing and impaired blood flow    -Instructed patient to watch for signs and symptoms of infection including but not limited to fever, chills, feeling ill, redness around the wounds, redness streaking up the legs, purulent drainage. Instructed patient to call the clinic or present to the nearest emergency department if they notice these signs or symptoms     RTC 6/27 for post op evaluation     Patient assessment and plan discussed with ROSANGELA Chaudhary DPM, MS  Podiatric Resident PGY-1  [facility]  Pager #[phone]  5/30/2025, 12:50 PM

## 2025-06-03 ENCOUNTER — TELEPHONE (OUTPATIENT)
Dept: VASCULAR SURGERY | Age: 65
End: 2025-06-03

## 2025-06-03 NOTE — TELEPHONE ENCOUNTER
Syed called concerned about right leg sutures. He sent photo of incision which was showed to Dr. Alvarado no changes at this time and will see him on Thursday 6/5/

## 2025-06-09 ENCOUNTER — TELEPHONE (OUTPATIENT)
Dept: INTERNAL MEDICINE CLINIC | Age: 65
End: 2025-06-09

## 2025-06-10 ENCOUNTER — OFFICE VISIT (OUTPATIENT)
Dept: VASCULAR SURGERY | Age: 65
End: 2025-06-10

## 2025-06-10 VITALS
HEART RATE: 66 BPM | DIASTOLIC BLOOD PRESSURE: 70 MMHG | SYSTOLIC BLOOD PRESSURE: 110 MMHG | OXYGEN SATURATION: 92 % | TEMPERATURE: 96.5 F

## 2025-06-10 DIAGNOSIS — I73.9 PERIPHERAL VASCULAR DISEASE: Primary | ICD-10-CM

## 2025-06-10 DIAGNOSIS — I70.263 ATHEROSCLEROSIS OF NATIVE ARTERY OF BOTH LOWER EXTREMITIES WITH GANGRENE (HCC): Primary | ICD-10-CM

## 2025-06-10 PROCEDURE — 99024 POSTOP FOLLOW-UP VISIT: CPT | Performed by: SURGERY

## 2025-06-10 RX ORDER — ASPIRIN 81 MG/1
81 TABLET, CHEWABLE ORAL DAILY
Status: ON HOLD | COMMUNITY
End: 2025-06-20 | Stop reason: HOSPADM

## 2025-06-10 RX ORDER — DOCUSATE SODIUM 100 MG/1
100 CAPSULE, LIQUID FILLED ORAL 2 TIMES DAILY PRN
COMMUNITY

## 2025-06-16 NOTE — PROGRESS NOTES
6/10/2025    Natan Lynch (:  1960) is a 64 y.o. male,Established patient, here for evaluation of the following chief complaint(s):  Post-Op Check ( Post op to remove the remainder of his sutures/staples)        ASSESSMENT/PLAN:  1. 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  Follow up with Podiatry clinic as scheduled.  He is scheduled for a right Lisfranc amputation on 2025 per Dr. Acosta.  I will see him back in 6-8 weeks.  I do think he has a high risk of losing the right bypass given the distal anastomosis to the dorsalis pedis and upcoming right Lisfranc amputation.  However, it is widely patent at this time and I do not think anything else we can do about that.  I will see him sooner if needed.    SUBJECTIVE/OBJECTIVE:  POV#3  Procedure(s):  (25)  Right below knee popliteal to dorsalis pedis bypass with right great saphenous vein  Left below knee popliteal to anterior tibialis bypass with left great saphenous vein    Overall, no new complaints. He is somewhat anxious about care at facility, but seems to be doing well.    Following with Dr. Acosta in Pod Clinic.  Is scheduled for upcoming Right Lisfranc.    Physical Exam  Vitals:    06/10/25 1157   BP: 110/70   BP Site: Right Upper Arm   Patient Position: Sitting   Pulse: 66   Temp: (!) 96.5 °F (35.8 °C)   TempSrc: Infrared   SpO2: 92%     Exam:  Bilateral lower extremity bypasses have a palpable pulse and palpable dorsalis pedis pulse distal to the distal anastomosis.  Incisions are healing relatively well.  He still has nylon sutures in the distal ankle incisions over the distal anastomoses.  These were not removed today due to patient discomfort and also he has upcoming foot amputation and I will ask podiatry to remove them at that time.    Otherwise, the right foot demonstrates a large amount of dry necrosis and early mummification.  Left lateral foot

## 2025-06-18 RX ORDER — POLYETHYLENE GLYCOL 3350 17 G/17G
17 POWDER, FOR SOLUTION ORAL DAILY
COMMUNITY

## 2025-06-18 NOTE — PROGRESS NOTES
6/18/25 1412: Spoke with Roger the nurse caring for pt today. Medication list and transportation arrangements verified by Roger. Per Roger, [facility] to be transporting pt on DOS.  - BDP

## 2025-06-18 NOTE — PROGRESS NOTES
NURSING HOME / GROUP HOME SURGICAL/ PROCEDURE PATIENTS:  Facility Name: [facility]   Phone #: [phone]  Fax #: [phone]  Nurse spoke with: Barbara NP   Can patient sign for themselves?   Yes    Instructed to fax Advance directive/ POA forms / Living will paperwork: No, Not Applicable, Per Barbara, no paperwork in [facility].   Is patient able to stand on own: Yes   Assistive devices needed: Wheelchair   Incontinent: No  Skin issues? (i.e. bed sores, scabs) No   Transportation:  [facility] phone #: [phone]  Recent infection: No    Blood product refusal: No  Able to read / write: Yes  Behavior issues: No  Blood thinners-  Yes make sure check with physician instructions    Diana Drummond RN.now

## 2025-06-19 ENCOUNTER — ANESTHESIA EVENT (OUTPATIENT)
Dept: OPERATING ROOM | Age: 65
End: 2025-06-19
Payer: COMMERCIAL

## 2025-06-19 NOTE — PROGRESS NOTES
6/19 @ 5330 Rhonda from scheduling is following up with Dr. Acosta schedulers to address 0730 case start time. Charge nurse Gladys hayes MD

## 2025-06-20 ENCOUNTER — HOSPITAL ENCOUNTER (OUTPATIENT)
Age: 65
Setting detail: SURGERY ADMIT
Discharge: HOME | End: 2025-06-20
Attending: PODIATRIST | Admitting: PODIATRIST
Payer: COMMERCIAL

## 2025-06-20 ENCOUNTER — ANESTHESIA (OUTPATIENT)
Dept: OPERATING ROOM | Age: 65
End: 2025-06-20
Payer: COMMERCIAL

## 2025-06-20 ENCOUNTER — APPOINTMENT (OUTPATIENT)
Dept: GENERAL RADIOLOGY | Age: 65
End: 2025-06-20
Attending: PODIATRIST
Payer: COMMERCIAL

## 2025-06-20 VITALS
HEIGHT: 77 IN | BODY MASS INDEX: 19.65 KG/M2 | SYSTOLIC BLOOD PRESSURE: 117 MMHG | WEIGHT: 166.4 LBS | OXYGEN SATURATION: 100 % | DIASTOLIC BLOOD PRESSURE: 80 MMHG | TEMPERATURE: 97.1 F | HEART RATE: 71 BPM | RESPIRATION RATE: 12 BRPM

## 2025-06-20 DIAGNOSIS — G89.18 POST-OP PAIN: Primary | ICD-10-CM

## 2025-06-20 DIAGNOSIS — M86.171 OSTEOMYELITIS OF ANKLE OR FOOT, RIGHT, ACUTE (HCC): ICD-10-CM

## 2025-06-20 DIAGNOSIS — I70.235 ATHSCL NATIVE ARTERIES OF RIGHT LEG W ULCER OTH PRT FOOT (HCC): ICD-10-CM

## 2025-06-20 DIAGNOSIS — M67.01 SHORTNESS, TENDON, ACHILLES ACQUIRED, RIGHT: ICD-10-CM

## 2025-06-20 LAB
APTT BLD: 36.4 SEC (ref 22.8–35.8)
INR PPP: 1.04 (ref 0.86–1.14)
PROTHROMBIN TIME: 13.9 SEC (ref 12.1–14.9)

## 2025-06-20 PROCEDURE — 88311 DECALCIFY TISSUE: CPT

## 2025-06-20 PROCEDURE — 6360000002 HC RX W HCPCS: Performed by: PODIATRIST

## 2025-06-20 PROCEDURE — 85610 PROTHROMBIN TIME: CPT

## 2025-06-20 PROCEDURE — 7100000010 HC PHASE II RECOVERY - FIRST 15 MIN: Performed by: PODIATRIST

## 2025-06-20 PROCEDURE — 7100000000 HC PACU RECOVERY - FIRST 15 MIN: Performed by: PODIATRIST

## 2025-06-20 PROCEDURE — 7100000011 HC PHASE II RECOVERY - ADDTL 15 MIN: Performed by: PODIATRIST

## 2025-06-20 PROCEDURE — 88305 TISSUE EXAM BY PATHOLOGIST: CPT

## 2025-06-20 PROCEDURE — 64445 NJX AA&/STRD SCIATIC NRV IMG: CPT | Performed by: ANESTHESIOLOGY

## 2025-06-20 PROCEDURE — 73630 X-RAY EXAM OF FOOT: CPT

## 2025-06-20 PROCEDURE — 2500000003 HC RX 250 WO HCPCS: Performed by: PODIATRIST

## 2025-06-20 PROCEDURE — 3700000001 HC ADD 15 MINUTES (ANESTHESIA): Performed by: PODIATRIST

## 2025-06-20 PROCEDURE — 2709999900 HC NON-CHARGEABLE SUPPLY: Performed by: PODIATRIST

## 2025-06-20 PROCEDURE — 3700000000 HC ANESTHESIA ATTENDED CARE: Performed by: PODIATRIST

## 2025-06-20 PROCEDURE — 2500000003 HC RX 250 WO HCPCS

## 2025-06-20 PROCEDURE — 6360000002 HC RX W HCPCS: Performed by: ANESTHESIOLOGY

## 2025-06-20 PROCEDURE — 3600000004 HC SURGERY LEVEL 4 BASE: Performed by: PODIATRIST

## 2025-06-20 PROCEDURE — 85730 THROMBOPLASTIN TIME PARTIAL: CPT

## 2025-06-20 PROCEDURE — 6360000002 HC RX W HCPCS

## 2025-06-20 PROCEDURE — 2580000003 HC RX 258: Performed by: ANESTHESIOLOGY

## 2025-06-20 PROCEDURE — 7100000001 HC PACU RECOVERY - ADDTL 15 MIN: Performed by: PODIATRIST

## 2025-06-20 PROCEDURE — 3600000014 HC SURGERY LEVEL 4 ADDTL 15MIN: Performed by: PODIATRIST

## 2025-06-20 DEVICE — PATCH AMNION 2 LAYR PROTCT 4 X 4CM STERISHIELD II: Type: IMPLANTABLE DEVICE | Site: FOOT | Status: FUNCTIONAL

## 2025-06-20 RX ORDER — PROMETHAZINE HYDROCHLORIDE 25 MG/1
25 TABLET ORAL EVERY 6 HOURS PRN
Qty: 20 TABLET | Refills: 0 | Status: SHIPPED | OUTPATIENT
Start: 2025-06-20 | End: 2025-06-27

## 2025-06-20 RX ORDER — ONDANSETRON 2 MG/ML
INJECTION INTRAMUSCULAR; INTRAVENOUS
Status: DISCONTINUED | OUTPATIENT
Start: 2025-06-20 | End: 2025-06-20 | Stop reason: SDUPTHER

## 2025-06-20 RX ORDER — SODIUM CHLORIDE, SODIUM LACTATE, POTASSIUM CHLORIDE, CALCIUM CHLORIDE 600; 310; 30; 20 MG/100ML; MG/100ML; MG/100ML; MG/100ML
INJECTION, SOLUTION INTRAVENOUS CONTINUOUS
Status: DISCONTINUED | OUTPATIENT
Start: 2025-06-20 | End: 2025-06-20 | Stop reason: HOSPADM

## 2025-06-20 RX ORDER — FENTANYL CITRATE 50 UG/ML
INJECTION, SOLUTION INTRAMUSCULAR; INTRAVENOUS
Status: COMPLETED | OUTPATIENT
Start: 2025-06-20 | End: 2025-06-20

## 2025-06-20 RX ORDER — PHENYLEPHRINE HCL IN 0.9% NACL 1 MG/10 ML
SYRINGE (ML) INTRAVENOUS
Status: DISCONTINUED | OUTPATIENT
Start: 2025-06-20 | End: 2025-06-20 | Stop reason: SDUPTHER

## 2025-06-20 RX ORDER — FENTANYL CITRATE 50 UG/ML
25 INJECTION, SOLUTION INTRAMUSCULAR; INTRAVENOUS EVERY 5 MIN PRN
Status: DISCONTINUED | OUTPATIENT
Start: 2025-06-20 | End: 2025-06-20 | Stop reason: HOSPADM

## 2025-06-20 RX ORDER — OXYCODONE HYDROCHLORIDE 5 MG/1
5 TABLET ORAL PRN
Status: DISCONTINUED | OUTPATIENT
Start: 2025-06-20 | End: 2025-06-20 | Stop reason: HOSPADM

## 2025-06-20 RX ORDER — FENTANYL CITRATE 50 UG/ML
INJECTION, SOLUTION INTRAMUSCULAR; INTRAVENOUS
Status: COMPLETED
Start: 2025-06-20 | End: 2025-06-20

## 2025-06-20 RX ORDER — SODIUM CHLORIDE 0.9 % (FLUSH) 0.9 %
5-40 SYRINGE (ML) INJECTION EVERY 12 HOURS SCHEDULED
Status: DISCONTINUED | OUTPATIENT
Start: 2025-06-20 | End: 2025-06-20 | Stop reason: HOSPADM

## 2025-06-20 RX ORDER — BUPIVACAINE HYDROCHLORIDE 5 MG/ML
INJECTION, SOLUTION EPIDURAL; INTRACAUDAL; PERINEURAL
Status: COMPLETED | OUTPATIENT
Start: 2025-06-20 | End: 2025-06-20

## 2025-06-20 RX ORDER — NALOXONE HYDROCHLORIDE 0.4 MG/ML
INJECTION, SOLUTION INTRAMUSCULAR; INTRAVENOUS; SUBCUTANEOUS PRN
Status: DISCONTINUED | OUTPATIENT
Start: 2025-06-20 | End: 2025-06-20 | Stop reason: HOSPADM

## 2025-06-20 RX ORDER — ROCURONIUM BROMIDE 10 MG/ML
INJECTION, SOLUTION INTRAVENOUS
Status: DISCONTINUED | OUTPATIENT
Start: 2025-06-20 | End: 2025-06-20 | Stop reason: SDUPTHER

## 2025-06-20 RX ORDER — LABETALOL HYDROCHLORIDE 5 MG/ML
10 INJECTION, SOLUTION INTRAVENOUS
Status: DISCONTINUED | OUTPATIENT
Start: 2025-06-20 | End: 2025-06-20 | Stop reason: HOSPADM

## 2025-06-20 RX ORDER — PROPOFOL 10 MG/ML
INJECTION, EMULSION INTRAVENOUS
Status: DISCONTINUED | OUTPATIENT
Start: 2025-06-20 | End: 2025-06-20 | Stop reason: SDUPTHER

## 2025-06-20 RX ORDER — PROCHLORPERAZINE EDISYLATE 5 MG/ML
5 INJECTION INTRAMUSCULAR; INTRAVENOUS
Status: DISCONTINUED | OUTPATIENT
Start: 2025-06-20 | End: 2025-06-20 | Stop reason: HOSPADM

## 2025-06-20 RX ORDER — SODIUM CHLORIDE 0.9 % (FLUSH) 0.9 %
5-40 SYRINGE (ML) INJECTION PRN
Status: DISCONTINUED | OUTPATIENT
Start: 2025-06-20 | End: 2025-06-20 | Stop reason: HOSPADM

## 2025-06-20 RX ORDER — DEXAMETHASONE SODIUM PHOSPHATE 4 MG/ML
INJECTION, SOLUTION INTRA-ARTICULAR; INTRALESIONAL; INTRAMUSCULAR; INTRAVENOUS; SOFT TISSUE
Status: DISCONTINUED | OUTPATIENT
Start: 2025-06-20 | End: 2025-06-20 | Stop reason: SDUPTHER

## 2025-06-20 RX ORDER — MAGNESIUM HYDROXIDE 1200 MG/15ML
LIQUID ORAL CONTINUOUS PRN
Status: DISCONTINUED | OUTPATIENT
Start: 2025-06-20 | End: 2025-06-20 | Stop reason: HOSPADM

## 2025-06-20 RX ORDER — OXYCODONE AND ACETAMINOPHEN 5; 325 MG/1; MG/1
1 TABLET ORAL EVERY 6 HOURS PRN
Qty: 28 TABLET | Refills: 0 | Status: SHIPPED | OUTPATIENT
Start: 2025-06-20 | End: 2025-06-27

## 2025-06-20 RX ORDER — LIDOCAINE HYDROCHLORIDE 20 MG/ML
INJECTION, SOLUTION INTRAVENOUS
Status: DISCONTINUED | OUTPATIENT
Start: 2025-06-20 | End: 2025-06-20 | Stop reason: SDUPTHER

## 2025-06-20 RX ORDER — ASPIRIN 325 MG
325 TABLET ORAL 2 TIMES DAILY
Qty: 60 TABLET | Refills: 3 | Status: SHIPPED | OUTPATIENT
Start: 2025-06-20

## 2025-06-20 RX ORDER — BUPIVACAINE HYDROCHLORIDE AND EPINEPHRINE 5; 5 MG/ML; UG/ML
INJECTION, SOLUTION EPIDURAL; INTRACAUDAL; PERINEURAL PRN
Status: DISCONTINUED | OUTPATIENT
Start: 2025-06-20 | End: 2025-06-20 | Stop reason: HOSPADM

## 2025-06-20 RX ORDER — HYDROMORPHONE HYDROCHLORIDE 1 MG/ML
0.5 INJECTION, SOLUTION INTRAMUSCULAR; INTRAVENOUS; SUBCUTANEOUS EVERY 5 MIN PRN
Status: DISCONTINUED | OUTPATIENT
Start: 2025-06-20 | End: 2025-06-20 | Stop reason: HOSPADM

## 2025-06-20 RX ORDER — SODIUM CHLORIDE 9 MG/ML
INJECTION, SOLUTION INTRAVENOUS PRN
Status: DISCONTINUED | OUTPATIENT
Start: 2025-06-20 | End: 2025-06-20 | Stop reason: HOSPADM

## 2025-06-20 RX ORDER — BUPIVACAINE HYDROCHLORIDE 5 MG/ML
INJECTION, SOLUTION EPIDURAL; INTRACAUDAL; PERINEURAL
Status: COMPLETED
Start: 2025-06-20 | End: 2025-06-20

## 2025-06-20 RX ORDER — OXYCODONE HYDROCHLORIDE 5 MG/1
10 TABLET ORAL PRN
Status: DISCONTINUED | OUTPATIENT
Start: 2025-06-20 | End: 2025-06-20 | Stop reason: HOSPADM

## 2025-06-20 RX ADMIN — SUGAMMADEX 200 MG: 100 INJECTION, SOLUTION INTRAVENOUS at 09:38

## 2025-06-20 RX ADMIN — PROPOFOL 150 MG: 10 INJECTION, EMULSION INTRAVENOUS at 08:43

## 2025-06-20 RX ADMIN — BUPIVACAINE HYDROCHLORIDE 20 ML: 5 INJECTION, SOLUTION EPIDURAL; INTRACAUDAL; PERINEURAL at 08:06

## 2025-06-20 RX ADMIN — Medication 100 MCG: at 08:57

## 2025-06-20 RX ADMIN — FENTANYL CITRATE 50 MCG: 50 INJECTION, SOLUTION INTRAMUSCULAR; INTRAVENOUS at 09:36

## 2025-06-20 RX ADMIN — WATER 2000 MG: 1 INJECTION INTRAMUSCULAR; INTRAVENOUS; SUBCUTANEOUS at 08:51

## 2025-06-20 RX ADMIN — LIDOCAINE HYDROCHLORIDE 80 MG: 20 INJECTION, SOLUTION INTRAVENOUS at 08:43

## 2025-06-20 RX ADMIN — FENTANYL CITRATE 100 MCG: 50 INJECTION, SOLUTION INTRAMUSCULAR; INTRAVENOUS at 08:06

## 2025-06-20 RX ADMIN — SODIUM CHLORIDE, SODIUM LACTATE, POTASSIUM CHLORIDE, AND CALCIUM CHLORIDE: .6; .31; .03; .02 INJECTION, SOLUTION INTRAVENOUS at 08:07

## 2025-06-20 RX ADMIN — SODIUM CHLORIDE, SODIUM LACTATE, POTASSIUM CHLORIDE, AND CALCIUM CHLORIDE: .6; .31; .03; .02 INJECTION, SOLUTION INTRAVENOUS at 09:44

## 2025-06-20 RX ADMIN — PROPOFOL 50 MG: 10 INJECTION, EMULSION INTRAVENOUS at 09:36

## 2025-06-20 RX ADMIN — ONDANSETRON 4 MG: 2 INJECTION, SOLUTION INTRAMUSCULAR; INTRAVENOUS at 08:51

## 2025-06-20 RX ADMIN — Medication 100 MCG: at 09:03

## 2025-06-20 RX ADMIN — ROCURONIUM BROMIDE 50 MG: 10 INJECTION, SOLUTION INTRAVENOUS at 08:43

## 2025-06-20 RX ADMIN — DEXAMETHASONE SODIUM PHOSPHATE 4 MG: 4 INJECTION INTRA-ARTICULAR; INTRALESIONAL; INTRAMUSCULAR; INTRAVENOUS; SOFT TISSUE at 08:51

## 2025-06-20 ASSESSMENT — PAIN DESCRIPTION - DESCRIPTORS: DESCRIPTORS: DISCOMFORT

## 2025-06-20 ASSESSMENT — PAIN SCALES - GENERAL
PAINLEVEL_OUTOF10: 0
PAINLEVEL_OUTOF10: 0

## 2025-06-20 ASSESSMENT — PAIN - FUNCTIONAL ASSESSMENT
PAIN_FUNCTIONAL_ASSESSMENT: 0-10
PAIN_FUNCTIONAL_ASSESSMENT: 0-10

## 2025-06-20 ASSESSMENT — LIFESTYLE VARIABLES: SMOKING_STATUS: 1

## 2025-06-20 NOTE — PROGRESS NOTES
Patient admitted to PACU #3 from OR per bed at 0957 s/p LISFRANC AMPUTATION-RIGHT FOOT, TENDO-ACHILLES LENGHTENING- RIGHT LOWER LEG, APPLICATION OF BELOW KNEE SPLINT- RIGHT LOWER LEG - Right. Report received at bedside in PACU from CRNA, OR nurse and Dr. Penn. Patient was reported to be hypotensive during surgery which he received treatment for, see anesthesia record. No complications reported. Patient received a pre-op popliteal peripheral nerve block per Dr. Cox. Patient connected to PACU monitoring equipment. IVF's infusing with site unremarkable. Patient arrived to PACU alert and awake with no difficulties noted and no pain verbalized. RLE surgical dressing with posterior splint, DSD and Ace Wrap which remains C,D,I with no drainage noted. No further changes. Will continue to monitor.

## 2025-06-20 NOTE — BRIEF OP NOTE
Brief Postoperative Note      Patient: Natan Lynch  YOB: 1960  MRN: [id number]    Date of Procedure: 6/20/2025    Pre-Op Diagnosis Codes:      * Osteomyelitis of ankle or foot, right, acute ([facility]) [M86.171]     * Athscl native arteries of right leg w ulcer oth prt foot (HCC) [I70.235]     * Shortness, tendon, Achilles acquired, right [M67.01]    Post-Op Diagnosis: Same       Procedure(s):  [id number] LISFRANC AMPUTATION-RIGHT FOOT, TENDO-ACHILLES LENGHTENING- RIGHT LOWER LEG,   2)53246 APPLICATION OF BELOW KNEE SPLINT- RIGHT LOWER LEG    Surgeon(s):  Leon Acosta DPM    Assistant:  Resident: Faye Penn DPM; Tarik Harrell DPM    Anesthesia: General with popliteal block    Hemostasis:Electrocautery, and Anatomic Dissection     Injectables: none    Materials: 3-0 Vicryl, 3-0 Nylon    Implants: 4x4 cm sterishield    Estimated Blood Loss (mL): 250    Complications: None    Specimens:   ID Type Source Tests Collected by Time Destination   A : A) RIGHT LOWER FOOT Tissue Foot SURGICAL PATHOLOGY Leon Acosta DPM 6/20/2025 0908        Implants:  Implant Name Type Inv. Item Serial No.  Lot No. LRB No. Used Action   PATCH AMNION 2 LAYR PROTCT 4 X 4CM STERISHIELD II - [id number]  PATCH AMNION 2 LAYR PROTCT 4 X 4CM STERISHIELD II [id number] BONE BANK ALLOGRAFTS- [id number] Right 1 Implanted         Drains: * No LDAs found *    Findings:  Infection Present At Time Of Surgery (PATOS) (choose all levels that have infection present):  - Organ Space infection (below fascia) present as evidenced by osteomyelitis  Other Findings: No purulence encountered during this procedure. Cuneiforms were noted to be white, shiny and hard and felt to be consistent with healthy bone.    Faye Penn DPM   Chief Podiatric Resident PGY3  Pager [phone] or Perfect[employer]  6/20/2025, 9:45 AM     Electronically signed by Faye Penn DPM on 6/20/2025 at 9:42 AM

## 2025-06-20 NOTE — PROGRESS NOTES
Anesthesia Dr. Cox here to do block.  O2 placed.  Start time:0805  Stop time:0813   Tolerated Well    See flow sheet for vital signs.    -Armband applied  -Call light within reach  -IV patent with LR infusing  -Preop medications administered per MAR  -All patient and visitor concerns addressed  -Visitor signed up for text message updates  -Pt states no further needs at this time

## 2025-06-20 NOTE — H&P
Date of Surgery Update:  Natan Lynch was seen, history and physical examination reviewed, and patient examined by Yousuf Maciel MD on on 5/30/2025. There have been no significant clinical changes since the completion of the previous history and physical.    The nature of the procedure, possible complications, alternative forms of therapy, post-op course, and post-op goals have been explained to patient (or appropriate guardian) and patient's family and understanding was verbalized.  All questions have been answered. The consent has been signed.  Patient's surgical site has been marked. Patient wishes to proceed with surgery.    Discussed with Dr. Leon Acosta DPM.    Faye Penn DPM  06/20/25  6:43 AM

## 2025-06-20 NOTE — PROGRESS NOTES
Ambulatory Surgery/Procedure Discharge Note    Vitals:    06/20/25 1121   BP: 117/80   Pulse: 71   Resp: 12   Temp: 97.1 °F (36.2 °C)   SpO2: 100%   Pt meets discharge criteria per Daniele score.     In: 1100 [I.V.:1100]  Out: 50     Restroom use offered before discharge.  Yes    Pain assessment:  present - adequately treated  Pain Level: 2    Pt and S.O./family states \"ready to go home\". Pt alert and oriented x4. IV removed. Denies N/V or pain. Dressing to right foot c/d/I.  Pt wearing a dependsPt tolerating po intake. Discharge instructions given to pt and sister with pt permission. Pt and sister verbalized understanding of all instructions. Left with all belongings and discharge instructions. Prescriptions picked up by sister at [facility].          Patient discharged to home/self care. Patient discharged via wheel chair by transporter to waiting family/S.O.       6/20/2025 12:00 PM

## 2025-06-20 NOTE — DISCHARGE INSTRUCTIONS
[facility]   [address]  [place], [place] [address]  [phone]    Dr. Leon Acosta                                             Post Operative Instructions    1.  Have Prescriptions filled and take as directed.  All medications should be taken with food or milk.    2.  Keep foot elevated six inches above the level of the heart.  Support feet, legs, and knees with pillows.    3.  KEEP FOOT ELEVATED AS MUCH AS POSSIBLE UNTIL YOUR NEXT VISIT    4.  Place an ice pack on the bandaged site for 15 minutes every hour while awake    5.  Keep dressing clean, dry, and intact.  DO NOT REMOVE DRESSING.  CALL THE OFFICE IF BANDAGE COMES OFF.    6.  For the first 7 days after surgery, take temperature by mouth three times a day.  Call the office if greater than 101F.    7.  Ambulate with non-weight bearing to the right lower extremity with surgical shoe or crutches / walker.    8.  All instructions are to be followed until otherwise instructed by your surgeon.    9.  Call our office if you have any concerns or questions which arise.  Our phones are answered 24 hours a day.  [phone]    10.  Your first post-operative appointment is scheduled, call the office for appointment date and time if not known prior to today.                  PERIPHERAL NERVE BLOCK INSTRUCTIONS     Please remember while having a nerve block you are at an increased risk for BALANCE ISSUES AND FALLS  You were given a nerve block today from the anesthesiologist. Most nerve blocks last anywhere from 6-36 hours.  You should start taking your pain medication before the block wears off or when you first begin feeling discomfort. It takes at least 30-60 minutes for a pain pill to take effect. Pain medications should be taken with food.  Consider setting an alarm through the night to help manage your pain level so you do not wake up with too much pain.   Pain medicines can cause more sedation and decrease your

## 2025-06-20 NOTE — ANESTHESIA PRE PROCEDURE
Department of Anesthesiology  Preprocedure Note       Name:  Natan Lynch   Age:  64 y.o.  :  1960                                          MRN:  [id number]         Date:  2025      Surgeon: Surgeon(s):  Leon Acosta DPM    Procedure: Procedure(s):  LISFRANC AMPUTATION-RIGHT FOOT, TENDO-ACHILLES LENGHTENING- RIGHT LOWER LEG, APPLICATION OF BELOW KNEE SPLINT- RIGHT LOWER LEG  .    Medications prior to admission:   Prior to Admission medications    Medication Sig Start Date End Date Taking? Authorizing Provider   aspirin 81 MG chewable tablet Take 1 tablet by mouth daily   Yes Rome Owusu MD   docusate sodium (COLACE) 100 MG capsule Take 1 capsule by mouth 2 times daily as needed for Constipation   Yes Rome Owusu MD   oxyCODONE (ROXICODONE) 5 MG immediate release tablet Take 1 tablet by mouth every 4 hours as needed for Pain.   Yes Rome Owusu MD   loperamide (IMODIUM) 2 MG capsule Take 1 capsule by mouth 4 times daily as needed for Diarrhea   Yes Rome Owusu MD   acetaminophen (TYLENOL) 500 MG tablet Take 1 tablet by mouth 4 times daily as needed for Pain 24  Yes oRme Rosa MD   polyethylene glycol (MIRALAX) 17 g PACK packet Take 17 g by mouth daily    Rome Owusu MD   Lactobacillus (ACIDOPHILUS) CAPS capsule Take 1 capsule by mouth daily  Patient not taking: Reported on 2025    Rome Owusu MD   gabapentin (NEURONTIN) 100 MG capsule Take 1 capsule by mouth 3 times daily as needed (Pain Mild (1-3), allowed for higher pain score per patient request) for up to 7 days. 4/24/25 6/10/25  Jasmin Salomon MD   varenicline (CHANTIX) 0.5 MG tablet Take 1-2 tablets by mouth See Admin Instructions 0.5mg DAILY for 3 days followed by 0.5mg TWICE DAILY for 4 days followed by 1mg TWICE DAILY  Patient not taking: Reported on 2025 4/3/25   Casandra Alvarado MD   rivaroxaban (XARELTO) 20 MG TABS tablet Take 1 tablet by mouth daily (with

## 2025-06-20 NOTE — ANESTHESIA PROCEDURE NOTES
Peripheral Block    Patient location during procedure: pre-op  Reason for block: post-op pain management and at surgeon's request  Start time: 6/20/2025 8:06 AM  End time: 6/20/2025 8:10 AM  Staffing  Anesthesiologist: Leon Cox DO  Performed by: Leon Cox DO  Authorized by: Leon Cox DO    Preanesthetic Checklist  Completed: patient identified, IV checked, site marked, risks and benefits discussed, surgical/procedural consents, equipment checked, pre-op evaluation, timeout performed, anesthesia consent given, oxygen available, monitors applied/VS acknowledged, fire risk safety assessment completed and verbalized and blood product R/B/A discussed and consented  Peripheral Block   Patient position: right lateral decubitus  Prep: ChloraPrep  Provider prep: mask and sterile gloves  Patient monitoring: cardiac monitor, continuous pulse ox, continuous capnometry, frequent blood pressure checks, IV access, oxygen and responsive to questions  Block type: Sciatic  Popliteal  Laterality: right  Injection technique: single-shot  Guidance: ultrasound guided    Needle   Needle type: insulated echogenic nerve stimulator needle   Needle gauge: 21 G  Needle localization: ultrasound guidance  Needle length: 10 cm  Assessment   Injection assessment: negative aspiration for heme, no paresthesia on injection, local visualized surrounding nerve on ultrasound and no intravascular symptoms  Paresthesia pain: none  Slow fractionated injection: yes  Hemodynamics: stable  Outcomes: uncomplicated and patient tolerated procedure well    Medications Administered  fentaNYL (SUBLIMAZE) injection - IntraVENous   100 mcg - 6/20/2025 8:06:00 AM  BUPivacaine (MARCAINE) PF injection 0.5% - Perineural   20 mL - 6/20/2025 8:06:00 AM

## 2025-06-20 NOTE — PROGRESS NOTES
PACU Transfer to [facility]    Vitals:    06/20/25 1100   BP: 117/75   Pulse: 70   Resp: 11   Temp: 98.2 °F (36.8 °C)   SpO2: 100%         Intake/Output Summary (Last 24 hours) at 6/20/2025 1116  Last data filed at 6/20/2025 0953  Gross per 24 hour   Intake 1100 ml   Output 50 ml   Net 1050 ml       Pain assessment:  none  Pain Level: 0    Patient transferred to care of [facility] RN.  LEFT MESSAGE ON PATIENT'S SISTER'S CELL PHONE WITH PATIENT UPDATE. PATIENT'S PRESCRIPTIONS SENT TO [facility]. SURGICAL SHOES X 2 SENT  WITH PATIENT TO SAME DAY. NO FURTHER CHANGES.   6/20/2025 11:16 AM

## 2025-06-20 NOTE — ANESTHESIA POSTPROCEDURE EVALUATION
Department of Anesthesiology  Postprocedure Note    Patient: Natan Lynch  MRN: [id number]  YOB: 1960  Date of evaluation: 6/20/2025    Procedure Summary       Date: 06/20/25 Room / Location: [address]    Anesthesia Start: 0839 Anesthesia Stop: 1001    Procedure: LISFRANC AMPUTATION-RIGHT FOOT, TENDO-ACHILLES LENGHTENING- RIGHT LOWER LEG, APPLICATION OF BELOW KNEE SPLINT- RIGHT LOWER LEG (Right: Foot) Diagnosis:       Osteomyelitis of ankle or foot, right, acute (HCC)      Athscl native arteries of right leg w ulcer oth prt foot (HCC)      Shortness, tendon, Achilles acquired, right      (Osteomyelitis of ankle or foot, right, acute (HCC) [M86.171])      (Athscl native arteries of right leg w ulcer oth prt foot (HCC) [I70.235])      (Shortness, tendon, Achilles acquired, right [M67.01])    Surgeons: Leon Acosta DPM Responsible Provider: Leon Cox DO    Anesthesia Type: general, regional ASA Status: 3            Anesthesia Type: No value filed.    Daniele Phase I: Daniele Score: 10    Daniele Phase II: Daniele Score: 10    Vitals:    06/20/25 1121   BP: 117/80   Pulse: 71   Resp: 12   Temp: 97.1 °F (36.2 °C)   SpO2: 100%       Anesthesia Post Evaluation    Patient location during evaluation: PACU  Patient participation: complete - patient participated  Level of consciousness: awake and awake and alert  Pain score: 0  Airway patency: patent  Nausea & Vomiting: no nausea and no vomiting  Cardiovascular status: hemodynamically stable  Respiratory status: acceptable  Hydration status: euvolemic  Pain management: adequate and satisfactory to patient    No notable events documented.

## 2025-06-21 NOTE — OP NOTE
Operative Note      Patient: Natan Lynch  YOB: 1960  MRN: [id number]    Date of Procedure: 6/20/2025    Pre-Op Diagnosis Codes:      * Osteomyelitis of ankle or foot, right, acute ([facility]) [M86.171]     * Athscl native arteries of right leg w ulcer oth prt foot ([facility]) [I70.235]     * Shortness, tendon, Achilles acquired, right [M67.01]    Post-Op Diagnosis: Same       Procedure(s):  1)28535 LISFRANC AMPUTATION-RIGHT FOOT, TENDO-ACHILLES LENGHTENING- RIGHT LOWER LEG,   2)40692 APPLICATION OF BELOW KNEE SPLINT- RIGHT LOWER LEG     Surgeon(s):  Leon Acosta DPM     Assistant:  Resident: Faye Penn DPM; Tarik Harrell DPM     Anesthesia: General with popliteal block     Hemostasis:Electrocautery, and Anatomic Dissection      Injectables: none     Materials: 3-0 Vicryl, 3-0 Nylon     Implants: 4x4 cm sterishield     Estimated Blood Loss (mL): 250     Complications: None     Specimens:   ID Type Source Tests Collected by Time Destination   A : A) RIGHT LOWER FOOT Tissue Foot SURGICAL PATHOLOGY Leon Acosta DPM 6/20/2025 0908           Implants:  Implant Name Type Inv. Item Serial No.  Lot No. LRB No. Used Action   PATCH AMNION 2 LAYR PROTCT 4 X 4CM STERISHIELD II - [id number]   PATCH AMNION 2 LAYR PROTCT 4 X 4CM STERISHIELD II [id number] BONE BANK ALLOGRAFTS- [id number] Right 1 Implanted          Drains: * No LDAs found *     Findings:  Infection Present At Time Of Surgery (PATOS) (choose all levels that have infection present):  - Organ Space infection (below fascia) present as evidenced by osteomyelitis  Other Findings: No purulence encountered during this procedure. Cuneiforms were noted to be white, shiny and hard and felt to be consistent with healthy bone.      INDICATIONS FOR PROCEDURE: This patient has signs and symptoms clinically and radiographically consistent with the above mentioned preoperative diagnosis. Having failed conservative treatment, it was

## 2025-06-27 ENCOUNTER — OFFICE VISIT (OUTPATIENT)
Dept: INTERNAL MEDICINE CLINIC | Age: 65
End: 2025-06-27
Payer: COMMERCIAL

## 2025-06-27 VITALS — TEMPERATURE: 98.8 F

## 2025-06-27 DIAGNOSIS — Z98.890 POST-OPERATIVE STATE: ICD-10-CM

## 2025-06-27 DIAGNOSIS — I73.9 PERIPHERAL ARTERIAL DISEASE: ICD-10-CM

## 2025-06-27 DIAGNOSIS — G89.18 POST-OP PAIN: Primary | ICD-10-CM

## 2025-06-27 DIAGNOSIS — R23.4 ESCHAR OF FOOT: ICD-10-CM

## 2025-06-27 DIAGNOSIS — Z89.431 HISTORY OF LISFRANC AMPUTATION OF RIGHT FOOT (HCC): ICD-10-CM

## 2025-06-27 PROCEDURE — 99213 OFFICE O/P EST LOW 20 MIN: CPT

## 2025-06-27 RX ORDER — GABAPENTIN 300 MG/1
300 CAPSULE ORAL NIGHTLY
Qty: 90 CAPSULE | Refills: 1 | Status: ON HOLD | OUTPATIENT
Start: 2025-06-27 | End: 2025-12-24

## 2025-06-27 RX ORDER — OXYCODONE AND ACETAMINOPHEN 7.5; 325 MG/1; MG/1
1 TABLET ORAL EVERY 6 HOURS PRN
Qty: 28 TABLET | Refills: 0 | Status: ON HOLD | OUTPATIENT
Start: 2025-06-27 | End: 2025-07-04

## 2025-06-27 NOTE — PATIENT INSTRUCTIONS
Letter of instructions sent with patient    Scripts for Gabapentin and Percocet sent with patient.

## 2025-06-28 NOTE — PROGRESS NOTES
Department of Podiatry  Resident Progress Note    Natan Lynch  Allergies: Patient has no known allergies.    SUBJECTIVE  The patient is a 64 y.o. male who presents for his first postoperative valuation s/p Lisfranc amputation at the [facility] (DOS 6/20/2025) with Dr. Leon Acosta DPM.  Patient remains at the SNF to aid in his nonweightbearing status to the right lower extremity.  Patient states this facility is very spotty with its care and wound changing capabilities.  He is ultimately unhappy with the care he is receiving at his SNF.  Patient has been in extreme pain since the procedure last week.  He notes to be diligent about his pain medication and is requesting more at this time as well as an increase in the gabapentin.  Patient states the wound dressing has been kept intact since the surgery and to have significant amount of blood through the dressing.  He is still receiving daily dressing changes consisting of saline soaked Santyl to eschar in the left foot.  He has no other pedal complaints.  Denies N/V/CP/SOB.      Past Medical History:    PMH of PAD, PVD, DVT/PE on ATC     REVIEW OF SYSTEMS:  A 12 point review of systems is unremarkable with the exception of the chief complaint. Patient specifically denies nausea, vomiting, fever, chills, shortness of breath, chest pain, abdominal pain, constipation, or difficulty urinating.     OBJECTIVE  Patient presents to clinic today unaccompanied and unassisted wearing boots. Patient is Aox3 and appears in NAD.    VASCULAR: DP and PT pulses are faintly palpable 1/2 b/l.  Upon prior Doppler examination DP and PT pulses bilateral are found to be monophasic b/l however this was done prior to revascularization.  CFT is delayed to the digits of the foot b/l. Skin temperature is cool to cool from proximal to distal with no focal calor.  Significant coolness appreciated to the right distal forefoot compared to the left foot.  No edema. No pain with calf

## 2025-06-30 ENCOUNTER — TELEPHONE (OUTPATIENT)
Dept: INTERNAL MEDICINE CLINIC | Age: 65
End: 2025-06-30

## 2025-06-30 NOTE — TELEPHONE ENCOUNTER
Spoke to Dr Ceci Ayon . Patient called form his nursing home room wanting to discuss his pain medication  with DR. Acosta . He was just given percocet  7.5 and gabapentin on Friday . I spke to Dr. Flores states he will try to contact Dr. Acosta. In the mean time I have repeatedly tried to contact his nursing home nurse . Waiting for her to call me back.

## 2025-06-30 NOTE — TELEPHONE ENCOUNTER
Patient would like to speak to Dr. Acosta about his medication and foot pain     Patient can reached at [phone]

## 2025-07-01 NOTE — TELEPHONE ENCOUNTER
Dr. Ayon states he spoke to Dr. Acosta No changes will be made to the pain mediation he is currently taking per Dr. Acosta . I spoke to Awa from his nursing home [facility]. Made her  aware patient called us on his own and wanted pain medications changed ; and that Dr. Acosta states no changes at this time .  Awa  states he did bring  in the 2 prescriptions , percocet and gabapentin he was given  last Friday in podiatry clinic and gave them to his nurse .  .  I also asked Awa to make patient aware in the future he needs to have one of the staff call us if he needs something  . States she will make him aware.

## 2025-07-03 ENCOUNTER — TELEPHONE (OUTPATIENT)
Dept: VASCULAR SURGERY | Age: 65
End: 2025-07-03

## 2025-07-03 ENCOUNTER — APPOINTMENT (OUTPATIENT)
Dept: GENERAL RADIOLOGY | Age: 65
DRG: 305 | End: 2025-07-03
Payer: COMMERCIAL

## 2025-07-03 ENCOUNTER — HOSPITAL ENCOUNTER (INPATIENT)
Age: 65
LOS: 13 days | Discharge: HOME | DRG: 305 | End: 2025-07-16
Attending: EMERGENCY MEDICINE | Admitting: INTERNAL MEDICINE
Payer: COMMERCIAL

## 2025-07-03 DIAGNOSIS — M86.9 OSTEOMYELITIS OF RIGHT FOOT, UNSPECIFIED TYPE (HCC): ICD-10-CM

## 2025-07-03 DIAGNOSIS — T81.30XA WOUND DEHISCENCE: ICD-10-CM

## 2025-07-03 DIAGNOSIS — I73.9 PERIPHERAL ARTERY DISEASE: ICD-10-CM

## 2025-07-03 DIAGNOSIS — F41.9 ANXIETY: ICD-10-CM

## 2025-07-03 DIAGNOSIS — Z89.511 STATUS POST BELOW KNEE AMPUTATION OF RIGHT LOWER EXTREMITY (HCC): ICD-10-CM

## 2025-07-03 DIAGNOSIS — T81.31XA DEHISCENCE OF OPERATIVE WOUND, INITIAL ENCOUNTER: Primary | ICD-10-CM

## 2025-07-03 LAB
ANION GAP SERPL CALCULATED.3IONS-SCNC: 12 MMOL/L (ref 3–16)
BASOPHILS # BLD: 0 K/UL (ref 0–0.2)
BASOPHILS NFR BLD: 0 %
BUN SERPL-MCNC: 21 MG/DL (ref 7–20)
CALCIUM SERPL-MCNC: 9.8 MG/DL (ref 8.3–10.6)
CHLORIDE SERPL-SCNC: 97 MMOL/L (ref 99–110)
CO2 SERPL-SCNC: 25 MMOL/L (ref 21–32)
CREAT SERPL-MCNC: 0.7 MG/DL (ref 0.8–1.3)
CRP SERPL-MCNC: 115 MG/L (ref 0–5.1)
DEPRECATED RDW RBC AUTO: 12.7 % (ref 12.4–15.4)
EOSINOPHIL # BLD: 0 K/UL (ref 0–0.6)
EOSINOPHIL NFR BLD: 0 %
ERYTHROCYTE [SEDIMENTATION RATE] IN BLOOD BY WESTERGREN METHOD: 61 MM/HR (ref 0–20)
GFR SERPLBLD CREATININE-BSD FMLA CKD-EPI: >90 ML/MIN/{1.73_M2}
GLUCOSE SERPL-MCNC: 172 MG/DL (ref 70–99)
HCT VFR BLD AUTO: 24.7 % (ref 40.5–52.5)
HGB BLD-MCNC: 8.3 G/DL (ref 13.5–17.5)
LYMPHOCYTES # BLD: 0.2 K/UL (ref 1–5.1)
LYMPHOCYTES NFR BLD: 1 %
MCH RBC QN AUTO: 28.7 PG (ref 26–34)
MCHC RBC AUTO-ENTMCNC: 33.6 G/DL (ref 31–36)
MCV RBC AUTO: 85.4 FL (ref 80–100)
MONOCYTES # BLD: 1.6 K/UL (ref 0–1.3)
MONOCYTES NFR BLD: 8 %
MYELOCYTES NFR BLD MANUAL: 2 %
NEUTROPHILS # BLD: 18.2 K/UL (ref 1.7–7.7)
NEUTROPHILS NFR BLD: 89 %
PLATELET # BLD AUTO: 761 K/UL (ref 135–450)
PMV BLD AUTO: 6.9 FL (ref 5–10.5)
POTASSIUM SERPL-SCNC: 4.7 MMOL/L (ref 3.5–5.1)
RBC # BLD AUTO: 2.9 M/UL (ref 4.2–5.9)
SODIUM SERPL-SCNC: 134 MMOL/L (ref 136–145)
WBC # BLD AUTO: 20 K/UL (ref 4–11)

## 2025-07-03 PROCEDURE — 85652 RBC SED RATE AUTOMATED: CPT

## 2025-07-03 PROCEDURE — 99255 IP/OBS CONSLTJ NEW/EST HI 80: CPT | Performed by: SURGERY

## 2025-07-03 PROCEDURE — 2580000003 HC RX 258: Performed by: INTERNAL MEDICINE

## 2025-07-03 PROCEDURE — 99285 EMERGENCY DEPT VISIT HI MDM: CPT

## 2025-07-03 PROCEDURE — 87040 BLOOD CULTURE FOR BACTERIA: CPT

## 2025-07-03 PROCEDURE — 6370000000 HC RX 637 (ALT 250 FOR IP): Performed by: INTERNAL MEDICINE

## 2025-07-03 PROCEDURE — 85025 COMPLETE CBC W/AUTO DIFF WBC: CPT

## 2025-07-03 PROCEDURE — 86140 C-REACTIVE PROTEIN: CPT

## 2025-07-03 PROCEDURE — 6360000002 HC RX W HCPCS: Performed by: INTERNAL MEDICINE

## 2025-07-03 PROCEDURE — 80048 BASIC METABOLIC PNL TOTAL CA: CPT

## 2025-07-03 PROCEDURE — 1200000000 HC SEMI PRIVATE

## 2025-07-03 PROCEDURE — 2500000003 HC RX 250 WO HCPCS: Performed by: INTERNAL MEDICINE

## 2025-07-03 PROCEDURE — 73630 X-RAY EXAM OF FOOT: CPT

## 2025-07-03 RX ORDER — SODIUM CHLORIDE 0.9 % (FLUSH) 0.9 %
5-40 SYRINGE (ML) INJECTION PRN
Status: DISCONTINUED | OUTPATIENT
Start: 2025-07-03 | End: 2025-07-16 | Stop reason: HOSPADM

## 2025-07-03 RX ORDER — SODIUM CHLORIDE 0.9 % (FLUSH) 0.9 %
5-40 SYRINGE (ML) INJECTION EVERY 12 HOURS SCHEDULED
Status: DISCONTINUED | OUTPATIENT
Start: 2025-07-03 | End: 2025-07-16 | Stop reason: HOSPADM

## 2025-07-03 RX ORDER — ONDANSETRON 2 MG/ML
4 INJECTION INTRAMUSCULAR; INTRAVENOUS EVERY 6 HOURS PRN
Status: DISCONTINUED | OUTPATIENT
Start: 2025-07-03 | End: 2025-07-16 | Stop reason: HOSPADM

## 2025-07-03 RX ORDER — HYDROMORPHONE HYDROCHLORIDE 1 MG/ML
0.5 INJECTION, SOLUTION INTRAMUSCULAR; INTRAVENOUS; SUBCUTANEOUS
Status: COMPLETED | OUTPATIENT
Start: 2025-07-03 | End: 2025-07-05

## 2025-07-03 RX ORDER — ACETAMINOPHEN 325 MG/1
650 TABLET ORAL EVERY 6 HOURS PRN
Status: DISCONTINUED | OUTPATIENT
Start: 2025-07-03 | End: 2025-07-05

## 2025-07-03 RX ORDER — ONDANSETRON 4 MG/1
4 TABLET, ORALLY DISINTEGRATING ORAL EVERY 8 HOURS PRN
Status: DISCONTINUED | OUTPATIENT
Start: 2025-07-03 | End: 2025-07-16 | Stop reason: HOSPADM

## 2025-07-03 RX ORDER — POTASSIUM CHLORIDE 1500 MG/1
40 TABLET, EXTENDED RELEASE ORAL PRN
Status: ACTIVE | OUTPATIENT
Start: 2025-07-03 | End: 2025-07-04

## 2025-07-03 RX ORDER — GUAIFENESIN AND DEXTROMETHORPHAN HYDROBROMIDE 10; 100 MG/5ML; MG/5ML
10 SYRUP ORAL EVERY 6 HOURS PRN
COMMUNITY

## 2025-07-03 RX ORDER — GABAPENTIN 100 MG/1
100 CAPSULE ORAL EVERY 8 HOURS PRN
COMMUNITY

## 2025-07-03 RX ORDER — CASTOR OIL AND BALSAM, PERU 788; 87 MG/G; MG/G
OINTMENT TOPICAL EVERY 8 HOURS
Status: DISCONTINUED | OUTPATIENT
Start: 2025-07-03 | End: 2025-07-16 | Stop reason: HOSPADM

## 2025-07-03 RX ORDER — SODIUM CHLORIDE 9 MG/ML
INJECTION, SOLUTION INTRAVENOUS CONTINUOUS
Status: ACTIVE | OUTPATIENT
Start: 2025-07-04 | End: 2025-07-04

## 2025-07-03 RX ORDER — DOCUSATE SODIUM 100 MG/1
100 CAPSULE, LIQUID FILLED ORAL 2 TIMES DAILY PRN
Status: DISCONTINUED | OUTPATIENT
Start: 2025-07-03 | End: 2025-07-16 | Stop reason: HOSPADM

## 2025-07-03 RX ORDER — GABAPENTIN 300 MG/1
300 CAPSULE ORAL NIGHTLY
Status: DISCONTINUED | OUTPATIENT
Start: 2025-07-03 | End: 2025-07-16 | Stop reason: HOSPADM

## 2025-07-03 RX ORDER — PROMETHAZINE HYDROCHLORIDE 25 MG/1
25 TABLET ORAL EVERY 6 HOURS PRN
COMMUNITY

## 2025-07-03 RX ORDER — POTASSIUM CHLORIDE 7.45 MG/ML
10 INJECTION INTRAVENOUS PRN
Status: ACTIVE | OUTPATIENT
Start: 2025-07-03 | End: 2025-07-04

## 2025-07-03 RX ORDER — ACETAMINOPHEN 650 MG/1
650 SUPPOSITORY RECTAL EVERY 6 HOURS PRN
Status: DISCONTINUED | OUTPATIENT
Start: 2025-07-03 | End: 2025-07-05

## 2025-07-03 RX ORDER — SODIUM CHLORIDE 9 MG/ML
INJECTION, SOLUTION INTRAVENOUS PRN
Status: DISCONTINUED | OUTPATIENT
Start: 2025-07-03 | End: 2025-07-16 | Stop reason: HOSPADM

## 2025-07-03 RX ORDER — GABAPENTIN 300 MG/1
300 CAPSULE ORAL EVERY 8 HOURS PRN
COMMUNITY
End: 2025-07-03

## 2025-07-03 RX ORDER — POLYETHYLENE GLYCOL 3350 17 G/17G
17 POWDER, FOR SOLUTION ORAL DAILY PRN
Status: DISCONTINUED | OUTPATIENT
Start: 2025-07-03 | End: 2025-07-10

## 2025-07-03 RX ORDER — GABAPENTIN 100 MG/1
100 CAPSULE ORAL EVERY 8 HOURS PRN
Status: DISCONTINUED | OUTPATIENT
Start: 2025-07-03 | End: 2025-07-16 | Stop reason: HOSPADM

## 2025-07-03 RX ORDER — MAGNESIUM SULFATE IN WATER 40 MG/ML
2000 INJECTION, SOLUTION INTRAVENOUS PRN
Status: DISCONTINUED | OUTPATIENT
Start: 2025-07-03 | End: 2025-07-16 | Stop reason: HOSPADM

## 2025-07-03 RX ORDER — PREDNISONE 20 MG/1
40 TABLET ORAL DAILY
Status: ON HOLD | COMMUNITY
Start: 2025-07-01 | End: 2025-07-15 | Stop reason: HOSPADM

## 2025-07-03 RX ORDER — OXYCODONE HYDROCHLORIDE 5 MG/1
5 TABLET ORAL EVERY 4 HOURS PRN
Status: COMPLETED | OUTPATIENT
Start: 2025-07-03 | End: 2025-07-04

## 2025-07-03 RX ADMIN — SODIUM CHLORIDE: 0.9 INJECTION, SOLUTION INTRAVENOUS at 21:41

## 2025-07-03 RX ADMIN — SODIUM CHLORIDE: 0.9 INJECTION, SOLUTION INTRAVENOUS at 23:34

## 2025-07-03 RX ADMIN — Medication: at 23:30

## 2025-07-03 RX ADMIN — GABAPENTIN 300 MG: 300 CAPSULE ORAL at 20:12

## 2025-07-03 RX ADMIN — OXYCODONE 5 MG: 5 TABLET ORAL at 20:12

## 2025-07-03 RX ADMIN — VANCOMYCIN HYDROCHLORIDE 1750 MG: 10 INJECTION, POWDER, LYOPHILIZED, FOR SOLUTION INTRAVENOUS at 22:20

## 2025-07-03 RX ADMIN — SODIUM CHLORIDE, PRESERVATIVE FREE 10 ML: 5 INJECTION INTRAVENOUS at 20:13

## 2025-07-03 RX ADMIN — CEFEPIME 2000 MG: 2 INJECTION, POWDER, FOR SOLUTION INTRAVENOUS at 21:45

## 2025-07-03 RX ADMIN — SODIUM CHLORIDE, PRESERVATIVE FREE 10 ML: 5 INJECTION INTRAVENOUS at 21:47

## 2025-07-03 ASSESSMENT — PAIN DESCRIPTION - DESCRIPTORS
DESCRIPTORS: DISCOMFORT
DESCRIPTORS: ACHING;DISCOMFORT;DULL

## 2025-07-03 ASSESSMENT — LIFESTYLE VARIABLES
HOW MANY STANDARD DRINKS CONTAINING ALCOHOL DO YOU HAVE ON A TYPICAL DAY: PATIENT DOES NOT DRINK
HOW OFTEN DO YOU HAVE A DRINK CONTAINING ALCOHOL: NEVER

## 2025-07-03 ASSESSMENT — PAIN DESCRIPTION - ORIENTATION
ORIENTATION: RIGHT
ORIENTATION: RIGHT;LEFT

## 2025-07-03 ASSESSMENT — PAIN SCALES - GENERAL
PAINLEVEL_OUTOF10: 3
PAINLEVEL_OUTOF10: 5
PAINLEVEL_OUTOF10: 7

## 2025-07-03 ASSESSMENT — PAIN DESCRIPTION - LOCATION
LOCATION: LEG
LOCATION: FOOT
LOCATION: LEG

## 2025-07-03 ASSESSMENT — PAIN - FUNCTIONAL ASSESSMENT
PAIN_FUNCTIONAL_ASSESSMENT: PREVENTS OR INTERFERES SOME ACTIVE ACTIVITIES AND ADLS
PAIN_FUNCTIONAL_ASSESSMENT: 0-10

## 2025-07-03 NOTE — H&P
V2.0  History and Physical      Name:  Natan Lynch /Age/Sex: 1960  (64 y.o. male)   MRN & CSN:  [id number] & [id number] Encounter Date/Time: 7/3/2025 5:26 PM EDT   Location:  [place] [address] PCP: Brandon Avina MD       Hospital Day: 1    Assessment and Plan:   Natan Lynch is a 64 y.o. male with a pmh of pulmonary embolism/DVT, tobacco use disorder, peripheral vascular disease with gangrene of both lower extremities, CAD, tobacco use disorder who presents with Wound dehiscence    Hospital Problems           Last Modified POA    * (Principal) Wound dehiscence 7/3/2025 Yes   #Surgical wound dehiscence-right foot  - Elevated inflammatory markers (CRP = 115, ESR = 61)    #Leukocytosis (per pharmacy note patient was started on prednisone 40 mg on 2025 for 5 days.  Received the first dose on )    #Peripheral vascular disease -Recent bilateral femoral anterior tibial bypasses by vascular surgery on 2025    #Tobacco use disorder  - Patient states he quit smoking in 2025    #Chronic anticoagulation for prior DVT/PE (Xarelto)  - Last dose today morning    #Chronic medical conditions as mentioned in PMH    Plan:  Start on vancomycin and cefepime  Pharmacy consult for vancomycin dosing  Pain relief as needed  Wound care per podiatry recommendations  Nonweightbearing to right lower extremity  Arterial Doppler bilateral lower extremities pending  Vascular surgery consult  Continue on Xarelto until further recommendations from vascular surgery  Monitor CBC and renal function  Continue home medications as ordered  Supportive therapy    Disposition:   Current Living situation: SNF  Expected Disposition: SNF  Estimated D/C: 3 days    Diet ADULT DIET; Regular   DVT Prophylaxis [] Lovenox, []  Heparin, [] SCDs, [] Ambulation,  [] Eliquis, [x] Xarelto, [] Coumadin   Code Status Full   Surrogate Decision Maker/ POA Not established yet     Personally reviewed Lab Studies CBC, BMP, CRP, ESR and Imaging

## 2025-07-03 NOTE — ED PROVIDER NOTES
ED Attending Attestation Note     Date of evaluation: 7/3/2025    This patient was seen by the advance practice provider.  I have seen and examined the patient, agree with the workup, evaluation, management and diagnosis. The care plan has been discussed.      Briefly, Natan Lynch is a 64 y.o. male with a PMH inclusive of PE, PVD who presents for evaluation of wound deshiscence    Notable exam findings include dehiscence of surgical incision with large section of flap necrosis    Assessment/ Medical Decision Making:     Obtained labs and consulted podiatry. They advised admission and will contsult vascular with concern for flap necrosis.          Nicky Conn MD  07/03/25 [id number]    
        Diagnostic Results       RADIOLOGY:  XR FOOT RIGHT (MIN 3 VIEWS)   Final Result   Increased lucency and bone erosion of multiple bones of the midfoot, and of the   anterior calcaneus, consistent with osteomyelitis..      Electronically signed by Amie Belle      Vascular duplex lower extremity arteries bilateral    (Results Pending)       LABS:   Results for orders placed or performed during the hospital encounter of 07/03/25   CBC with Auto Differential   Result Value Ref Range    WBC 20.0 (H) 4.0 - 11.0 K/uL    RBC 2.90 (L) 4.20 - 5.90 M/uL    Hemoglobin 8.3 (L) 13.5 - 17.5 g/dL    Hematocrit 24.7 (L) 40.5 - 52.5 %    MCV 85.4 80.0 - 100.0 fL    MCH 28.7 26.0 - 34.0 pg    MCHC 33.6 31.0 - 36.0 g/dL    RDW 12.7 12.4 - 15.4 %    Platelets 761 (H) 135 - 450 K/uL    MPV 6.9 5.0 - 10.5 fL    Neutrophils % 89.0 %    Lymphocytes % 1.0 %    Monocytes % 8.0 %    Eosinophils % 0.0 %    Basophils % 0.0 %    Neutrophils Absolute 18.2 (H) 1.7 - 7.7 K/uL    Lymphocytes Absolute 0.2 (L) 1.0 - 5.1 K/uL    Monocytes Absolute 1.6 (H) 0.0 - 1.3 K/uL    Eosinophils Absolute 0.0 0.0 - 0.6 K/uL    Basophils Absolute 0.0 0.0 - 0.2 K/uL    Myelocyte Percent 2 (A) %   BMP w/ Reflex to MG   Result Value Ref Range    Sodium 134 (L) 136 - 145 mmol/L    Potassium reflex Magnesium 4.7 3.5 - 5.1 mmol/L    Chloride 97 (L) 99 - 110 mmol/L    CO2 25 21 - 32 mmol/L    Anion Gap 12 3 - 16    Glucose 172 (H) 70 - 99 mg/dL    BUN 21 (H) 7 - 20 mg/dL    Creatinine 0.7 (L) 0.8 - 1.3 mg/dL    Est, Glom Filt Rate >90 >60    Calcium 9.8 8.3 - 10.6 mg/dL   Sedimentation Rate   Result Value Ref Range    Sed Rate, Automated 61 (H) 0 - 20 mm/Hr   C-Reactive Protein   Result Value Ref Range    .0 (H) 0.0 - 5.1 mg/L   Basic Metabolic Panel w/ Reflex to MG   Result Value Ref Range    Sodium 134 (L) 136 - 145 mmol/L    Potassium reflex Magnesium 3.9 3.5 - 5.1 mmol/L    Chloride 98 (L) 99 - 110 mmol/L    CO2 26 21 - 32 mmol/L    Anion Gap 10 3 - 16

## 2025-07-03 NOTE — ED TRIAGE NOTES
Patient had amputation of R foot toes 6/20. Has been refusing wound care. SNF report today he got up for PT and would is dehisced and draining with odor. Concern for infection.

## 2025-07-03 NOTE — ED NOTES
Patient Name: Natan Lynch  : 1960 64 y.o.  MRN: [id number]  ED Room #: B24/B24-24     Chief complaint:   Chief Complaint   Patient presents with    Post-op Problem     Pt to ED from [facility] d/t stitches splitting open and concern for infection in R foot after partial amputation on 25     Hospital Problem/Diagnosis:   Hospital Problems           Last Modified POA    * (Principal) Wound dehiscence 7/3/2025 Yes         O2 Flow Rate:    (if applicable)  Cardiac Rhythm:   (if applicable)  Active LDA's:   Peripheral IV 25 Right;Dorsal Forearm (Active)            How does patient ambulate? Unknown, did not assess in the Emergency Department    2. How does patient take pills? Unknown, no oral medications were given in the Emergency Department    3. Is patient alert? Alert    4. Is patient oriented? To Person, To Place, To Time, To Situation, and Follows Commands    5.   Patient arrived from:  nursing home  Facility Name: _[facility]__    6. If patient is disoriented or from a Skill Nursing Facility has family been notified of admission? No    7. Patient belongings? Belongings: Clothing, phone    Disposition of belongings? Kept with Patient     8. Any specific patient or family belongings/needs/dynamics?   a. N/a    9. Miscellaneous comments/pending orders?  a. N/a      If there are any additional questions please reach out to the Emergency Department.      Henrik Mattson RN  [phone]

## 2025-07-03 NOTE — TELEPHONE ENCOUNTER
TERESO De La Garza at [facility] called to inform us that he was sent to [facility] for dehiscence of foot incision.

## 2025-07-04 LAB
ANION GAP SERPL CALCULATED.3IONS-SCNC: 10 MMOL/L (ref 3–16)
BASOPHILS # BLD: 0 K/UL (ref 0–0.2)
BASOPHILS NFR BLD: 0.1 %
BUN SERPL-MCNC: 13 MG/DL (ref 7–20)
CALCIUM SERPL-MCNC: 9.6 MG/DL (ref 8.3–10.6)
CHLORIDE SERPL-SCNC: 98 MMOL/L (ref 99–110)
CO2 SERPL-SCNC: 26 MMOL/L (ref 21–32)
CREAT SERPL-MCNC: 0.6 MG/DL (ref 0.8–1.3)
DEPRECATED RDW RBC AUTO: 12.7 % (ref 12.4–15.4)
EOSINOPHIL # BLD: 0 K/UL (ref 0–0.6)
EOSINOPHIL NFR BLD: 0.1 %
GFR SERPLBLD CREATININE-BSD FMLA CKD-EPI: >90 ML/MIN/{1.73_M2}
GLUCOSE SERPL-MCNC: 102 MG/DL (ref 70–99)
HCT VFR BLD AUTO: 23 % (ref 40.5–52.5)
HGB BLD-MCNC: 7.8 G/DL (ref 13.5–17.5)
LYMPHOCYTES # BLD: 3 K/UL (ref 1–5.1)
LYMPHOCYTES NFR BLD: 18.4 %
MAGNESIUM SERPL-MCNC: 2.04 MG/DL (ref 1.8–2.4)
MCH RBC QN AUTO: 28.5 PG (ref 26–34)
MCHC RBC AUTO-ENTMCNC: 34 G/DL (ref 31–36)
MCV RBC AUTO: 84 FL (ref 80–100)
MONOCYTES # BLD: 1.5 K/UL (ref 0–1.3)
MONOCYTES NFR BLD: 9.5 %
NEUTROPHILS # BLD: 11.5 K/UL (ref 1.7–7.7)
NEUTROPHILS NFR BLD: 71.9 %
PHOSPHATE SERPL-MCNC: 3.8 MG/DL (ref 2.5–4.9)
PLATELET # BLD AUTO: 695 K/UL (ref 135–450)
PMV BLD AUTO: 6.9 FL (ref 5–10.5)
POTASSIUM SERPL-SCNC: 3.9 MMOL/L (ref 3.5–5.1)
RBC # BLD AUTO: 2.74 M/UL (ref 4.2–5.9)
SODIUM SERPL-SCNC: 134 MMOL/L (ref 136–145)
WBC # BLD AUTO: 16 K/UL (ref 4–11)

## 2025-07-04 PROCEDURE — 6370000000 HC RX 637 (ALT 250 FOR IP): Performed by: NURSE PRACTITIONER

## 2025-07-04 PROCEDURE — 85025 COMPLETE CBC W/AUTO DIFF WBC: CPT

## 2025-07-04 PROCEDURE — 83735 ASSAY OF MAGNESIUM: CPT

## 2025-07-04 PROCEDURE — 6360000002 HC RX W HCPCS: Performed by: NURSE PRACTITIONER

## 2025-07-04 PROCEDURE — 6370000000 HC RX 637 (ALT 250 FOR IP): Performed by: INTERNAL MEDICINE

## 2025-07-04 PROCEDURE — 87205 SMEAR GRAM STAIN: CPT

## 2025-07-04 PROCEDURE — 87070 CULTURE OTHR SPECIMN AEROBIC: CPT

## 2025-07-04 PROCEDURE — 87077 CULTURE AEROBIC IDENTIFY: CPT

## 2025-07-04 PROCEDURE — 36415 COLL VENOUS BLD VENIPUNCTURE: CPT

## 2025-07-04 PROCEDURE — 87075 CULTR BACTERIA EXCEPT BLOOD: CPT

## 2025-07-04 PROCEDURE — 2580000003 HC RX 258: Performed by: INTERNAL MEDICINE

## 2025-07-04 PROCEDURE — 6360000002 HC RX W HCPCS: Performed by: INTERNAL MEDICINE

## 2025-07-04 PROCEDURE — 84100 ASSAY OF PHOSPHORUS: CPT

## 2025-07-04 PROCEDURE — 1200000000 HC SEMI PRIVATE

## 2025-07-04 PROCEDURE — 2500000003 HC RX 250 WO HCPCS: Performed by: INTERNAL MEDICINE

## 2025-07-04 PROCEDURE — 87186 SC STD MICRODIL/AGAR DIL: CPT

## 2025-07-04 PROCEDURE — 80048 BASIC METABOLIC PNL TOTAL CA: CPT

## 2025-07-04 RX ORDER — ENOXAPARIN SODIUM 100 MG/ML
40 INJECTION SUBCUTANEOUS DAILY
Status: DISCONTINUED | OUTPATIENT
Start: 2025-07-04 | End: 2025-07-05

## 2025-07-04 RX ORDER — ENOXAPARIN SODIUM 100 MG/ML
40 INJECTION SUBCUTANEOUS 2 TIMES DAILY
Status: DISCONTINUED | OUTPATIENT
Start: 2025-07-04 | End: 2025-07-04 | Stop reason: DRUGHIGH

## 2025-07-04 RX ORDER — SENNA AND DOCUSATE SODIUM 50; 8.6 MG/1; MG/1
2 TABLET, FILM COATED ORAL DAILY
Status: DISCONTINUED | OUTPATIENT
Start: 2025-07-04 | End: 2025-07-16 | Stop reason: HOSPADM

## 2025-07-04 RX ADMIN — SODIUM CHLORIDE: 0.9 INJECTION, SOLUTION INTRAVENOUS at 04:39

## 2025-07-04 RX ADMIN — ACETAMINOPHEN 650 MG: 325 TABLET ORAL at 15:21

## 2025-07-04 RX ADMIN — SODIUM CHLORIDE, PRESERVATIVE FREE 10 ML: 5 INJECTION INTRAVENOUS at 08:31

## 2025-07-04 RX ADMIN — ENOXAPARIN SODIUM 40 MG: 100 INJECTION SUBCUTANEOUS at 13:18

## 2025-07-04 RX ADMIN — OXYCODONE 5 MG: 5 TABLET ORAL at 20:52

## 2025-07-04 RX ADMIN — CEFEPIME 2000 MG: 2 INJECTION, POWDER, FOR SOLUTION INTRAVENOUS at 13:18

## 2025-07-04 RX ADMIN — OXYCODONE 5 MG: 5 TABLET ORAL at 03:05

## 2025-07-04 RX ADMIN — Medication: at 20:59

## 2025-07-04 RX ADMIN — HYDROMORPHONE HYDROCHLORIDE 0.5 MG: 1 INJECTION, SOLUTION INTRAMUSCULAR; INTRAVENOUS; SUBCUTANEOUS at 00:29

## 2025-07-04 RX ADMIN — SENNOSIDES, DOCUSATE SODIUM 2 TABLET: 50; 8.6 TABLET, FILM COATED ORAL at 13:18

## 2025-07-04 RX ADMIN — GABAPENTIN 100 MG: 100 CAPSULE ORAL at 10:59

## 2025-07-04 RX ADMIN — RIVAROXABAN 20 MG: 20 TABLET, FILM COATED ORAL at 08:28

## 2025-07-04 RX ADMIN — HYDROMORPHONE HYDROCHLORIDE 0.5 MG: 1 INJECTION, SOLUTION INTRAMUSCULAR; INTRAVENOUS; SUBCUTANEOUS at 08:29

## 2025-07-04 RX ADMIN — SODIUM CHLORIDE, PRESERVATIVE FREE 10 ML: 5 INJECTION INTRAVENOUS at 20:58

## 2025-07-04 RX ADMIN — CEFEPIME 2000 MG: 2 INJECTION, POWDER, FOR SOLUTION INTRAVENOUS at 20:57

## 2025-07-04 RX ADMIN — GABAPENTIN 300 MG: 300 CAPSULE ORAL at 20:52

## 2025-07-04 RX ADMIN — Medication: at 13:20

## 2025-07-04 RX ADMIN — Medication: at 06:21

## 2025-07-04 RX ADMIN — CEFEPIME 2000 MG: 2 INJECTION, POWDER, FOR SOLUTION INTRAVENOUS at 04:51

## 2025-07-04 RX ADMIN — HYDROMORPHONE HYDROCHLORIDE 0.5 MG: 1 INJECTION, SOLUTION INTRAMUSCULAR; INTRAVENOUS; SUBCUTANEOUS at 16:13

## 2025-07-04 RX ADMIN — OXYCODONE 5 MG: 5 TABLET ORAL at 13:18

## 2025-07-04 ASSESSMENT — PAIN DESCRIPTION - DESCRIPTORS
DESCRIPTORS: ACHING
DESCRIPTORS: THROBBING
DESCRIPTORS: ACHING;THROBBING

## 2025-07-04 ASSESSMENT — PAIN DESCRIPTION - ORIENTATION
ORIENTATION: LEFT;RIGHT
ORIENTATION: LEFT
ORIENTATION: RIGHT
ORIENTATION: LEFT;RIGHT
ORIENTATION: RIGHT;LEFT
ORIENTATION: RIGHT

## 2025-07-04 ASSESSMENT — PAIN - FUNCTIONAL ASSESSMENT
PAIN_FUNCTIONAL_ASSESSMENT: PREVENTS OR INTERFERES SOME ACTIVE ACTIVITIES AND ADLS
PAIN_FUNCTIONAL_ASSESSMENT: PREVENTS OR INTERFERES SOME ACTIVE ACTIVITIES AND ADLS

## 2025-07-04 ASSESSMENT — PAIN DESCRIPTION - LOCATION
LOCATION: FOOT
LOCATION: LEG;FOOT
LOCATION: LEG
LOCATION: FOOT
LOCATION: FOOT
LOCATION: FOOT;LEG

## 2025-07-04 ASSESSMENT — PAIN SCALES - GENERAL
PAINLEVEL_OUTOF10: 0
PAINLEVEL_OUTOF10: 5
PAINLEVEL_OUTOF10: 7
PAINLEVEL_OUTOF10: 7
PAINLEVEL_OUTOF10: 3
PAINLEVEL_OUTOF10: 8
PAINLEVEL_OUTOF10: 3

## 2025-07-04 ASSESSMENT — PAIN SCALES - WONG BAKER
WONGBAKER_NUMERICALRESPONSE: NO HURT
WONGBAKER_NUMERICALRESPONSE: NO HURT

## 2025-07-04 NOTE — PLAN OF CARE
Problem: Discharge Planning  Goal: Discharge to home or other facility with appropriate resources  Outcome: Progressing  Flowsheets (Taken 7/3/2025 2004)  Discharge to home or other facility with appropriate resources: Identify barriers to discharge with patient and caregiver     Problem: Safety - Adult  Goal: Free from fall injury  Outcome: Progressing     Problem: Pain  Goal: Verbalizes/displays adequate comfort level or baseline comfort level  Outcome: Progressing

## 2025-07-04 NOTE — PLAN OF CARE
Problem: Discharge Planning  Goal: Discharge to home or other facility with appropriate resources  7/4/2025 1630 by Lindy Bauman RN  Outcome: Progressing  Flowsheets (Taken 7/4/2025 1630)  Discharge to home or other facility with appropriate resources:   Identify barriers to discharge with patient and caregiver   Arrange for needed discharge resources and transportation as appropriate   Identify discharge learning needs (meds, wound care, etc)   Refer to discharge planning if patient needs post-hospital services based on physician order or complex needs related to functional status, cognitive ability or social support system  7/4/2025 0344 by Bhanu Infante, RN  Outcome: Progressing  Flowsheets (Taken 7/3/2025 2004)  Discharge to home or other facility with appropriate resources: Identify barriers to discharge with patient and caregiver     Problem: Safety - Adult  Goal: Free from fall injury  7/4/2025 1630 by Lindy Bauman RN  Outcome: Progressing  Flowsheets (Taken 7/4/2025 1630)  Free From Fall Injury: Instruct family/caregiver on patient safety  7/4/2025 0344 by Bhanu Infante RN  Outcome: Progressing     Problem: Pain  Goal: Verbalizes/displays adequate comfort level or baseline comfort level  7/4/2025 1630 by Lindy Bauman RN  Outcome: Progressing  Flowsheets (Taken 7/4/2025 1630)  Verbalizes/displays adequate comfort level or baseline comfort level:   Encourage patient to monitor pain and request assistance   Assess pain using appropriate pain scale   Administer analgesics based on type and severity of pain and evaluate response   Implement non-pharmacological measures as appropriate and evaluate response   Consider cultural and social influences on pain and pain management   Notify Licensed Independent Practitioner if interventions unsuccessful or patient reports new pain  7/4/2025 0344 by Bhanu Infante, RN  Outcome: Progressing     Problem: Skin/Tissue Integrity  Goal: Skin integrity remains

## 2025-07-04 NOTE — DISCHARGE INSTRUCTIONS
Podiatry Wound Care Discharge Instructions  Please perform every day dressing changes to left lower extremity as follows  -Apply Santyl to gauze, then small amount of saline on the gauze to the wound on the outside of the left foot   -Next apply gauze to the top of the left foot and ankle  -Next loosely wrap the left lower extremity with Kerlix starting from just in front of the toes and ending just above the ankle  -Next gently wrap the left foot with 4 inch Ace bandage starting from just in front of the toes and ending just above the ankle    Patient is weightbearing as tolerated to left foot  Please follow-up with Dr. Acosta DPM for continued wound care

## 2025-07-04 NOTE — DISCHARGE INSTR - COC
Continuity of Care Form    Patient Name: Natan Lynch   :  1960  MRN:  [id number]    Admit date:  7/3/2025  Discharge date:  2025    Code Status Order: Full Code   Advance Directives:     Admitting Physician:  Ana Blanchard MD  PCP: Brandon Avina MD    Discharging Nurse: Rivka [facility] Unit/Room#: 6304/6304-01  Discharging Unit Phone Number: [phone]    Emergency Contact:   Extended Emergency Contact Information  Primary Emergency Contact: Nicolasa Lynch  Home Phone: [phone]  Mobile Phone: [phone]  Relation: Brother/Sister    Past Surgical History:  Past Surgical History:   Procedure Laterality Date    ARTERIAL BYPASS SURGRY Bilateral 2025    BILATERAL FEMORAL  ANTERIOR TIBIAL BYPASS performed by Casandra Alvarado MD at [facility] OR    FOOT SURGERY Right 2025    LISFRANC AMPUTATION-RIGHT FOOT, TENDO-ACHILLES LENGHTENING- RIGHT LOWER LEG, APPLICATION OF BELOW KNEE SPLINT- RIGHT LOWER LEG performed by Leon Acosta DPM at [facility] OR    INVASIVE VASCULAR N/A 2025    Angiography lower ext left performed by Casandra Alvarado MD at [facility] CARDIAC CATH LAB    INVASIVE VASCULAR Left 2025    Angioplasty ant tib artery performed by Casandra Alvarado MD at [facility] CARDIAC CATH LAB    INVASIVE VASCULAR N/A 4/10/2025    Angiography lower ext left performed by Casandra Alvarado MD at [facility] CARDIAC CATH LAB       Immunization History:     There is no immunization history on file for this patient.    Active Problems:  Patient Active Problem List   Diagnosis Code    Bilateral pulmonary embolism (HCC) I26.99    Acute deep vein thrombosis (DVT) of right lower extremity ([facility]) I82.401    Nicotine dependence with current use F17.200    Edema of both feet R60.0    Peripheral vascular disease I73.9    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 ([facility]) I70.263    Ischemia of left lower extremity I99.8    Severe malnutrition E43    Gangrene ([facility]) I96

## 2025-07-05 LAB
ALBUMIN SERPL-MCNC: 3.2 G/DL (ref 3.4–5)
ANION GAP SERPL CALCULATED.3IONS-SCNC: 9 MMOL/L (ref 3–16)
APTT BLD: 43.5 SEC (ref 22.8–35.8)
APTT BLD: 76.6 SEC (ref 22.8–35.8)
BASOPHILS # BLD: 0 K/UL (ref 0–0.2)
BASOPHILS NFR BLD: 0.2 %
BUN SERPL-MCNC: 9 MG/DL (ref 7–20)
CALCIUM SERPL-MCNC: 10 MG/DL (ref 8.3–10.6)
CHLORIDE SERPL-SCNC: 97 MMOL/L (ref 99–110)
CO2 SERPL-SCNC: 27 MMOL/L (ref 21–32)
CREAT SERPL-MCNC: 0.6 MG/DL (ref 0.8–1.3)
CRP SERPL-MCNC: 152 MG/L (ref 0–5.1)
DEPRECATED RDW RBC AUTO: 12.5 % (ref 12.4–15.4)
DEPRECATED RDW RBC AUTO: 12.5 % (ref 12.4–15.4)
EOSINOPHIL # BLD: 0.1 K/UL (ref 0–0.6)
EOSINOPHIL NFR BLD: 0.7 %
GFR SERPLBLD CREATININE-BSD FMLA CKD-EPI: >90 ML/MIN/{1.73_M2}
GLUCOSE SERPL-MCNC: 96 MG/DL (ref 70–99)
HCT VFR BLD AUTO: 23.9 % (ref 40.5–52.5)
HCT VFR BLD AUTO: 24.3 % (ref 40.5–52.5)
HGB BLD-MCNC: 8 G/DL (ref 13.5–17.5)
HGB BLD-MCNC: 8.1 G/DL (ref 13.5–17.5)
INR PPP: 1.31 (ref 0.86–1.14)
LYMPHOCYTES # BLD: 3.5 K/UL (ref 1–5.1)
LYMPHOCYTES NFR BLD: 23 %
MAGNESIUM SERPL-MCNC: 2.16 MG/DL (ref 1.8–2.4)
MCH RBC QN AUTO: 28.4 PG (ref 26–34)
MCH RBC QN AUTO: 28.4 PG (ref 26–34)
MCHC RBC AUTO-ENTMCNC: 33.2 G/DL (ref 31–36)
MCHC RBC AUTO-ENTMCNC: 33.5 G/DL (ref 31–36)
MCV RBC AUTO: 84.8 FL (ref 80–100)
MCV RBC AUTO: 85.5 FL (ref 80–100)
MONOCYTES # BLD: 1.3 K/UL (ref 0–1.3)
MONOCYTES NFR BLD: 8.8 %
NEUTROPHILS # BLD: 10.2 K/UL (ref 1.7–7.7)
NEUTROPHILS NFR BLD: 67.3 %
PHOSPHATE SERPL-MCNC: 3.8 MG/DL (ref 2.5–4.9)
PLATELET # BLD AUTO: 737 K/UL (ref 135–450)
PLATELET # BLD AUTO: 758 K/UL (ref 135–450)
PMV BLD AUTO: 6.8 FL (ref 5–10.5)
PMV BLD AUTO: 6.8 FL (ref 5–10.5)
POTASSIUM SERPL-SCNC: 4 MMOL/L (ref 3.5–5.1)
PREALB SERPL-MCNC: 13.8 MG/DL (ref 20–40)
PROTHROMBIN TIME: 16.4 SEC (ref 12.1–14.9)
RBC # BLD AUTO: 2.82 M/UL (ref 4.2–5.9)
RBC # BLD AUTO: 2.84 M/UL (ref 4.2–5.9)
SODIUM SERPL-SCNC: 133 MMOL/L (ref 136–145)
TRANSFERRIN SERPL-MCNC: 140 MG/DL (ref 200–360)
WBC # BLD AUTO: 15.1 K/UL (ref 4–11)
WBC # BLD AUTO: 15.7 K/UL (ref 4–11)

## 2025-07-05 PROCEDURE — 85610 PROTHROMBIN TIME: CPT

## 2025-07-05 PROCEDURE — 6360000002 HC RX W HCPCS: Performed by: STUDENT IN AN ORGANIZED HEALTH CARE EDUCATION/TRAINING PROGRAM

## 2025-07-05 PROCEDURE — 84134 ASSAY OF PREALBUMIN: CPT

## 2025-07-05 PROCEDURE — 84466 ASSAY OF TRANSFERRIN: CPT

## 2025-07-05 PROCEDURE — 36415 COLL VENOUS BLD VENIPUNCTURE: CPT

## 2025-07-05 PROCEDURE — 1200000000 HC SEMI PRIVATE

## 2025-07-05 PROCEDURE — 2580000003 HC RX 258: Performed by: INTERNAL MEDICINE

## 2025-07-05 PROCEDURE — 2500000003 HC RX 250 WO HCPCS: Performed by: INTERNAL MEDICINE

## 2025-07-05 PROCEDURE — 6360000002 HC RX W HCPCS: Performed by: INTERNAL MEDICINE

## 2025-07-05 PROCEDURE — 6370000000 HC RX 637 (ALT 250 FOR IP)

## 2025-07-05 PROCEDURE — 6370000000 HC RX 637 (ALT 250 FOR IP): Performed by: NURSE PRACTITIONER

## 2025-07-05 PROCEDURE — 6360000002 HC RX W HCPCS

## 2025-07-05 PROCEDURE — 6360000002 HC RX W HCPCS: Performed by: NURSE PRACTITIONER

## 2025-07-05 PROCEDURE — 85027 COMPLETE CBC AUTOMATED: CPT

## 2025-07-05 PROCEDURE — 86140 C-REACTIVE PROTEIN: CPT

## 2025-07-05 PROCEDURE — 6370000000 HC RX 637 (ALT 250 FOR IP): Performed by: INTERNAL MEDICINE

## 2025-07-05 PROCEDURE — 85730 THROMBOPLASTIN TIME PARTIAL: CPT

## 2025-07-05 PROCEDURE — 85025 COMPLETE CBC W/AUTO DIFF WBC: CPT

## 2025-07-05 PROCEDURE — 83735 ASSAY OF MAGNESIUM: CPT

## 2025-07-05 PROCEDURE — 80069 RENAL FUNCTION PANEL: CPT

## 2025-07-05 RX ORDER — HEPARIN SODIUM 10000 [USP'U]/100ML
5-30 INJECTION, SOLUTION INTRAVENOUS CONTINUOUS
Status: DISPENSED | OUTPATIENT
Start: 2025-07-05 | End: 2025-07-07

## 2025-07-05 RX ORDER — HYDROMORPHONE HYDROCHLORIDE 1 MG/ML
1 INJECTION, SOLUTION INTRAMUSCULAR; INTRAVENOUS; SUBCUTANEOUS EVERY 6 HOURS PRN
Status: DISCONTINUED | OUTPATIENT
Start: 2025-07-05 | End: 2025-07-05

## 2025-07-05 RX ORDER — HEPARIN SODIUM 1000 [USP'U]/ML
80 INJECTION, SOLUTION INTRAVENOUS; SUBCUTANEOUS PRN
Status: DISPENSED | OUTPATIENT
Start: 2025-07-05 | End: 2025-07-07

## 2025-07-05 RX ORDER — OXYCODONE HYDROCHLORIDE 5 MG/1
10 TABLET ORAL EVERY 4 HOURS PRN
Status: DISCONTINUED | OUTPATIENT
Start: 2025-07-05 | End: 2025-07-11

## 2025-07-05 RX ORDER — DIAZEPAM 5 MG/1
2.5 TABLET ORAL EVERY 8 HOURS
Status: DISCONTINUED | OUTPATIENT
Start: 2025-07-05 | End: 2025-07-07

## 2025-07-05 RX ORDER — HEPARIN SODIUM 1000 [USP'U]/ML
40 INJECTION, SOLUTION INTRAVENOUS; SUBCUTANEOUS PRN
Status: ACTIVE | OUTPATIENT
Start: 2025-07-05 | End: 2025-07-07

## 2025-07-05 RX ORDER — HYDROMORPHONE HYDROCHLORIDE 1 MG/ML
0.25 INJECTION, SOLUTION INTRAMUSCULAR; INTRAVENOUS; SUBCUTANEOUS
Refills: 0 | Status: DISCONTINUED | OUTPATIENT
Start: 2025-07-05 | End: 2025-07-07

## 2025-07-05 RX ORDER — OXYCODONE HYDROCHLORIDE 5 MG/1
5 TABLET ORAL EVERY 4 HOURS PRN
Status: DISCONTINUED | OUTPATIENT
Start: 2025-07-05 | End: 2025-07-11

## 2025-07-05 RX ORDER — ACETAMINOPHEN 325 MG/1
650 TABLET ORAL EVERY 6 HOURS SCHEDULED
Status: DISCONTINUED | OUTPATIENT
Start: 2025-07-05 | End: 2025-07-08

## 2025-07-05 RX ORDER — HEPARIN SODIUM 1000 [USP'U]/ML
80 INJECTION, SOLUTION INTRAVENOUS; SUBCUTANEOUS ONCE
Status: COMPLETED | OUTPATIENT
Start: 2025-07-05 | End: 2025-07-05

## 2025-07-05 RX ORDER — HYDROMORPHONE HYDROCHLORIDE 1 MG/ML
0.5 INJECTION, SOLUTION INTRAMUSCULAR; INTRAVENOUS; SUBCUTANEOUS
Refills: 0 | Status: DISCONTINUED | OUTPATIENT
Start: 2025-07-05 | End: 2025-07-07

## 2025-07-05 RX ADMIN — DIAZEPAM 2.5 MG: 5 TABLET ORAL at 17:05

## 2025-07-05 RX ADMIN — OXYCODONE 5 MG: 5 TABLET ORAL at 17:06

## 2025-07-05 RX ADMIN — HYDROMORPHONE HYDROCHLORIDE 1 MG: 1 INJECTION, SOLUTION INTRAMUSCULAR; INTRAVENOUS; SUBCUTANEOUS at 06:37

## 2025-07-05 RX ADMIN — HYDROMORPHONE HYDROCHLORIDE 0.5 MG: 1 INJECTION, SOLUTION INTRAMUSCULAR; INTRAVENOUS; SUBCUTANEOUS at 01:47

## 2025-07-05 RX ADMIN — HEPARIN SODIUM 18 UNITS/KG/HR: 10000 INJECTION, SOLUTION INTRAVENOUS at 13:39

## 2025-07-05 RX ADMIN — SODIUM CHLORIDE, PRESERVATIVE FREE 10 ML: 5 INJECTION INTRAVENOUS at 20:38

## 2025-07-05 RX ADMIN — COLLAGENASE SANTYL: 250 OINTMENT TOPICAL at 09:00

## 2025-07-05 RX ADMIN — HYDROMORPHONE HYDROCHLORIDE 0.25 MG: 1 INJECTION, SOLUTION INTRAMUSCULAR; INTRAVENOUS; SUBCUTANEOUS at 15:33

## 2025-07-05 RX ADMIN — CEFEPIME 2000 MG: 2 INJECTION, POWDER, FOR SOLUTION INTRAVENOUS at 20:40

## 2025-07-05 RX ADMIN — HEPARIN SODIUM 5700 UNITS: 1000 INJECTION INTRAVENOUS; SUBCUTANEOUS at 13:24

## 2025-07-05 RX ADMIN — GABAPENTIN 100 MG: 100 CAPSULE ORAL at 01:52

## 2025-07-05 RX ADMIN — SODIUM CHLORIDE, PRESERVATIVE FREE 10 ML: 5 INJECTION INTRAVENOUS at 10:12

## 2025-07-05 RX ADMIN — CEFEPIME 2000 MG: 2 INJECTION, POWDER, FOR SOLUTION INTRAVENOUS at 13:11

## 2025-07-05 RX ADMIN — Medication: at 20:37

## 2025-07-05 RX ADMIN — ACETAMINOPHEN 650 MG: 325 TABLET ORAL at 10:19

## 2025-07-05 RX ADMIN — HYDROMORPHONE HYDROCHLORIDE 0.5 MG: 1 INJECTION, SOLUTION INTRAMUSCULAR; INTRAVENOUS; SUBCUTANEOUS at 18:51

## 2025-07-05 RX ADMIN — HYDROMORPHONE HYDROCHLORIDE 0.5 MG: 1 INJECTION, SOLUTION INTRAMUSCULAR; INTRAVENOUS; SUBCUTANEOUS at 10:10

## 2025-07-05 RX ADMIN — GABAPENTIN 300 MG: 300 CAPSULE ORAL at 20:37

## 2025-07-05 RX ADMIN — HYDROMORPHONE HYDROCHLORIDE 0.5 MG: 1 INJECTION, SOLUTION INTRAMUSCULAR; INTRAVENOUS; SUBCUTANEOUS at 22:05

## 2025-07-05 RX ADMIN — OXYCODONE 5 MG: 5 TABLET ORAL at 13:23

## 2025-07-05 RX ADMIN — CEFEPIME 2000 MG: 2 INJECTION, POWDER, FOR SOLUTION INTRAVENOUS at 05:52

## 2025-07-05 RX ADMIN — GABAPENTIN 100 MG: 100 CAPSULE ORAL at 09:59

## 2025-07-05 RX ADMIN — SENNOSIDES, DOCUSATE SODIUM 2 TABLET: 50; 8.6 TABLET, FILM COATED ORAL at 09:59

## 2025-07-05 RX ADMIN — ACETAMINOPHEN 650 MG: 325 TABLET ORAL at 17:05

## 2025-07-05 RX ADMIN — Medication: at 13:12

## 2025-07-05 RX ADMIN — DIAZEPAM 2.5 MG: 5 TABLET ORAL at 09:59

## 2025-07-05 ASSESSMENT — PAIN DESCRIPTION - ORIENTATION
ORIENTATION: RIGHT
ORIENTATION: RIGHT
ORIENTATION: LEFT;RIGHT
ORIENTATION: RIGHT
ORIENTATION: RIGHT
ORIENTATION: LEFT;RIGHT
ORIENTATION: LEFT;RIGHT
ORIENTATION: RIGHT

## 2025-07-05 ASSESSMENT — PAIN DESCRIPTION - LOCATION
LOCATION: FOOT

## 2025-07-05 ASSESSMENT — PAIN SCALES - GENERAL
PAINLEVEL_OUTOF10: 4
PAINLEVEL_OUTOF10: 10
PAINLEVEL_OUTOF10: 8
PAINLEVEL_OUTOF10: 8
PAINLEVEL_OUTOF10: 5
PAINLEVEL_OUTOF10: 8
PAINLEVEL_OUTOF10: 4
PAINLEVEL_OUTOF10: 8
PAINLEVEL_OUTOF10: 4

## 2025-07-05 ASSESSMENT — PAIN DESCRIPTION - DESCRIPTORS
DESCRIPTORS: ACHING

## 2025-07-05 NOTE — PLAN OF CARE
Problem: Discharge Planning  Goal: Discharge to home or other facility with appropriate resources  7/5/2025 1511 by Lindy Bauman RN  Outcome: Progressing  Flowsheets (Taken 7/5/2025 1511)  Discharge to home or other facility with appropriate resources:   Identify barriers to discharge with patient and caregiver   Arrange for needed discharge resources and transportation as appropriate   Identify discharge learning needs (meds, wound care, etc)   Refer to discharge planning if patient needs post-hospital services based on physician order or complex needs related to functional status, cognitive ability or social support system  7/5/2025 0155 by Rosalee Tsai RN  Outcome: Progressing     Problem: Safety - Adult  Goal: Free from fall injury  7/5/2025 1511 by Lindy Bauman RN  Outcome: Progressing  Flowsheets (Taken 7/5/2025 1511)  Free From Fall Injury: Instruct family/caregiver on patient safety  7/5/2025 0155 by Rosalee Tsai RN  Outcome: Progressing     Problem: Pain  Goal: Verbalizes/displays adequate comfort level or baseline comfort level  7/5/2025 1511 by Lindy Bauman RN  Outcome: Progressing  Flowsheets (Taken 7/5/2025 1511)  Verbalizes/displays adequate comfort level or baseline comfort level:   Encourage patient to monitor pain and request assistance   Assess pain using appropriate pain scale   Administer analgesics based on type and severity of pain and evaluate response   Implement non-pharmacological measures as appropriate and evaluate response   Consider cultural and social influences on pain and pain management   Notify Licensed Independent Practitioner if interventions unsuccessful or patient reports new pain  7/5/2025 0155 by Rosalee Tsai RN  Outcome: Progressing     Problem: Skin/Tissue Integrity  Goal: Skin integrity remains intact  Description: 1.  Monitor for areas of redness and/or skin breakdown  2.  Assess vascular access sites hourly  3.  Every 4-6 hours minimum:  Change oxygen

## 2025-07-05 NOTE — PLAN OF CARE
Problem: Pain  Goal: Verbalizes/displays adequate comfort level or baseline comfort level  7/5/2025 0155 by Rosalee Tsai RN  Outcome: Progressing   Medicated with Oxycodone 5 mg po alternating with Dilaudid 0.5 mg ivp for complaints of right foot pain. Vital signs stable, afebrile. Continue to monitor alteration in comfort.

## 2025-07-06 LAB
ALBUMIN SERPL-MCNC: 3.2 G/DL (ref 3.4–5)
ANION GAP SERPL CALCULATED.3IONS-SCNC: 10 MMOL/L (ref 3–16)
APTT BLD: 62.6 SEC (ref 22.8–35.8)
APTT BLD: 78.9 SEC (ref 22.8–35.8)
BASOPHILS # BLD: 0.1 K/UL (ref 0–0.2)
BASOPHILS NFR BLD: 0.5 %
BUN SERPL-MCNC: 23 MG/DL (ref 7–20)
CALCIUM SERPL-MCNC: 10 MG/DL (ref 8.3–10.6)
CHLORIDE SERPL-SCNC: 97 MMOL/L (ref 99–110)
CO2 SERPL-SCNC: 26 MMOL/L (ref 21–32)
CREAT SERPL-MCNC: 0.8 MG/DL (ref 0.8–1.3)
DEPRECATED RDW RBC AUTO: 12.4 % (ref 12.4–15.4)
EOSINOPHIL # BLD: 0.4 K/UL (ref 0–0.6)
EOSINOPHIL NFR BLD: 2.1 %
GFR SERPLBLD CREATININE-BSD FMLA CKD-EPI: >90 ML/MIN/{1.73_M2}
GLUCOSE SERPL-MCNC: 147 MG/DL (ref 70–99)
HCT VFR BLD AUTO: 23.6 % (ref 40.5–52.5)
HGB BLD-MCNC: 7.8 G/DL (ref 13.5–17.5)
LYMPHOCYTES # BLD: 3.5 K/UL (ref 1–5.1)
LYMPHOCYTES NFR BLD: 20.5 %
MAGNESIUM SERPL-MCNC: 2.05 MG/DL (ref 1.8–2.4)
MCH RBC QN AUTO: 28 PG (ref 26–34)
MCHC RBC AUTO-ENTMCNC: 32.9 G/DL (ref 31–36)
MCV RBC AUTO: 85.1 FL (ref 80–100)
MONOCYTES # BLD: 1.4 K/UL (ref 0–1.3)
MONOCYTES NFR BLD: 8.1 %
NEUTROPHILS # BLD: 11.6 K/UL (ref 1.7–7.7)
NEUTROPHILS NFR BLD: 68.8 %
PHOSPHATE SERPL-MCNC: 3.2 MG/DL (ref 2.5–4.9)
PLATELET # BLD AUTO: 733 K/UL (ref 135–450)
PMV BLD AUTO: 6.6 FL (ref 5–10.5)
POTASSIUM SERPL-SCNC: 4.4 MMOL/L (ref 3.5–5.1)
RBC # BLD AUTO: 2.78 M/UL (ref 4.2–5.9)
SODIUM SERPL-SCNC: 133 MMOL/L (ref 136–145)
WBC # BLD AUTO: 16.9 K/UL (ref 4–11)

## 2025-07-06 PROCEDURE — 85025 COMPLETE CBC W/AUTO DIFF WBC: CPT

## 2025-07-06 PROCEDURE — 1200000000 HC SEMI PRIVATE

## 2025-07-06 PROCEDURE — 2580000003 HC RX 258: Performed by: NURSE PRACTITIONER

## 2025-07-06 PROCEDURE — 83735 ASSAY OF MAGNESIUM: CPT

## 2025-07-06 PROCEDURE — 80069 RENAL FUNCTION PANEL: CPT

## 2025-07-06 PROCEDURE — 6370000000 HC RX 637 (ALT 250 FOR IP)

## 2025-07-06 PROCEDURE — 6370000000 HC RX 637 (ALT 250 FOR IP): Performed by: INTERNAL MEDICINE

## 2025-07-06 PROCEDURE — 6360000002 HC RX W HCPCS: Performed by: INTERNAL MEDICINE

## 2025-07-06 PROCEDURE — 2580000003 HC RX 258: Performed by: INTERNAL MEDICINE

## 2025-07-06 PROCEDURE — 6360000002 HC RX W HCPCS

## 2025-07-06 PROCEDURE — 6370000000 HC RX 637 (ALT 250 FOR IP): Performed by: NURSE PRACTITIONER

## 2025-07-06 PROCEDURE — 36415 COLL VENOUS BLD VENIPUNCTURE: CPT

## 2025-07-06 PROCEDURE — 6360000002 HC RX W HCPCS: Performed by: STUDENT IN AN ORGANIZED HEALTH CARE EDUCATION/TRAINING PROGRAM

## 2025-07-06 PROCEDURE — 2500000003 HC RX 250 WO HCPCS: Performed by: INTERNAL MEDICINE

## 2025-07-06 PROCEDURE — 85730 THROMBOPLASTIN TIME PARTIAL: CPT

## 2025-07-06 PROCEDURE — 6360000002 HC RX W HCPCS: Performed by: NURSE PRACTITIONER

## 2025-07-06 RX ORDER — SODIUM CHLORIDE, SODIUM LACTATE, POTASSIUM CHLORIDE, CALCIUM CHLORIDE 600; 310; 30; 20 MG/100ML; MG/100ML; MG/100ML; MG/100ML
INJECTION, SOLUTION INTRAVENOUS CONTINUOUS
Status: DISCONTINUED | OUTPATIENT
Start: 2025-07-07 | End: 2025-07-08

## 2025-07-06 RX ADMIN — CEFEPIME 2000 MG: 2 INJECTION, POWDER, FOR SOLUTION INTRAVENOUS at 05:58

## 2025-07-06 RX ADMIN — VANCOMYCIN HYDROCHLORIDE 1000 MG: 1 INJECTION, POWDER, LYOPHILIZED, FOR SOLUTION INTRAVENOUS at 21:30

## 2025-07-06 RX ADMIN — SENNOSIDES, DOCUSATE SODIUM 2 TABLET: 50; 8.6 TABLET, FILM COATED ORAL at 08:30

## 2025-07-06 RX ADMIN — ACETAMINOPHEN 650 MG: 325 TABLET ORAL at 17:42

## 2025-07-06 RX ADMIN — DIAZEPAM 2.5 MG: 5 TABLET ORAL at 00:44

## 2025-07-06 RX ADMIN — HYDROMORPHONE HYDROCHLORIDE 0.5 MG: 1 INJECTION, SOLUTION INTRAMUSCULAR; INTRAVENOUS; SUBCUTANEOUS at 16:15

## 2025-07-06 RX ADMIN — ACETAMINOPHEN 650 MG: 325 TABLET ORAL at 12:00

## 2025-07-06 RX ADMIN — MEROPENEM 1000 MG: 1 INJECTION INTRAVENOUS at 16:21

## 2025-07-06 RX ADMIN — Medication: at 12:10

## 2025-07-06 RX ADMIN — ACETAMINOPHEN 650 MG: 325 TABLET ORAL at 00:44

## 2025-07-06 RX ADMIN — MEROPENEM 1000 MG: 1 INJECTION INTRAVENOUS at 08:55

## 2025-07-06 RX ADMIN — GABAPENTIN 100 MG: 100 CAPSULE ORAL at 12:00

## 2025-07-06 RX ADMIN — DIAZEPAM 2.5 MG: 5 TABLET ORAL at 08:31

## 2025-07-06 RX ADMIN — COLLAGENASE SANTYL: 250 OINTMENT TOPICAL at 08:33

## 2025-07-06 RX ADMIN — ACETAMINOPHEN 650 MG: 325 TABLET ORAL at 05:55

## 2025-07-06 RX ADMIN — SODIUM CHLORIDE, PRESERVATIVE FREE 10 ML: 5 INJECTION INTRAVENOUS at 08:31

## 2025-07-06 RX ADMIN — Medication: at 21:17

## 2025-07-06 RX ADMIN — OXYCODONE 10 MG: 5 TABLET ORAL at 00:44

## 2025-07-06 RX ADMIN — HEPARIN SODIUM 18 UNITS/KG/HR: 10000 INJECTION, SOLUTION INTRAVENOUS at 08:13

## 2025-07-06 RX ADMIN — HYDROMORPHONE HYDROCHLORIDE 0.5 MG: 1 INJECTION, SOLUTION INTRAMUSCULAR; INTRAVENOUS; SUBCUTANEOUS at 08:30

## 2025-07-06 RX ADMIN — HYDROMORPHONE HYDROCHLORIDE 0.5 MG: 1 INJECTION, SOLUTION INTRAMUSCULAR; INTRAVENOUS; SUBCUTANEOUS at 21:24

## 2025-07-06 RX ADMIN — DIAZEPAM 2.5 MG: 5 TABLET ORAL at 17:42

## 2025-07-06 RX ADMIN — HEPARIN SODIUM 2800 UNITS: 1000 INJECTION INTRAVENOUS; SUBCUTANEOUS at 16:46

## 2025-07-06 RX ADMIN — GABAPENTIN 300 MG: 300 CAPSULE ORAL at 21:31

## 2025-07-06 RX ADMIN — OXYCODONE 10 MG: 5 TABLET ORAL at 18:27

## 2025-07-06 RX ADMIN — OXYCODONE 10 MG: 5 TABLET ORAL at 14:10

## 2025-07-06 RX ADMIN — VANCOMYCIN HYDROCHLORIDE 1750 MG: 10 INJECTION, POWDER, LYOPHILIZED, FOR SOLUTION INTRAVENOUS at 12:09

## 2025-07-06 ASSESSMENT — PAIN - FUNCTIONAL ASSESSMENT
PAIN_FUNCTIONAL_ASSESSMENT: PREVENTS OR INTERFERES SOME ACTIVE ACTIVITIES AND ADLS

## 2025-07-06 ASSESSMENT — PAIN DESCRIPTION - ORIENTATION
ORIENTATION: RIGHT;LEFT
ORIENTATION: RIGHT
ORIENTATION: RIGHT;LEFT
ORIENTATION: RIGHT;LEFT
ORIENTATION: RIGHT
ORIENTATION: RIGHT;LEFT

## 2025-07-06 ASSESSMENT — PAIN SCALES - GENERAL
PAINLEVEL_OUTOF10: 8
PAINLEVEL_OUTOF10: 9
PAINLEVEL_OUTOF10: 10
PAINLEVEL_OUTOF10: 8
PAINLEVEL_OUTOF10: 3
PAINLEVEL_OUTOF10: 10
PAINLEVEL_OUTOF10: 7

## 2025-07-06 ASSESSMENT — PAIN DESCRIPTION - LOCATION
LOCATION: FOOT

## 2025-07-06 ASSESSMENT — PAIN DESCRIPTION - DESCRIPTORS
DESCRIPTORS: ACHING

## 2025-07-06 NOTE — PLAN OF CARE
Problem: Discharge Planning  Goal: Discharge to home or other facility with appropriate resources  Outcome: Progressing  Flowsheets (Taken 7/6/2025 1844)  Discharge to home or other facility with appropriate resources:   Identify barriers to discharge with patient and caregiver   Arrange for needed discharge resources and transportation as appropriate   Identify discharge learning needs (meds, wound care, etc)   Refer to discharge planning if patient needs post-hospital services based on physician order or complex needs related to functional status, cognitive ability or social support system     Problem: Safety - Adult  Goal: Free from fall injury  Outcome: Progressing  Flowsheets (Taken 7/6/2025 1844)  Free From Fall Injury: Instruct family/caregiver on patient safety     Problem: Pain  Goal: Verbalizes/displays adequate comfort level or baseline comfort level  Outcome: Progressing  Flowsheets (Taken 7/6/2025 1844)  Verbalizes/displays adequate comfort level or baseline comfort level:   Encourage patient to monitor pain and request assistance   Assess pain using appropriate pain scale   Administer analgesics based on type and severity of pain and evaluate response   Implement non-pharmacological measures as appropriate and evaluate response   Consider cultural and social influences on pain and pain management   Notify Licensed Independent Practitioner if interventions unsuccessful or patient reports new pain     Problem: Skin/Tissue Integrity  Goal: Skin integrity remains intact  Description: 1.  Monitor for areas of redness and/or skin breakdown  2.  Assess vascular access sites hourly  3.  Every 4-6 hours minimum:  Change oxygen saturation probe site  4.  Every 4-6 hours:  If on nasal continuous positive airway pressure, respiratory therapy assess nares and determine need for appliance change or resting period  Outcome: Progressing  Flowsheets (Taken 7/6/2025 1844)  Skin Integrity Remains Intact: Monitor for

## 2025-07-07 ENCOUNTER — ANESTHESIA EVENT (OUTPATIENT)
Dept: OPERATING ROOM | Age: 65
End: 2025-07-07
Payer: COMMERCIAL

## 2025-07-07 ENCOUNTER — APPOINTMENT (OUTPATIENT)
Dept: VASCULAR LAB | Age: 65
DRG: 305 | End: 2025-07-07
Payer: COMMERCIAL

## 2025-07-07 ENCOUNTER — ANESTHESIA (OUTPATIENT)
Dept: OPERATING ROOM | Age: 65
End: 2025-07-07
Payer: COMMERCIAL

## 2025-07-07 LAB
ABO/RH: NORMAL
ALBUMIN SERPL-MCNC: 3.2 G/DL (ref 3.4–5)
ANION GAP SERPL CALCULATED.3IONS-SCNC: 9 MMOL/L (ref 3–16)
ANTIBODY SCREEN: NORMAL
BACTERIA BLD CULT ORG #2: NORMAL
BACTERIA BLD CULT: NORMAL
BACTERIA SPEC AEROBE CULT: ABNORMAL
BACTERIA SPEC ANAEROBE CULT: ABNORMAL
BASOPHILS # BLD: 0.1 K/UL (ref 0–0.2)
BASOPHILS NFR BLD: 0.7 %
BUN SERPL-MCNC: 12 MG/DL (ref 7–20)
CALCIUM SERPL-MCNC: 10.2 MG/DL (ref 8.3–10.6)
CHLORIDE SERPL-SCNC: 98 MMOL/L (ref 99–110)
CO2 SERPL-SCNC: 28 MMOL/L (ref 21–32)
CREAT SERPL-MCNC: 0.7 MG/DL (ref 0.8–1.3)
DEPRECATED RDW RBC AUTO: 12.6 % (ref 12.4–15.4)
EOSINOPHIL # BLD: 0.4 K/UL (ref 0–0.6)
EOSINOPHIL NFR BLD: 2.9 %
GFR SERPLBLD CREATININE-BSD FMLA CKD-EPI: >90 ML/MIN/{1.73_M2}
GLUCOSE SERPL-MCNC: 103 MG/DL (ref 70–99)
GRAM STN SPEC: ABNORMAL
HCT VFR BLD AUTO: 24 % (ref 40.5–52.5)
HGB BLD-MCNC: 7.8 G/DL (ref 13.5–17.5)
INR PPP: 1.2 (ref 0.86–1.14)
LYMPHOCYTES # BLD: 3.7 K/UL (ref 1–5.1)
LYMPHOCYTES NFR BLD: 24.7 %
MAGNESIUM SERPL-MCNC: 2.15 MG/DL (ref 1.8–2.4)
MCH RBC QN AUTO: 28 PG (ref 26–34)
MCHC RBC AUTO-ENTMCNC: 32.5 G/DL (ref 31–36)
MCV RBC AUTO: 86 FL (ref 80–100)
MONOCYTES # BLD: 1 K/UL (ref 0–1.3)
MONOCYTES NFR BLD: 6.5 %
NEUTROPHILS # BLD: 9.7 K/UL (ref 1.7–7.7)
NEUTROPHILS NFR BLD: 65.2 %
ORGANISM: ABNORMAL
PHOSPHATE SERPL-MCNC: 3.6 MG/DL (ref 2.5–4.9)
PLATELET # BLD AUTO: 812 K/UL (ref 135–450)
PMV BLD AUTO: 6.5 FL (ref 5–10.5)
POTASSIUM SERPL-SCNC: 4.1 MMOL/L (ref 3.5–5.1)
PROTHROMBIN TIME: 15.4 SEC (ref 12.1–14.9)
RBC # BLD AUTO: 2.79 M/UL (ref 4.2–5.9)
SODIUM SERPL-SCNC: 135 MMOL/L (ref 136–145)
WBC # BLD AUTO: 15 K/UL (ref 4–11)

## 2025-07-07 PROCEDURE — 2580000003 HC RX 258: Performed by: STUDENT IN AN ORGANIZED HEALTH CARE EDUCATION/TRAINING PROGRAM

## 2025-07-07 PROCEDURE — 85610 PROTHROMBIN TIME: CPT

## 2025-07-07 PROCEDURE — 7100000001 HC PACU RECOVERY - ADDTL 15 MIN: Performed by: SURGERY

## 2025-07-07 PROCEDURE — 6370000000 HC RX 637 (ALT 250 FOR IP)

## 2025-07-07 PROCEDURE — 86901 BLOOD TYPING SEROLOGIC RH(D): CPT

## 2025-07-07 PROCEDURE — 2580000003 HC RX 258

## 2025-07-07 PROCEDURE — 6360000002 HC RX W HCPCS: Performed by: NURSE PRACTITIONER

## 2025-07-07 PROCEDURE — 2580000003 HC RX 258: Performed by: NURSE PRACTITIONER

## 2025-07-07 PROCEDURE — 99255 IP/OBS CONSLTJ NEW/EST HI 80: CPT | Performed by: INTERNAL MEDICINE

## 2025-07-07 PROCEDURE — 2500000003 HC RX 250 WO HCPCS

## 2025-07-07 PROCEDURE — 83735 ASSAY OF MAGNESIUM: CPT

## 2025-07-07 PROCEDURE — 6360000002 HC RX W HCPCS

## 2025-07-07 PROCEDURE — 80069 RENAL FUNCTION PANEL: CPT

## 2025-07-07 PROCEDURE — 64447 NJX AA&/STRD FEMORAL NRV IMG: CPT | Performed by: ANESTHESIOLOGY

## 2025-07-07 PROCEDURE — 0Y6H0Z1 DETACHMENT AT RIGHT LOWER LEG, HIGH, OPEN APPROACH: ICD-10-PCS | Performed by: SURGERY

## 2025-07-07 PROCEDURE — 64445 NJX AA&/STRD SCIATIC NRV IMG: CPT | Performed by: ANESTHESIOLOGY

## 2025-07-07 PROCEDURE — 2709999900 HC NON-CHARGEABLE SUPPLY: Performed by: SURGERY

## 2025-07-07 PROCEDURE — 6360000002 HC RX W HCPCS: Performed by: SURGERY

## 2025-07-07 PROCEDURE — 86850 RBC ANTIBODY SCREEN: CPT

## 2025-07-07 PROCEDURE — 85025 COMPLETE CBC W/AUTO DIFF WBC: CPT

## 2025-07-07 PROCEDURE — 88311 DECALCIFY TISSUE: CPT

## 2025-07-07 PROCEDURE — 86900 BLOOD TYPING SEROLOGIC ABO: CPT

## 2025-07-07 PROCEDURE — 3700000001 HC ADD 15 MINUTES (ANESTHESIA): Performed by: SURGERY

## 2025-07-07 PROCEDURE — 6360000002 HC RX W HCPCS: Performed by: ANESTHESIOLOGY

## 2025-07-07 PROCEDURE — 2720000010 HC SURG SUPPLY STERILE: Performed by: SURGERY

## 2025-07-07 PROCEDURE — 2500000003 HC RX 250 WO HCPCS: Performed by: SURGERY

## 2025-07-07 PROCEDURE — 27880 AMPUTATION OF LOWER LEG: CPT | Performed by: SURGERY

## 2025-07-07 PROCEDURE — 36415 COLL VENOUS BLD VENIPUNCTURE: CPT

## 2025-07-07 PROCEDURE — 93923 UPR/LXTR ART STDY 3+ LVLS: CPT

## 2025-07-07 PROCEDURE — 3600000004 HC SURGERY LEVEL 4 BASE: Performed by: SURGERY

## 2025-07-07 PROCEDURE — 88307 TISSUE EXAM BY PATHOLOGIST: CPT

## 2025-07-07 PROCEDURE — 7100000000 HC PACU RECOVERY - FIRST 15 MIN: Performed by: SURGERY

## 2025-07-07 PROCEDURE — 1200000000 HC SEMI PRIVATE

## 2025-07-07 PROCEDURE — 3600000014 HC SURGERY LEVEL 4 ADDTL 15MIN: Performed by: SURGERY

## 2025-07-07 PROCEDURE — 3700000000 HC ANESTHESIA ATTENDED CARE: Performed by: SURGERY

## 2025-07-07 RX ORDER — PROCHLORPERAZINE EDISYLATE 5 MG/ML
5 INJECTION INTRAMUSCULAR; INTRAVENOUS
Status: DISCONTINUED | OUTPATIENT
Start: 2025-07-07 | End: 2025-07-07

## 2025-07-07 RX ORDER — ROPIVACAINE HYDROCHLORIDE 5 MG/ML
INJECTION, SOLUTION EPIDURAL; INFILTRATION; PERINEURAL
Status: COMPLETED | OUTPATIENT
Start: 2025-07-07 | End: 2025-07-07

## 2025-07-07 RX ORDER — HYDROMORPHONE HYDROCHLORIDE 1 MG/ML
1 INJECTION, SOLUTION INTRAMUSCULAR; INTRAVENOUS; SUBCUTANEOUS
Status: DISCONTINUED | OUTPATIENT
Start: 2025-07-07 | End: 2025-07-11

## 2025-07-07 RX ORDER — HYDROMORPHONE HYDROCHLORIDE 1 MG/ML
0.5 INJECTION, SOLUTION INTRAMUSCULAR; INTRAVENOUS; SUBCUTANEOUS ONCE
Status: COMPLETED | OUTPATIENT
Start: 2025-07-07 | End: 2025-07-07

## 2025-07-07 RX ORDER — SODIUM CHLORIDE 0.9 % (FLUSH) 0.9 %
5-40 SYRINGE (ML) INJECTION EVERY 12 HOURS SCHEDULED
Status: DISCONTINUED | OUTPATIENT
Start: 2025-07-07 | End: 2025-07-07

## 2025-07-07 RX ORDER — HEPARIN SODIUM 1000 [USP'U]/ML
80 INJECTION, SOLUTION INTRAVENOUS; SUBCUTANEOUS PRN
Status: DISCONTINUED | OUTPATIENT
Start: 2025-07-07 | End: 2025-07-08

## 2025-07-07 RX ORDER — SODIUM CHLORIDE 0.9 % (FLUSH) 0.9 %
5-40 SYRINGE (ML) INJECTION PRN
Status: DISCONTINUED | OUTPATIENT
Start: 2025-07-07 | End: 2025-07-07

## 2025-07-07 RX ORDER — SODIUM CHLORIDE 9 MG/ML
INJECTION, SOLUTION INTRAVENOUS PRN
Status: DISCONTINUED | OUTPATIENT
Start: 2025-07-07 | End: 2025-07-07

## 2025-07-07 RX ORDER — IPRATROPIUM BROMIDE AND ALBUTEROL SULFATE 2.5; .5 MG/3ML; MG/3ML
1 SOLUTION RESPIRATORY (INHALATION)
Status: DISCONTINUED | OUTPATIENT
Start: 2025-07-07 | End: 2025-07-07

## 2025-07-07 RX ORDER — ACETAMINOPHEN 325 MG/1
650 TABLET ORAL
Status: DISCONTINUED | OUTPATIENT
Start: 2025-07-07 | End: 2025-07-07

## 2025-07-07 RX ORDER — ONDANSETRON 2 MG/ML
4 INJECTION INTRAMUSCULAR; INTRAVENOUS
Status: DISCONTINUED | OUTPATIENT
Start: 2025-07-07 | End: 2025-07-07

## 2025-07-07 RX ORDER — ONDANSETRON 2 MG/ML
INJECTION INTRAMUSCULAR; INTRAVENOUS
Status: DISCONTINUED | OUTPATIENT
Start: 2025-07-07 | End: 2025-07-07 | Stop reason: SDUPTHER

## 2025-07-07 RX ORDER — LABETALOL HYDROCHLORIDE 5 MG/ML
10 INJECTION, SOLUTION INTRAVENOUS
Status: DISCONTINUED | OUTPATIENT
Start: 2025-07-07 | End: 2025-07-07

## 2025-07-07 RX ORDER — PROPOFOL 10 MG/ML
INJECTION, EMULSION INTRAVENOUS
Status: DISCONTINUED | OUTPATIENT
Start: 2025-07-07 | End: 2025-07-07 | Stop reason: SDUPTHER

## 2025-07-07 RX ORDER — MIDAZOLAM HYDROCHLORIDE 1 MG/ML
INJECTION, SOLUTION INTRAMUSCULAR; INTRAVENOUS
Status: COMPLETED
Start: 2025-07-07 | End: 2025-07-07

## 2025-07-07 RX ORDER — LIDOCAINE HYDROCHLORIDE 20 MG/ML
INJECTION, SOLUTION INTRAVENOUS
Status: DISCONTINUED | OUTPATIENT
Start: 2025-07-07 | End: 2025-07-07 | Stop reason: SDUPTHER

## 2025-07-07 RX ORDER — FENTANYL CITRATE 50 UG/ML
INJECTION, SOLUTION INTRAMUSCULAR; INTRAVENOUS
Status: COMPLETED | OUTPATIENT
Start: 2025-07-07 | End: 2025-07-07

## 2025-07-07 RX ORDER — HYDROMORPHONE HYDROCHLORIDE 1 MG/ML
0.25 INJECTION, SOLUTION INTRAMUSCULAR; INTRAVENOUS; SUBCUTANEOUS ONCE
Status: DISCONTINUED | OUTPATIENT
Start: 2025-07-07 | End: 2025-07-07

## 2025-07-07 RX ORDER — PHENYLEPHRINE HCL IN 0.9% NACL 1 MG/10 ML
SYRINGE (ML) INTRAVENOUS
Status: DISCONTINUED | OUTPATIENT
Start: 2025-07-07 | End: 2025-07-07 | Stop reason: SDUPTHER

## 2025-07-07 RX ORDER — DIAZEPAM 5 MG/1
5 TABLET ORAL EVERY 8 HOURS
Status: DISCONTINUED | OUTPATIENT
Start: 2025-07-08 | End: 2025-07-15

## 2025-07-07 RX ORDER — HYDROMORPHONE HYDROCHLORIDE 1 MG/ML
0.5 INJECTION, SOLUTION INTRAMUSCULAR; INTRAVENOUS; SUBCUTANEOUS EVERY 5 MIN PRN
Status: DISCONTINUED | OUTPATIENT
Start: 2025-07-07 | End: 2025-07-07

## 2025-07-07 RX ORDER — MIDAZOLAM HYDROCHLORIDE 1 MG/ML
INJECTION, SOLUTION INTRAMUSCULAR; INTRAVENOUS
Status: COMPLETED | OUTPATIENT
Start: 2025-07-07 | End: 2025-07-07

## 2025-07-07 RX ORDER — HEPARIN SODIUM 10000 [USP'U]/100ML
5-30 INJECTION, SOLUTION INTRAVENOUS CONTINUOUS
Status: DISCONTINUED | OUTPATIENT
Start: 2025-07-07 | End: 2025-07-08

## 2025-07-07 RX ORDER — FENTANYL CITRATE 50 UG/ML
INJECTION, SOLUTION INTRAMUSCULAR; INTRAVENOUS
Status: COMPLETED
Start: 2025-07-07 | End: 2025-07-07

## 2025-07-07 RX ORDER — DEXAMETHASONE SODIUM PHOSPHATE 4 MG/ML
INJECTION, SOLUTION INTRA-ARTICULAR; INTRALESIONAL; INTRAMUSCULAR; INTRAVENOUS; SOFT TISSUE
Status: DISCONTINUED | OUTPATIENT
Start: 2025-07-07 | End: 2025-07-07 | Stop reason: SDUPTHER

## 2025-07-07 RX ORDER — HEPARIN SODIUM 1000 [USP'U]/ML
40 INJECTION, SOLUTION INTRAVENOUS; SUBCUTANEOUS PRN
Status: DISCONTINUED | OUTPATIENT
Start: 2025-07-07 | End: 2025-07-08

## 2025-07-07 RX ORDER — ROPIVACAINE HYDROCHLORIDE 5 MG/ML
INJECTION, SOLUTION EPIDURAL; INFILTRATION; PERINEURAL
Status: COMPLETED
Start: 2025-07-07 | End: 2025-07-07

## 2025-07-07 RX ORDER — FENTANYL CITRATE 50 UG/ML
25 INJECTION, SOLUTION INTRAMUSCULAR; INTRAVENOUS EVERY 5 MIN PRN
Status: DISCONTINUED | OUTPATIENT
Start: 2025-07-07 | End: 2025-07-07

## 2025-07-07 RX ORDER — HYDROMORPHONE HYDROCHLORIDE 1 MG/ML
0.5 INJECTION, SOLUTION INTRAMUSCULAR; INTRAVENOUS; SUBCUTANEOUS
Status: DISCONTINUED | OUTPATIENT
Start: 2025-07-07 | End: 2025-07-11

## 2025-07-07 RX ORDER — ROCURONIUM BROMIDE 10 MG/ML
INJECTION, SOLUTION INTRAVENOUS
Status: DISCONTINUED | OUTPATIENT
Start: 2025-07-07 | End: 2025-07-07 | Stop reason: SDUPTHER

## 2025-07-07 RX ADMIN — DIAZEPAM 2.5 MG: 5 TABLET ORAL at 00:11

## 2025-07-07 RX ADMIN — ONDANSETRON 4 MG: 2 INJECTION, SOLUTION INTRAMUSCULAR; INTRAVENOUS at 14:49

## 2025-07-07 RX ADMIN — Medication 100 MCG: at 14:43

## 2025-07-07 RX ADMIN — MEROPENEM 1000 MG: 1 INJECTION INTRAVENOUS at 11:01

## 2025-07-07 RX ADMIN — Medication 200 MCG: at 14:32

## 2025-07-07 RX ADMIN — SODIUM CHLORIDE, SODIUM LACTATE, POTASSIUM CHLORIDE, AND CALCIUM CHLORIDE: .6; .31; .03; .02 INJECTION, SOLUTION INTRAVENOUS at 15:33

## 2025-07-07 RX ADMIN — ACETAMINOPHEN 650 MG: 325 TABLET ORAL at 22:18

## 2025-07-07 RX ADMIN — ROPIVACAINE HYDROCHLORIDE 20 ML: 5 INJECTION, SOLUTION EPIDURAL; INFILTRATION; PERINEURAL at 12:58

## 2025-07-07 RX ADMIN — DEXMEDETOMIDINE HYDROCHLORIDE 4 MCG: 100 INJECTION, SOLUTION INTRAVENOUS at 15:35

## 2025-07-07 RX ADMIN — DEXAMETHASONE SODIUM PHOSPHATE 4 MG: 4 INJECTION INTRA-ARTICULAR; INTRALESIONAL; INTRAMUSCULAR; INTRAVENOUS; SOFT TISSUE at 14:38

## 2025-07-07 RX ADMIN — Medication 100 MCG: at 15:40

## 2025-07-07 RX ADMIN — ACETAMINOPHEN 650 MG: 325 TABLET ORAL at 05:07

## 2025-07-07 RX ADMIN — Medication: at 22:34

## 2025-07-07 RX ADMIN — SODIUM CHLORIDE, SODIUM LACTATE, POTASSIUM CHLORIDE, AND CALCIUM CHLORIDE: .6; .31; .03; .02 INJECTION, SOLUTION INTRAVENOUS at 01:34

## 2025-07-07 RX ADMIN — MEROPENEM 1000 MG: 1 INJECTION INTRAVENOUS at 00:11

## 2025-07-07 RX ADMIN — PROPOFOL 20 MG: 10 INJECTION, EMULSION INTRAVENOUS at 15:27

## 2025-07-07 RX ADMIN — HEPARIN SODIUM 18 UNITS/KG/HR: 10000 INJECTION, SOLUTION INTRAVENOUS at 23:34

## 2025-07-07 RX ADMIN — MIDAZOLAM HYDROCHLORIDE 2 MG: 1 INJECTION, SOLUTION INTRAMUSCULAR; INTRAVENOUS at 12:58

## 2025-07-07 RX ADMIN — SODIUM CHLORIDE 1250 MG: 0.9 INJECTION, SOLUTION INTRAVENOUS at 22:21

## 2025-07-07 RX ADMIN — PROPOFOL 50 MG: 10 INJECTION, EMULSION INTRAVENOUS at 15:52

## 2025-07-07 RX ADMIN — OXYCODONE 10 MG: 5 TABLET ORAL at 00:12

## 2025-07-07 RX ADMIN — ROCURONIUM BROMIDE 50 MG: 10 INJECTION, SOLUTION INTRAVENOUS at 14:29

## 2025-07-07 RX ADMIN — GABAPENTIN 300 MG: 300 CAPSULE ORAL at 22:18

## 2025-07-07 RX ADMIN — HYDROMORPHONE HYDROCHLORIDE 0.5 MG: 1 INJECTION, SOLUTION INTRAMUSCULAR; INTRAVENOUS; SUBCUTANEOUS at 09:49

## 2025-07-07 RX ADMIN — OXYCODONE 10 MG: 5 TABLET ORAL at 11:02

## 2025-07-07 RX ADMIN — PROPOFOL 20 MG: 10 INJECTION, EMULSION INTRAVENOUS at 15:40

## 2025-07-07 RX ADMIN — HYDROMORPHONE HYDROCHLORIDE 0.5 MG: 1 INJECTION, SOLUTION INTRAMUSCULAR; INTRAVENOUS; SUBCUTANEOUS at 08:53

## 2025-07-07 RX ADMIN — PHENYLEPHRINE HYDROCHLORIDE 30 MCG/MIN: 10 INJECTION, SOLUTION INTRAVENOUS at 14:43

## 2025-07-07 RX ADMIN — Medication: at 05:05

## 2025-07-07 RX ADMIN — FENTANYL CITRATE 100 MCG: 50 INJECTION, SOLUTION INTRAMUSCULAR; INTRAVENOUS at 12:58

## 2025-07-07 RX ADMIN — OXYCODONE 10 MG: 5 TABLET ORAL at 05:08

## 2025-07-07 RX ADMIN — SUGAMMADEX 200 MG: 100 INJECTION, SOLUTION INTRAVENOUS at 16:10

## 2025-07-07 RX ADMIN — SODIUM CHLORIDE, PRESERVATIVE FREE 10 ML: 5 INJECTION INTRAVENOUS at 22:19

## 2025-07-07 RX ADMIN — ROPIVACAINE HYDROCHLORIDE 20 ML: 5 INJECTION, SOLUTION EPIDURAL; INFILTRATION; PERINEURAL at 13:02

## 2025-07-07 RX ADMIN — SODIUM CHLORIDE: 0.9 INJECTION, SOLUTION INTRAVENOUS at 22:21

## 2025-07-07 RX ADMIN — SODIUM CHLORIDE 1250 MG: 0.9 INJECTION, SOLUTION INTRAVENOUS at 14:36

## 2025-07-07 RX ADMIN — PROPOFOL 100 MG: 10 INJECTION, EMULSION INTRAVENOUS at 14:28

## 2025-07-07 RX ADMIN — LIDOCAINE HYDROCHLORIDE 100 MG: 20 INJECTION, SOLUTION INTRAVENOUS at 14:28

## 2025-07-07 RX ADMIN — ACETAMINOPHEN 650 MG: 325 TABLET ORAL at 00:12

## 2025-07-07 RX ADMIN — Medication 100 MCG: at 15:47

## 2025-07-07 RX ADMIN — HYDROMORPHONE HYDROCHLORIDE 0.5 MG: 1 INJECTION, SOLUTION INTRAMUSCULAR; INTRAVENOUS; SUBCUTANEOUS at 02:15

## 2025-07-07 ASSESSMENT — PAIN SCALES - GENERAL
PAINLEVEL_OUTOF10: 0
PAINLEVEL_OUTOF10: 8
PAINLEVEL_OUTOF10: 8
PAINLEVEL_OUTOF10: 0
PAINLEVEL_OUTOF10: 10
PAINLEVEL_OUTOF10: 8
PAINLEVEL_OUTOF10: 7
PAINLEVEL_OUTOF10: 0
PAINLEVEL_OUTOF10: 10

## 2025-07-07 ASSESSMENT — PAIN DESCRIPTION - ORIENTATION
ORIENTATION: RIGHT

## 2025-07-07 ASSESSMENT — PAIN - FUNCTIONAL ASSESSMENT
PAIN_FUNCTIONAL_ASSESSMENT: PREVENTS OR INTERFERES SOME ACTIVE ACTIVITIES AND ADLS
PAIN_FUNCTIONAL_ASSESSMENT: NONE - DENIES PAIN
PAIN_FUNCTIONAL_ASSESSMENT: NONE - DENIES PAIN
PAIN_FUNCTIONAL_ASSESSMENT: PREVENTS OR INTERFERES SOME ACTIVE ACTIVITIES AND ADLS
PAIN_FUNCTIONAL_ASSESSMENT: PREVENTS OR INTERFERES SOME ACTIVE ACTIVITIES AND ADLS

## 2025-07-07 ASSESSMENT — PAIN DESCRIPTION - DESCRIPTORS
DESCRIPTORS: ACHING

## 2025-07-07 ASSESSMENT — PAIN DESCRIPTION - LOCATION
LOCATION: FOOT

## 2025-07-07 NOTE — BRIEF OP NOTE
Brief Postoperative Note      Patient: Natan Lynch  YOB: 1960  MRN: [id number]    Date of Procedure: 7/7/2025    Pre-Op Diagnosis Codes:      * Osteomyelitis of right foot, unspecified type (HCC) [M86.9]    Post-Op Diagnosis: Same       Procedure(s):  RIGHT BELOW KNEE LEG AMPUTATION    Surgeon(s):  Casandra Alvarado MD    Assistant:  Resident: Henrik Tejada DO    Anesthesia: General    Estimated Blood Loss (mL): less than 50     Complications: None    Specimens:   ID Type Source Tests Collected by Time Destination   A : A. Left Leg Specimen Leg SURGICAL PATHOLOGY Casandra Alvarado MD 7/7/2025 1515        Implants:  * No implants in log *      Drains: * No LDAs found *    Findings:  Infection Present At Time Of Surgery (PATOS) (choose all levels that have infection present):  - Superficial Infection (skin/subcutaneous) present as evidenced by fluid consistent with infection  - Deep Infection (muscle/fascia) present as evidenced by fluid consistent with infection  Other Findings: Right foot wounds and osteomyelitis. Right below knee amputation without any issues. Amputation site dressed with xeraform, gauze, web roll, and ace wrap. Abilities in motion created IPOP.     Electronically signed by Henrik Tejada DO on 7/7/2025 at 4:08 PM

## 2025-07-07 NOTE — ANESTHESIA PROCEDURE NOTES
Peripheral Block    Patient location during procedure: holding area  Reason for block: procedure for pain, post-op pain management and at surgeon's request  Start time: 7/7/2025 12:58 PM  End time: 7/7/2025 1:01 PM  Staffing  Performed: anesthesiologist   Anesthesiologist: Corwin Sanz MD  Performed by: Corwin Sanz MD  Authorized by: Corwin Sanz MD    Preanesthetic Checklist  Completed: patient identified, IV checked, site marked, risks and benefits discussed, surgical/procedural consents, equipment checked, pre-op evaluation, timeout performed, anesthesia consent given, oxygen available, monitors applied/VS acknowledged, fire risk safety assessment completed and verbalized and blood product R/B/A discussed and consented  Peripheral Block   Patient position: supine  Prep: ChloraPrep  Provider prep: mask and sterile gloves  Patient monitoring: cardiac monitor, continuous pulse ox, continuous capnometry, frequent blood pressure checks, IV access, oxygen and responsive to questions  Block type: Sciatic  Popliteal  Laterality: right  Injection technique: single-shot  Guidance: ultrasound guided    Needle   Needle type: insulated echogenic nerve stimulator needle   Needle gauge: 22 G  Needle localization: ultrasound guidance  Needle length: 8 cm  Assessment   Injection assessment: negative aspiration for heme, no paresthesia on injection, local visualized surrounding nerve on ultrasound and no intravascular symptoms  Paresthesia pain: none  Slow fractionated injection: yes  Hemodynamics: stable  Outcomes: uncomplicated and patient tolerated procedure well    Additional Notes  20 ml 0.5% ropivacaine injected in 2 ml increments with negative aspiration between. No complications.  Medications Administered  midazolam (VERSED) injection 2 mg/2mL - IntraVENous   2 mg - 7/7/2025 12:58:00 PM  fentaNYL (SUBLIMAZE) injection - IntraVENous   100 mcg - 7/7/2025 12:58:00 PM  ropivacaine (NAROPIN) injection 0.5% -

## 2025-07-07 NOTE — ANESTHESIA PROCEDURE NOTES
Peripheral Block    Patient location during procedure: holding area  Reason for block: procedure for pain, post-op pain management and at surgeon's request  Start time: 7/7/2025 1:02 PM  End time: 7/7/2025 1:04 PM  Staffing  Performed: anesthesiologist   Anesthesiologist: Corwin Sanz MD  Performed by: Corwin Sanz MD  Authorized by: Corwin Sanz MD    Preanesthetic Checklist  Completed: patient identified, IV checked, site marked, risks and benefits discussed, surgical/procedural consents, equipment checked, pre-op evaluation, timeout performed, anesthesia consent given, oxygen available, monitors applied/VS acknowledged, fire risk safety assessment completed and verbalized and blood product R/B/A discussed and consented  Peripheral Block   Patient position: supine  Prep: ChloraPrep  Provider prep: mask and sterile gloves  Patient monitoring: cardiac monitor, continuous pulse ox, continuous capnometry, frequent blood pressure checks, IV access, oxygen and responsive to questions  Block type: Femoral  Femoral crease  Laterality: right  Injection technique: single-shot  Guidance: ultrasound guided    Needle   Needle type: insulated echogenic nerve stimulator needle   Needle gauge: 22 G  Needle localization: ultrasound guidance  Needle length: 8 cm  Assessment   Injection assessment: negative aspiration for heme, no paresthesia on injection, local visualized surrounding nerve on ultrasound and no intravascular symptoms  Paresthesia pain: none  Slow fractionated injection: yes  Hemodynamics: stable  Outcomes: uncomplicated and patient tolerated procedure well    Additional Notes  20 ml 0.5% ropivacaine injected in 5 ml increments with negative aspiration between. No complications.  Medications Administered  ropivacaine (NAROPIN) injection 0.5% - Perineural   20 mL - 7/7/2025 1:02:00 PM

## 2025-07-07 NOTE — ANESTHESIA POSTPROCEDURE EVALUATION
Department of Anesthesiology  Postprocedure Note    Patient: Natan Lynch  MRN: [id number]  YOB: 1960  Date of evaluation: 7/7/2025    Procedure Summary       Date: 07/07/25 Room / Location: [place] [id number] / [facility]    Anesthesia Start: 1421 Anesthesia Stop: 1625    Procedure: RIGHT BELOW KNEE LEG AMPUTATION (Right: Leg Lower) Diagnosis:       Osteomyelitis of right foot, unspecified type (HCC)      (Osteomyelitis of right foot, unspecified type (HCC) [M86.9])    Surgeons: Casandra Alvarado MD Responsible Provider: Corwin Sanz MD    Anesthesia Type: general, regional ASA Status: 3            Anesthesia Type: No value filed.    Daniele Phase I: Daniele Score: 8    Daniele Phase II:      Anesthesia Post Evaluation    Patient location during evaluation: PACU  Patient participation: complete - patient participated  Level of consciousness: awake  Pain score: 0  Nausea & Vomiting: no nausea and no vomiting  Cardiovascular status: blood pressure returned to baseline  Respiratory status: acceptable  Hydration status: euvolemic  Pain management: adequate    No notable events documented.

## 2025-07-07 NOTE — ANESTHESIA PRE PROCEDURE
Department of Anesthesiology  Preprocedure Note       Name:  Natan Lynch   Age:  64 y.o.  :  1960                                          MRN:  [id number]         Date:  2025      Surgeon: Surgeon(s):  Casandra Alvarado MD    Procedure: Procedure(s):  RIGHT BELOW KNEE LEG AMPUTATION VERSUS ABOVE KNEE LEG AMPUTATION    Medications prior to admission:   Prior to Admission medications    Medication Sig Start Date End Date Taking? Authorizing Provider   gabapentin (NEURONTIN) 100 MG capsule Take 1 capsule by mouth every 8 hours as needed (pain -- do not give within 2h of scheduled bedtime dose).   Yes Provider, Historical, MD   Balsam [place]-Castor Oil (VENELEX EX) Apply 1 Application topically every 8 (eight) hours To sacrum   Yes Rome Owusu MD   Dextromethorphan-guaiFENesin  MG/5ML SYRP Take 10 mLs by mouth every 6 hours as needed for Cough   Yes Rome Owusu MD   promethazine (PHENERGAN) 25 MG tablet Take 1 tablet by mouth every 6 hours as needed for Nausea   Yes Rome Owusu MD   gabapentin (NEURONTIN) 300 MG capsule Take 1 capsule by mouth nightly for 180 days. Intended supply: 90 days 25 Yes Javi Ayon DPM   aspirin (RL ASPIRIN) 325 MG tablet Take 1 tablet by mouth in the morning and at bedtime 25  Yes Faye Penn DPM   polyethylene glycol (MIRALAX) 17 g PACK packet Take 17 g by mouth daily   Yes Rome Owusu MD   docusate sodium (COLACE) 100 MG capsule Take 1 capsule by mouth 2 times daily as needed for Constipation   Yes Rome Owusu MD   loperamide (IMODIUM) 2 MG capsule Take 1 capsule by mouth 4 times daily as needed for Diarrhea   Yes Rome Owusu MD   rivaroxaban (XARELTO) 20 MG TABS tablet Take 1 tablet by mouth daily (with breakfast) 10/16/24  Yes Sim Serrano MD   acetaminophen (TYLENOL) 500 MG tablet Take 1 tablet by mouth 4 times daily as needed for Pain 24  Yes Rome Rosa MD

## 2025-07-08 LAB
ALBUMIN SERPL-MCNC: 3.2 G/DL (ref 3.4–5)
ANION GAP SERPL CALCULATED.3IONS-SCNC: 10 MMOL/L (ref 3–16)
ANTI-XA UNFRAC HEPARIN: 0.12 IU/ML (ref 0.3–0.7)
ANTI-XA UNFRAC HEPARIN: 0.22 IU/ML (ref 0.3–0.7)
ANTI-XA UNFRAC HEPARIN: 0.22 IU/ML (ref 0.3–0.7)
ANTI-XA UNFRAC HEPARIN: 0.23 IU/ML (ref 0.3–0.7)
BASOPHILS # BLD: 0.1 K/UL (ref 0–0.2)
BASOPHILS NFR BLD: 0.9 %
BUN SERPL-MCNC: 9 MG/DL (ref 7–20)
CALCIUM SERPL-MCNC: 9.5 MG/DL (ref 8.3–10.6)
CHLORIDE SERPL-SCNC: 100 MMOL/L (ref 99–110)
CO2 SERPL-SCNC: 27 MMOL/L (ref 21–32)
CREAT SERPL-MCNC: 0.6 MG/DL (ref 0.8–1.3)
DEPRECATED RDW RBC AUTO: 12.7 % (ref 12.4–15.4)
ECHO BSA: 1.96 M2
EOSINOPHIL # BLD: 0.1 K/UL (ref 0–0.6)
EOSINOPHIL NFR BLD: 0.7 %
GFR SERPLBLD CREATININE-BSD FMLA CKD-EPI: >90 ML/MIN/{1.73_M2}
GLUCOSE SERPL-MCNC: 118 MG/DL (ref 70–99)
HCT VFR BLD AUTO: 22.8 % (ref 40.5–52.5)
HGB BLD-MCNC: 7.4 G/DL (ref 13.5–17.5)
LYMPHOCYTES # BLD: 3.7 K/UL (ref 1–5.1)
LYMPHOCYTES NFR BLD: 22.6 %
MAGNESIUM SERPL-MCNC: 1.97 MG/DL (ref 1.8–2.4)
MCH RBC QN AUTO: 27.4 PG (ref 26–34)
MCHC RBC AUTO-ENTMCNC: 32.6 G/DL (ref 31–36)
MCV RBC AUTO: 84.1 FL (ref 80–100)
MONOCYTES # BLD: 1.6 K/UL (ref 0–1.3)
MONOCYTES NFR BLD: 9.7 %
NEUTROPHILS # BLD: 10.9 K/UL (ref 1.7–7.7)
NEUTROPHILS NFR BLD: 66.1 %
PHOSPHATE SERPL-MCNC: 2.7 MG/DL (ref 2.5–4.9)
PLATELET # BLD AUTO: 756 K/UL (ref 135–450)
PMV BLD AUTO: 6.6 FL (ref 5–10.5)
POTASSIUM SERPL-SCNC: 3.8 MMOL/L (ref 3.5–5.1)
RBC # BLD AUTO: 2.72 M/UL (ref 4.2–5.9)
SODIUM SERPL-SCNC: 137 MMOL/L (ref 136–145)
VANCOMYCIN SERPL-MCNC: 10.3 UG/ML
WBC # BLD AUTO: 16.5 K/UL (ref 4–11)

## 2025-07-08 PROCEDURE — 6370000000 HC RX 637 (ALT 250 FOR IP)

## 2025-07-08 PROCEDURE — 6360000002 HC RX W HCPCS

## 2025-07-08 PROCEDURE — 83735 ASSAY OF MAGNESIUM: CPT

## 2025-07-08 PROCEDURE — 6370000000 HC RX 637 (ALT 250 FOR IP): Performed by: STUDENT IN AN ORGANIZED HEALTH CARE EDUCATION/TRAINING PROGRAM

## 2025-07-08 PROCEDURE — 2580000003 HC RX 258

## 2025-07-08 PROCEDURE — 85025 COMPLETE CBC W/AUTO DIFF WBC: CPT

## 2025-07-08 PROCEDURE — 80202 ASSAY OF VANCOMYCIN: CPT

## 2025-07-08 PROCEDURE — 2500000003 HC RX 250 WO HCPCS

## 2025-07-08 PROCEDURE — 1200000000 HC SEMI PRIVATE

## 2025-07-08 PROCEDURE — 80069 RENAL FUNCTION PANEL: CPT

## 2025-07-08 PROCEDURE — 85520 HEPARIN ASSAY: CPT

## 2025-07-08 PROCEDURE — 99232 SBSQ HOSP IP/OBS MODERATE 35: CPT | Performed by: INTERNAL MEDICINE

## 2025-07-08 PROCEDURE — 36415 COLL VENOUS BLD VENIPUNCTURE: CPT

## 2025-07-08 PROCEDURE — 93923 UPR/LXTR ART STDY 3+ LVLS: CPT | Performed by: SURGERY

## 2025-07-08 RX ORDER — HEPARIN SODIUM 10000 [USP'U]/100ML
5-30 INJECTION, SOLUTION INTRAVENOUS CONTINUOUS
Status: DISCONTINUED | OUTPATIENT
Start: 2025-07-08 | End: 2025-07-11

## 2025-07-08 RX ORDER — LIDOCAINE 4 G/G
1 PATCH TOPICAL DAILY
Status: DISCONTINUED | OUTPATIENT
Start: 2025-07-08 | End: 2025-07-16 | Stop reason: HOSPADM

## 2025-07-08 RX ORDER — HYDROMORPHONE HYDROCHLORIDE 1 MG/ML
0.5 INJECTION, SOLUTION INTRAMUSCULAR; INTRAVENOUS; SUBCUTANEOUS
Status: DISCONTINUED | OUTPATIENT
Start: 2025-07-08 | End: 2025-07-09

## 2025-07-08 RX ORDER — ACETAMINOPHEN 500 MG
1000 TABLET ORAL EVERY 6 HOURS SCHEDULED
Status: DISCONTINUED | OUTPATIENT
Start: 2025-07-08 | End: 2025-07-16 | Stop reason: HOSPADM

## 2025-07-08 RX ORDER — METHOCARBAMOL 750 MG/1
750 TABLET, FILM COATED ORAL 4 TIMES DAILY
Status: DISCONTINUED | OUTPATIENT
Start: 2025-07-08 | End: 2025-07-16 | Stop reason: HOSPADM

## 2025-07-08 RX ORDER — HEPARIN SODIUM 1000 [USP'U]/ML
40 INJECTION, SOLUTION INTRAVENOUS; SUBCUTANEOUS PRN
Status: DISCONTINUED | OUTPATIENT
Start: 2025-07-08 | End: 2025-07-11

## 2025-07-08 RX ORDER — HEPARIN SODIUM 1000 [USP'U]/ML
80 INJECTION, SOLUTION INTRAVENOUS; SUBCUTANEOUS PRN
Status: DISCONTINUED | OUTPATIENT
Start: 2025-07-08 | End: 2025-07-11

## 2025-07-08 RX ADMIN — HEPARIN SODIUM 2800 UNITS: 1000 INJECTION INTRAVENOUS; SUBCUTANEOUS at 15:04

## 2025-07-08 RX ADMIN — COLLAGENASE SANTYL: 250 OINTMENT TOPICAL at 08:47

## 2025-07-08 RX ADMIN — OXYCODONE 10 MG: 5 TABLET ORAL at 06:29

## 2025-07-08 RX ADMIN — OXYCODONE 10 MG: 5 TABLET ORAL at 16:46

## 2025-07-08 RX ADMIN — HYDROMORPHONE HYDROCHLORIDE 1 MG: 1 INJECTION, SOLUTION INTRAMUSCULAR; INTRAVENOUS; SUBCUTANEOUS at 08:36

## 2025-07-08 RX ADMIN — HEPARIN SODIUM 2800 UNITS: 1000 INJECTION INTRAVENOUS; SUBCUTANEOUS at 22:52

## 2025-07-08 RX ADMIN — METHOCARBAMOL 750 MG: 750 TABLET ORAL at 21:13

## 2025-07-08 RX ADMIN — HYDROMORPHONE HYDROCHLORIDE 1 MG: 1 INJECTION, SOLUTION INTRAMUSCULAR; INTRAVENOUS; SUBCUTANEOUS at 14:34

## 2025-07-08 RX ADMIN — HYDROMORPHONE HYDROCHLORIDE 1 MG: 1 INJECTION, SOLUTION INTRAMUSCULAR; INTRAVENOUS; SUBCUTANEOUS at 21:14

## 2025-07-08 RX ADMIN — METHOCARBAMOL 750 MG: 750 TABLET ORAL at 17:41

## 2025-07-08 RX ADMIN — SODIUM CHLORIDE, PRESERVATIVE FREE 10 ML: 5 INJECTION INTRAVENOUS at 08:48

## 2025-07-08 RX ADMIN — SENNOSIDES, DOCUSATE SODIUM 2 TABLET: 50; 8.6 TABLET, FILM COATED ORAL at 08:48

## 2025-07-08 RX ADMIN — SODIUM CHLORIDE, PRESERVATIVE FREE 10 ML: 5 INJECTION INTRAVENOUS at 21:16

## 2025-07-08 RX ADMIN — DIAZEPAM 5 MG: 5 TABLET ORAL at 17:41

## 2025-07-08 RX ADMIN — SODIUM CHLORIDE 1250 MG: 0.9 INJECTION, SOLUTION INTRAVENOUS at 23:13

## 2025-07-08 RX ADMIN — HYDROMORPHONE HYDROCHLORIDE 1 MG: 1 INJECTION, SOLUTION INTRAMUSCULAR; INTRAVENOUS; SUBCUTANEOUS at 05:19

## 2025-07-08 RX ADMIN — METHOCARBAMOL 750 MG: 750 TABLET ORAL at 13:34

## 2025-07-08 RX ADMIN — GABAPENTIN 300 MG: 300 CAPSULE ORAL at 21:13

## 2025-07-08 RX ADMIN — HEPARIN SODIUM 2800 UNITS: 1000 INJECTION INTRAVENOUS; SUBCUTANEOUS at 07:50

## 2025-07-08 RX ADMIN — MEROPENEM 1000 MG: 1 INJECTION INTRAVENOUS at 17:56

## 2025-07-08 RX ADMIN — POLYETHYLENE GLYCOL 3350 17 G: 17 POWDER, FOR SOLUTION ORAL at 17:42

## 2025-07-08 RX ADMIN — DIAZEPAM 5 MG: 5 TABLET ORAL at 08:48

## 2025-07-08 RX ADMIN — OXYCODONE 10 MG: 5 TABLET ORAL at 23:03

## 2025-07-08 RX ADMIN — SODIUM CHLORIDE 1250 MG: 0.9 INJECTION, SOLUTION INTRAVENOUS at 12:15

## 2025-07-08 RX ADMIN — MEROPENEM 1000 MG: 1 INJECTION INTRAVENOUS at 08:57

## 2025-07-08 RX ADMIN — MEROPENEM 1000 MG: 1 INJECTION INTRAVENOUS at 00:24

## 2025-07-08 RX ADMIN — ACETAMINOPHEN 1000 MG: 500 TABLET ORAL at 05:20

## 2025-07-08 RX ADMIN — OXYCODONE 10 MG: 5 TABLET ORAL at 10:18

## 2025-07-08 RX ADMIN — ACETAMINOPHEN 1000 MG: 500 TABLET ORAL at 12:04

## 2025-07-08 RX ADMIN — ACETAMINOPHEN 1000 MG: 500 TABLET ORAL at 17:41

## 2025-07-08 RX ADMIN — METHOCARBAMOL 750 MG: 750 TABLET ORAL at 08:47

## 2025-07-08 RX ADMIN — POTASSIUM PHOSPHATE, MONOBASIC AND POTASSIUM PHOSPHATE, DIBASIC 15 MMOL: 224; 236 INJECTION, SOLUTION, CONCENTRATE INTRAVENOUS at 20:02

## 2025-07-08 ASSESSMENT — PAIN DESCRIPTION - LOCATION
LOCATION: LEG

## 2025-07-08 ASSESSMENT — PAIN DESCRIPTION - PAIN TYPE
TYPE: SURGICAL PAIN

## 2025-07-08 ASSESSMENT — PAIN - FUNCTIONAL ASSESSMENT
PAIN_FUNCTIONAL_ASSESSMENT: PREVENTS OR INTERFERES SOME ACTIVE ACTIVITIES AND ADLS

## 2025-07-08 ASSESSMENT — PAIN SCALES - GENERAL
PAINLEVEL_OUTOF10: 8
PAINLEVEL_OUTOF10: 10
PAINLEVEL_OUTOF10: 5
PAINLEVEL_OUTOF10: 7
PAINLEVEL_OUTOF10: 9

## 2025-07-08 ASSESSMENT — PAIN DESCRIPTION - ORIENTATION
ORIENTATION: RIGHT
ORIENTATION: RIGHT;LOWER

## 2025-07-08 ASSESSMENT — PAIN DESCRIPTION - ONSET
ONSET: GRADUAL

## 2025-07-08 ASSESSMENT — PAIN DESCRIPTION - DESCRIPTORS
DESCRIPTORS: DISCOMFORT
DESCRIPTORS: CRAMPING
DESCRIPTORS: THROBBING
DESCRIPTORS: ACHING;CRAMPING
DESCRIPTORS: DISCOMFORT

## 2025-07-08 ASSESSMENT — PAIN DESCRIPTION - FREQUENCY
FREQUENCY: INTERMITTENT

## 2025-07-08 NOTE — PLAN OF CARE
Problem: Safety - Adult  Goal: Free from fall injury  Outcome: Progressing  Note:  Remains free from falls, bed in low position, call light in reach, bed alarm monitoring for safety.     Problem: Pain  Goal: Verbalizes/displays adequate comfort level or baseline comfort level  Outcome: Progressing  Note:  1) PRN Dilaudid 1 mg IV at 0519; and, 2) PRN Oxycodone 10 mg PO at 0629 for surgical pain from right BKA.     Problem: Respiratory - Adult  Goal: Achieves optimal ventilation and oxygenation  Outcome: Progressing  Note:  O2 98 to 99% on Room Air.     Problem: Cardiovascular - Adult  Goal: Absence of cardiac dysrhythmias or at baseline  Outcome: Progressing  Note:  Vital signs stable.  Non-telemetry.

## 2025-07-09 PROBLEM — M86.9 OSTEOMYELITIS OF RIGHT FOOT (HCC): Status: ACTIVE | Noted: 2025-07-09

## 2025-07-09 LAB
ALBUMIN SERPL-MCNC: 2.8 G/DL (ref 3.4–5)
ANION GAP SERPL CALCULATED.3IONS-SCNC: 11 MMOL/L (ref 3–16)
ANTI-XA UNFRAC HEPARIN: 0.17 IU/ML (ref 0.3–0.7)
ANTI-XA UNFRAC HEPARIN: 0.47 IU/ML (ref 0.3–0.7)
ANTI-XA UNFRAC HEPARIN: 0.51 IU/ML (ref 0.3–0.7)
BASOPHILS # BLD: 0.1 K/UL (ref 0–0.2)
BASOPHILS NFR BLD: 0.8 %
BUN SERPL-MCNC: 6 MG/DL (ref 7–20)
CALCIUM SERPL-MCNC: 9.3 MG/DL (ref 8.3–10.6)
CHLORIDE SERPL-SCNC: 101 MMOL/L (ref 99–110)
CO2 SERPL-SCNC: 25 MMOL/L (ref 21–32)
CREAT SERPL-MCNC: 0.5 MG/DL (ref 0.8–1.3)
DEPRECATED RDW RBC AUTO: 12.6 % (ref 12.4–15.4)
EOSINOPHIL # BLD: 0.3 K/UL (ref 0–0.6)
EOSINOPHIL NFR BLD: 2.1 %
GFR SERPLBLD CREATININE-BSD FMLA CKD-EPI: >90 ML/MIN/{1.73_M2}
GLUCOSE SERPL-MCNC: 101 MG/DL (ref 70–99)
HCT VFR BLD AUTO: 21.9 % (ref 40.5–52.5)
HGB BLD-MCNC: 7.2 G/DL (ref 13.5–17.5)
LYMPHOCYTES # BLD: 3.4 K/UL (ref 1–5.1)
LYMPHOCYTES NFR BLD: 27.3 %
MAGNESIUM SERPL-MCNC: 1.89 MG/DL (ref 1.8–2.4)
MCH RBC QN AUTO: 27.4 PG (ref 26–34)
MCHC RBC AUTO-ENTMCNC: 32.7 G/DL (ref 31–36)
MCV RBC AUTO: 83.7 FL (ref 80–100)
MONOCYTES # BLD: 0.8 K/UL (ref 0–1.3)
MONOCYTES NFR BLD: 6.8 %
NEUTROPHILS # BLD: 7.8 K/UL (ref 1.7–7.7)
NEUTROPHILS NFR BLD: 63 %
PHOSPHATE SERPL-MCNC: 3.1 MG/DL (ref 2.5–4.9)
PLATELET # BLD AUTO: 790 K/UL (ref 135–450)
PMV BLD AUTO: 6.8 FL (ref 5–10.5)
POTASSIUM SERPL-SCNC: 4 MMOL/L (ref 3.5–5.1)
RBC # BLD AUTO: 2.62 M/UL (ref 4.2–5.9)
SODIUM SERPL-SCNC: 137 MMOL/L (ref 136–145)
WBC # BLD AUTO: 12.4 K/UL (ref 4–11)

## 2025-07-09 PROCEDURE — 6370000000 HC RX 637 (ALT 250 FOR IP)

## 2025-07-09 PROCEDURE — 97530 THERAPEUTIC ACTIVITIES: CPT

## 2025-07-09 PROCEDURE — 36415 COLL VENOUS BLD VENIPUNCTURE: CPT

## 2025-07-09 PROCEDURE — 83735 ASSAY OF MAGNESIUM: CPT

## 2025-07-09 PROCEDURE — 80069 RENAL FUNCTION PANEL: CPT

## 2025-07-09 PROCEDURE — 6360000002 HC RX W HCPCS

## 2025-07-09 PROCEDURE — 97166 OT EVAL MOD COMPLEX 45 MIN: CPT

## 2025-07-09 PROCEDURE — 85025 COMPLETE CBC W/AUTO DIFF WBC: CPT

## 2025-07-09 PROCEDURE — 6370000000 HC RX 637 (ALT 250 FOR IP): Performed by: STUDENT IN AN ORGANIZED HEALTH CARE EDUCATION/TRAINING PROGRAM

## 2025-07-09 PROCEDURE — 97116 GAIT TRAINING THERAPY: CPT

## 2025-07-09 PROCEDURE — 85520 HEPARIN ASSAY: CPT

## 2025-07-09 PROCEDURE — 2500000003 HC RX 250 WO HCPCS

## 2025-07-09 PROCEDURE — 2580000003 HC RX 258

## 2025-07-09 PROCEDURE — 97535 SELF CARE MNGMENT TRAINING: CPT

## 2025-07-09 PROCEDURE — 97162 PT EVAL MOD COMPLEX 30 MIN: CPT

## 2025-07-09 PROCEDURE — 1200000000 HC SEMI PRIVATE

## 2025-07-09 RX ADMIN — OXYCODONE 10 MG: 5 TABLET ORAL at 15:33

## 2025-07-09 RX ADMIN — Medication: at 14:54

## 2025-07-09 RX ADMIN — HEPARIN SODIUM 26 UNITS/KG/HR: 10000 INJECTION, SOLUTION INTRAVENOUS at 10:36

## 2025-07-09 RX ADMIN — ACETAMINOPHEN 1000 MG: 500 TABLET ORAL at 12:35

## 2025-07-09 RX ADMIN — DIAZEPAM 5 MG: 5 TABLET ORAL at 01:24

## 2025-07-09 RX ADMIN — METHOCARBAMOL 750 MG: 750 TABLET ORAL at 09:44

## 2025-07-09 RX ADMIN — DOCUSATE SODIUM 100 MG: 100 CAPSULE, LIQUID FILLED ORAL at 23:18

## 2025-07-09 RX ADMIN — SENNOSIDES, DOCUSATE SODIUM 2 TABLET: 50; 8.6 TABLET, FILM COATED ORAL at 09:44

## 2025-07-09 RX ADMIN — OXYCODONE 10 MG: 5 TABLET ORAL at 09:44

## 2025-07-09 RX ADMIN — HYDROMORPHONE HYDROCHLORIDE 1 MG: 1 INJECTION, SOLUTION INTRAMUSCULAR; INTRAVENOUS; SUBCUTANEOUS at 01:25

## 2025-07-09 RX ADMIN — ACETAMINOPHEN 1000 MG: 500 TABLET ORAL at 18:59

## 2025-07-09 RX ADMIN — HEPARIN SODIUM 2800 UNITS: 1000 INJECTION INTRAVENOUS; SUBCUTANEOUS at 08:50

## 2025-07-09 RX ADMIN — OXYCODONE 10 MG: 5 TABLET ORAL at 20:27

## 2025-07-09 RX ADMIN — METHOCARBAMOL 750 MG: 750 TABLET ORAL at 16:58

## 2025-07-09 RX ADMIN — SODIUM CHLORIDE 1250 MG: 0.9 INJECTION, SOLUTION INTRAVENOUS at 10:38

## 2025-07-09 RX ADMIN — ACETAMINOPHEN 1000 MG: 500 TABLET ORAL at 01:24

## 2025-07-09 RX ADMIN — HYDROMORPHONE HYDROCHLORIDE 0.5 MG: 1 INJECTION, SOLUTION INTRAMUSCULAR; INTRAVENOUS; SUBCUTANEOUS at 19:04

## 2025-07-09 RX ADMIN — METHOCARBAMOL 750 MG: 750 TABLET ORAL at 12:34

## 2025-07-09 RX ADMIN — HYDROMORPHONE HYDROCHLORIDE 1 MG: 1 INJECTION, SOLUTION INTRAMUSCULAR; INTRAVENOUS; SUBCUTANEOUS at 12:36

## 2025-07-09 RX ADMIN — SODIUM CHLORIDE, PRESERVATIVE FREE 10 ML: 5 INJECTION INTRAVENOUS at 20:30

## 2025-07-09 RX ADMIN — SODIUM CHLORIDE 1250 MG: 0.9 INJECTION, SOLUTION INTRAVENOUS at 21:38

## 2025-07-09 RX ADMIN — MEROPENEM 1000 MG: 1 INJECTION INTRAVENOUS at 01:13

## 2025-07-09 RX ADMIN — MEROPENEM 1000 MG: 1 INJECTION INTRAVENOUS at 17:03

## 2025-07-09 RX ADMIN — MEROPENEM 1000 MG: 1 INJECTION INTRAVENOUS at 09:49

## 2025-07-09 RX ADMIN — COLLAGENASE SANTYL: 250 OINTMENT TOPICAL at 10:27

## 2025-07-09 RX ADMIN — METHOCARBAMOL 750 MG: 750 TABLET ORAL at 20:29

## 2025-07-09 RX ADMIN — GABAPENTIN 300 MG: 300 CAPSULE ORAL at 20:30

## 2025-07-09 RX ADMIN — DIAZEPAM 5 MG: 5 TABLET ORAL at 16:58

## 2025-07-09 RX ADMIN — DIAZEPAM 5 MG: 5 TABLET ORAL at 09:44

## 2025-07-09 RX ADMIN — ACETAMINOPHEN 1000 MG: 500 TABLET ORAL at 06:11

## 2025-07-09 ASSESSMENT — PAIN - FUNCTIONAL ASSESSMENT
PAIN_FUNCTIONAL_ASSESSMENT: PREVENTS OR INTERFERES SOME ACTIVE ACTIVITIES AND ADLS
PAIN_FUNCTIONAL_ASSESSMENT: ACTIVITIES ARE NOT PREVENTED
PAIN_FUNCTIONAL_ASSESSMENT: PREVENTS OR INTERFERES SOME ACTIVE ACTIVITIES AND ADLS
PAIN_FUNCTIONAL_ASSESSMENT: ACTIVITIES ARE NOT PREVENTED
PAIN_FUNCTIONAL_ASSESSMENT: PREVENTS OR INTERFERES SOME ACTIVE ACTIVITIES AND ADLS

## 2025-07-09 ASSESSMENT — PAIN DESCRIPTION - DESCRIPTORS
DESCRIPTORS: ACHING;NAGGING
DESCRIPTORS: POUNDING;STABBING
DESCRIPTORS: ACHING
DESCRIPTORS: DISCOMFORT
DESCRIPTORS: ACHING

## 2025-07-09 ASSESSMENT — PAIN SCALES - GENERAL
PAINLEVEL_OUTOF10: 0
PAINLEVEL_OUTOF10: 10
PAINLEVEL_OUTOF10: 7
PAINLEVEL_OUTOF10: 9
PAINLEVEL_OUTOF10: 4
PAINLEVEL_OUTOF10: 10
PAINLEVEL_OUTOF10: 7

## 2025-07-09 ASSESSMENT — PAIN DESCRIPTION - FREQUENCY: FREQUENCY: INTERMITTENT

## 2025-07-09 ASSESSMENT — PAIN DESCRIPTION - ORIENTATION
ORIENTATION: RIGHT
ORIENTATION: LEFT

## 2025-07-09 ASSESSMENT — PAIN DESCRIPTION - ONSET: ONSET: GRADUAL

## 2025-07-09 ASSESSMENT — PAIN DESCRIPTION - LOCATION
LOCATION: LEG

## 2025-07-09 ASSESSMENT — PAIN DESCRIPTION - PAIN TYPE: TYPE: SURGICAL PAIN

## 2025-07-09 NOTE — PLAN OF CARE
Problem: Safety - Adult  Goal: Free from fall injury  Outcome: Progressing  Note:  Remains free from falls, bed in low position, call light in reach, bed alarm monitoring for safety.     Problem: Pain  Goal: Verbalizes/displays adequate comfort level or baseline comfort level  Outcome: Progressing  Note:  PRNs: 1) Dilaudid 1 mg IV at 2114; and, 2) Oxycodone 10 mg PO at 2303.  Scheduled:  Gabapentin, Methocarbamol, and Tylenol helpful for surgical pain right BKA.     Problem: Respiratory - Adult  Goal: Achieves optimal ventilation and oxygenation  Outcome: Progressing  Note:  O2 97 to 100% on Room Air.     Problem: Cardiovascular - Adult  Goal: Absence of cardiac dysrhythmias or at baseline  Outcome: Progressing  Note:  Vital signs stable.  Non-telemetry.

## 2025-07-09 NOTE — OP NOTE
Operative Note      Patient: Natan Lynch  YOB: 1960  MRN: [id number]    Date of Procedure: 7/7/2025    Pre-Op Diagnosis Codes:      * Osteomyelitis of right foot, unspecified type (HCC) [M86.9]    Post-Op Diagnosis: Same       Procedure(s):  RIGHT BELOW KNEE LEG AMPUTATION and IPOP PLACEMENT    Surgeon(s):  Casandra Alvarado MD    Assistant:   Resident: Henrik Tejada DO    Anesthesia: General    Estimated Blood Loss (mL): less than 100     Complications: None    Specimens:   ID Type Source Tests Collected by Time Destination   A : A. Left Leg Specimen Leg SURGICAL PATHOLOGY Casandra Alvarado MD 7/7/2025 1515        Implants:  * No implants in log *      Drains: * No LDAs found *    Findings:  Infection Present At Time Of Surgery (PATOS) (choose all levels that have infection present):  - Superficial Infection (skin/subcutaneous) present as evidenced by fluid consistent with infection  - Organ Space infection (below fascia) present as evidenced by osteomyelitis      Details of Procedure:  The patient was taken to the operating room and placed in supine position.  General anesthesia was administered by the anesthesia team. The operative area was clipped, prepared and draped in sterile fashion. A dose of intravenous antibiotics was administered. A brief time out was performed per hospital protocol.    A sterile tourniquet was applied in the proximal thigh and inflated to 300 mmHg.  An elliptical incision was made circumferentially about the right below-knee area, transecting the leg at an angle such that the superior portion of the ellipse was on the anterior surface approximately 1 handbreadth below the tibial tuberosity and the inferior portion of the ellipse was on the posterior calf.  The tissue was dissected down through the skin and subcutaneous tissue with electrocautery.  The anterior compartment muscles were divided and the anterior tibial neurovascular bundle was clamped, transected and oversewn

## 2025-07-10 PROBLEM — M86.671 CHRONIC OSTEOMYELITIS OF RIGHT FOOT (HCC): Status: ACTIVE | Noted: 2025-07-09

## 2025-07-10 PROBLEM — Z89.511 STATUS POST BELOW KNEE AMPUTATION OF RIGHT LOWER EXTREMITY (HCC): Status: ACTIVE | Noted: 2025-07-10

## 2025-07-10 LAB
ALBUMIN SERPL-MCNC: 2.7 G/DL (ref 3.4–5)
ANION GAP SERPL CALCULATED.3IONS-SCNC: 10 MMOL/L (ref 3–16)
ANTI-XA UNFRAC HEPARIN: 0.26 IU/ML (ref 0.3–0.7)
ANTI-XA UNFRAC HEPARIN: 0.54 IU/ML (ref 0.3–0.7)
ANTI-XA UNFRAC HEPARIN: 0.75 IU/ML (ref 0.3–0.7)
BASOPHILS # BLD: 0.1 K/UL (ref 0–0.2)
BASOPHILS NFR BLD: 0.6 %
BUN SERPL-MCNC: 6 MG/DL (ref 7–20)
CALCIUM SERPL-MCNC: 9.7 MG/DL (ref 8.3–10.6)
CHLORIDE SERPL-SCNC: 100 MMOL/L (ref 99–110)
CO2 SERPL-SCNC: 25 MMOL/L (ref 21–32)
CREAT SERPL-MCNC: 0.6 MG/DL (ref 0.8–1.3)
DEPRECATED RDW RBC AUTO: 12.9 % (ref 12.4–15.4)
EOSINOPHIL # BLD: 0.3 K/UL (ref 0–0.6)
EOSINOPHIL NFR BLD: 3.3 %
GFR SERPLBLD CREATININE-BSD FMLA CKD-EPI: >90 ML/MIN/{1.73_M2}
GLUCOSE SERPL-MCNC: 123 MG/DL (ref 70–99)
HCT VFR BLD AUTO: 22.3 % (ref 40.5–52.5)
HGB BLD-MCNC: 7.3 G/DL (ref 13.5–17.5)
LYMPHOCYTES # BLD: 3.3 K/UL (ref 1–5.1)
LYMPHOCYTES NFR BLD: 36.6 %
MAGNESIUM SERPL-MCNC: 1.9 MG/DL (ref 1.8–2.4)
MCH RBC QN AUTO: 27.7 PG (ref 26–34)
MCHC RBC AUTO-ENTMCNC: 32.8 G/DL (ref 31–36)
MCV RBC AUTO: 84.3 FL (ref 80–100)
MONOCYTES # BLD: 0.7 K/UL (ref 0–1.3)
MONOCYTES NFR BLD: 7.3 %
NEUTROPHILS # BLD: 4.7 K/UL (ref 1.7–7.7)
NEUTROPHILS NFR BLD: 52.2 %
PHOSPHATE SERPL-MCNC: 3.4 MG/DL (ref 2.5–4.9)
PLATELET # BLD AUTO: 764 K/UL (ref 135–450)
PMV BLD AUTO: 6.1 FL (ref 5–10.5)
POTASSIUM SERPL-SCNC: 3.6 MMOL/L (ref 3.5–5.1)
RBC # BLD AUTO: 2.64 M/UL (ref 4.2–5.9)
SODIUM SERPL-SCNC: 135 MMOL/L (ref 136–145)
WBC # BLD AUTO: 9 K/UL (ref 4–11)

## 2025-07-10 PROCEDURE — 80069 RENAL FUNCTION PANEL: CPT

## 2025-07-10 PROCEDURE — 85025 COMPLETE CBC W/AUTO DIFF WBC: CPT

## 2025-07-10 PROCEDURE — 83735 ASSAY OF MAGNESIUM: CPT

## 2025-07-10 PROCEDURE — 6360000002 HC RX W HCPCS

## 2025-07-10 PROCEDURE — 6370000000 HC RX 637 (ALT 250 FOR IP)

## 2025-07-10 PROCEDURE — 85520 HEPARIN ASSAY: CPT

## 2025-07-10 PROCEDURE — 97116 GAIT TRAINING THERAPY: CPT

## 2025-07-10 PROCEDURE — 36415 COLL VENOUS BLD VENIPUNCTURE: CPT

## 2025-07-10 PROCEDURE — 2500000003 HC RX 250 WO HCPCS

## 2025-07-10 PROCEDURE — 2580000003 HC RX 258

## 2025-07-10 PROCEDURE — 1200000000 HC SEMI PRIVATE

## 2025-07-10 PROCEDURE — 6370000000 HC RX 637 (ALT 250 FOR IP): Performed by: STUDENT IN AN ORGANIZED HEALTH CARE EDUCATION/TRAINING PROGRAM

## 2025-07-10 PROCEDURE — 97530 THERAPEUTIC ACTIVITIES: CPT

## 2025-07-10 PROCEDURE — 99232 SBSQ HOSP IP/OBS MODERATE 35: CPT | Performed by: INTERNAL MEDICINE

## 2025-07-10 RX ORDER — POLYETHYLENE GLYCOL 3350 17 G/17G
17 POWDER, FOR SOLUTION ORAL DAILY
Status: DISCONTINUED | OUTPATIENT
Start: 2025-07-10 | End: 2025-07-16 | Stop reason: HOSPADM

## 2025-07-10 RX ORDER — POTASSIUM CHLORIDE 1500 MG/1
40 TABLET, EXTENDED RELEASE ORAL ONCE
Status: COMPLETED | OUTPATIENT
Start: 2025-07-10 | End: 2025-07-10

## 2025-07-10 RX ADMIN — POLYETHYLENE GLYCOL 3350 17 G: 17 POWDER, FOR SOLUTION ORAL at 08:33

## 2025-07-10 RX ADMIN — OXYCODONE 10 MG: 5 TABLET ORAL at 17:31

## 2025-07-10 RX ADMIN — ACETAMINOPHEN 1000 MG: 500 TABLET ORAL at 05:17

## 2025-07-10 RX ADMIN — HYDROMORPHONE HYDROCHLORIDE 1 MG: 1 INJECTION, SOLUTION INTRAMUSCULAR; INTRAVENOUS; SUBCUTANEOUS at 19:46

## 2025-07-10 RX ADMIN — MEROPENEM 1000 MG: 1 INJECTION INTRAVENOUS at 16:17

## 2025-07-10 RX ADMIN — GABAPENTIN 300 MG: 300 CAPSULE ORAL at 19:56

## 2025-07-10 RX ADMIN — METHOCARBAMOL 750 MG: 750 TABLET ORAL at 08:26

## 2025-07-10 RX ADMIN — METHOCARBAMOL 750 MG: 750 TABLET ORAL at 19:46

## 2025-07-10 RX ADMIN — Medication: at 05:14

## 2025-07-10 RX ADMIN — OXYCODONE 10 MG: 5 TABLET ORAL at 00:17

## 2025-07-10 RX ADMIN — MEROPENEM 1000 MG: 1 INJECTION INTRAVENOUS at 00:19

## 2025-07-10 RX ADMIN — OXYCODONE 10 MG: 5 TABLET ORAL at 05:17

## 2025-07-10 RX ADMIN — ACETAMINOPHEN 1000 MG: 500 TABLET ORAL at 17:26

## 2025-07-10 RX ADMIN — SODIUM CHLORIDE, PRESERVATIVE FREE 10 ML: 5 INJECTION INTRAVENOUS at 08:26

## 2025-07-10 RX ADMIN — METHOCARBAMOL 750 MG: 750 TABLET ORAL at 12:55

## 2025-07-10 RX ADMIN — OXYCODONE 10 MG: 5 TABLET ORAL at 21:36

## 2025-07-10 RX ADMIN — ACETAMINOPHEN 1000 MG: 500 TABLET ORAL at 11:30

## 2025-07-10 RX ADMIN — Medication: at 11:31

## 2025-07-10 RX ADMIN — OXYCODONE 10 MG: 5 TABLET ORAL at 12:54

## 2025-07-10 RX ADMIN — MEROPENEM 1000 MG: 1 INJECTION INTRAVENOUS at 08:29

## 2025-07-10 RX ADMIN — HYDROMORPHONE HYDROCHLORIDE 1 MG: 1 INJECTION, SOLUTION INTRAMUSCULAR; INTRAVENOUS; SUBCUTANEOUS at 07:23

## 2025-07-10 RX ADMIN — SENNOSIDES, DOCUSATE SODIUM 2 TABLET: 50; 8.6 TABLET, FILM COATED ORAL at 08:27

## 2025-07-10 RX ADMIN — DOCUSATE SODIUM 100 MG: 100 CAPSULE, LIQUID FILLED ORAL at 08:27

## 2025-07-10 RX ADMIN — DIAZEPAM 5 MG: 5 TABLET ORAL at 00:17

## 2025-07-10 RX ADMIN — DIAZEPAM 5 MG: 5 TABLET ORAL at 08:27

## 2025-07-10 RX ADMIN — SODIUM CHLORIDE 1250 MG: 0.9 INJECTION, SOLUTION INTRAVENOUS at 11:32

## 2025-07-10 RX ADMIN — METHOCARBAMOL 750 MG: 750 TABLET ORAL at 17:26

## 2025-07-10 RX ADMIN — POTASSIUM CHLORIDE 40 MEQ: 1500 TABLET, EXTENDED RELEASE ORAL at 11:30

## 2025-07-10 RX ADMIN — COLLAGENASE SANTYL: 250 OINTMENT TOPICAL at 08:26

## 2025-07-10 RX ADMIN — HEPARIN SODIUM 26 UNITS/KG/HR: 10000 INJECTION, SOLUTION INTRAVENOUS at 14:38

## 2025-07-10 RX ADMIN — HEPARIN SODIUM 26 UNITS/KG/HR: 10000 INJECTION, SOLUTION INTRAVENOUS at 00:42

## 2025-07-10 RX ADMIN — DIAZEPAM 5 MG: 5 TABLET ORAL at 17:26

## 2025-07-10 RX ADMIN — Medication: at 19:57

## 2025-07-10 RX ADMIN — SODIUM CHLORIDE, PRESERVATIVE FREE 10 ML: 5 INJECTION INTRAVENOUS at 19:58

## 2025-07-10 ASSESSMENT — PAIN DESCRIPTION - ONSET
ONSET: ON-GOING
ONSET: ON-GOING

## 2025-07-10 ASSESSMENT — PAIN DESCRIPTION - FREQUENCY
FREQUENCY: INTERMITTENT
FREQUENCY: INTERMITTENT

## 2025-07-10 ASSESSMENT — PAIN SCALES - GENERAL
PAINLEVEL_OUTOF10: 4
PAINLEVEL_OUTOF10: 7
PAINLEVEL_OUTOF10: 3
PAINLEVEL_OUTOF10: 0
PAINLEVEL_OUTOF10: 0
PAINLEVEL_OUTOF10: 4
PAINLEVEL_OUTOF10: 4
PAINLEVEL_OUTOF10: 5
PAINLEVEL_OUTOF10: 7

## 2025-07-10 ASSESSMENT — PAIN DESCRIPTION - ORIENTATION
ORIENTATION: RIGHT

## 2025-07-10 ASSESSMENT — PAIN DESCRIPTION - DESCRIPTORS
DESCRIPTORS: ACHING

## 2025-07-10 ASSESSMENT — PAIN DESCRIPTION - LOCATION
LOCATION: LEG

## 2025-07-10 ASSESSMENT — PAIN DESCRIPTION - PAIN TYPE
TYPE: ACUTE PAIN;SURGICAL PAIN
TYPE: ACUTE PAIN;SURGICAL PAIN

## 2025-07-10 ASSESSMENT — PAIN SCALES - WONG BAKER
WONGBAKER_NUMERICALRESPONSE: NO HURT
WONGBAKER_NUMERICALRESPONSE: NO HURT

## 2025-07-10 NOTE — PLAN OF CARE
Problem: Discharge Planning  Goal: Discharge to home or other facility with appropriate resources  Outcome: Progressing     Problem: Safety - Adult  Goal: Free from fall injury  Outcome: Progressing     Problem: Pain  Goal: Verbalizes/displays adequate comfort level or baseline comfort level  Outcome: Progressing     Problem: Skin/Tissue Integrity  Goal: Skin integrity remains intact  Description: 1.  Monitor for areas of redness and/or skin breakdown  2.  Assess vascular access sites hourly  3.  Every 4-6 hours minimum:  Change oxygen saturation probe site  4.  Every 4-6 hours:  If on nasal continuous positive airway pressure, respiratory therapy assess nares and determine need for appliance change or resting period  Outcome: Progressing     Problem: Nutrition Deficit:  Goal: Optimize nutritional status  Outcome: Progressing     Problem: Respiratory - Adult  Goal: Achieves optimal ventilation and oxygenation  Outcome: Progressing     Problem: Cardiovascular - Adult  Goal: Absence of cardiac dysrhythmias or at baseline  Outcome: Progressing

## 2025-07-10 NOTE — CONSULTS
Clinical Pharmacy Consult Note    Vancomycin has been discontinued by Dr. Loy Nicole. Pharmacy will sign off on dosing at this time.   If resumed, please re-consult pharmacy.    Please call with questions.    Debbie Weaver, PharmD, [facility]  Clinical Pharmacy Specialist - Emergency Dept  Wireless: [phone]  7/4/2025 9:11 AM    
Department of Podiatry Consult Note  Resident      Reason for Consult: Postop wound dehiscence  Requesting Physician: Dr.Chalana Lo MD    CHIEF COMPLAINT: Postoperative wound dehiscence    HISTORY OF PRESENT ILLNESS:    The patient is a 64 y.o. male with significant past medical history as listed below who is consulted to podiatry for postoperative wound dehiscence.  Patient recently underwent surgical invention with Dr. Acosta on 6/20/2025 for Lisfranc amputation.  Patient was seen in the podiatry clinic last Friday for first postoperative visit.  At that time patient had no signs of gapping or signs of ischemia.  Patient states that his dressing was not being changed at his nursing facility as instructed.  Patient presented to the emergency department after dressing change was performed and there were signs of gapping at his incision sites and concern for infection.  Patient also had surgical intervention by Dr. Casandra Alvarado on 4/14 consisting of bilateral femoral anterior tibial bypasses.  Patient denies any N/V/F/SOB/CP. Patient denies any other pedal complaints today.    Past Medical History:        Diagnosis Date    Bilateral pulmonary embolism (HCC)     CAD (coronary artery disease)     DVT (deep venous thrombosis) (HCC)     History of blood transfusion     Hx of blood clots     Peripheral artery disease     Tobacco abuse     Uses wheelchair        Past Surgical History:        Procedure Laterality Date    ARTERIAL BYPASS SURGRY Bilateral 4/14/2025    BILATERAL FEMORAL  ANTERIOR TIBIAL BYPASS performed by Casandra Alvarado MD at [facility] OR    FOOT SURGERY Right 6/20/2025    LISFRANC AMPUTATION-RIGHT FOOT, TENDO-ACHILLES LENGHTENING- RIGHT LOWER LEG, APPLICATION OF BELOW KNEE SPLINT- RIGHT LOWER LEG performed by Leon Acosta DPM at [facility] OR    INVASIVE VASCULAR N/A 4/9/2025    Angiography lower ext left performed by Casandra Alvarado MD at [facility] CARDIAC CATH LAB    INVASIVE VASCULAR Left 4/9/2025    
Natan QUINN Syed  7/9/2025  [id number]    Chief Complaint: Wound dehiscence    Subjective:   This is a 64-year-old male with a past medical history including:  Past Medical History:   Diagnosis Date    Bilateral pulmonary embolism (HCC)     CAD (coronary artery disease)     DVT (deep venous thrombosis) (HCC)     History of blood transfusion     Hx of blood clots     Peripheral artery disease     Tobacco abuse     Uses wheelchair      He came to the hospital with bilateral gangrene of lower extremities.  Currently podiatry is treating the patient's left lower extremity.  Right lower extremity underwent BKA.  Patient is a new amputee.  Previous to this the patient was fairly mobile and living with his wife in [place].  He is interested in going to acute rehab when medically cleared.  Therapy scoring with AM-PAC 14/16 respectively.    ROS: No CP, SOB, dyspnea    Objective:  Patient Vitals for the past 24 hrs:   BP Temp Temp src Pulse Resp SpO2 Weight   07/09/25 0846 128/70 98.1 °F (36.7 °C) Oral 73 18 98 % --   07/09/25 0800 -- -- -- -- -- -- 75.4 kg (166 lb 3.6 oz)   07/09/25 0612 117/68 98.4 °F (36.9 °C) Oral 81 16 97 % --   07/09/25 0116 121/70 98.2 °F (36.8 °C) Axillary 82 16 98 % --   07/08/25 2303 -- -- -- -- 18 -- --   07/08/25 2111 130/75 98.2 °F (36.8 °C) Oral 71 18 100 % --   07/08/25 1522 116/63 98.4 °F (36.9 °C) Oral 72 18 100 % --     Gen: No distress, pleasant.   HEENT: Normocephalic, atraumatic.   CV: No audible murmurs, well perfused extremities  Resp: No respiratory distress. No increased WOB  Abd: Soft, nontender nondistended  Ext: Right BKA/left open wound lower extremity ankle  Neuro: Alert, oriented, appropriately interactive.     Laboratory data: Available via EMR.     Therapy progress:    PT    Rolling: Level of difficulty - A Little   Sit to Stand from a Chair: Level of difficulty - A Little  Supine to Sit: Level of difficulty - A Little     Bed to Chair: Physical Assistance Required - A 
[facility] -  Clinical Pharmacy Note    Vancomycin - Management by Pharmacy    Consult Date(s): 7/6/25   Consulting Provider(s): SIMRAN Stokes    Assessment / Plan   SSTI / Surgical Site Infection of RLE - Vancomycin  Concurrent Antimicrobials: Meropenem 1 g IV q8h EI (Day #1 of TBD); ID consulted, no note yet in chart  Day of Vanc Therapy / Ordered Duration: Day #1 of TBD; will defer to ID for duration recommendations pending OR tentatively planned for 7/7/25  Current Dosing Method: Bayesian-Guided AUC Dosing  Therapeutic Goal: -600 mg/L*hr  Current Dose / Plan:   Renal function: SCr slightly up from baseline today at 0.8 mg/dL (baseline SCr ~0.6 mg/dL); Documented UOP over prior 24 hours of ~1.5 mL/kg/hr   Will plan to initiate vancomycin with loading dose of 25 mg/kg IV x1 at this time (1750 mg) followed by 1000 mg IV q12h thereafter  Kinetics predict ssAUC of 457 mg/L*h with ssTr of 11.9 mg/mL  No vancomycin follow up levels have been ordered at this time  Will continue to monitor clinical condition and make adjustments to regimen as appropriate    Thank you for consulting pharmacy,    Debbie Weaver, PharmD, [facility]  Clinical Pharmacy Specialist - Emergency Dept  Wireless: [phone]  7/6/2025 9:05 AM      Interval update:  therapy initiation ; ID consulted, tentative plan for OR on 7/7/25    Subjective/Objective: Natan Lynch is a 64 y.o. male with a PMHx significant for DVT, peripheral artery disease, coronary artery disease who is admitted with postoperative wound dehiscence.    Pharmacy is consulted to dose vancomycin.    Ht Readings from Last 1 Encounters:   07/03/25 1.956 m (6' 5\")     Wt Readings from Last 1 Encounters:   07/03/25 70.7 kg (155 lb 12.8 oz)     Current & Prior Antimicrobial Regimen(s):  Cefepime (7/3-7/6)  Meropenem IV EI (7/6-current)  Vancomycin IV PTD (7/3 x1; 7/6-current)    Vancomycin Level(s) / Doses:  Date Time Dose Type of Level / Level Interpretation   7/8         
[facility] -  Clinical Pharmacy Note    Vancomycin - Management by Pharmacy    Consult Date(s): 07/03/25  Consulting Provider(s): Dr. Blanchard     Assessment / Plan   Postoperative wound dehiscence-Vancomycin  Concurrent Antimicrobials: Cefepime  Day of Vanc Therapy / Ordered Duration: Day 1  Current Dosing Method: Bayesian-Guided AUC Dosing  Therapeutic Goal: -600 mg/L*hr  Current Dose / Plan:   Renal function: Scr stable-0.7 mg/dl  Initiate vancomycin 1250 mg IV q12h.   Kinetics predict an AUC = 502 mg/L*h with an estimated steady-state vancomycin trough = 12.2 mcg/mL  A vancomycin random level will be ordered as appropriate to assess predicted kinetics.   Will continue to monitor clinical condition and make adjustments to regimen as appropriate.    Thank you for consulting pharmacy,    Ana Avila, PharmD  Main Pharmacy: [id number]  7/3/2025 10:01 PM        Interval update:  therapy initiation     Subjective/Objective:   Natan Lynch is a 64 y.o. male with a PMHx significant for DVT, peripheral artery disease, coronary artery disease who is admitted with postoperative wound dehiscence .     Pharmacy is consulted to manage vancomycin dosing.    Ht Readings from Last 1 Encounters:   07/03/25 1.956 m (6' 5\")     Wt Readings from Last 1 Encounters:   07/03/25 70.7 kg (155 lb 12.8 oz)     Current & Prior Antimicrobial Regimen(s):  Cefepime (7/3 - current)  Vancomycin   1750 mg IV x1 (7/3)  1250 mg IV q12h (7/4 start)    Vancomycin Level(s) / Doses:    Date Time Dose Type of Level / Level Interpretation                 Note: Serum levels collected for AUC-based dosing may be high if collected in close proximity to the dose administered. This is not necessarily indicative of toxicity.    Cultures & Sensitivities:    Date Site Micro Susceptibility / Result   07/03 Blood cx x2 sent            Recent Labs     07/03/25  1528   CREATININE 0.7*   BUN 21*   WBC 20.0*       Estimated Creatinine Clearance: 109 
Vancomycin has been discontinued. Pharmacy will sign off consult. If medication dosing is resumed, please re-consult pharmacy.    Shanel HaysD PGY1 Resident    
with no accessory muscle use on RA  Cardiovascular: RR  Skin: R foot with erythema and minimal strike through at surgical site. Sutures at amputation site are partially dehisced with ischemic skin changes to medial aspect. Left foot with extensive stasis dermatitis as well has large ulceration with overlying eschar.   Extremities: R monophasic AT/PT ; L monophasic DP/PT  Neuro: A&Ox3, no focal deficits.    LLE:      RLE:        Labs:    CBC:   Recent Labs     07/03/25  1528   WBC 20.0*   HGB 8.3*   HCT 24.7*   MCV 85.4   *     BMP:   Recent Labs     07/03/25  1528   *   K 4.7   CL 97*   CO2 25   BUN 21*   CREATININE 0.7*     Liver Profile:   Lab Results   Component Value Date/Time    AST 27 08/16/2024 07:40 AM    ALT 18 08/16/2024 07:40 AM    BILITOT 2.0 08/16/2024 07:40 AM    ALKPHOS 58 08/16/2024 07:40 AM    PROTEIN 6.2 08/16/2024 07:40 AM     Imaging:   XR FOOT RIGHT (MIN 3 VIEWS)   Final Result   Increased lucency and bone erosion of multiple bones of the midfoot, and of the   anterior calcaneus, consistent with osteomyelitis..      Electronically signed by Amie Belle      Vascular duplex lower extremity arteries bilateral    (Results Pending)         Assessment/Plan:  This is a 64 y.o. male with Hx of CAD, PAD, left popliteal to anterior tibial bypass and right popliteal to dorsalis pedis bypass on 4/14, and recent right foot Lisfranc amputation on 6/20. Vascular surgery was consulted for evaluation of R BKA given dehiscence and ischemic changes to RLE.    - No acute surgical intervention is required at this time, will determine plan regarding further surgical involvement pending arterial duplex of b/l LE  - Patient has multiple non healing wounds on both feet with likely reduced blood flow given vascular history, although monophasic signals are identifiable bilaterally   - Continue dressing changes per Podiatry  - Continue Q4H neurovascular checks  - Continue abx per primary, leukocytosis (WBC 
  BMP:  Recent Labs     07/05/25  0602 07/06/25  0239 07/07/25  0457   * 133* 135*   K 4.0 4.4 4.1   CL 97* 97* 98*   CO2 27 26 28   BUN 9 23* 12   CREATININE 0.6* 0.8 0.7*   CALCIUM 10.0 10.0 10.2   GLUCOSE 96 147* 103*        Cultures:   Superficial wound swab right foot 7/4:  Corynebacterium stratum, ESBL Klebsiella pneumoniae and MRSA  Klebsiella pneumoniae ESBL (2)    Antibiotic Interpretation MIKE  Method Status    ampicillin Resistant >=32 mcg/mL BACTERIAL SUSCEPTIBILITY PANEL BY MIKE     ampicillin-sulbactam Intermediate 16 mcg/mL BACTERIAL SUSCEPTIBILITY PANEL BY MIKE     cefepime Resistant >=32 mcg/mL BACTERIAL SUSCEPTIBILITY PANEL BY MIKE     cefTRIAXone Resistant >=64 mcg/mL BACTERIAL SUSCEPTIBILITY PANEL BY MIKE     ciprofloxacin Intermediate 0.5 mcg/mL BACTERIAL SUSCEPTIBILITY PANEL BY MIKE     ertapenem Sensitive <=0.12 mcg/mL BACTERIAL SUSCEPTIBILITY PANEL BY MIKE     gentamicin Sensitive <=1 mcg/mL BACTERIAL SUSCEPTIBILITY PANEL BY MIKE     levofloxacin Intermediate 1 mcg/mL BACTERIAL SUSCEPTIBILITY PANEL BY MIKE     meropenem Sensitive <=0.25 mcg/mL BACTERIAL SUSCEPTIBILITY PANEL BY MIKE     trimethoprim-sulfamethoxazole Resistant >=320 mcg/mL BACTERIAL SUSCEPTIBILITY PANEL BY MKIE     Methicillin-Resistant Staphylococcus aureus (3)    Antibiotic Interpretation MIKE  Method Status    amoxicillin-clavulanate Resistant >4/2 mcg/mL BACTERIAL SUSCEPTIBILITY PANEL BY MIKE     ampicillin-sulbactam Resistant <=8/4 mcg/mL BACTERIAL SUSCEPTIBILITY PANEL BY MIKE     ceFAZolin Resistant 16 mcg/mL BACTERIAL SUSCEPTIBILITY PANEL BY MIKE     clindamycin Sensitive 0.5 mcg/mL BACTERIAL SUSCEPTIBILITY PANEL BY MIKE     DAPTOmycin Sensitive <=0.5 mcg/mL BACTERIAL SUSCEPTIBILITY PANEL BY MIKE     erythromycin Resistant >4 mcg/mL BACTERIAL SUSCEPTIBILITY PANEL BY MIKE     linezolid Sensitive 4 mcg/mL BACTERIAL SUSCEPTIBILITY PANEL BY MIKE     oxacillin Resistant >2 mcg/mL BACTERIAL SUSCEPTIBILITY PANEL BY MIKE     rifampin

## 2025-07-11 ENCOUNTER — APPOINTMENT (OUTPATIENT)
Dept: GENERAL RADIOLOGY | Age: 65
DRG: 305 | End: 2025-07-11
Payer: COMMERCIAL

## 2025-07-11 LAB
ALBUMIN SERPL-MCNC: 3 G/DL (ref 3.4–5)
ANION GAP SERPL CALCULATED.3IONS-SCNC: 10 MMOL/L (ref 3–16)
ANTI-XA UNFRAC HEPARIN: 0.57 IU/ML (ref 0.3–0.7)
BASOPHILS # BLD: 0 K/UL (ref 0–0.2)
BASOPHILS NFR BLD: 0.4 %
BUN SERPL-MCNC: 9 MG/DL (ref 7–20)
CALCIUM SERPL-MCNC: 10.1 MG/DL (ref 8.3–10.6)
CHLORIDE SERPL-SCNC: 99 MMOL/L (ref 99–110)
CO2 SERPL-SCNC: 26 MMOL/L (ref 21–32)
CREAT SERPL-MCNC: 0.6 MG/DL (ref 0.8–1.3)
DEPRECATED RDW RBC AUTO: 13 % (ref 12.4–15.4)
EOSINOPHIL # BLD: 0.3 K/UL (ref 0–0.6)
EOSINOPHIL NFR BLD: 3.6 %
GFR SERPLBLD CREATININE-BSD FMLA CKD-EPI: >90 ML/MIN/{1.73_M2}
GLUCOSE SERPL-MCNC: 101 MG/DL (ref 70–99)
HCT VFR BLD AUTO: 23.3 % (ref 40.5–52.5)
HGB BLD-MCNC: 7.8 G/DL (ref 13.5–17.5)
LYMPHOCYTES # BLD: 3.2 K/UL (ref 1–5.1)
LYMPHOCYTES NFR BLD: 37.7 %
MAGNESIUM SERPL-MCNC: 2 MG/DL (ref 1.8–2.4)
MCH RBC QN AUTO: 28.1 PG (ref 26–34)
MCHC RBC AUTO-ENTMCNC: 33.6 G/DL (ref 31–36)
MCV RBC AUTO: 83.7 FL (ref 80–100)
MONOCYTES # BLD: 0.7 K/UL (ref 0–1.3)
MONOCYTES NFR BLD: 8.3 %
NEUTROPHILS # BLD: 4.2 K/UL (ref 1.7–7.7)
NEUTROPHILS NFR BLD: 50 %
PHOSPHATE SERPL-MCNC: 4 MG/DL (ref 2.5–4.9)
PLATELET # BLD AUTO: 793 K/UL (ref 135–450)
PMV BLD AUTO: 6.7 FL (ref 5–10.5)
POTASSIUM SERPL-SCNC: 4.3 MMOL/L (ref 3.5–5.1)
RBC # BLD AUTO: 2.78 M/UL (ref 4.2–5.9)
SODIUM SERPL-SCNC: 135 MMOL/L (ref 136–145)
WBC # BLD AUTO: 8.4 K/UL (ref 4–11)

## 2025-07-11 PROCEDURE — 6370000000 HC RX 637 (ALT 250 FOR IP): Performed by: FAMILY MEDICINE

## 2025-07-11 PROCEDURE — 36415 COLL VENOUS BLD VENIPUNCTURE: CPT

## 2025-07-11 PROCEDURE — 6370000000 HC RX 637 (ALT 250 FOR IP)

## 2025-07-11 PROCEDURE — 74018 RADEX ABDOMEN 1 VIEW: CPT

## 2025-07-11 PROCEDURE — 85025 COMPLETE CBC W/AUTO DIFF WBC: CPT

## 2025-07-11 PROCEDURE — 6360000002 HC RX W HCPCS

## 2025-07-11 PROCEDURE — 6370000000 HC RX 637 (ALT 250 FOR IP): Performed by: STUDENT IN AN ORGANIZED HEALTH CARE EDUCATION/TRAINING PROGRAM

## 2025-07-11 PROCEDURE — 85520 HEPARIN ASSAY: CPT

## 2025-07-11 PROCEDURE — 2500000003 HC RX 250 WO HCPCS

## 2025-07-11 PROCEDURE — 6360000002 HC RX W HCPCS: Performed by: INTERNAL MEDICINE

## 2025-07-11 PROCEDURE — 80069 RENAL FUNCTION PANEL: CPT

## 2025-07-11 PROCEDURE — 1200000000 HC SEMI PRIVATE

## 2025-07-11 PROCEDURE — 83735 ASSAY OF MAGNESIUM: CPT

## 2025-07-11 RX ORDER — HEPARIN SODIUM 10000 [USP'U]/100ML
5-30 INJECTION, SOLUTION INTRAVENOUS CONTINUOUS
Status: DISCONTINUED | OUTPATIENT
Start: 2025-07-11 | End: 2025-07-11

## 2025-07-11 RX ORDER — HEPARIN SODIUM 1000 [USP'U]/ML
40 INJECTION, SOLUTION INTRAVENOUS; SUBCUTANEOUS PRN
Status: DISCONTINUED | OUTPATIENT
Start: 2025-07-11 | End: 2025-07-11

## 2025-07-11 RX ORDER — HYDROMORPHONE HYDROCHLORIDE 1 MG/ML
1 INJECTION, SOLUTION INTRAMUSCULAR; INTRAVENOUS; SUBCUTANEOUS
Status: DISPENSED | OUTPATIENT
Start: 2025-07-11 | End: 2025-07-13

## 2025-07-11 RX ORDER — OXYCODONE HYDROCHLORIDE 5 MG/1
5 TABLET ORAL EVERY 4 HOURS PRN
Refills: 0 | Status: DISCONTINUED | OUTPATIENT
Start: 2025-07-11 | End: 2025-07-12

## 2025-07-11 RX ORDER — OXYCODONE HYDROCHLORIDE 5 MG/1
2.5 TABLET ORAL EVERY 4 HOURS PRN
Refills: 0 | Status: DISCONTINUED | OUTPATIENT
Start: 2025-07-11 | End: 2025-07-12

## 2025-07-11 RX ORDER — HEPARIN SODIUM 1000 [USP'U]/ML
80 INJECTION, SOLUTION INTRAVENOUS; SUBCUTANEOUS PRN
Status: DISCONTINUED | OUTPATIENT
Start: 2025-07-11 | End: 2025-07-11

## 2025-07-11 RX ORDER — HYDROMORPHONE HYDROCHLORIDE 1 MG/ML
0.25 INJECTION, SOLUTION INTRAMUSCULAR; INTRAVENOUS; SUBCUTANEOUS
Status: ACTIVE | OUTPATIENT
Start: 2025-07-11 | End: 2025-07-13

## 2025-07-11 RX ADMIN — APIXABAN 5 MG: 5 TABLET, FILM COATED ORAL at 20:48

## 2025-07-11 RX ADMIN — COLLAGENASE SANTYL: 250 OINTMENT TOPICAL at 08:53

## 2025-07-11 RX ADMIN — OXYCODONE 5 MG: 5 TABLET ORAL at 19:09

## 2025-07-11 RX ADMIN — METHOCARBAMOL 750 MG: 750 TABLET ORAL at 08:53

## 2025-07-11 RX ADMIN — GABAPENTIN 300 MG: 300 CAPSULE ORAL at 20:48

## 2025-07-11 RX ADMIN — METHOCARBAMOL 750 MG: 750 TABLET ORAL at 20:48

## 2025-07-11 RX ADMIN — OXYCODONE 5 MG: 5 TABLET ORAL at 08:52

## 2025-07-11 RX ADMIN — ACETAMINOPHEN 1000 MG: 500 TABLET ORAL at 17:50

## 2025-07-11 RX ADMIN — DIAZEPAM 5 MG: 5 TABLET ORAL at 04:10

## 2025-07-11 RX ADMIN — Medication: at 12:36

## 2025-07-11 RX ADMIN — APIXABAN 5 MG: 5 TABLET, FILM COATED ORAL at 08:52

## 2025-07-11 RX ADMIN — ACETAMINOPHEN 1000 MG: 500 TABLET ORAL at 12:36

## 2025-07-11 RX ADMIN — HYDROMORPHONE HYDROCHLORIDE 1 MG: 1 INJECTION, SOLUTION INTRAMUSCULAR; INTRAVENOUS; SUBCUTANEOUS at 00:21

## 2025-07-11 RX ADMIN — DIAZEPAM 5 MG: 5 TABLET ORAL at 17:50

## 2025-07-11 RX ADMIN — METHOCARBAMOL 750 MG: 750 TABLET ORAL at 12:36

## 2025-07-11 RX ADMIN — NALOXEGOL OXALATE 25 MG: 25 TABLET, FILM COATED ORAL at 12:36

## 2025-07-11 RX ADMIN — OXYCODONE 5 MG: 5 TABLET ORAL at 13:30

## 2025-07-11 RX ADMIN — DIAZEPAM 5 MG: 5 TABLET ORAL at 08:52

## 2025-07-11 RX ADMIN — HEPARIN SODIUM AND DEXTROSE 28 UNITS/KG/HR: 10000; 5 INJECTION INTRAVENOUS at 06:08

## 2025-07-11 RX ADMIN — SODIUM CHLORIDE, PRESERVATIVE FREE 10 ML: 5 INJECTION INTRAVENOUS at 00:27

## 2025-07-11 RX ADMIN — HYDROMORPHONE HYDROCHLORIDE 1 MG: 1 INJECTION, SOLUTION INTRAMUSCULAR; INTRAVENOUS; SUBCUTANEOUS at 08:35

## 2025-07-11 RX ADMIN — SODIUM CHLORIDE, PRESERVATIVE FREE 10 ML: 5 INJECTION INTRAVENOUS at 20:48

## 2025-07-11 RX ADMIN — SENNOSIDES, DOCUSATE SODIUM 2 TABLET: 50; 8.6 TABLET, FILM COATED ORAL at 08:52

## 2025-07-11 RX ADMIN — METHOCARBAMOL 750 MG: 750 TABLET ORAL at 17:50

## 2025-07-11 RX ADMIN — SODIUM CHLORIDE, PRESERVATIVE FREE 10 ML: 5 INJECTION INTRAVENOUS at 08:36

## 2025-07-11 RX ADMIN — POLYETHYLENE GLYCOL 3350 17 G: 17 POWDER, FOR SOLUTION ORAL at 08:50

## 2025-07-11 RX ADMIN — OXYCODONE 10 MG: 5 TABLET ORAL at 06:49

## 2025-07-11 RX ADMIN — HEPARIN SODIUM 3000 UNITS: 1000 INJECTION INTRAVENOUS; SUBCUTANEOUS at 00:53

## 2025-07-11 ASSESSMENT — PAIN SCALES - GENERAL
PAINLEVEL_OUTOF10: 7
PAINLEVEL_OUTOF10: 7
PAINLEVEL_OUTOF10: 10
PAINLEVEL_OUTOF10: 0
PAINLEVEL_OUTOF10: 3
PAINLEVEL_OUTOF10: 6
PAINLEVEL_OUTOF10: 4
PAINLEVEL_OUTOF10: 6
PAINLEVEL_OUTOF10: 3
PAINLEVEL_OUTOF10: 6
PAINLEVEL_OUTOF10: 3
PAINLEVEL_OUTOF10: 5
PAINLEVEL_OUTOF10: 0

## 2025-07-11 ASSESSMENT — PAIN - FUNCTIONAL ASSESSMENT
PAIN_FUNCTIONAL_ASSESSMENT: ACTIVITIES ARE NOT PREVENTED

## 2025-07-11 ASSESSMENT — PAIN DESCRIPTION - FREQUENCY
FREQUENCY: INTERMITTENT

## 2025-07-11 ASSESSMENT — PAIN DESCRIPTION - ONSET
ONSET: ON-GOING

## 2025-07-11 ASSESSMENT — PAIN DESCRIPTION - DESCRIPTORS
DESCRIPTORS: ACHING

## 2025-07-11 ASSESSMENT — PAIN SCALES - WONG BAKER
WONGBAKER_NUMERICALRESPONSE: NO HURT

## 2025-07-11 ASSESSMENT — PAIN DESCRIPTION - LOCATION
LOCATION: LEG

## 2025-07-11 ASSESSMENT — PAIN DESCRIPTION - PAIN TYPE
TYPE: SURGICAL PAIN
TYPE: SURGICAL PAIN
TYPE: ACUTE PAIN;SURGICAL PAIN

## 2025-07-11 ASSESSMENT — PAIN DESCRIPTION - ORIENTATION
ORIENTATION: RIGHT

## 2025-07-11 NOTE — PLAN OF CARE
Problem: Discharge Planning  Goal: Discharge to home or other facility with appropriate resources  Outcome: Progressing     Problem: Safety - Adult  Goal: Free from fall injury  7/11/2025 0955 by Brigido Noriega RN  Outcome: Progressing  7/11/2025 0113 by Candelaria Booth RN  Outcome: Progressing     Problem: Pain  Goal: Verbalizes/displays adequate comfort level or baseline comfort level  7/11/2025 0955 by Brigido Noriega RN  Outcome: Progressing  Flowsheets (Taken 7/11/2025 0353 by Candelaria Booth RN)  Verbalizes/displays adequate comfort level or baseline comfort level: Encourage patient to monitor pain and request assistance  7/11/2025 0113 by Candelaria Booth RN  Outcome: Progressing  Flowsheets (Taken 7/10/2025 1946)  Verbalizes/displays adequate comfort level or baseline comfort level: Encourage patient to monitor pain and request assistance     Problem: Skin/Tissue Integrity  Goal: Skin integrity remains intact  Description: 1.  Monitor for areas of redness and/or skin breakdown  2.  Assess vascular access sites hourly  3.  Every 4-6 hours minimum:  Change oxygen saturation probe site  4.  Every 4-6 hours:  If on nasal continuous positive airway pressure, respiratory therapy assess nares and determine need for appliance change or resting period  Outcome: Progressing     Problem: Nutrition Deficit:  Goal: Optimize nutritional status  Outcome: Progressing     Problem: Respiratory - Adult  Goal: Achieves optimal ventilation and oxygenation  Outcome: Progressing     Problem: Cardiovascular - Adult  Goal: Absence of cardiac dysrhythmias or at baseline  Outcome: Progressing

## 2025-07-12 LAB
ALBUMIN SERPL-MCNC: 3.2 G/DL (ref 3.4–5)
ANION GAP SERPL CALCULATED.3IONS-SCNC: 10 MMOL/L (ref 3–16)
BASOPHILS # BLD: 0.1 K/UL (ref 0–0.2)
BASOPHILS NFR BLD: 0.7 %
BUN SERPL-MCNC: 9 MG/DL (ref 7–20)
CALCIUM SERPL-MCNC: 10.6 MG/DL (ref 8.3–10.6)
CHLORIDE SERPL-SCNC: 100 MMOL/L (ref 99–110)
CO2 SERPL-SCNC: 27 MMOL/L (ref 21–32)
CREAT SERPL-MCNC: 0.6 MG/DL (ref 0.8–1.3)
CRP SERPL-MCNC: 66.3 MG/L (ref 0–5.1)
DEPRECATED RDW RBC AUTO: 13.4 % (ref 12.4–15.4)
EOSINOPHIL # BLD: 0.3 K/UL (ref 0–0.6)
EOSINOPHIL NFR BLD: 3.6 %
GFR SERPLBLD CREATININE-BSD FMLA CKD-EPI: >90 ML/MIN/{1.73_M2}
GLUCOSE SERPL-MCNC: 109 MG/DL (ref 70–99)
HCT VFR BLD AUTO: 23.4 % (ref 40.5–52.5)
HGB BLD-MCNC: 7.8 G/DL (ref 13.5–17.5)
LYMPHOCYTES # BLD: 2 K/UL (ref 1–5.1)
LYMPHOCYTES NFR BLD: 26.4 %
MAGNESIUM SERPL-MCNC: 2.14 MG/DL (ref 1.8–2.4)
MCH RBC QN AUTO: 27.8 PG (ref 26–34)
MCHC RBC AUTO-ENTMCNC: 33.2 G/DL (ref 31–36)
MCV RBC AUTO: 83.7 FL (ref 80–100)
MONOCYTES # BLD: 0.5 K/UL (ref 0–1.3)
MONOCYTES NFR BLD: 6.9 %
NEUTROPHILS # BLD: 4.7 K/UL (ref 1.7–7.7)
NEUTROPHILS NFR BLD: 62.4 %
PHOSPHATE SERPL-MCNC: 4.4 MG/DL (ref 2.5–4.9)
PLATELET # BLD AUTO: 750 K/UL (ref 135–450)
PMV BLD AUTO: 6.2 FL (ref 5–10.5)
POTASSIUM SERPL-SCNC: 4.5 MMOL/L (ref 3.5–5.1)
PREALB SERPL-MCNC: 16.2 MG/DL (ref 20–40)
RBC # BLD AUTO: 2.8 M/UL (ref 4.2–5.9)
SODIUM SERPL-SCNC: 137 MMOL/L (ref 136–145)
WBC # BLD AUTO: 7.5 K/UL (ref 4–11)

## 2025-07-12 PROCEDURE — 6360000002 HC RX W HCPCS: Performed by: INTERNAL MEDICINE

## 2025-07-12 PROCEDURE — 84134 ASSAY OF PREALBUMIN: CPT

## 2025-07-12 PROCEDURE — 6370000000 HC RX 637 (ALT 250 FOR IP)

## 2025-07-12 PROCEDURE — 6370000000 HC RX 637 (ALT 250 FOR IP): Performed by: STUDENT IN AN ORGANIZED HEALTH CARE EDUCATION/TRAINING PROGRAM

## 2025-07-12 PROCEDURE — 83735 ASSAY OF MAGNESIUM: CPT

## 2025-07-12 PROCEDURE — 6360000002 HC RX W HCPCS

## 2025-07-12 PROCEDURE — 1200000000 HC SEMI PRIVATE

## 2025-07-12 PROCEDURE — 2500000003 HC RX 250 WO HCPCS

## 2025-07-12 PROCEDURE — 85025 COMPLETE CBC W/AUTO DIFF WBC: CPT

## 2025-07-12 PROCEDURE — 84466 ASSAY OF TRANSFERRIN: CPT

## 2025-07-12 PROCEDURE — 36415 COLL VENOUS BLD VENIPUNCTURE: CPT

## 2025-07-12 PROCEDURE — 86140 C-REACTIVE PROTEIN: CPT

## 2025-07-12 PROCEDURE — 6370000000 HC RX 637 (ALT 250 FOR IP): Performed by: FAMILY MEDICINE

## 2025-07-12 PROCEDURE — 80069 RENAL FUNCTION PANEL: CPT

## 2025-07-12 PROCEDURE — 36592 COLLECT BLOOD FROM PICC: CPT

## 2025-07-12 RX ORDER — HYDROMORPHONE HYDROCHLORIDE 1 MG/ML
0.5 INJECTION, SOLUTION INTRAMUSCULAR; INTRAVENOUS; SUBCUTANEOUS ONCE
Status: COMPLETED | OUTPATIENT
Start: 2025-07-12 | End: 2025-07-12

## 2025-07-12 RX ORDER — OXYCODONE HYDROCHLORIDE 5 MG/1
10 TABLET ORAL EVERY 4 HOURS PRN
Refills: 0 | Status: DISCONTINUED | OUTPATIENT
Start: 2025-07-12 | End: 2025-07-14

## 2025-07-12 RX ORDER — OXYCODONE HYDROCHLORIDE 5 MG/1
5 TABLET ORAL EVERY 4 HOURS PRN
Refills: 0 | Status: DISCONTINUED | OUTPATIENT
Start: 2025-07-12 | End: 2025-07-13

## 2025-07-12 RX ADMIN — DIAZEPAM 5 MG: 5 TABLET ORAL at 03:46

## 2025-07-12 RX ADMIN — APIXABAN 5 MG: 5 TABLET, FILM COATED ORAL at 20:47

## 2025-07-12 RX ADMIN — SODIUM CHLORIDE, PRESERVATIVE FREE 10 ML: 5 INJECTION INTRAVENOUS at 09:17

## 2025-07-12 RX ADMIN — METHOCARBAMOL 750 MG: 750 TABLET ORAL at 20:47

## 2025-07-12 RX ADMIN — SODIUM CHLORIDE, PRESERVATIVE FREE 10 ML: 5 INJECTION INTRAVENOUS at 04:13

## 2025-07-12 RX ADMIN — METHOCARBAMOL 750 MG: 750 TABLET ORAL at 16:46

## 2025-07-12 RX ADMIN — ACETAMINOPHEN 1000 MG: 500 TABLET ORAL at 18:01

## 2025-07-12 RX ADMIN — Medication: at 20:50

## 2025-07-12 RX ADMIN — DIAZEPAM 5 MG: 5 TABLET ORAL at 18:01

## 2025-07-12 RX ADMIN — HYDROMORPHONE HYDROCHLORIDE 0.5 MG: 1 INJECTION, SOLUTION INTRAMUSCULAR; INTRAVENOUS; SUBCUTANEOUS at 06:32

## 2025-07-12 RX ADMIN — NALOXEGOL OXALATE 25 MG: 25 TABLET, FILM COATED ORAL at 06:37

## 2025-07-12 RX ADMIN — OXYCODONE 5 MG: 5 TABLET ORAL at 03:46

## 2025-07-12 RX ADMIN — SODIUM CHLORIDE, PRESERVATIVE FREE 10 ML: 5 INJECTION INTRAVENOUS at 06:31

## 2025-07-12 RX ADMIN — APIXABAN 5 MG: 5 TABLET, FILM COATED ORAL at 09:18

## 2025-07-12 RX ADMIN — POLYETHYLENE GLYCOL 3350 17 G: 17 POWDER, FOR SOLUTION ORAL at 09:20

## 2025-07-12 RX ADMIN — GABAPENTIN 300 MG: 300 CAPSULE ORAL at 20:47

## 2025-07-12 RX ADMIN — HYDROMORPHONE HYDROCHLORIDE 1 MG: 1 INJECTION, SOLUTION INTRAMUSCULAR; INTRAVENOUS; SUBCUTANEOUS at 18:49

## 2025-07-12 RX ADMIN — OXYCODONE 5 MG: 5 TABLET ORAL at 20:47

## 2025-07-12 RX ADMIN — METHOCARBAMOL 750 MG: 750 TABLET ORAL at 12:15

## 2025-07-12 RX ADMIN — DIAZEPAM 5 MG: 5 TABLET ORAL at 09:18

## 2025-07-12 RX ADMIN — Medication: at 03:46

## 2025-07-12 RX ADMIN — SENNOSIDES, DOCUSATE SODIUM 2 TABLET: 50; 8.6 TABLET, FILM COATED ORAL at 09:18

## 2025-07-12 RX ADMIN — GABAPENTIN 100 MG: 100 CAPSULE ORAL at 15:43

## 2025-07-12 RX ADMIN — ACETAMINOPHEN 1000 MG: 500 TABLET ORAL at 11:43

## 2025-07-12 RX ADMIN — OXYCODONE 10 MG: 5 TABLET ORAL at 15:42

## 2025-07-12 RX ADMIN — Medication: at 11:46

## 2025-07-12 RX ADMIN — COLLAGENASE SANTYL: 250 OINTMENT TOPICAL at 09:17

## 2025-07-12 RX ADMIN — METHOCARBAMOL 750 MG: 750 TABLET ORAL at 09:18

## 2025-07-12 RX ADMIN — HYDROMORPHONE HYDROCHLORIDE 1 MG: 1 INJECTION, SOLUTION INTRAMUSCULAR; INTRAVENOUS; SUBCUTANEOUS at 04:13

## 2025-07-12 RX ADMIN — OXYCODONE 10 MG: 5 TABLET ORAL at 11:42

## 2025-07-12 ASSESSMENT — PAIN DESCRIPTION - ORIENTATION
ORIENTATION: RIGHT
ORIENTATION: RIGHT
ORIENTATION: RIGHT;LEFT
ORIENTATION: RIGHT

## 2025-07-12 ASSESSMENT — PAIN DESCRIPTION - DESCRIPTORS
DESCRIPTORS: ACHING;DISCOMFORT
DESCRIPTORS: ACHING
DESCRIPTORS: ACHING;DISCOMFORT
DESCRIPTORS: BURNING;THROBBING;STABBING
DESCRIPTORS: ACHING

## 2025-07-12 ASSESSMENT — PAIN SCALES - WONG BAKER
WONGBAKER_NUMERICALRESPONSE: NO HURT
WONGBAKER_NUMERICALRESPONSE: NO HURT

## 2025-07-12 ASSESSMENT — PAIN SCALES - GENERAL
PAINLEVEL_OUTOF10: 8
PAINLEVEL_OUTOF10: 7
PAINLEVEL_OUTOF10: 0
PAINLEVEL_OUTOF10: 8
PAINLEVEL_OUTOF10: 9
PAINLEVEL_OUTOF10: 7
PAINLEVEL_OUTOF10: 0
PAINLEVEL_OUTOF10: 0
PAINLEVEL_OUTOF10: 10
PAINLEVEL_OUTOF10: 4
PAINLEVEL_OUTOF10: 7
PAINLEVEL_OUTOF10: 4
PAINLEVEL_OUTOF10: 6
PAINLEVEL_OUTOF10: 0
PAINLEVEL_OUTOF10: 4
PAINLEVEL_OUTOF10: 5

## 2025-07-12 ASSESSMENT — PAIN - FUNCTIONAL ASSESSMENT
PAIN_FUNCTIONAL_ASSESSMENT: ACTIVITIES ARE NOT PREVENTED

## 2025-07-12 ASSESSMENT — PAIN DESCRIPTION - ONSET
ONSET: ON-GOING

## 2025-07-12 ASSESSMENT — PAIN DESCRIPTION - PAIN TYPE
TYPE: ACUTE PAIN;SURGICAL PAIN

## 2025-07-12 ASSESSMENT — PAIN DESCRIPTION - LOCATION
LOCATION: LEG
LOCATION: LEG;ANKLE
LOCATION: LEG
LOCATION: LEG

## 2025-07-12 ASSESSMENT — PAIN DESCRIPTION - FREQUENCY
FREQUENCY: INTERMITTENT

## 2025-07-13 LAB
BASOPHILS # BLD: 0.1 K/UL (ref 0–0.2)
BASOPHILS NFR BLD: 0.8 %
DEPRECATED RDW RBC AUTO: 13.4 % (ref 12.4–15.4)
EOSINOPHIL # BLD: 0.4 K/UL (ref 0–0.6)
EOSINOPHIL NFR BLD: 5.9 %
HCT VFR BLD AUTO: 24.8 % (ref 40.5–52.5)
HGB BLD-MCNC: 8.2 G/DL (ref 13.5–17.5)
INR PPP: 1.21 (ref 0.86–1.14)
LYMPHOCYTES # BLD: 2.7 K/UL (ref 1–5.1)
LYMPHOCYTES NFR BLD: 43.7 %
MCH RBC QN AUTO: 28 PG (ref 26–34)
MCHC RBC AUTO-ENTMCNC: 33.2 G/DL (ref 31–36)
MCV RBC AUTO: 84.3 FL (ref 80–100)
MONOCYTES # BLD: 0.5 K/UL (ref 0–1.3)
MONOCYTES NFR BLD: 8.6 %
NEUTROPHILS # BLD: 2.6 K/UL (ref 1.7–7.7)
NEUTROPHILS NFR BLD: 41 %
PLATELET # BLD AUTO: 782 K/UL (ref 135–450)
PMV BLD AUTO: 5.9 FL (ref 5–10.5)
PROTHROMBIN TIME: 15.5 SEC (ref 12.1–14.9)
RBC # BLD AUTO: 2.94 M/UL (ref 4.2–5.9)
TRANSFERRIN SERPL-MCNC: 162 MG/DL (ref 200–360)
WBC # BLD AUTO: 6.3 K/UL (ref 4–11)

## 2025-07-13 PROCEDURE — 2500000003 HC RX 250 WO HCPCS

## 2025-07-13 PROCEDURE — 6370000000 HC RX 637 (ALT 250 FOR IP): Performed by: STUDENT IN AN ORGANIZED HEALTH CARE EDUCATION/TRAINING PROGRAM

## 2025-07-13 PROCEDURE — 6370000000 HC RX 637 (ALT 250 FOR IP): Performed by: FAMILY MEDICINE

## 2025-07-13 PROCEDURE — 36415 COLL VENOUS BLD VENIPUNCTURE: CPT

## 2025-07-13 PROCEDURE — 6370000000 HC RX 637 (ALT 250 FOR IP)

## 2025-07-13 PROCEDURE — 85610 PROTHROMBIN TIME: CPT

## 2025-07-13 PROCEDURE — 1200000000 HC SEMI PRIVATE

## 2025-07-13 PROCEDURE — 36592 COLLECT BLOOD FROM PICC: CPT

## 2025-07-13 PROCEDURE — 6360000002 HC RX W HCPCS: Performed by: FAMILY MEDICINE

## 2025-07-13 PROCEDURE — 6360000002 HC RX W HCPCS

## 2025-07-13 PROCEDURE — 85025 COMPLETE CBC W/AUTO DIFF WBC: CPT

## 2025-07-13 RX ORDER — OXYCODONE AND ACETAMINOPHEN 7.5; 325 MG/1; MG/1
1 TABLET ORAL EVERY 6 HOURS PRN
Refills: 0 | Status: DISCONTINUED | OUTPATIENT
Start: 2025-07-13 | End: 2025-07-14

## 2025-07-13 RX ORDER — HYDROMORPHONE HYDROCHLORIDE 1 MG/ML
1 INJECTION, SOLUTION INTRAMUSCULAR; INTRAVENOUS; SUBCUTANEOUS EVERY 4 HOURS PRN
Status: DISCONTINUED | OUTPATIENT
Start: 2025-07-13 | End: 2025-07-14

## 2025-07-13 RX ORDER — KETOROLAC TROMETHAMINE 30 MG/ML
15 INJECTION, SOLUTION INTRAMUSCULAR; INTRAVENOUS ONCE
Status: COMPLETED | OUTPATIENT
Start: 2025-07-13 | End: 2025-07-13

## 2025-07-13 RX ADMIN — SENNOSIDES, DOCUSATE SODIUM 2 TABLET: 50; 8.6 TABLET, FILM COATED ORAL at 07:54

## 2025-07-13 RX ADMIN — OXYCODONE 10 MG: 5 TABLET ORAL at 01:03

## 2025-07-13 RX ADMIN — HYDROMORPHONE HYDROCHLORIDE 1 MG: 1 INJECTION, SOLUTION INTRAMUSCULAR; INTRAVENOUS; SUBCUTANEOUS at 06:15

## 2025-07-13 RX ADMIN — METHOCARBAMOL 750 MG: 750 TABLET ORAL at 20:24

## 2025-07-13 RX ADMIN — Medication: at 06:19

## 2025-07-13 RX ADMIN — KETOROLAC TROMETHAMINE 15 MG: 30 INJECTION, SOLUTION INTRAMUSCULAR at 14:37

## 2025-07-13 RX ADMIN — Medication: at 20:27

## 2025-07-13 RX ADMIN — DIAZEPAM 5 MG: 5 TABLET ORAL at 08:52

## 2025-07-13 RX ADMIN — METHOCARBAMOL 750 MG: 750 TABLET ORAL at 12:30

## 2025-07-13 RX ADMIN — OXYCODONE 10 MG: 5 TABLET ORAL at 16:41

## 2025-07-13 RX ADMIN — ACETAMINOPHEN 1000 MG: 500 TABLET ORAL at 18:15

## 2025-07-13 RX ADMIN — OXYCODONE 10 MG: 5 TABLET ORAL at 11:19

## 2025-07-13 RX ADMIN — SODIUM CHLORIDE, PRESERVATIVE FREE 10 ML: 5 INJECTION INTRAVENOUS at 07:51

## 2025-07-13 RX ADMIN — OXYCODONE 10 MG: 5 TABLET ORAL at 23:09

## 2025-07-13 RX ADMIN — ACETAMINOPHEN 500 MG: 500 TABLET ORAL at 23:09

## 2025-07-13 RX ADMIN — ACETAMINOPHEN 1000 MG: 500 TABLET ORAL at 06:15

## 2025-07-13 RX ADMIN — SODIUM CHLORIDE, PRESERVATIVE FREE 10 ML: 5 INJECTION INTRAVENOUS at 20:27

## 2025-07-13 RX ADMIN — NALOXEGOL OXALATE 25 MG: 25 TABLET, FILM COATED ORAL at 06:15

## 2025-07-13 RX ADMIN — Medication: at 12:31

## 2025-07-13 RX ADMIN — POLYETHYLENE GLYCOL 3350 17 G: 17 POWDER, FOR SOLUTION ORAL at 07:54

## 2025-07-13 RX ADMIN — ACETAMINOPHEN 1000 MG: 500 TABLET ORAL at 11:20

## 2025-07-13 RX ADMIN — DIAZEPAM 5 MG: 5 TABLET ORAL at 18:15

## 2025-07-13 RX ADMIN — COLLAGENASE SANTYL: 250 OINTMENT TOPICAL at 07:54

## 2025-07-13 RX ADMIN — DIAZEPAM 5 MG: 5 TABLET ORAL at 01:07

## 2025-07-13 RX ADMIN — HYDROMORPHONE HYDROCHLORIDE 1 MG: 1 INJECTION, SOLUTION INTRAMUSCULAR; INTRAVENOUS; SUBCUTANEOUS at 19:45

## 2025-07-13 RX ADMIN — APIXABAN 5 MG: 5 TABLET, FILM COATED ORAL at 07:53

## 2025-07-13 RX ADMIN — METHOCARBAMOL 750 MG: 750 TABLET ORAL at 16:41

## 2025-07-13 RX ADMIN — GABAPENTIN 300 MG: 300 CAPSULE ORAL at 20:25

## 2025-07-13 RX ADMIN — GABAPENTIN 100 MG: 100 CAPSULE ORAL at 11:20

## 2025-07-13 RX ADMIN — METHOCARBAMOL 750 MG: 750 TABLET ORAL at 07:53

## 2025-07-13 RX ADMIN — APIXABAN 5 MG: 5 TABLET, FILM COATED ORAL at 20:24

## 2025-07-13 RX ADMIN — OXYCODONE AND ACETAMINOPHEN 1 TABLET: 7.5; 325 TABLET ORAL at 13:50

## 2025-07-13 RX ADMIN — HYDROMORPHONE HYDROCHLORIDE 1 MG: 1 INJECTION, SOLUTION INTRAMUSCULAR; INTRAVENOUS; SUBCUTANEOUS at 12:30

## 2025-07-13 ASSESSMENT — PAIN DESCRIPTION - ORIENTATION
ORIENTATION: RIGHT;LEFT
ORIENTATION: RIGHT
ORIENTATION: RIGHT;LEFT;LOWER
ORIENTATION: RIGHT;LEFT
ORIENTATION: RIGHT;LEFT

## 2025-07-13 ASSESSMENT — PAIN SCALES - GENERAL
PAINLEVEL_OUTOF10: 7
PAINLEVEL_OUTOF10: 4
PAINLEVEL_OUTOF10: 4
PAINLEVEL_OUTOF10: 5
PAINLEVEL_OUTOF10: 7
PAINLEVEL_OUTOF10: 10
PAINLEVEL_OUTOF10: 8
PAINLEVEL_OUTOF10: 5
PAINLEVEL_OUTOF10: 1
PAINLEVEL_OUTOF10: 10
PAINLEVEL_OUTOF10: 4
PAINLEVEL_OUTOF10: 8
PAINLEVEL_OUTOF10: 3
PAINLEVEL_OUTOF10: 7
PAINLEVEL_OUTOF10: 7
PAINLEVEL_OUTOF10: 3
PAINLEVEL_OUTOF10: 10
PAINLEVEL_OUTOF10: 4

## 2025-07-13 ASSESSMENT — PAIN - FUNCTIONAL ASSESSMENT
PAIN_FUNCTIONAL_ASSESSMENT: ACTIVITIES ARE NOT PREVENTED
PAIN_FUNCTIONAL_ASSESSMENT: PREVENTS OR INTERFERES SOME ACTIVE ACTIVITIES AND ADLS
PAIN_FUNCTIONAL_ASSESSMENT: ACTIVITIES ARE NOT PREVENTED

## 2025-07-13 ASSESSMENT — PAIN DESCRIPTION - LOCATION
LOCATION: LEG
LOCATION: LEG;ANKLE
LOCATION: ANKLE;LEG
LOCATION: LEG;ANKLE

## 2025-07-13 ASSESSMENT — PAIN DESCRIPTION - FREQUENCY
FREQUENCY: INTERMITTENT
FREQUENCY: INTERMITTENT

## 2025-07-13 ASSESSMENT — PAIN DESCRIPTION - DESCRIPTORS
DESCRIPTORS: SQUEEZING;PRESSURE
DESCRIPTORS: ACHING;DISCOMFORT
DESCRIPTORS: THROBBING;BURNING
DESCRIPTORS: ACHING;DISCOMFORT
DESCRIPTORS: DISCOMFORT;ACHING
DESCRIPTORS: ACHING;DISCOMFORT;BURNING;SHARP

## 2025-07-13 ASSESSMENT — PAIN DESCRIPTION - ONSET
ONSET: ON-GOING
ONSET: ON-GOING

## 2025-07-13 ASSESSMENT — PAIN SCALES - WONG BAKER: WONGBAKER_NUMERICALRESPONSE: NO HURT

## 2025-07-13 ASSESSMENT — PAIN DESCRIPTION - PAIN TYPE: TYPE: ACUTE PAIN;SURGICAL PAIN;CHRONIC PAIN

## 2025-07-13 NOTE — PLAN OF CARE
Problem: Discharge Planning  Goal: Discharge to home or other facility with appropriate resources  7/13/2025 1517 by Juana Stewart RN  Outcome: Progressing    Problem: Safety - Adult  Goal: Free from fall injury  7/13/2025 1517 by Juana Stewart RN  Outcome: Progressing  Patient is a fall risk. Fall risk protocol in place as per fall risk score, see assessment for details. Bed is locked and in lowest possible position, side rails up x2, alarm in place and on, no slip footwear on. Call light/belongings within reach. Patient utilizes call light appropriately for assist as needed. Hourly rounds in place for safety, pt remains free from fall/injury. Will continue to monitor.        Problem: Pain  Goal: Verbalizes/displays adequate comfort level or baseline comfort level  7/13/2025 1517 by Juana Stewart RN  Outcome: Progressing  One time dose of prn Toradol ordered for discomfort post dressing change. Pain controlled with current prn and scheduled medications.        Problem: Skin/Tissue Integrity  Goal: Skin integrity remains intact  Description: 1.  Monitor for areas of redness and/or skin breakdown  2.  Assess vascular access sites hourly  3.  Every 4-6 hours minimum:  Change oxygen saturation probe site  4.  Every 4-6 hours:  If on nasal continuous positive airway pressure, respiratory therapy assess nares and determine need for appliance change or resting period  7/13/2025 1517 by Juana Stewart RN  Outcome: Progressing  Flowsheets (Taken 7/13/2025 1516)  Skin Integrity Remains Intact: Monitor for areas of redness and/or skin breakdown       Problem: Nutrition Deficit:  Goal: Optimize nutritional status  7/13/2025 1517 by Juana Stewart RN  Outcome: Progressing    Problem: Respiratory - Adult  Goal: Achieves optimal ventilation and oxygenation  7/13/2025 1517 by Juana Stewart RN  Outcome: Progressing    Problem: Cardiovascular - Adult  Goal: Absence of cardiac dysrhythmias or at baseline  7/13/2025 1517

## 2025-07-14 LAB
ALBUMIN SERPL-MCNC: 3.2 G/DL (ref 3.4–5)
ANION GAP SERPL CALCULATED.3IONS-SCNC: 8 MMOL/L (ref 3–16)
BASOPHILS # BLD: 0 K/UL (ref 0–0.2)
BASOPHILS NFR BLD: 0.6 %
BUN SERPL-MCNC: 20 MG/DL (ref 7–20)
CALCIUM SERPL-MCNC: 10.2 MG/DL (ref 8.3–10.6)
CHLORIDE SERPL-SCNC: 99 MMOL/L (ref 99–110)
CO2 SERPL-SCNC: 29 MMOL/L (ref 21–32)
CREAT SERPL-MCNC: 0.7 MG/DL (ref 0.8–1.3)
DEPRECATED RDW RBC AUTO: 13.3 % (ref 12.4–15.4)
EOSINOPHIL # BLD: 0.4 K/UL (ref 0–0.6)
EOSINOPHIL NFR BLD: 6.9 %
GFR SERPLBLD CREATININE-BSD FMLA CKD-EPI: >90 ML/MIN/{1.73_M2}
GLUCOSE SERPL-MCNC: 118 MG/DL (ref 70–99)
HCT VFR BLD AUTO: 22 % (ref 40.5–52.5)
HGB BLD-MCNC: 7.4 G/DL (ref 13.5–17.5)
LYMPHOCYTES # BLD: 2.5 K/UL (ref 1–5.1)
LYMPHOCYTES NFR BLD: 47 %
MAGNESIUM SERPL-MCNC: 2 MG/DL (ref 1.8–2.4)
MCH RBC QN AUTO: 28.4 PG (ref 26–34)
MCHC RBC AUTO-ENTMCNC: 33.7 G/DL (ref 31–36)
MCV RBC AUTO: 84.2 FL (ref 80–100)
MONOCYTES # BLD: 0.4 K/UL (ref 0–1.3)
MONOCYTES NFR BLD: 8.2 %
NEUTROPHILS # BLD: 2 K/UL (ref 1.7–7.7)
NEUTROPHILS NFR BLD: 37.3 %
PHOSPHATE SERPL-MCNC: 4.3 MG/DL (ref 2.5–4.9)
PLATELET # BLD AUTO: 692 K/UL (ref 135–450)
PMV BLD AUTO: 6.1 FL (ref 5–10.5)
POTASSIUM SERPL-SCNC: 3.9 MMOL/L (ref 3.5–5.1)
RBC # BLD AUTO: 2.61 M/UL (ref 4.2–5.9)
SODIUM SERPL-SCNC: 136 MMOL/L (ref 136–145)
WBC # BLD AUTO: 5.4 K/UL (ref 4–11)

## 2025-07-14 PROCEDURE — 85025 COMPLETE CBC W/AUTO DIFF WBC: CPT

## 2025-07-14 PROCEDURE — 6370000000 HC RX 637 (ALT 250 FOR IP): Performed by: INTERNAL MEDICINE

## 2025-07-14 PROCEDURE — 6370000000 HC RX 637 (ALT 250 FOR IP)

## 2025-07-14 PROCEDURE — 97530 THERAPEUTIC ACTIVITIES: CPT

## 2025-07-14 PROCEDURE — 97116 GAIT TRAINING THERAPY: CPT

## 2025-07-14 PROCEDURE — 97110 THERAPEUTIC EXERCISES: CPT

## 2025-07-14 PROCEDURE — 83735 ASSAY OF MAGNESIUM: CPT

## 2025-07-14 PROCEDURE — 1200000000 HC SEMI PRIVATE

## 2025-07-14 PROCEDURE — 6370000000 HC RX 637 (ALT 250 FOR IP): Performed by: STUDENT IN AN ORGANIZED HEALTH CARE EDUCATION/TRAINING PROGRAM

## 2025-07-14 PROCEDURE — 80069 RENAL FUNCTION PANEL: CPT

## 2025-07-14 PROCEDURE — 36415 COLL VENOUS BLD VENIPUNCTURE: CPT

## 2025-07-14 PROCEDURE — 2500000003 HC RX 250 WO HCPCS

## 2025-07-14 PROCEDURE — 6370000000 HC RX 637 (ALT 250 FOR IP): Performed by: FAMILY MEDICINE

## 2025-07-14 RX ORDER — OXYCODONE AND ACETAMINOPHEN 10; 325 MG/1; MG/1
1 TABLET ORAL EVERY 4 HOURS PRN
Refills: 0 | Status: DISCONTINUED | OUTPATIENT
Start: 2025-07-14 | End: 2025-07-16 | Stop reason: HOSPADM

## 2025-07-14 RX ORDER — OXYCODONE HCL 20 MG/1
20 TABLET, FILM COATED, EXTENDED RELEASE ORAL EVERY 12 HOURS SCHEDULED
Refills: 0 | Status: DISCONTINUED | OUTPATIENT
Start: 2025-07-14 | End: 2025-07-14

## 2025-07-14 RX ORDER — OXYCODONE HCL 10 MG/1
10 TABLET, FILM COATED, EXTENDED RELEASE ORAL EVERY 12 HOURS SCHEDULED
Refills: 0 | Status: DISCONTINUED | OUTPATIENT
Start: 2025-07-14 | End: 2025-07-16 | Stop reason: HOSPADM

## 2025-07-14 RX ADMIN — OXYCODONE AND ACETAMINOPHEN 1 TABLET: 325; 10 TABLET ORAL at 13:21

## 2025-07-14 RX ADMIN — OXYCODONE 10 MG: 5 TABLET ORAL at 05:53

## 2025-07-14 RX ADMIN — OXYCODONE AND ACETAMINOPHEN 1 TABLET: 325; 10 TABLET ORAL at 20:59

## 2025-07-14 RX ADMIN — OXYCODONE HYDROCHLORIDE 10 MG: 10 TABLET, FILM COATED, EXTENDED RELEASE ORAL at 19:29

## 2025-07-14 RX ADMIN — APIXABAN 5 MG: 5 TABLET, FILM COATED ORAL at 08:37

## 2025-07-14 RX ADMIN — ACETAMINOPHEN 1000 MG: 500 TABLET ORAL at 11:01

## 2025-07-14 RX ADMIN — METHOCARBAMOL 750 MG: 750 TABLET ORAL at 08:36

## 2025-07-14 RX ADMIN — Medication: at 05:53

## 2025-07-14 RX ADMIN — APIXABAN 5 MG: 5 TABLET, FILM COATED ORAL at 19:29

## 2025-07-14 RX ADMIN — Medication: at 19:30

## 2025-07-14 RX ADMIN — DIAZEPAM 5 MG: 5 TABLET ORAL at 02:43

## 2025-07-14 RX ADMIN — GABAPENTIN 100 MG: 100 CAPSULE ORAL at 15:25

## 2025-07-14 RX ADMIN — COLLAGENASE SANTYL: 250 OINTMENT TOPICAL at 11:02

## 2025-07-14 RX ADMIN — METHOCARBAMOL 750 MG: 750 TABLET ORAL at 13:21

## 2025-07-14 RX ADMIN — GABAPENTIN 300 MG: 300 CAPSULE ORAL at 19:29

## 2025-07-14 RX ADMIN — NALOXEGOL OXALATE 25 MG: 25 TABLET, FILM COATED ORAL at 05:53

## 2025-07-14 RX ADMIN — METHOCARBAMOL 750 MG: 750 TABLET ORAL at 19:29

## 2025-07-14 RX ADMIN — SODIUM CHLORIDE, PRESERVATIVE FREE 10 ML: 5 INJECTION INTRAVENOUS at 08:41

## 2025-07-14 RX ADMIN — SODIUM CHLORIDE, PRESERVATIVE FREE 10 ML: 5 INJECTION INTRAVENOUS at 19:30

## 2025-07-14 RX ADMIN — OXYCODONE HYDROCHLORIDE 10 MG: 10 TABLET, FILM COATED, EXTENDED RELEASE ORAL at 11:01

## 2025-07-14 RX ADMIN — DIAZEPAM 5 MG: 5 TABLET ORAL at 08:37

## 2025-07-14 RX ADMIN — Medication: at 15:26

## 2025-07-14 ASSESSMENT — PAIN SCALES - GENERAL
PAINLEVEL_OUTOF10: 5
PAINLEVEL_OUTOF10: 5
PAINLEVEL_OUTOF10: 3
PAINLEVEL_OUTOF10: 3
PAINLEVEL_OUTOF10: 5
PAINLEVEL_OUTOF10: 5
PAINLEVEL_OUTOF10: 7
PAINLEVEL_OUTOF10: 5
PAINLEVEL_OUTOF10: 7

## 2025-07-14 ASSESSMENT — PAIN DESCRIPTION - ORIENTATION
ORIENTATION: RIGHT

## 2025-07-14 ASSESSMENT — PAIN DESCRIPTION - LOCATION
LOCATION: LEG

## 2025-07-14 ASSESSMENT — PAIN - FUNCTIONAL ASSESSMENT: PAIN_FUNCTIONAL_ASSESSMENT: ACTIVITIES ARE NOT PREVENTED

## 2025-07-14 NOTE — PLAN OF CARE
Problem: Discharge Planning  Goal: Discharge to home or other facility with appropriate resources  7/13/2025 2322 by Tarik Purvis RN  Outcome: Progressing     Problem: Safety - Adult  Goal: Free from fall injury  7/13/2025 2322 by Tarik Purvis RN  Outcome: Progressing     Problem: Pain  Goal: Verbalizes/displays adequate comfort level or baseline comfort level  7/13/2025 2322 by Tarik uPrvis RN  Outcome: Progressing     Problem: Skin/Tissue Integrity  Goal: Skin integrity remains intact  Description: 1.  Monitor for areas of redness and/or skin breakdown  2.  Assess vascular access sites hourly  3.  Every 4-6 hours minimum:  Change oxygen saturation probe site  4.  Every 4-6 hours:  If on nasal continuous positive airway pressure, respiratory therapy assess nares and determine need for appliance change or resting period  7/13/2025 2322 by Tarik Purvis RN  Outcome: Progressing     Problem: Nutrition Deficit:  Goal: Optimize nutritional status  7/13/2025 2322 by Tarik Purvis RN  Outcome: Progressing     Problem: Respiratory - Adult  Goal: Achieves optimal ventilation and oxygenation  7/13/2025 2322 by Tarik Purvis RN  Outcome: Progressing     Problem: Cardiovascular - Adult  Goal: Absence of cardiac dysrhythmias or at baseline  7/13/2025 2322 by Tarik Purvis RN  Outcome: Progressing

## 2025-07-15 LAB
ANION GAP SERPL CALCULATED.3IONS-SCNC: 9 MMOL/L (ref 3–16)
BASOPHILS # BLD: 0 K/UL (ref 0–0.2)
BASOPHILS NFR BLD: 0.5 %
BUN SERPL-MCNC: 13 MG/DL (ref 7–20)
CALCIUM SERPL-MCNC: 10.3 MG/DL (ref 8.3–10.6)
CHLORIDE SERPL-SCNC: 100 MMOL/L (ref 99–110)
CO2 SERPL-SCNC: 28 MMOL/L (ref 21–32)
CREAT SERPL-MCNC: 0.7 MG/DL (ref 0.8–1.3)
DEPRECATED RDW RBC AUTO: 13.3 % (ref 12.4–15.4)
EOSINOPHIL # BLD: 0.3 K/UL (ref 0–0.6)
EOSINOPHIL NFR BLD: 6.8 %
GFR SERPLBLD CREATININE-BSD FMLA CKD-EPI: >90 ML/MIN/{1.73_M2}
GLUCOSE SERPL-MCNC: 92 MG/DL (ref 70–99)
HCT VFR BLD AUTO: 23.8 % (ref 40.5–52.5)
HGB BLD-MCNC: 7.8 G/DL (ref 13.5–17.5)
LYMPHOCYTES # BLD: 2.2 K/UL (ref 1–5.1)
LYMPHOCYTES NFR BLD: 46.3 %
MAGNESIUM SERPL-MCNC: 1.79 MG/DL (ref 1.8–2.4)
MCH RBC QN AUTO: 27.5 PG (ref 26–34)
MCHC RBC AUTO-ENTMCNC: 32.6 G/DL (ref 31–36)
MCV RBC AUTO: 84.3 FL (ref 80–100)
MONOCYTES # BLD: 0.4 K/UL (ref 0–1.3)
MONOCYTES NFR BLD: 8.6 %
NEUTROPHILS # BLD: 1.8 K/UL (ref 1.7–7.7)
NEUTROPHILS NFR BLD: 37.8 %
PHOSPHATE SERPL-MCNC: 4.3 MG/DL (ref 2.5–4.9)
PLATELET # BLD AUTO: 630 K/UL (ref 135–450)
PMV BLD AUTO: 6.1 FL (ref 5–10.5)
POTASSIUM SERPL-SCNC: 4.2 MMOL/L (ref 3.5–5.1)
RBC # BLD AUTO: 2.83 M/UL (ref 4.2–5.9)
SODIUM SERPL-SCNC: 137 MMOL/L (ref 136–145)
WBC # BLD AUTO: 4.9 K/UL (ref 4–11)

## 2025-07-15 PROCEDURE — 6370000000 HC RX 637 (ALT 250 FOR IP): Performed by: FAMILY MEDICINE

## 2025-07-15 PROCEDURE — 6370000000 HC RX 637 (ALT 250 FOR IP)

## 2025-07-15 PROCEDURE — 6370000000 HC RX 637 (ALT 250 FOR IP): Performed by: STUDENT IN AN ORGANIZED HEALTH CARE EDUCATION/TRAINING PROGRAM

## 2025-07-15 PROCEDURE — 83735 ASSAY OF MAGNESIUM: CPT

## 2025-07-15 PROCEDURE — 1200000000 HC SEMI PRIVATE

## 2025-07-15 PROCEDURE — 6370000000 HC RX 637 (ALT 250 FOR IP): Performed by: INTERNAL MEDICINE

## 2025-07-15 PROCEDURE — 83540 ASSAY OF IRON: CPT

## 2025-07-15 PROCEDURE — 84100 ASSAY OF PHOSPHORUS: CPT

## 2025-07-15 PROCEDURE — 80048 BASIC METABOLIC PNL TOTAL CA: CPT

## 2025-07-15 PROCEDURE — 85025 COMPLETE CBC W/AUTO DIFF WBC: CPT

## 2025-07-15 PROCEDURE — 6360000002 HC RX W HCPCS

## 2025-07-15 PROCEDURE — 2500000003 HC RX 250 WO HCPCS

## 2025-07-15 PROCEDURE — 83550 IRON BINDING TEST: CPT

## 2025-07-15 PROCEDURE — 36415 COLL VENOUS BLD VENIPUNCTURE: CPT

## 2025-07-15 RX ORDER — OXYCODONE AND ACETAMINOPHEN 10; 325 MG/1; MG/1
1 TABLET ORAL EVERY 4 HOURS PRN
Qty: 12 TABLET | Refills: 0 | Status: SHIPPED | OUTPATIENT
Start: 2025-07-15 | End: 2025-07-15

## 2025-07-15 RX ORDER — DIAZEPAM 2 MG/1
2 TABLET ORAL EVERY 8 HOURS PRN
Status: DISCONTINUED | OUTPATIENT
Start: 2025-07-15 | End: 2025-07-16 | Stop reason: HOSPADM

## 2025-07-15 RX ORDER — DIAZEPAM 2 MG/1
2 TABLET ORAL EVERY 8 HOURS PRN
Qty: 8 TABLET | Refills: 0 | Status: SHIPPED | OUTPATIENT
Start: 2025-07-15 | End: 2025-07-15

## 2025-07-15 RX ORDER — DIAZEPAM 2 MG/1
2 TABLET ORAL EVERY 8 HOURS PRN
Qty: 8 TABLET | Refills: 0 | Status: SHIPPED | OUTPATIENT
Start: 2025-07-15 | End: 2025-07-18

## 2025-07-15 RX ORDER — SENNA AND DOCUSATE SODIUM 50; 8.6 MG/1; MG/1
2 TABLET, FILM COATED ORAL DAILY
Qty: 60 TABLET | Refills: 0
Start: 2025-07-16

## 2025-07-15 RX ORDER — ACETAMINOPHEN 500 MG
500 TABLET ORAL EVERY 6 HOURS
Qty: 120 TABLET | Refills: 0
Start: 2025-07-15

## 2025-07-15 RX ORDER — DIAZEPAM 2 MG/1
2 TABLET ORAL EVERY 8 HOURS
Status: DISCONTINUED | OUTPATIENT
Start: 2025-07-15 | End: 2025-07-15

## 2025-07-15 RX ORDER — MAGNESIUM SULFATE IN WATER 40 MG/ML
2000 INJECTION, SOLUTION INTRAVENOUS ONCE
Status: COMPLETED | OUTPATIENT
Start: 2025-07-15 | End: 2025-07-15

## 2025-07-15 RX ORDER — OXYCODONE HCL 10 MG/1
10 TABLET, FILM COATED, EXTENDED RELEASE ORAL EVERY 12 HOURS SCHEDULED
Qty: 6 TABLET | Refills: 0 | Status: SHIPPED | OUTPATIENT
Start: 2025-07-15 | End: 2025-07-18

## 2025-07-15 RX ORDER — METHOCARBAMOL 750 MG/1
750 TABLET, FILM COATED ORAL 4 TIMES DAILY
Qty: 40 TABLET | Refills: 0 | Status: SHIPPED | OUTPATIENT
Start: 2025-07-15 | End: 2025-07-25

## 2025-07-15 RX ORDER — OXYCODONE HCL 10 MG/1
10 TABLET, FILM COATED, EXTENDED RELEASE ORAL EVERY 12 HOURS SCHEDULED
Qty: 6 TABLET | Refills: 0 | Status: SHIPPED | OUTPATIENT
Start: 2025-07-15 | End: 2025-07-15

## 2025-07-15 RX ORDER — OXYCODONE AND ACETAMINOPHEN 10; 325 MG/1; MG/1
1 TABLET ORAL EVERY 4 HOURS PRN
Qty: 12 TABLET | Refills: 0 | Status: SHIPPED | OUTPATIENT
Start: 2025-07-15 | End: 2025-07-18

## 2025-07-15 RX ADMIN — METHOCARBAMOL 750 MG: 750 TABLET ORAL at 16:32

## 2025-07-15 RX ADMIN — POLYETHYLENE GLYCOL 3350 17 G: 17 POWDER, FOR SOLUTION ORAL at 08:16

## 2025-07-15 RX ADMIN — Medication: at 19:45

## 2025-07-15 RX ADMIN — NALOXEGOL OXALATE 25 MG: 25 TABLET, FILM COATED ORAL at 05:52

## 2025-07-15 RX ADMIN — Medication: at 11:52

## 2025-07-15 RX ADMIN — METHOCARBAMOL 750 MG: 750 TABLET ORAL at 13:51

## 2025-07-15 RX ADMIN — Medication: at 05:52

## 2025-07-15 RX ADMIN — ACETAMINOPHEN 1000 MG: 500 TABLET ORAL at 12:03

## 2025-07-15 RX ADMIN — GABAPENTIN 100 MG: 100 CAPSULE ORAL at 05:52

## 2025-07-15 RX ADMIN — SODIUM CHLORIDE, PRESERVATIVE FREE 10 ML: 5 INJECTION INTRAVENOUS at 08:24

## 2025-07-15 RX ADMIN — MAGNESIUM SULFATE HEPTAHYDRATE 2000 MG: 40 INJECTION, SOLUTION INTRAVENOUS at 11:59

## 2025-07-15 RX ADMIN — ACETAMINOPHEN 1000 MG: 500 TABLET ORAL at 16:32

## 2025-07-15 RX ADMIN — SENNOSIDES, DOCUSATE SODIUM 2 TABLET: 50; 8.6 TABLET, FILM COATED ORAL at 08:15

## 2025-07-15 RX ADMIN — OXYCODONE AND ACETAMINOPHEN 1 TABLET: 325; 10 TABLET ORAL at 03:06

## 2025-07-15 RX ADMIN — GABAPENTIN 300 MG: 300 CAPSULE ORAL at 19:45

## 2025-07-15 RX ADMIN — OXYCODONE AND ACETAMINOPHEN 1 TABLET: 325; 10 TABLET ORAL at 22:18

## 2025-07-15 RX ADMIN — METHOCARBAMOL 750 MG: 750 TABLET ORAL at 08:15

## 2025-07-15 RX ADMIN — OXYCODONE HYDROCHLORIDE 10 MG: 10 TABLET, FILM COATED, EXTENDED RELEASE ORAL at 19:44

## 2025-07-15 RX ADMIN — METHOCARBAMOL 750 MG: 750 TABLET ORAL at 19:45

## 2025-07-15 RX ADMIN — APIXABAN 5 MG: 5 TABLET, FILM COATED ORAL at 08:15

## 2025-07-15 RX ADMIN — OXYCODONE AND ACETAMINOPHEN 1 TABLET: 325; 10 TABLET ORAL at 13:51

## 2025-07-15 RX ADMIN — SODIUM CHLORIDE, PRESERVATIVE FREE 10 ML: 5 INJECTION INTRAVENOUS at 19:45

## 2025-07-15 RX ADMIN — OXYCODONE HYDROCHLORIDE 10 MG: 10 TABLET, FILM COATED, EXTENDED RELEASE ORAL at 08:15

## 2025-07-15 RX ADMIN — ACETAMINOPHEN 1000 MG: 500 TABLET ORAL at 05:52

## 2025-07-15 RX ADMIN — RIVAROXABAN 20 MG: 20 TABLET, FILM COATED ORAL at 19:47

## 2025-07-15 ASSESSMENT — PAIN DESCRIPTION - ORIENTATION
ORIENTATION: RIGHT

## 2025-07-15 ASSESSMENT — PAIN SCALES - GENERAL
PAINLEVEL_OUTOF10: 4
PAINLEVEL_OUTOF10: 1
PAINLEVEL_OUTOF10: 5
PAINLEVEL_OUTOF10: 4
PAINLEVEL_OUTOF10: 6
PAINLEVEL_OUTOF10: 0

## 2025-07-15 ASSESSMENT — PAIN DESCRIPTION - DESCRIPTORS
DESCRIPTORS: DULL
DESCRIPTORS: DISCOMFORT;SPASM
DESCRIPTORS: DISCOMFORT;SHARP
DESCRIPTORS: SORE;SHARP
DESCRIPTORS: SHARP

## 2025-07-15 ASSESSMENT — PAIN - FUNCTIONAL ASSESSMENT
PAIN_FUNCTIONAL_ASSESSMENT: ACTIVITIES ARE NOT PREVENTED

## 2025-07-15 ASSESSMENT — PAIN DESCRIPTION - LOCATION
LOCATION: LEG

## 2025-07-15 ASSESSMENT — PAIN DESCRIPTION - PAIN TYPE: TYPE: SURGICAL PAIN

## 2025-07-15 NOTE — PLAN OF CARE
Problem: Cardiovascular - Adult  Goal: Absence of cardiac dysrhythmias or at baseline  Outcome: Progressing  Flowsheets (Taken 7/15/2025 1444)  Absence of cardiac dysrhythmias or at baseline:   Monitor cardiac rate and rhythm   Assess for signs of decreased cardiac output   Administer antiarrhythmia medication and electrolyte replacement as ordered     Problem: Respiratory - Adult  Goal: Achieves optimal ventilation and oxygenation  Outcome: Progressing  Flowsheets (Taken 7/15/2025 1444)  Achieves optimal ventilation and oxygenation:   Assess for changes in respiratory status   Assess for changes in mentation and behavior   Position to facilitate oxygenation and minimize respiratory effort   Oxygen supplementation based on oxygen saturation or arterial blood gases   Assess and instruct to report shortness of breath or any respiratory difficulty   Respiratory therapy support as indicated     Problem: Nutrition Deficit:  Goal: Optimize nutritional status  Outcome: Progressing  Flowsheets (Taken 7/15/2025 1444)  Nutrient intake appropriate for improving, restoring, or maintaining nutritional needs:   Assess nutritional status and recommend course of action   Monitor oral intake, labs, and treatment plans   Recommend appropriate diets, oral nutritional supplements, and vitamin/mineral supplements   Order, calculate, and assess calorie counts as needed     Problem: Skin/Tissue Integrity  Goal: Skin integrity remains intact  Description: 1.  Monitor for areas of redness and/or skin breakdown  2.  Assess vascular access sites hourly  3.  Every 4-6 hours minimum:  Change oxygen saturation probe site  4.  Every 4-6 hours:  If on nasal continuous positive airway pressure, respiratory therapy assess nares and determine need for appliance change or resting period  7/15/2025 1444 by Rivka Alcantara, RN  Outcome: Progressing  Flowsheets (Taken 7/15/2025 1444)  Skin Integrity Remains Intact:   Monitor for areas of redness and/or

## 2025-07-16 VITALS
HEART RATE: 72 BPM | BODY MASS INDEX: 19.6 KG/M2 | SYSTOLIC BLOOD PRESSURE: 110 MMHG | WEIGHT: 166 LBS | HEIGHT: 77 IN | TEMPERATURE: 98.2 F | DIASTOLIC BLOOD PRESSURE: 79 MMHG | RESPIRATION RATE: 16 BRPM | OXYGEN SATURATION: 100 %

## 2025-07-16 LAB
ANION GAP SERPL CALCULATED.3IONS-SCNC: 18 MMOL/L (ref 3–16)
BUN SERPL-MCNC: 23 MG/DL (ref 7–20)
CALCIUM SERPL-MCNC: 10.2 MG/DL (ref 8.3–10.6)
CHLORIDE SERPL-SCNC: 100 MMOL/L (ref 99–110)
CO2 SERPL-SCNC: 17 MMOL/L (ref 21–32)
CREAT SERPL-MCNC: 0.7 MG/DL (ref 0.8–1.3)
DEPRECATED RDW RBC AUTO: 13.6 % (ref 12.4–15.4)
GFR SERPLBLD CREATININE-BSD FMLA CKD-EPI: >90 ML/MIN/{1.73_M2}
GLUCOSE SERPL-MCNC: 80 MG/DL (ref 70–99)
HCT VFR BLD AUTO: 23.8 % (ref 40.5–52.5)
HGB BLD-MCNC: 8 G/DL (ref 13.5–17.5)
IRON SATN MFR SERPL: 14 % (ref 20–50)
IRON SERPL-MCNC: 32 UG/DL (ref 59–158)
MAGNESIUM SERPL-MCNC: 1.89 MG/DL (ref 1.8–2.4)
MCH RBC QN AUTO: 28.3 PG (ref 26–34)
MCHC RBC AUTO-ENTMCNC: 33.5 G/DL (ref 31–36)
MCV RBC AUTO: 84.5 FL (ref 80–100)
PHOSPHATE SERPL-MCNC: 4.1 MG/DL (ref 2.5–4.9)
PLATELET # BLD AUTO: 607 K/UL (ref 135–450)
PMV BLD AUTO: 6.3 FL (ref 5–10.5)
POTASSIUM SERPL-SCNC: 4.5 MMOL/L (ref 3.5–5.1)
RBC # BLD AUTO: 2.81 M/UL (ref 4.2–5.9)
SODIUM SERPL-SCNC: 135 MMOL/L (ref 136–145)
TIBC SERPL-MCNC: 234 UG/DL (ref 260–445)
WBC # BLD AUTO: 6.3 K/UL (ref 4–11)

## 2025-07-16 PROCEDURE — 6370000000 HC RX 637 (ALT 250 FOR IP)

## 2025-07-16 PROCEDURE — 6370000000 HC RX 637 (ALT 250 FOR IP): Performed by: INTERNAL MEDICINE

## 2025-07-16 PROCEDURE — 36415 COLL VENOUS BLD VENIPUNCTURE: CPT

## 2025-07-16 PROCEDURE — 6360000002 HC RX W HCPCS

## 2025-07-16 PROCEDURE — 6370000000 HC RX 637 (ALT 250 FOR IP): Performed by: STUDENT IN AN ORGANIZED HEALTH CARE EDUCATION/TRAINING PROGRAM

## 2025-07-16 PROCEDURE — 6370000000 HC RX 637 (ALT 250 FOR IP): Performed by: FAMILY MEDICINE

## 2025-07-16 PROCEDURE — 80048 BASIC METABOLIC PNL TOTAL CA: CPT

## 2025-07-16 PROCEDURE — 85027 COMPLETE CBC AUTOMATED: CPT

## 2025-07-16 PROCEDURE — 84100 ASSAY OF PHOSPHORUS: CPT

## 2025-07-16 PROCEDURE — 83735 ASSAY OF MAGNESIUM: CPT

## 2025-07-16 PROCEDURE — 2500000003 HC RX 250 WO HCPCS

## 2025-07-16 RX ORDER — FERROUS SULFATE 325(65) MG
325 TABLET ORAL 2 TIMES DAILY WITH MEALS
Status: DISCONTINUED | OUTPATIENT
Start: 2025-07-16 | End: 2025-07-16 | Stop reason: HOSPADM

## 2025-07-16 RX ORDER — MAGNESIUM SULFATE 1 G/100ML
1000 INJECTION INTRAVENOUS ONCE
Status: COMPLETED | OUTPATIENT
Start: 2025-07-16 | End: 2025-07-16

## 2025-07-16 RX ORDER — FERROUS SULFATE 325(65) MG
325 TABLET, DELAYED RELEASE (ENTERIC COATED) ORAL 2 TIMES DAILY WITH MEALS
Qty: 90 TABLET | Refills: 3 | Status: SHIPPED | OUTPATIENT
Start: 2025-07-16

## 2025-07-16 RX ADMIN — NALOXEGOL OXALATE 25 MG: 25 TABLET, FILM COATED ORAL at 05:41

## 2025-07-16 RX ADMIN — Medication: at 05:41

## 2025-07-16 RX ADMIN — OXYCODONE AND ACETAMINOPHEN 1 TABLET: 325; 10 TABLET ORAL at 03:25

## 2025-07-16 RX ADMIN — METHOCARBAMOL 750 MG: 750 TABLET ORAL at 08:37

## 2025-07-16 RX ADMIN — COLLAGENASE SANTYL: 250 OINTMENT TOPICAL at 09:38

## 2025-07-16 RX ADMIN — GABAPENTIN 100 MG: 100 CAPSULE ORAL at 05:41

## 2025-07-16 RX ADMIN — SODIUM CHLORIDE, PRESERVATIVE FREE 10 ML: 5 INJECTION INTRAVENOUS at 08:39

## 2025-07-16 RX ADMIN — SENNOSIDES, DOCUSATE SODIUM 2 TABLET: 50; 8.6 TABLET, FILM COATED ORAL at 08:37

## 2025-07-16 RX ADMIN — FERROUS SULFATE TAB 325 MG (65 MG ELEMENTAL FE) 325 MG: 325 (65 FE) TAB at 09:35

## 2025-07-16 RX ADMIN — ACETAMINOPHEN 1000 MG: 500 TABLET ORAL at 11:58

## 2025-07-16 RX ADMIN — Medication: at 11:59

## 2025-07-16 RX ADMIN — OXYCODONE HYDROCHLORIDE 10 MG: 10 TABLET, FILM COATED, EXTENDED RELEASE ORAL at 08:37

## 2025-07-16 RX ADMIN — MAGNESIUM SULFATE HEPTAHYDRATE 1000 MG: 1 INJECTION, SOLUTION INTRAVENOUS at 12:02

## 2025-07-16 RX ADMIN — METHOCARBAMOL 750 MG: 750 TABLET ORAL at 11:58

## 2025-07-16 ASSESSMENT — PAIN DESCRIPTION - LOCATION
LOCATION: LEG

## 2025-07-16 ASSESSMENT — PAIN DESCRIPTION - ORIENTATION
ORIENTATION: LEFT
ORIENTATION: LEFT
ORIENTATION: RIGHT

## 2025-07-16 ASSESSMENT — PAIN SCALES - GENERAL
PAINLEVEL_OUTOF10: 4
PAINLEVEL_OUTOF10: 1
PAINLEVEL_OUTOF10: 5

## 2025-07-16 ASSESSMENT — PAIN DESCRIPTION - DESCRIPTORS
DESCRIPTORS: DISCOMFORT;ACHING
DESCRIPTORS: SHARP
DESCRIPTORS: SORE

## 2025-07-16 ASSESSMENT — PAIN - FUNCTIONAL ASSESSMENT: PAIN_FUNCTIONAL_ASSESSMENT: ACTIVITIES ARE NOT PREVENTED

## 2025-07-16 NOTE — PLAN OF CARE
Problem: Discharge Planning  Goal: Discharge to home or other facility with appropriate resources  7/15/2025 2115 by Almita Hernandez RN  Outcome: Progressing     Problem: Safety - Adult  Goal: Free from fall injury  7/15/2025 2115 by Almita Hernandez RN  Outcome: Progressing     Problem: Pain  Goal: Verbalizes/displays adequate comfort level or baseline comfort level  7/15/2025 2115 by Almita Hernandez RN  Outcome: Progressing     Problem: Nutrition Deficit:  Goal: Optimize nutritional status  7/15/2025 2115 by Almita Hernandez RN  Outcome: Progressing

## 2025-07-16 NOTE — DISCHARGE SUMMARY
Patient discharge education complete. IV removed and dressing placed. Patient verbalized understanding of medications and side effects at time of discharge. All questions answered at this time. Patient discharged with private EMS. Patient and personal belongings including wallet and keys were discharged with patient. Patient is being discharged to [facility] at this time. Report was provided to TERESO Lamas      Electronically signed by Rivka Alcantara RN on 7/16/2025 at 5:54 PM

## 2025-07-16 NOTE — CARE COORDINATION
9:09 AM  Chart review:   Pt is from [facility] with a plan to transition to LTC.   Pt has been rec'd ARU and accepted to both [facility] and [facility]. Pt has been refusing to pick a place for precert until DC order is in.   Per IDR, DC order to be placed today. BERENICE will follow up.     10:43 AM  BERENICE observed DC order. BERENICE met with pt at bedside. Pt stated he would like to go to [facility].     BERENICE called and LVM for [facility] informing of pt decision and asking to start precert. Awaiting call back.    10:53 AM  BERENICE received call from [facility] that they will start precert today and he expects it to come back within 48 hrs.    
[facility] - Acute Rehab Unit   After review, this patient is felt to be:       [x]  Appropriate for Acute Inpatient Rehab    []  Appropriate for Acute Inpatient Rehab Pending Insurance Authorization    []  Not appropriate for Acute Inpatient Rehab    []  Referral received and ARU reviewing patient; Evaluation ongoing.      Patient is appropriate for ARU, Don't anticipate a bed until Sunday at this time. Patient would require precert if chosen.     Thank you for the referral,    Lindy Urbina  Clinical Liaison  [facility]  P:[phone].   
[facility] - Acute Rehab Unit     We can accept patient -anticipate a bed to be available Sunday. Patient requires precert for Acute rehab if choice by patient.     Patient would need to be transitioned off Heparin Gtt and transitioned to PO pain management (off pain IV meds x 24 hrs).      Thank you for the referral.   Lindy Urbina  Clinical Liaison  [facility]  P: [phone]  
Assessed by Dr. Lovell today and family and patient considering which ARU to go to. CM will continue to follow patient until discharge.  Electronically signed by Nancy Delgado RN on 7/10/2025 at 6:53 PM    
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 Christiano MD.      =  Podiatry following :  Patient concerned about healing capabilities of the left foot.   - wound X: Laser doppler. -  will continue to follow patient while in house with local wound care of the left lower extremity.     Patient will need post OP  PTOT :when appropriate:   -  Cont on Hep gtt.     CM cont to follow :  Patient from  Skilled Nursing facility and can return once medically stable / will need  Pre cert.     [facility]          Services Available   Skilled Nursing      Address   [address] [place] [address]             Contact Information    [phone]         Elaine  / Admissions liaison @ facility [phone],following.       Electronically signed by Janee Wright RN on 7/8/2025 at 12:43 PM     Case Management Assessment  Initial Evaluation    Date/Time of Evaluation: 7/8/2025 12:57 PM  Assessment Completed by: Janee Wright RN    If patient is discharged prior to next notation, then this note serves as note for discharge by case management.    Patient Name: Natan Lynch                   YOB: 1960  Diagnosis: Wound dehiscence [T81.30XA]  Dehiscence of operative wound, initial encounter [T81.31XA]                   Date / Time: 7/3/2025  3:13 PM    Patient Admission Status: Inpatient   Readmission Risk (Low < 19, Mod (19-27), High > 27): Readmission Risk Score: 23.6    Current PCP: Brandon Avina MD  PCP verified by CM? Yes    Chart Reviewed: Yes      History Provided by: Patient  Patient Orientation: Alert and Oriented    Patient Cognition: Alert    Hospitalization in 
CM  following for  d/c planning:    Patient PRINCESS :    Per Chart review:    Chief Complaint      Post-op Problem (Pt to ED from [facility] d/t stitches splitting open and concern for infection in R foot after partial amputation on 6/20/25)     [facility]    Services Available   Skilled Nursing      Address   [address]  [facility] [address]             Contact Information    [phone]        CM tee dTaylor  [phone], to confirm  Level of  care , Skilled VS  LTC: and if  Pre cert is needed to return or not  .He was Skilled and the plan was to transition to  LTC .  Will need Cert to return ,         Patient is scheduled for BKA versus AKA today with Dr. Christiano MD.  Today     CM  will follow up later  time permitting     Patient will need  Post Op PTOT  and cert to return to Rehab .      Electronically signed by Janee Wright RN on 7/7/2025 at 3:42 PM       Janee Wright RN Case Manager  [facility] [address]  [facility] [address] [phone]  Fax [phone]   
CM  following for  d/c planning:    Patient from [facility] and requesting  different  SNF at  d/c  CM  met with both patient and his daughter,   who is  assisting in finding new  facility :    PTOT worked with patient post op and feel he would be a good ARU candidate:  CM  provided  ARU list for  review  and  will make requested  referrals  .   Proactive referrals  faxed to confirm   in network .    1.) [facility] can accept per Santos    2.) [facility] can accept per Imelda [phone]  3.) [facility] reviewing: Lindy [phone].No bed avail til Sun 7//13  4.)  [facility]: Johnny :  No beds currently available [phone]  5.) [facility]: can accept :  Damaris [phone].  6.)  [facility]:  Tobias pelayo [phone]:      Electronically signed by Janee Wright RN on 7/9/2025 at 2:16 PM       Janee Wright RN Case Manager  [facility]  [address]  [facility] [address]  [phone]  Fax [phone]   
CM to call sister of patient when he has a discharge order in. Electronically signed by Nancy Delgado RN on 7/11/2025 at 1:47 PM     
Given list to patient again for rehab facilities:These are the ones marked that take his insurance.   1.) [facility] can accept per Santos    2.) [facility] can accept per Imelda [phone]  3.) [facility] reviewing: Lindy [phone].No bed avail til Sun 7//13  4.)  [facility]: Johnny :  No beds currently available [phone]  5.) [facility]: can accept :  Damaris [phone].  6.)  [facility]: Tobias pelayo [phone]  Electronically signed by Nancy Delgado RN on 7/11/2025 at 1:45 PM    
SW has spoken with patient at bedside multiple times today in regards to discharge coordination.     Patient needs to make a decision between two accepting ARU's ([facility] vs. [facility]) so insurance precert can be ran and patient can get approved.     Patient has known about about need to make ARU decision since 7/11 and has yet to make decision.     Patient told SW at bedside that he would not make decision until a discharge order was placed and until he spoke with Vivien MCQUEEN.     Dr.Chandra Santo covering for patient/NP today.     's plan is to discharge patient from hospital tomorrow.     SW met with patient and daughter (PCA for [employer]) at bedside to discuss discharge updates. SW noted that patient's medical team planned on discharging him tomorrow and noted that Vivien MCQUEEN would not be available due to sickness.     Patient went on to say that SW is saying something different than what internal medicine vs. Podiatry vs. Vascular is saying for discharge.     Per notes, Internal Med, Vascular, and Podiatry all feel patient is stable to discharge.     Patient asked SW to leave room.     SW called [facility] (Patient recently discharged) to discuss discharge coordination and spoke with Elaine in admissions. Elaine noted that patient could be considered for SNF again but added that patient was refusing many services and dressing changes at SNF prior to hospitalization.     [place] following.     Electronically signed by SINGH Jaquez on 7/14/2025 at 2:14 PM   
OT AM-PAC Score: 14 /24    DISCHARGE Disposition: Inpatient Rehab: [facility]  Phone: [phone]  Fax: [phone]    LOC at discharge: Not Applicable  NOHEMI Completed: Yes    Notification completed in HENS/PAS?:  Not Applicable    IMM Completed:   Not Indicated due to: Medicaid         Transportation:  Transportation PLAN for discharge: EMS transportation   Mode of Transport: Ambulance stretcher - BLS  Reason for medical transport: Bed confined: Meets the following criteria 1) unable to get out of bed without assistance or ambulate, 2) unable to safely sit up in a wheelchair, 3) unable to maintain erect seating position in a chair for time needed for transport  Name of Transport Company: [employer]  Phone: [phone]  Time of Transport: 4:30 pm    Transport form completed: Yes    Referrals made at DISCHARGE for outpatient continued care:  Not Applicable    Additional CM Notes: SWK observed DC order and met with pt at bedside. Pt's precert was approved. Pt will be DC-ing to [facility] @ 4:30 pm via [facility]. Pt in agreement and stated that he will tell his needed fam members about DC.   No further CM needs.     BERENICE called and spoke with Ivan @ [facility] and informed of pt DC time. Ivan in agreement.     EBRENICE informed RN of pt DC time and location. RN in agreement.   Nurse Report: [phone]      COVID Result:  No results found for: \"COVID19\"    The Plan for Transition of Care is related to the following treatment goals of Wound dehiscence [T81.30XA]  Dehiscence of operative wound, initial encounter [T81.31XA]    The Patient and/or patient representative Natan and his family were provided with a choice of provider and agrees with the discharge plan Yes    Freedom of choice list was provided with basic dialogue that supports the patient's individualized plan of care/goals and shares the quality data associated with the providers. Yes    Care Transitions

## 2025-07-16 NOTE — PROGRESS NOTES
[facility] Medicine Progress Note  V 5.17      Date of Admission: 7/3/2025    Hospital Day: 2      Chief Admission Complaint: Wound dehiscence    Subjective: Bilateral lower extremity wound positive pain improved with pain medication dressing just done by podiatry    Presenting Admission History:       Natan Lynch is a 64 y.o. male with a pmh of pulmonary embolism/DVT, tobacco use disorder, peripheral vascular disease with gangrene of both lower extremities, CAD, tobacco use disorder who presents with Wound dehiscence       Assessment/Plan:      #Surgical wound dehiscence-right foot  - Elevated inflammatory markers (CRP = 115, ESR = 61)     #Leukocytosis (per pharmacy note patient was started on prednisone 40 mg on 7/2/2025 for 5 days.  Received the first dose on 7/2)  Leukocytosis 20  Started on vancomycin and cefepime; pharmacy to consult for dosing; as needed pain medication; podiatry consult  To continue IV antibiotics per podiatry recommendation wound culture sent to micro  Nonweightbearing right lower EXTR    #Peripheral vascular disease -Recent bilateral femoral anterior tibial bypasses by vascular surgery on 4/14/2025  Arterial duplex bilateral lower extremity  Vascular surgery consult  Initially started on Xarelto; this is held by vascular surgery; no acute surgical intervention at this time;      #Tobacco use disorder  - Patient states he quit smoking in April 2025     #Chronic anticoagulation for prior DVT/PE (Xarelto)  - Last dose today morning; currently on hold  Lovenox for prophylaxis     #Chronic medical conditions as mentioned in PMH    Ongoing threat to life and/or bodily function without ongoing treatment due to:      Consults:      IP CONSULT TO PODIATRY  IP CONSULT TO VASCULAR SURGERY        --------------------------------------------------      Medications:        Infusion Medications    sodium chloride 10 mL/hr at 07/03/25 6833     Scheduled Medications    collagenase   Topical Daily 
      Hospital Medicine Progress Note  V 5.17      Date of Admission: 7/3/2025    Hospital Day: 3      Chief Admission Complaint: Wound dehiscence;     Subjective: Bilateral lower extremity wound   Vascular surgery recommended heparin GTT for anticoagulation; heparin drip started; Xarelto on hold.  Complaining of severe pain in right lower extremity     Presenting Admission History:       Natan Lynch is a 64 y.o. male with a pmh of pulmonary embolism/DVT, tobacco use disorder, peripheral vascular disease with gangrene of both lower extremities, CAD, tobacco use disorder who presents with Wound dehiscence       Assessment/Plan:      #Surgical wound dehiscence-right foot  - Elevated inflammatory markers (CRP = 115, ESR = 61)     #Leukocytosis (per pharmacy note patient was started on prednisone 40 mg on 7/2/2025 for 5 days.  Received the first dose on 7/2)  Leukocytosis 20  Started on vancomycin and cefepime; pharmacy to consult for dosing; as needed pain medication; podiatry consult  To continue IV antibiotics per podiatry recommendation wound culture sent to micro  Nonweightbearing right lower EXTR    #Peripheral vascular disease -Recent bilateral femoral anterior tibial bypasses by vascular surgery on 4/14/2025  Arterial duplex bilateral lower extremity  Vascular surgery consult.  Recommending heparin gtt. as anticoagulation p.o. has been on hold  Initially started on Xarelto; this is held by vascular surgery; no acute surgical intervention at this time;      #Tobacco use disorder  - Patient states he quit smoking in April 2025     #Chronic anticoagulation for prior DVT/PE (Xarelto)  - Last dose today morning; currently on hold  Lovenox for prophylaxis DC'd and placed on heparin drip     #Chronic medical conditions as mentioned in PMH    Ongoing threat to life and/or bodily function without ongoing treatment due to:      Consults:      IP CONSULT TO PODIATRY  IP CONSULT TO VASCULAR SURGERY  
      Hospital Medicine Progress Note  V 5.17      Date of Admission: 7/3/2025    Hospital Day: 4      Chief Admission Complaint: Wound dehiscence;     Subjective: Bilateral lower extremity wound     Interval history vascular surgery recommended heparin GTT for anticoagulation; heparin drip started; Xarelto on hold.  Complaining of severe pain in right lower extremity; he will be going to the OR tomorrow for amputation.  OR time pending  Remains on heparin gtt.  wound cultures came positive for MRSA,, Klebsiella ESBL.  Switched from cefepime to vancomycin and meropenem ID:    Presenting Admission History:       Natan Lynch is a 64 y.o. male with a pmh of pulmonary embolism/DVT, tobacco use disorder, peripheral vascular disease with gangrene of both lower extremities, CAD, tobacco use disorder who presents with Wound dehiscence       Assessment/Plan:      #Surgical wound dehiscence-right foot  - Elevated inflammatory markers (CRP = 115, ESR = 61)     #Leukocytosis (per pharmacy note patient was started on prednisone 40 mg on 7/2/2025 for 5 days.  Received the first dose on 7/2)  Leukocytosis 20  Start meropenem and vancomycin discontinue cefepime; pharmacy to consult for dosing; as needed pain medication; podiatry consult  To continue IV antibiotics per podiatry recommendation wound culture sent to micro  Nonweightbearing right lower EXTR    #Peripheral vascular disease -Recent bilateral femoral anterior tibial bypasses by vascular surgery on 4/14/2025  Arterial duplex bilateral lower extremity  Vascular surgery consult.  Recommending heparin gtt. as anticoagulation p.o. has been on hold  Initially started on Xarelto; this is held by vascular surgery; no acute surgical intervention at this time;      #Tobacco use disorder  - Patient states he quit smoking in April 2025     #Chronic anticoagulation for prior DVT/PE (Xarelto)  - Last dose today morning; currently on hold  Lovenox for prophylaxis DC'd and placed on 
      Hospitalist Progress Note      Name:  Natan Lynch /Age/Sex: 1960  (64 y.o. male)   MRN & CSN:  [id number] & [id number] Encounter Date/Time: 2025 9:48 AM EDT    Location:  3313/3313-01 PCP: Brandon Avina MD       Hospital Day: 11         Date of Admission: 7/3/2025    Chief Complaint: Wound dehiscence     Hospital Course: Natan Lynch is a 64 y.o. male with a pmh of pulmonary embolism/DVT, CAD, tobacco use disorder, peripheral vascular disease with gangrene of both lower extremities, CAD, tobacco use disorder who presents with wound dehiscence, postoperatively.   Patient recently underwent right foot Lisfranc amputation on  and left popliteal to anterior tibial bypass with reversed GSV and right popliteal to dorsalis pedis bypass with reversed GSV on .  He states he was doing well and was discharged to rehab following surgery.  Patient states the dressings were not changed as planned at the nursing facility.  When he came to the ER for dressing change on 7/3, it was noted that his incision was opened with concerns of infection.  Upon presentation, WBC was 20, Elevated inflammatory markers (CRP = 115, ESR = 61)  and he was afebrile.  He was empirically started on vancomycin and cefepime which was later changed to meropenem.  Exam showed ischemic changes on the medial aspect of the plantar flap of the Lisfranc site up to the dorsal aspect of the forefoot. A superficial wound swab of the area is now showing growth of corynebacterium, Klebsiella ESBL, and MRSA.  Blood cultures x 2 have been no growth to date.  Wound culture noted: corynebacterium Klebsiella, MRSA. Per laser Doppler patient exhibits likely wound healing to the left lower extremity. A x-ray of the right foot obtained on presentation is showing increased lucency and bone erosion of multiple bones of the midfoot and anterior calcaneus consistent with osteomyelitis.  Podiatry and vascular surgery are following. He 
      Hospitalist Progress Note      Name:  Natan Lynch /Age/Sex: 1960  (64 y.o. male)   MRN & CSN:  [id number] & [id number] Encounter Date/Time: 2025 10:15 AM EDT    Location:  6304/6304-01 PCP: Brandon Avina MD       Hospital Day: 6         Date of Admission: 7/3/2025    Chief Complaint: Wound dehiscence     Hospital Course: Natan Lynch is a 64 y.o. male with a pmh of pulmonary embolism/DVT, CAD, tobacco use disorder, peripheral vascular disease with gangrene of both lower extremities, CAD, tobacco use disorder who presents with wound dehiscence, postoperatively.   Patient recently underwent right foot Lisfranc amputation on  and left popliteal to anterior tibial bypass with reversed GSV and right popliteal to dorsalis pedis bypass with reversed GSV on .  He states he was doing well and was discharged to rehab following surgery.  Patient states the dressings were not changed as planned at the nursing facility.  When he came to the ER for dressing change on 7/3, it was noted that his incision was opened with concerns of infection.  Upon presentation, WBC was 20, Elevated inflammatory markers (CRP = 115, ESR = 61)  and he was afebrile.  He was empirically started on vancomycin and cefepime which was later changed to meropenem.  Exam showed ischemic changes on the medial aspect of the plantar flap of the Lisfranc site up to the dorsal aspect of the forefoot. A superficial wound swab of the area is now showing growth of corynebacterium, Klebsiella ESBL, and MRSA.  Blood cultures x 2 have been no growth to date.  A x-ray of the right foot obtained on presentation is showing increased lucency and bone erosion of multiple bones of the midfoot and anterior calcaneus consistent with osteomyelitis.  Podiatry and vascular surgery are following. He underwent a RIGHT BKA with IPOP on . ID following for osteomyelitis and he continues on IV Meropenem and vanc. Vascular surgery recommended 
      Hospitalist Progress Note      Name:  Natan Lynch /Age/Sex: 1960  (64 y.o. male)   MRN & CSN:  [id number] & [id number] Encounter Date/Time: 7/10/2025 9:46 AM EDT    Location:  3313/3313-01 PCP: Brandon Avina MD       Hospital Day: 8         Date of Admission: 7/3/2025    Chief Complaint: Wound dehiscence     Hospital Course: Natan Lynch is a 64 y.o. male with a pmh of pulmonary embolism/DVT, CAD, tobacco use disorder, peripheral vascular disease with gangrene of both lower extremities, CAD, tobacco use disorder who presents with wound dehiscence, postoperatively.   Patient recently underwent right foot Lisfranc amputation on  and left popliteal to anterior tibial bypass with reversed GSV and right popliteal to dorsalis pedis bypass with reversed GSV on .  He states he was doing well and was discharged to rehab following surgery.  Patient states the dressings were not changed as planned at the nursing facility.  When he came to the ER for dressing change on 7/3, it was noted that his incision was opened with concerns of infection.  Upon presentation, WBC was 20, Elevated inflammatory markers (CRP = 115, ESR = 61)  and he was afebrile.  He was empirically started on vancomycin and cefepime which was later changed to meropenem.  Exam showed ischemic changes on the medial aspect of the plantar flap of the Lisfranc site up to the dorsal aspect of the forefoot. A superficial wound swab of the area is now showing growth of corynebacterium, Klebsiella ESBL, and MRSA.  Blood cultures x 2 have been no growth to date.  Wound culture noted: corynebacterium Klebsiella, MRSA. Per laser Doppler patient exhibits likely wound healing to the left lower extremity. A x-ray of the right foot obtained on presentation is showing increased lucency and bone erosion of multiple bones of the midfoot and anterior calcaneus consistent with osteomyelitis.  Podiatry and vascular surgery are following. He 
      Hospitalist Progress Note      Name:  Natan Lynch /Age/Sex: 1960  (64 y.o. male)   MRN & CSN:  [id number] & [id number] Encounter Date/Time: 2025 10:57 AM EDT    Location:  6304/6304-01 PCP: Brandon Avina MD       Hospital Day: 7         Date of Admission: 7/3/2025    Chief Complaint: Wound dehiscence     Hospital Course: Natan Lynch is a 64 y.o. male with a pmh of pulmonary embolism/DVT, CAD, tobacco use disorder, peripheral vascular disease with gangrene of both lower extremities, CAD, tobacco use disorder who presents with wound dehiscence, postoperatively.   Patient recently underwent right foot Lisfranc amputation on  and left popliteal to anterior tibial bypass with reversed GSV and right popliteal to dorsalis pedis bypass with reversed GSV on .  He states he was doing well and was discharged to rehab following surgery.  Patient states the dressings were not changed as planned at the nursing facility.  When he came to the ER for dressing change on 7/3, it was noted that his incision was opened with concerns of infection.  Upon presentation, WBC was 20, Elevated inflammatory markers (CRP = 115, ESR = 61)  and he was afebrile.  He was empirically started on vancomycin and cefepime which was later changed to meropenem.  Exam showed ischemic changes on the medial aspect of the plantar flap of the Lisfranc site up to the dorsal aspect of the forefoot. A superficial wound swab of the area is now showing growth of corynebacterium, Klebsiella ESBL, and MRSA.  Blood cultures x 2 have been no growth to date.  Per laser Doppler patient exhibits likely wound healing to the left lower extremity. A x-ray of the right foot obtained on presentation is showing increased lucency and bone erosion of multiple bones of the midfoot and anterior calcaneus consistent with osteomyelitis.  Podiatry and vascular surgery are following. He underwent a RIGHT BKA with IPOP on . ID following for 
      Hospitalist Progress Note      Name:  Natan Lynch /Age/Sex: 1960  (64 y.o. male)   MRN & CSN:  [id number] & [id number] Encounter Date/Time: 2025 8:36 AM EDT    Location:  3313/3313-01 PCP: Brandon Avina MD       Hospital Day: 9         Date of Admission: 7/3/2025    Chief Complaint: Wound dehiscence     Hospital Course: Natan Lynch is a 64 y.o. male with a pmh of pulmonary embolism/DVT, CAD, tobacco use disorder, peripheral vascular disease with gangrene of both lower extremities, CAD, tobacco use disorder who presents with wound dehiscence, postoperatively.   Patient recently underwent right foot Lisfranc amputation on  and left popliteal to anterior tibial bypass with reversed GSV and right popliteal to dorsalis pedis bypass with reversed GSV on .  He states he was doing well and was discharged to rehab following surgery.  Patient states the dressings were not changed as planned at the nursing facility.  When he came to the ER for dressing change on 7/3, it was noted that his incision was opened with concerns of infection.  Upon presentation, WBC was 20, Elevated inflammatory markers (CRP = 115, ESR = 61)  and he was afebrile.  He was empirically started on vancomycin and cefepime which was later changed to meropenem.  Exam showed ischemic changes on the medial aspect of the plantar flap of the Lisfranc site up to the dorsal aspect of the forefoot. A superficial wound swab of the area is now showing growth of corynebacterium, Klebsiella ESBL, and MRSA.  Blood cultures x 2 have been no growth to date.  Wound culture noted: corynebacterium Klebsiella, MRSA. Per laser Doppler patient exhibits likely wound healing to the left lower extremity. A x-ray of the right foot obtained on presentation is showing increased lucency and bone erosion of multiple bones of the midfoot and anterior calcaneus consistent with osteomyelitis.  Podiatry and vascular surgery are following. He 
      Hospitalist Progress Note      Name:  Natan Lynch /Age/Sex: 1960  (64 y.o. male)   MRN & CSN:  [id number] & [id number] Encounter Date/Time: 2025 10:42 AM EDT    Location:  3313/3313-01 PCP: Brandon Avina MD       Hospital Day: 10         Date of Admission: 7/3/2025    Chief Complaint: Wound dehiscence     Hospital Course: Natan Lynch is a 64 y.o. male with a pmh of pulmonary embolism/DVT, CAD, tobacco use disorder, peripheral vascular disease with gangrene of both lower extremities, CAD, tobacco use disorder who presents with wound dehiscence, postoperatively.   Patient recently underwent right foot Lisfranc amputation on  and left popliteal to anterior tibial bypass with reversed GSV and right popliteal to dorsalis pedis bypass with reversed GSV on .  He states he was doing well and was discharged to rehab following surgery.  Patient states the dressings were not changed as planned at the nursing facility.  When he came to the ER for dressing change on 7/3, it was noted that his incision was opened with concerns of infection.  Upon presentation, WBC was 20, Elevated inflammatory markers (CRP = 115, ESR = 61)  and he was afebrile.  He was empirically started on vancomycin and cefepime which was later changed to meropenem.  Exam showed ischemic changes on the medial aspect of the plantar flap of the Lisfranc site up to the dorsal aspect of the forefoot. A superficial wound swab of the area is now showing growth of corynebacterium, Klebsiella ESBL, and MRSA.  Blood cultures x 2 have been no growth to date.  Wound culture noted: corynebacterium Klebsiella, MRSA. Per laser Doppler patient exhibits likely wound healing to the left lower extremity. A x-ray of the right foot obtained on presentation is showing increased lucency and bone erosion of multiple bones of the midfoot and anterior calcaneus consistent with osteomyelitis.  Podiatry and vascular surgery are following. He 
      [facility] Medicine Progress Note  V 5.17      Date of Admission: 7/3/2025    Hospital Day: 5      Chief Admission Complaint: Wound dehiscence;     Subjective: Bilateral lower extremity wound.  Underwent laser perfusion study.  Had a dressing done after secondary to bleeding now is having significant pain    Interval history vascular surgery recommended heparin GTT for anticoagulation; heparin drip started; Xarelto on hold.  Complaining of severe pain in right lower extremity; he will be going to the OR tomorrow for amputation.  OR time pending  Remains on heparin gtt.  wound cultures came positive for MRSA,, Klebsiella ESBL.  Switched from cefepime to vancomycin and meropenem ID:    Presenting Admission History:       Natan Lynch is a 64 y.o. male with a pmh of pulmonary embolism/DVT, tobacco use disorder, peripheral vascular disease with gangrene of both lower extremities, CAD, tobacco use disorder who presents with Wound dehiscence       Assessment/Plan:      #Surgical wound dehiscence-right foot  - Elevated inflammatory markers (CRP = 115, ESR = 61)     #Leukocytosis-on prednisone  Leukocytosis 20  Start meropenem and vancomycin discontinue cefepime; pharmacy to consult for dosing; as needed pain medication; podiatry consult  To continue IV antibiotics per podiatry recommendation wound culture sent to micro  Nonweightbearing right lower EXTR    #Peripheral vascular disease -Recent bilateral femoral anterior tibial bypasses by vascular surgery on 4/14/2025  Arterial duplex bilateral lower extremity  Vascular surgery consult.  Recommending heparin gtt. as anticoagulation p.o. has been on hold  Initially started on Xarelto; this is held by vascular surgery;     Patient underwent laser study perfusion.  Going to the OR for BKA later     #Tobacco use disorder  - Patient states he quit smoking in April 2025     #Chronic anticoagulation for prior DVT/PE (Xarelto)  - Last dose today morning; currently on 
    Pharmacist Review and Automatic Dose Adjustment of Prophylactic Enoxaparin         The reviewing pharmacist has made an adjustment to the ordered enoxaparin dose or converted to UFH per the approved [facility] protocol and table as identified below.        Natan Lynch is a 64 y.o. male.     Recent Labs     07/03/25  1528 07/04/25  0609   CREATININE 0.7* 0.6*       Estimated Creatinine Clearance: 128 mL/min (A) (based on SCr of 0.6 mg/dL (L)).    Recent Labs     07/03/25  1528 07/04/25  0609   HGB 8.3* 7.8*   HCT 24.7* 23.0*   * 695*     No results for input(s): \"INR\" in the last 72 hours.    Height:   Ht Readings from Last 1 Encounters:   07/03/25 1.956 m (6' 5\")     Weight:  Wt Readings from Last 1 Encounters:   07/03/25 70.7 kg (155 lb 12.8 oz)               Plan: Based upon the patient's weight and renal function    Ordered: Enoxaparin 40mg SUBQ BID    Changed/converted to    New Order: Enoxaparin 40mg SUBQ Daily      Thank you,  Rosibel Sheth [facility]  7/4/2025, 11:24 AM    
    V2.0    [facility] Progress Note      Name:  Natan Lynch /Age/Sex: 1960  (64 y.o. male)   MRN & CSN:  [id number] & [id number] Encounter Date/Time: 7/15/2025 8:28 AM EDT   Location:  5319/5319-01 PCP: Brandon Avina MD     Attending:Hansa Nunez MD       Hospital Day: 13    HPI:    Assessment and Recommendations     Hospital course:    Natan Lynch is a 64 y.o. male with pmh of with a past medical history of PE/DVT, coronary artery disease, peripheral vascular disease,, tobacco abuse who presents with postop Wound dehiscence.  Patient underwent right foot Lisfranc amputation on  and left popliteal to anterior tibial bypass with reversed GSV and right popliteal to dorsalis pedis bypass with reversed GSV on  and was discharged to a nursing facility where dressings were not done.  Patient presented to the ED with incision infection and dehiscence.  Wound culture grew corynebacterium Klebsiella ESBL and MRSA.  Patient underwent BKA on .  ID was consulted.  Patient was treated with Vanco and meropenem.  Antibiotics have been held as patient underwent BKA         Plan:     Right foot wound dehiscence with polymicrobial infection with right foot osteomyelitis s/p on   - Now off antibiotics  - Management per vascular surgery  - Past history of Lisfranc amputation on   - Off antibiotics  - Pain control with long-acting pain medication followed by as needed po medication    Peripheral vascular disease has full-thickness/prior left lower extremity  -- Unclear why patient was switched from Xarelto to Eliquis.  Patient Xarelto is his home medication will continue Xarelto  - S/p femoral anterior tibial bypass on   - Podiatry following    Constipation  - Improved  - Continue bowel regimen    Chronic anemia  - Hemoglobin stable between 7.5 and 8  -iron studies ordered    Anxiety  - Patient started on scheduled Valium will cut back dose and switch to prn        I spent _   minutes in the 
    V2.0    [facility] Progress Note      Name:  Natan Lynch /Age/Sex: 1960  (64 y.o. male)   MRN & CSN:  [id number] & [id number] Encounter Date/Time: 2025 8:28 AM EDT   Location:  5319/5319-01 PCP: Brandon Avina MD     Attending:Hansa Nunez MD       Hospital Day: 14    HPI:    Assessment and Recommendations     Hospital course:    Natan Lynch is a 64 y.o. male with pmh of with a past medical history of PE/DVT, coronary artery disease, peripheral vascular disease,, tobacco abuse who presents with postop Wound dehiscence.  Patient underwent right foot Lisfranc amputation on  and left popliteal to anterior tibial bypass with reversed GSV and right popliteal to dorsalis pedis bypass with reversed GSV on  and was discharged to a nursing facility where dressings were not done.  Patient presented to the ED with incision infection and dehiscence.  Wound culture grew corynebacterium Klebsiella ESBL and MRSA.  Patient underwent BKA on .  ID was consulted.  Patient was treated with Vanco and meropenem.  Antibiotics have been held as patient underwent BKA         Plan:     Right foot wound dehiscence with polymicrobial infection with right foot osteomyelitis s/p AKA on -dressing per surgery  - Now off antibiotics  - Management per vascular surgery  - Past history of Lisfranc amputation on   - Pain control with long-acting pain medication followed by as needed po medication    Peripheral vascular disease has full-thickness/prior left lower extremity  -- Unclear why patient was switched from Xarelto to Eliquis.  Patient Xarelto is his home medication will continue Xarelto  - S/p femoral anterior tibial bypass on   - Podiatry following    Constipation  - Improved  - Continue bowel regimen    Chronic anemia  - Hemoglobin stable between 7.5 and 8  -iron studies shows iron deficiency start iron supplement    Anxiety  - Patient started on scheduled Valium will cut back dose and 
    V2.0    [facility] Progress Note      Name:  Natan Lynch /Age/Sex: 1960  (64 y.o. male)   MRN & CSN:  [id number] & [id number] Encounter Date/Time: 2025 8:28 AM EDT   Location:  [facility]13-01 PCP: Brandon Avina MD     Attending:Hansa Nunez MD       Hospital Day: 12    HPI:    Assessment and Recommendations     Hospital course:    Natan Lynch is a 64 y.o. male with pmh of with a past medical history of PE/DVT, coronary artery disease, peripheral vascular disease,, tobacco abuse who presents with postop Wound dehiscence.  Patient underwent right foot Lisfranc amputation on  and left popliteal to anterior tibial bypass with reversed GSV and right popliteal to dorsalis pedis bypass with reversed GSV on  and was discharged to a nursing facility where dressings were not done.  Patient presented to the ED with incision infection and dehiscence.  Wound culture grew corynebacterium Klebsiella ESBL and MRSA.  Patient underwent BKA on .  ID was consulted.  Patient was treated with Vanco and meropenem.  Antibiotics have been held as patient underwent BKA         Plan:     Right foot wound dehiscence with polymicrobial infection with right foot osteomyelitis s/p on   - Now off antibiotics  - Management per vascular surgery  - Past history of Lisfranc amputation on   - Off antibiotics  - Pain control, patient is getting IV Dilaudid along with Percocet and Roxicodone.  Will switch to long-acting p.o. medication, stop Roxicodone and continue Percocet.  Will stop Dilaudid if pain is controlled    Peripheral vascular disease has full-thickness/prior left lower extremity  --started back on Xarelto  - S/p femoral anterior tibial bypass on   - Podiatry following    Constipation  - Improved  - Continue bowel regimen    Chronic anemia  - Hemoglobin stable between 7.5 and 8        I spent _   minutes in the care of this patient.  Over 50% of that time was in face-to-face counseling 
   Clinical Pharmacy Note  Medication History     Admit Date: 7/3/2025       List of current medications patient is taking is complete. Home Medication list in EPIC updated to reflect changes noted below.    Source of Information:    Review of facility papers- list from [facility] dated 7/3/25        CHANGES TO HOME MEDICATION LIST:    REMOVED:  (not on NH med list.)  1) Lactobacillus  2) Verenicline    ADDED:  1) Prednisone  2) Venelex oint  3) guafenesin DM  4) Promethazine     DOSE or FREQUENCY CHANGE:   1) GABAPENTIN -- takes 300 mg nightly  + 100 mg q8h PRN (not to take within 2h of scheduled bedtime dose         Current Outpatient Medications   Medication Instructions    acetaminophen (TYLENOL) 500 mg, Oral, 4 TIMES DAILY PRN    aspirin (RL ASPIRIN) 325 mg, Oral, 2 times daily    Balsam [place]-Castor Oil (VENELEX EX) 1 Application, Every 8 hours    Dextromethorphan-guaiFENesin  MG/5ML SYRP 10 mLs, EVERY 6 HOURS PRN    docusate sodium (COLACE) 100 mg, 2 TIMES DAILY PRN    gabapentin (NEURONTIN) 300 mg, Oral, NIGHTLY, Intended supply: 90 days    gabapentin (NEURONTIN) 100 mg, EVERY 8 HOURS PRN    loperamide (IMODIUM) 2 mg, 4 TIMES DAILY PRN    oxyCODONE-acetaminophen (PERCOCET) 7.5-325 MG per tablet 1 tablet, Oral, EVERY 6 HOURS PRN, Intended supply: 7 days    polyethylene glycol (MIRALAX) 17 g, DAILY    predniSONE (DELTASONE) 40 mg, DAILY    promethazine (PHENERGAN) 25 mg, EVERY 6 HOURS PRN    rivaroxaban (XARELTO) 20 mg, Oral, DAILY WITH BREAKFAST         Sasha Morris [facility] PharmD, [facility]   Inpatient pharmacy [id number]            
  Comprehensive Nutrition Assessment    RECOMMENDATIONS:  PO Diet: Continue Regular  Nutrition Supplement: Continue Ensure+ HP TID & Shaggy BID (recommended dose for Shaggy is BID)  Nutrition Education: Education/Counseling initiated     NUTRITION ASSESSMENT:   Nutritional summary & status: Follow-up. Upon visit, pt reports okay appetite and intake; endorses eating 50% or more of 3 meals/day on average. States he hasn't been drinking Ensure d/t receiving wrong flavor; this RD added flavor preference to [employer] ([employer] database) this morning so hopefully pt should receive vanilla from now on. Endorses drinking Shaggy BID so will continue. Now having regular BMs per flowsheet I/Os. Pt interested in high kcal, high protein nutrition therapy as well as sample meal plans based on estimated energy and protein needs; will provide handouts and verbal review of education as time permits and continue to monitor.   Admission // PMH: Wound Dehiscence // PVD, CAD    MALNUTRITION ASSESSMENT  Context of Malnutrition: Chronic Illness   Malnutrition Status: Severe malnutrition  Findings of the 6 clinical characteristics of malnutrition (Minimum of 2 out of 6 clinical characteristics is required to make the diagnosis of moderate or severe Protein Calorie Malnutrition based on AND/ASPEN Guidelines):  Energy Intake:  Mild decrease in energy intake  Weight Loss:  No weight loss (Wt fluctuates 155-175 lb ~1 year)     Body Fat Loss:  Severe body fat loss Orbital, Fat Overlying Ribs, Buccal region   Muscle Mass Loss:  Severe muscle mass loss Temples (temporalis), Clavicles (pectoralis & deltoids)    NUTRITION DIAGNOSIS   Severe malnutrition related to inadequate protein-energy intake as evidenced by criteria as identified in malnutrition assessment    Nutrition Monitoring and Evaluation:   Food/Nutrient Intake Outcomes:  Food and Nutrient Intake, Supplement Intake  Physical Signs/Symptoms Outcomes:  Biochemical Data, Nutrition Focused 
  Comprehensive Nutrition Assessment    RECOMMENDATIONS:  PO Diet: Regular diet,   Nutrition Supplement: Modify ONS- Ensure TID w/meals, Shaggy BID  Nutrition Education: No recommendation at this time     NUTRITION ASSESSMENT:   Nutritional summary & status: Positive screen for wounds. Patient admitted with foot pain r/t recent amputation. Pt with multiple wounds on BLE r/t PVD, vascular and podiatry consulted and discussing possible additional amputation however no plans confirmed at this time. No weight change since last admit wt, tolerating po diet with good intake of one meal, and Ensure and Shaggy ordered as well. Patient with significant muscle as fat wasting and meets ASPEN criteria for PCM. RD will monitor plan of care and provide additional interventions as needed.   Admission // PMH: Wound Dehiscence // PVD, CAD    MALNUTRITION ASSESSMENT  Context of Malnutrition: Chronic Illness   Malnutrition Status: Severe malnutrition  Findings of the 6 clinical characteristics of malnutrition (Minimum of 2 out of 6 clinical characteristics is required to make the diagnosis of moderate or severe Protein Calorie Malnutrition based on AND/ASPEN Guidelines):  Energy Intake:  Mild decrease in energy intake  Weight Loss:  No weight loss (Wt fluctuates 155-175 lb ~1 year)     Body Fat Loss:  Severe body fat loss Orbital, Fat Overlying Ribs, Buccal region   Muscle Mass Loss:  Severe muscle mass loss Temples (temporalis), Clavicles (pectoralis & deltoids)  Fluid Accumulation:  No fluid accumulation     Strength:  Not Performed    NUTRITION DIAGNOSIS   Severe malnutrition related to inadequate protein-energy intake as evidenced by criteria as identified in malnutrition assessment    Nutrition Monitoring and Evaluation:   Food/Nutrient Intake Outcomes:  Diet Advancement/Tolerance, Food and Nutrient Intake  Physical Signs/Symptoms Outcomes:  Biochemical Data, GI Status, Nutrition Focused Physical Findings, Skin, Weight 
  Comprehensive Nutrition Assessment    RECOMMENDATIONS:  PO Diet: Regular diet, CC4  Nutrition Supplement: Continue Ensure TID w/meals, Shaggy BID  Nutrition Education: No recommendation at this time     NUTRITION ASSESSMENT:   Nutritional summary & status: Follow up. Patient is s/p rt BKA 7/7, tolerating a regular CC4 diet with fair intake and ONS ordered as well. Patient is from home with wife and is a good candidate for ARU at d/c per rehab MD. RD will continue to monitor po intake and need for further nutrition intervention.   Admission // PMH: Wound Dehiscence // PVD, CAD    MALNUTRITION ASSESSMENT  Context of Malnutrition: Chronic Illness   Malnutrition Status: Severe malnutrition  Findings of the 6 clinical characteristics of malnutrition (Minimum of 2 out of 6 clinical characteristics is required to make the diagnosis of moderate or severe Protein Calorie Malnutrition based on AND/ASPEN Guidelines):  Energy Intake:  Mild decrease in energy intake  Weight Loss:  No weight loss (Wt fluctuates 155-175 lb ~1 year)     Body Fat Loss:  Severe body fat loss Orbital, Fat Overlying Ribs, Buccal region   Muscle Mass Loss:  Severe muscle mass loss Temples (temporalis), Clavicles (pectoralis & deltoids)  Fluid Accumulation:  No fluid accumulation     Strength:  Not Performed    NUTRITION DIAGNOSIS   Severe malnutrition related to inadequate protein-energy intake as evidenced by criteria as identified in malnutrition assessment    Nutrition Monitoring and Evaluation:   Food/Nutrient Intake Outcomes:  Food and Nutrient Intake, Supplement Intake  Physical Signs/Symptoms Outcomes:  Biochemical Data, GI Status, Nutrition Focused Physical Findings, Skin, Weight     OBJECTIVE DATA: Significant to nutrition assessment  Nutrition Related Findings: Last BM 7/2, senna scheduled 7/4, will rec'd increasing bowel regimen,   Wounds: Multiple, Venous Stasis, Diabetic Ulcer, Open Wounds  Nutrition Goals: PO intake 50% or 
  Physician Progress Note      PATIENT:               PATRICIA SHANKAR  CSN #:                  [id number]  :                       1960  ADMIT DATE:       7/3/2025 3:13 PM  DISCH DATE:  RESPONDING  PROVIDER #:        JUAN ART          QUERY TEXT:    Please clarify the patient?s nutritional status:    The clinical indicators include:  64 year old male w/ PVD s/p amputation and dehiscence of stump    BMI 18.48    07/05 RD progress note- \"Malnutrition Status: Severe malnutrition. Findings of   the 6 clinical characteristics of malnutrition (Minimum of 2 out of 6   clinical characteristics is required to make the diagnosis of moderate or   severe Protein Calorie Malnutrition based on AND/ASPEN Guidelines): Energy   Intake:  Mild decrease in energy intake. Weight Loss:  No weight loss (Wt   fluctuates 155-175 lb   1 year). Body Fat Loss:  Severe body fat loss Orbital, Fat Overlying Ribs,   Buccal region. Muscle Mass Loss: Severe muscle mass loss Temples (temporalis),   Clavicles (pectoralis & deltoids). Fluid Accumulation: No fluid accumulation.    Strength: Not Performed... Severe malnutrition related to inadequate   protein-energy intake as evidenced by criteria as identified in malnutrition   assessment\".    Treatment- regular diet, Ensure TID, Shaggy BID, I&Os, weights, labs  Options provided:  -- Protein calorie malnutrition severe  -- Other - I will add my own diagnosis  -- Disagree - Not applicable / Not valid  -- Disagree - Clinically unable to determine / Unknown  -- Refer to Clinical Documentation Reviewer    PROVIDER RESPONSE TEXT:    This patient has severe protein calorie malnutrition.    Query created by: Elle Joseph on 2025 2:20 PM      Electronically signed by:  JUAN ART 2025 8:52 AM          
4 Eyes Skin Assessment     NAME:  Natan Lynch  YOB: 1960  MEDICAL RECORD NUMBER:  [id number]    The patient is being assessed for  Transfer to New Unit    I agree that at least one RN has performed a thorough Head to Toe Skin Assessment on the patient. ALL assessment sites listed below have been assessed.      Areas assessed by both nurses:    Head, Face, Ears, Shoulders, Back, Chest, Arms, Elbows, Hands, Sacrum. Buttock, Coccyx, Ischium, Legs. Feet and Heels, and Under Medical Devices     Wound to LLE with dressing in place, incision to RBKA with staples         Does the Patient have a Wound? Yes wound(s) were present on assessment. LDA wound assessment was Initiated and completed by RN       Antelmo Prevention initiated by RN: Yes  Wound Care Orders initiated by RN: Yes    Pressure Injury (Stage 1,2,3,4, Unstageable, DTI, NWPT, and Complex wounds) if present, place Wound referral order by RN under : No    Wound managed by podiatry    New Ostomies, if present place, Ostomy referral order under : No     Nurse 1 eSignature: Electronically signed by Hina Kessler RN on 7/14/25 at 7:03 PM EDT    **SHARE this note so that the co-signing nurse can place an eSignature**    Nurse 2 eSignature: Electronically signed by Tracy Bee RN on 7/14/25 at 7:05 PM EDT   
4 Eyes Skin Assessment     NAME:  Natan Lynch  YOB: 1960  MEDICAL RECORD NUMBER:  [id number]    The patient is being assessed for  Transfer to New Unit    I agree that at least one RN has performed a thorough Head to Toe Skin Assessment on the patient. ALL assessment sites listed below have been assessed.      Areas assessed by both nurses:    Head, Face, Ears, Shoulders, Back, Chest, Arms, Elbows, Hands, Sacrum. Buttock, Coccyx, Ischium, Legs. Feet and Heels, and Under Medical Devices         Does the Patient have a Wound? Yes wound(s) were present on assessment. LDA wound assessment was Initiated and completed by RN       Antelmo Prevention initiated by RN: No  Wound Care Orders initiated by RN: No    Podiatry/ vascular are managing the pts wounds at this time      Pressure Injury (Stage 1,2,3,4, Unstageable, DTI, NWPT, and Complex wounds) if present, place Wound referral order by RN under : No    New Ostomies, if present place, Ostomy referral order under : No     Nurse 1 eSignature: Electronically signed by Danny Gleason RN on 7/10/25 at 3:44 AM EDT    **SHARE this note so that the co-signing nurse can place an eSignature**    Nurse 2 eSignature: Electronically signed by Sumi Harmon RN on 7/10/25 at 5:30 AM EDT    
[employer]  Removed IPOP for dressing change by vascular surgery. Re applied IPOP.  Jah Fairbanks [phone]  
AntiXa 0.22. Heparin drip rate changed according to protocol with night shift nurse handoff. See MAR. AntiXa ordered for 1400 per protocol.  
Aptt was 76.6. Heparin drip @ 18units/kg/hr. No change in dose. Next Aptt @ 0230.  
Called lab regarding patient transfer to [facility], they need to draw blood @2130 for Anti-xa.  
Charge nurse spoke with lab regarding need for bloodwork stat and to verify why the blood hasn't been drawn yet. Lab will call charge nurse back. Will monitor.  
D:  Please note, pain medications were discussed with patient; additionally, patient has further questions but wants to see the surgeon on duty.  Please come see patient/advise when you may come so I can inform patient per patient's request.  Thank you.  A:  Notified Surgery provider, Debbie Mancilla MD. And notified Jules Wu RN, Charge Nurse.  R:  Per provider above, \"I will be by in a little bit for his post op check.  Likely within the hour.\"  
D:  Please note, patient has the following new concerns:  1) Patient is c/o left heel pain 3/10.  There are no PRN orders for this level of pain.  Additionally, his BP is 99/65, P 78.    2) Patient is inquiring about a Podiatrist coming to see him for his left heel area due to callused skin which appears could fall off.    Please advise.  Thank you.    A:  Notified provider, Debbie Mancilla MD.  
D:  Please note, patient is asking for you to please come see him (noting he has not seen you yet).  Thank you.  A:  Notified provider, Debbie Mancilla MD.  R:  Per provider above, \"We will be rounding shortly.\"  
D:  Please note, patient is concerned about getting Oxycodone 10 mg PO Q4H scheduled (as this was his regimen prior to surgery) and, in addition, wants to receive to PRN Dilaudid for breakthrough pain.  Patient just had a Right BKA and has a tremendous history of pain.  Please advise.  Thank you.  A:  Notified Surgery provider, Debbie Mancilla MD, and notified Charge RN, Jules Wu.  
Dr. Alvarado aware of patients elevated temperature and states she will proceed with surgery. Dr. Sanz made aware for anesthesia and Charge Nurse Shyanne in OR aware.   
Following secure message sent to [place] NP:    Patient admitted for wound dehis from [facility] where his wound was not being cared for.  Patient was receiving oxycodone 5-10mg PO Q4hr PRN for pain.  Yesterday it was changed to 2.5 to 5mg Q4.  He recceived 5mg oxy at 0346 and 1mg dilaudid at 0413.  He is still c/o 9/10 pain and asking for more pain meds.  I have nothing ordered that I can give now.  Is there something else he can have for pain?    Electronically signed by Candelaria Booth RN on 7/12/2025 at 5:03 AM    
From OR to PACU.   Post op RIGHT BELOW KNEE LEG AMPUTATION - Right  Casandra Alvarado MD  Report received from CRNA at bedside, and surgical residenr Dr. Tejada.   Right BKA with cast in place, elevated on pillows.   Patient had pre op nerve block for pain control.   No complaints of pain or nausea.   Patient has productive cough with clear sputum, hx smoking.   O2 saturation 100 % on 2 liters BC.     Denies any needs at this time.   
Heparin drip discontinued as ordered. LR @ 100 ml/hr initiated. NPO after midnight. Vital signs stable afebrile.call light in reach.  
ID Follow-up NOTE    CC:   R foot infection / osteomyelitis  Antibiotics: Vancomycin, Meropenem    Admit Date: 7/3/2025  Hospital Day: 6    Subjective:     POD#1 R BKA    Patient c/o R LE / surg site pain  No other complaint       Objective:     Patient Vitals for the past 8 hrs:   BP Temp Temp src Pulse Resp SpO2   07/08/25 1522 116/63 98.4 °F (36.9 °C) Oral 72 18 100 %     I/O last 3 completed shifts:  In: 2326.7 [P.O.:120; I.V.:1956.7; IV Piggyback:250]  Out: 3650 [Urine:3650]  I/O this shift:  In: 300 [P.O.:300]  Out: 400 [Urine:400]    EXAM:  GENERAL: No apparent distress.    HEENT: Membranes moist, no oral lesion  NECK:  Supple, no lymphadenopathy  LUNGS: Clear b/l, no rales, no dullness  CARDIAC: RRR, no murmur appreciated  ABD:  + BS, soft / NT  EXT:  No rash, no edema, no lesions  R leg with IPOP  NEURO: No focal neurologic findings  PSYCH: Orientation, sensorium, mood normal  LINES:  Peripheral iv       Data Review:  Lab Results   Component Value Date    WBC 16.5 (H) 07/08/2025    HGB 7.4 (L) 07/08/2025    HCT 22.8 (L) 07/08/2025    MCV 84.1 07/08/2025     (H) 07/08/2025     Lab Results   Component Value Date    CREATININE 0.6 (L) 07/08/2025    BUN 9 07/08/2025     07/08/2025    K 3.8 07/08/2025     07/08/2025    CO2 27 07/08/2025       Hepatic Function Panel:   Lab Results   Component Value Date/Time    ALKPHOS 58 08/16/2024 07:40 AM    ALT 18 08/16/2024 07:40 AM    AST 27 08/16/2024 07:40 AM    BILITOT 2.0 08/16/2024 07:40 AM       MICRO:  7/3 BC no growth    7/4 R foot wound cult - K pneumoniae ESBL, C striatium, MRSA  Klebsiella pneumoniae ESBL   Antibiotic Interpretation MIKE     ampicillin Resistant >=32 mcg/mL   ampicillin-sulbactam Intermediate 16 mcg/mL   cefepime Resistant >=32 mcg/mL   cefTRIAXone Resistant >=64 mcg/mL   ciprofloxacin Intermediate 0.5 mcg/mL   ertapenem Sensitive <=0.12 mcg/mL   gentamicin Sensitive <=1 mcg/mL   levofloxacin Intermediate 1 mcg/mL   meropenem 
ID Follow-up NOTE    CC:   R foot infection / osteomyelitis  Antibiotics: Vancomycin, Meropenem    Admit Date: 7/3/2025  Hospital Day: 8    Subjective:     Dressing change today   POD#3 R BKA    R LE / surg site pain controlled   No other complaint       Objective:     Patient Vitals for the past 8 hrs:   BP Temp Temp src Pulse Resp SpO2 Height Weight   07/10/25 1801 -- -- -- -- 18 -- -- --   07/10/25 1532 114/67 98.1 °F (36.7 °C) Oral 90 18 100 % 1.956 m (6' 5\") 75.3 kg (166 lb)   07/10/25 1324 -- -- -- -- 18 -- -- --     I/O last 3 completed shifts:  In: 690 [P.O.:680; I.V.:10]  Out: 3200 [Urine:3200]  No intake/output data recorded.    EXAM:  GENERAL: No apparent distress.    HEENT: Membranes moist, no oral lesion  NECK:  Supple, no lymphadenopathy  LUNGS: Clear b/l, no rales, no dullness  CARDIAC: RRR, no murmur appreciated  ABD:  + BS, soft / NT  EXT:  No rash, no edema, no lesions  R leg with IPOP  NEURO: No focal neurologic findings  PSYCH: Orientation, sensorium, mood normal  LINES:  Peripheral iv       Data Review:  Lab Results   Component Value Date    WBC 9.0 07/10/2025    HGB 7.3 (L) 07/10/2025    HCT 22.3 (L) 07/10/2025    MCV 84.3 07/10/2025     (H) 07/10/2025     Lab Results   Component Value Date    CREATININE 0.6 (L) 07/10/2025    BUN 6 (L) 07/10/2025     (L) 07/10/2025    K 3.6 07/10/2025     07/10/2025    CO2 25 07/10/2025       Hepatic Function Panel:   Lab Results   Component Value Date/Time    ALKPHOS 58 08/16/2024 07:40 AM    ALT 18 08/16/2024 07:40 AM    AST 27 08/16/2024 07:40 AM    BILITOT 2.0 08/16/2024 07:40 AM       MICRO:  7/3 BC no growth    7/4 R foot wound cult - K pneumoniae ESBL, C striatium, MRSA  Klebsiella pneumoniae ESBL   Antibiotic Interpretation MIKE     ampicillin Resistant >=32 mcg/mL   ampicillin-sulbactam Intermediate 16 mcg/mL   cefepime Resistant >=32 mcg/mL   cefTRIAXone Resistant >=64 mcg/mL   ciprofloxacin Intermediate 0.5 mcg/mL   ertapenem 
IPOP and dressing taken down. Staple line c/d/I without drainage or erythema. Redressed with petroleum dressing, kerlex and ACE wrap. IPOP replaced by [employer] rep.         Chica Gautam MD  PGY1, General Surgery  07/10/25  1:29 PM  [employer]  Pager Number: [phone]    
MD notified of bleeding from R foot wound and uncontrolled pain. Asssesed patient. Redressed wound. Ordered 0.25mg dilaudid IV x1. Informed patient of surgery time (12pm today). All questions answered.     Chica Gautam MD  PGY1, General Surgery  07/07/25  11:04 AM  [facility]  Pager Number: [phone]    
Medicated with Dilaudid 0.5 mg ivp for complaints of pain. Vital signs stable, afebrile. Will continue to monitor alteration in comfort  
Natan Allenrd  7/10/2025  [id number]    Chief Complaint: Wound dehiscence    Subjective:   This is a 64-year-old male with a past medical history including:  Past Medical History:   Diagnosis Date    Bilateral pulmonary embolism (HCC)     CAD (coronary artery disease)     DVT (deep venous thrombosis) (HCC)     History of blood transfusion     Hx of blood clots     Peripheral artery disease     Tobacco abuse     Uses wheelchair      He came to the hospital with bilateral gangrene of lower extremities.  Currently podiatry is treating the patient's left lower extremity.  Right lower extremity underwent BKA.  Patient is a new amputee.  Previous to this the patient was fairly mobile and living with his wife in [place].  He is interested in going to acute rehab when medically cleared.  Therapy scoring with AM-PAC 14/16 respectively.      Interval history: Patient is deciding between location for rehab unit.    ROS: No CP, SOB, dyspnea    Objective:  Patient Vitals for the past 24 hrs:   BP Temp Temp src Pulse Resp SpO2   07/10/25 0753 -- -- -- -- 18 --   07/10/25 0732 114/68 98.3 °F (36.8 °C) Oral 80 18 99 %   07/10/25 0512 128/76 98 °F (36.7 °C) Oral 79 16 100 %   07/10/25 0047 -- -- -- -- 18 --   07/09/25 2213 107/69 99.1 °F (37.3 °C) Oral 83 16 99 %   07/09/25 2027 123/79 98.2 °F (36.8 °C) Oral 73 18 98 %   07/09/25 1534 126/72 98.5 °F (36.9 °C) Oral 83 20 98 %     Gen: No distress, pleasant.   HEENT: Normocephalic, atraumatic.   CV: No audible murmurs, well perfused extremities  Resp: No respiratory distress. No increased WOB  Abd: Soft, nontender nondistended  Ext: Right BKA/left open wound lower extremity ankle  Neuro: Alert, oriented, appropriately interactive.     Laboratory data: Available via EMR.     Therapy progress:    PT    Rolling: Level of difficulty - A Little   Sit to Stand from a Chair: Level of difficulty - A Little  Supine to Sit: Level of difficulty - A Little     Bed to Chair: Physical Assistance 
Natan Allenrd  7/11/2025  [id number]    Chief Complaint: Wound dehiscence    Subjective:   This is a 64-year-old male with a past medical history including:  Past Medical History:   Diagnosis Date    Bilateral pulmonary embolism (HCC)     CAD (coronary artery disease)     DVT (deep venous thrombosis) (HCC)     History of blood transfusion     Hx of blood clots     Peripheral artery disease     Tobacco abuse     Uses wheelchair      He came to the hospital with bilateral gangrene of lower extremities.  Currently podiatry is treating the patient's left lower extremity.  Right lower extremity underwent BKA.  Patient is a new amputee.  Previous to this the patient was fairly mobile and living with his wife in [place].  He is interested in going to acute rehab when medically cleared.  Therapy scoring with AM-PAC 14/16 respectively.      Interval history: Seen in his room this morning.  Patient is still unsure of where he would like to go to rehab.  ROS: No CP, SOB, dyspnea    Objective:  Patient Vitals for the past 24 hrs:   BP Temp Temp src Pulse Resp SpO2 Height Weight   07/11/25 0922 -- -- -- -- 18 -- -- --   07/11/25 0838 116/79 98.2 °F (36.8 °C) Oral 75 18 100 % -- --   07/11/25 0719 -- -- -- -- 18 -- -- --   07/11/25 0353 106/69 97.5 °F (36.4 °C) Oral 79 18 98 % -- --   07/11/25 0051 -- -- -- -- 18 -- -- --   07/10/25 2206 -- -- -- -- 18 -- -- --   07/10/25 2016 -- -- -- -- 18 -- -- --   07/10/25 1946 110/74 98.2 °F (36.8 °C) Oral 86 18 100 % -- --   07/10/25 1801 -- -- -- -- 18 -- -- --   07/10/25 1532 114/67 98.1 °F (36.7 °C) Oral 90 18 100 % 1.956 m (6' 5\") 75.3 kg (166 lb)   07/10/25 1324 -- -- -- -- 18 -- -- --     Gen: No distress, pleasant.   HEENT: Normocephalic, atraumatic.   CV: No audible murmurs, well perfused extremities  Resp: No respiratory distress. No increased WOB  Abd: Soft, nontender nondistended  Ext: Right BKA/left open wound lower extremity ankle  Neuro: Alert, oriented, appropriately 
Occupational Therapy  Attempt    Attempted to follow up with pt per OT POC. Pt declining OT services at this time due to having multiple visitors recently and wanting to discuss rehab prospects with family, also reporting high pain level - RN notified. Will f/u later in day as pt tolerating and schedule allows.    Donaldo Zhang, OTR/L    
Occupational Therapy  Attempted Treatment    Attempted to work with pt this afternoon. Pt declined. Reporting feeling tired and \"they're going to take me to that other place (SNF) in a little while.\" Will continue per POC.    Criselda Beatty OTR/L #[id number]    
Occupational Therapy  Facility/Department: [address]  Daily Treatment Note  NAME: Natan Lynch  : 1960  MRN: [id number]    Date of Service: 2025    Discharge Recommendations:  IP Rehab  OT Equipment Recommendations  Equipment Needed: No  Other: defer to next care facility      Patient Diagnosis(es): The primary encounter diagnosis was Dehiscence of operative wound, initial encounter. Diagnoses of Wound dehiscence, Peripheral artery disease, and Osteomyelitis of right foot, unspecified type (HCC) were also pertinent to this visit.     Assessment   Assessment: Pt tolerates session well overall. Pt provided SBA for supine to sit with increased time, HOB elevated, and rail. Pt completes STS x 1 rep to RW from elevated EOB surface and max A x 1 on the third attempt. Once standing pt maintains balance at the EOB and edge of chair for a total ~ 4 minutes with focus on balance training and completion of torrey care. Pt balance CGA progressing to SBA. With min A and RW ~ 3-4 hops completed on RLE with RW to transfer from bed to chair. Pt provided education and practice time on exercises to promote general strength and independence with transfers. Demos understanding. As pt conts to be below his baseline he would benefit from cont skilled therapy to max safe progress towards PLOF/goals.  Activity Tolerance: Patient limited by endurance;Patient limited by pain  Discharge Recommendations: IP Rehab  Equipment Needed: No  Other: defer to next care facility     Plan  Occupational Therapy Plan  Times Per Week: 2-5x  Times Per Day: Once a day  Current Treatment Recommendations: Functional mobility training;Safety education & training;Patient/Caregiver education & training;Endurance training;Self-Care / ADL;Strengthening;Balance training    Restrictions  Position Activity Restriction  Other Position/Activity Restrictions: Up with assistance, Nonweightbearing to right foot however may be weightbearing to left foot, 
PACU Transfer Note    Vitals:    07/07/25 1645   BP: 109/68   Pulse: 82   Resp: 17   Temp: 98.4 °F (36.9 °C)   SpO2: 100%       In: 1850 [I.V.:1600]  Out: -     Pain assessment:  none No no nausea present. Called [facility] RN with report. Called and updated sister Nicolasa on phone. Patient to return to room # 6304. Oral temperature 98.4  Pain Level: 0    Report given to Receiving unit RN.    7/7/2025 5:02 PM       
PRE-OP NOTE  Department of Surgery      Chief Complaint or Reason for Surgery: Non-healing R foot infection    Procedure: R BKA vs R AKA  Expected time: 7/7/25    Plan  1. Diet: NPO at MN  2. IVF:   3. Antibiotics: scheduled meropenem  4. Access: PIV x2  5. Labs to be drawn: Type and Screen, INR  6. Anesthesia: to see patient  7. Consent: To be obtained, placed in chart  8. Pulmonary: CXR: n/a  9. Cardiac: Stress test 4/1//25 low risk  10. Pregnancy test: n/a  11. Prophylaxis: Heparin drip to stop at 7/7 01:30    Beck Delgado IV, MD  General Surgery, PGY-5  07/06/25  3:30 PM  [phone]  
Patient is A&O x4.  RA, sat 98%.  No complaints of SOB.  Medicated for c/o pain as needed.    Vital signs stable as charted.  Respirations appear to easy and unlabored.  Lungs clear.  Respirations easy with no complaints of cough.  No complaints of nausea/vomiting/diarrhea.  Up with lift;PIVOT to the chair.  Heparin gtt infusing as ordered.    Tolerating regular diet.  Voids per urinal.  LLE hard dressing.    Plan of care and safety measures reviewed with the patient.  Call light in reach and bed alarm in place.  Bed attached to wall for alarm purposes.  Will continue to monitor.  Electronically signed by Candelaria Booth RN on 7/11/2025 at 11:03 PM    
Patient is A&O x4.  RA, sat 98%.  No complaints of SOB.  Medicated for c/o pain as needed.    Vital signs stable as charted.  Respirations appear to easy and unlabored.  Lungs clear.  Respirations easy with no complaints of cough.  No complaints of nausea/vomiting/diarrhea.  Up with lift;PIVOT to the chair.  Right and left FA PIVs intact and flushed.     Tolerating regular diet.  Voids per urinal.  LLE hard dressing.    Plan of care and safety measures reviewed with the patient.  Call light in reach and bed alarm in place.  Bed attached to wall for alarm purposes.  Will continue to monitor.  Electronically signed by Candelaria Booth RN on 7/11/2025 at 11:04 PM    
Patient is alert and oriented x4. VSS on room air. Medications given per MAR, no side effects noted. Patient Up with assist of one. No complaints of pain, continuing to monitor and manage per MAR. Voiding well via BRP, bm this shift     Patient is currently resting in bed with bed alarm on for safety. Call light within reach and all fall precautions in place. Plan of care continues.    Brigido Noriega RN  
Patient is alert and oriented x4. VSS on room air. Medications given per MAR, no side effects noted. Patient Up with assist of one. Pain managed with PRN pain medication, continuing to monitor and manage per MAR. Voiding well via BRP,      Patient is currently resting in bed with bed alarm on for safety. Call light within reach and all fall precautions in place. Plan of care continues.    Brigido Noriega RN  
Patient resting well over current course of shift. Access appropriate. Skin and pain per protocol. VSC on RA. Room safety measures in place. Call light in hand.   
Pharmacy Progress Note    Heparin high dose weight based infusion is ordered for patient.  Per chart review, patient has received an oral Factor Xa inhibitor (Rivaroxaban) within the last 72 hours.  As oral Factor Xa inhibitors can impact the anti-Xa test calibrated to unfractionated heparin, Heparin infusion will be monitored and adjusted using aPTT's per [facility] Policy.    APTT algorithm:  Initial dose is 18 units/kg/hr.    Recorded patient weight: 70.7 kg  * 18 units/kg/hr = initial rate of 1272 units/hr    Previous anticoagulation: rivaroxaban [aPTT protocol will be utilized]  Last dose of previous anticoagulation (date/time): 7/4 @ 0830    **Use original weight used to start heparin for all adjustments**  Maximum initial infusion rate = 2100 units/hr    aPTT < 59         Heparin 80 units/kg bolus     Increase infusion by 4 units/kg/hr                             (maximum 10,000 units)  aPTT 59-72.9    Heparin 40 units/kg bolus     Increase infusion by 2 units/kg/hr                             (maximum 5,000 units)  aPTT      No bolus                                No change  aPTT 102.1-109      No bolus                          Decrease infusion by 1 units/kg/hr  aPTT  109.1-122.9  No bolus    Decrease infusion by 2 units/kg/hr  aPTT  > 123      Hold heparin for 1 hour         Decrease infusion by 3 units/kg/hr.  Draw next aPTT 6 hrs after infusion is restarted.    Hang infusion immediately after drawing initial labs.   Do NOT use ANY aPTT drawn prior to initiation of infusion for titration.   Obtain aPTT 6 hours after initial bolus and 6 hours after any dose change until two consecutive therapeutic aPTTs are achieved - then daily     Pharmacy will monitor daily to assist with adjustments & ordering of labs as needed.  If patient remains on heparin infusion 72 hours from last dose of oral factor Xa inhibitor, pharmacy will change infusion back to usual monitoring via the Anti-Xa algorithm per [facility] Policy, as 
Physical Therapy  Facility/Department: [address]  Physical Therapy daily treatment    Name: Natan Lynch  : 1960  MRN: [id number]  Date of Service: 7/10/2025    Discharge Recommendations:  IP Rehab, Patient able to tolerate 3 hours of therapy per day   PT Equipment Recommendations  Equipment Needed: No (defer)      At baseline this patient was IND with functional mobility & ADL's. The current hospitalization has resulted in the patient now being limited with all aspects of mobility, negatively impacting the patient's functional independence, ability to safely access their home environment, and ability to complete valued daily tasks and life roles. Natan Lynch is motivated for recovery and demonstrates the ability to tolerate inpatient rehabilitation 3 hours/day, 5 days/week for optimal return to functional independence, quality of life, and decreased caregiver burden.    Patient Diagnosis(es): The primary encounter diagnosis was Dehiscence of operative wound, initial encounter. Diagnoses of Wound dehiscence, Peripheral artery disease, and Osteomyelitis of right foot, unspecified type (HCC) were also pertinent to this visit.  Past Medical History:  has a past medical history of Bilateral pulmonary embolism (HCC), CAD (coronary artery disease), DVT (deep venous thrombosis) (HCC), History of blood transfusion, Hx of blood clots, Peripheral artery disease, Tobacco abuse, and Uses wheelchair.  Past Surgical History:  has a past surgical history that includes invasive vascular (N/A, 2025); invasive vascular (Left, 2025); Arterial bypass surgry (Bilateral, 2025); invasive vascular (N/A, 4/10/2025); Foot surgery (Right, 2025); and Leg amputation below knee (Right, 2025).    Assessment  Assessment: Pt able to transfer and hop short distances with RW and min-mod A x1 this date. Remains limited by pain and requires inc time for all mobility. Pt remains unable to ambulate household 
Physical Therapy  Facility/Department: [address]  Physical Therapy Treatment    Name: Natan Lynch  : 1960  MRN: [id number]  Date of Service: 2025    Discharge Recommendations:  IP Rehab, Patient able to tolerate 3 hours of therapy per day   PT Equipment Recommendations  Other: Defer      Patient Diagnosis(es): The primary encounter diagnosis was Dehiscence of operative wound, initial encounter. Diagnoses of Wound dehiscence, Peripheral artery disease, and Osteomyelitis of right foot, unspecified type (HCC) were also pertinent to this visit.  Past Medical History:  has a past medical history of Bilateral pulmonary embolism (HCC), CAD (coronary artery disease), DVT (deep venous thrombosis) (HCC), History of blood transfusion, Hx of blood clots, Peripheral artery disease, Tobacco abuse, and Uses wheelchair.  Past Surgical History:  has a past surgical history that includes invasive vascular (N/A, 2025); invasive vascular (Left, 2025); Arterial bypass surgry (Bilateral, 2025); invasive vascular (N/A, 4/10/2025); Foot surgery (Right, 2025); and Leg amputation below knee (Right, 2025).    Assessment  Assessment: Pt showing gradual progress with his mobility.  Limited a bit today by discomfort in R LE in dependent position.  Pt able to transfer, ambulate with wheeled walker short distance, and perform bed mobility, all with assist of 1.  Rec continued inpt PT at d/c prior to return home and pt agrees.  Will continue.    At baseline this patient was independent with functional mobility & ADL's. The current hospitalization has resulted in the patient now being limited with all aspects of mobility, negatively impacting the patient's functional independence, ability to safely access their home environment, and ability to complete valued daily tasks and life roles. Natan Lynch is motivated for recovery and demonstrates the ability to tolerate inpatient rehabilitation 3 hours/day, 5 
Physical Therapy  Facility/Department: [address]  Physical Therapy Initial Assessment/treatment note    Name: Natan Lynch  : 1960  MRN: [id number]  Date of Service: 2025    Discharge Recommendations:  IP Rehab   PT Equipment Recommendations  Equipment Needed: No (defer)      Patient Diagnosis(es): The primary encounter diagnosis was Dehiscence of operative wound, initial encounter. Diagnoses of Wound dehiscence, Peripheral artery disease, and Osteomyelitis of right foot, unspecified type (HCC) were also pertinent to this visit.  Past Medical History:  has a past medical history of Bilateral pulmonary embolism (HCC), CAD (coronary artery disease), DVT (deep venous thrombosis) (HCC), History of blood transfusion, Hx of blood clots, Peripheral artery disease, Tobacco abuse, and Uses wheelchair.  Past Surgical History:  has a past surgical history that includes invasive vascular (N/A, 2025); invasive vascular (Left, 2025); Arterial bypass surgry (Bilateral, 2025); invasive vascular (N/A, 4/10/2025); Foot surgery (Right, 2025); and Leg amputation below knee (Right, 2025).    Assessment  Assessment: 63 yo admitted 7/3/25 for R foot wound dehisence; subsequent R BKA with IPOP on 25. Pt expressed grave concern about not returning to SNF due to his lengthy description of inadequate care while he was there; emotional support provided. Pt demo mobility well below his reported baseline of independent at home alone without AD prior to . Pt required min assist for gait and transfers with RW this session and very limited endurance. Recommend ARU at discharge to maximize his functional independence. Discussed with CM. Recommend nursing encourage OOB PRN and often with assist of 2/RW vs ROB cano while he remains in hospital.  Treatment Diagnosis: mobility impairement due to R BKA  Decision Making: Medium Complexity  Requires PT Follow-Up: Yes  Activity Tolerance  Activity 
Podiatric Surgery Daily Progress Note  Natan Lynch      Subjective :   Patient seen and examined this am at the bedside.  Patient denies pain in left foot.  Minimal pain at amputation stump.  Patient denies N/V/F/C/SOB    Review of Systems: A 12 point review of symptoms is unremarkable with the exception of the chief complaint. Patient specifically denies nausea, fever, vomiting, chills, shortness of breath, chest pain, abdominal pain, constipation or difficulty urinating.       Objective     /67   Pulse 77   Temp 98 °F (36.7 °C) (Oral)   Resp 16   Ht 1.956 m (6' 5\")   Wt 75.3 kg (166 lb)   SpO2 99%   BMI 19.68 kg/m²      I/O:  Intake/Output Summary (Last 24 hours) at 7/14/2025 0918  Last data filed at 7/14/2025 0841  Gross per 24 hour   Intake 370 ml   Output 1050 ml   Net -680 ml              Wt Readings from Last 3 Encounters:   07/10/25 75.3 kg (166 lb)   06/20/25 75.5 kg (166 lb 6.4 oz)   05/30/25 71.9 kg (158 lb 8 oz)       LABS:    Recent Labs     07/12/25  0634 07/13/25  0604   WBC 7.5 6.3   HGB 7.8* 8.2*   HCT 23.4* 24.8*   * 782*        Recent Labs     07/12/25  0634      K 4.5      CO2 27   PHOS 4.4   BUN 9   CREATININE 0.6*        Recent Labs     07/13/25  0604   INR 1.21*           LOWER EXTREMITY EXAMINATION    Dressing to b/l LE intact. No strikethrough noted to the external dressing. Mild serosanguinous drainage noted to the internal layers of the dressing.     VASCULAR: DP and PT pulses are faintly palpable 1/4 on the left.  Upon previous doppler examination DP and PT pulses on left are found to be monophasic.  CFT is WNL to the digits of the left foot.  Skin temperature is warm to cool from proximal to distal with no focal calor of the left foot. No edema. No pain with calf compression b/l.     NEUROLOGIC: Gross and epicritic sensation is diminished to the left foot.  Protective sensation is diminished at all pedal sites to the left foot.     DERMATOLOGIC: 
Podiatric Surgery Daily Progress Note  Natan Lynch      Subjective :   Patient seen and examined this am at the bedside.  Patient denies pain in left foot.  Minimal pain at amputation stump.  Patient denies N/V/F/C/SOB    Review of Systems: A 12 point review of symptoms is unremarkable with the exception of the chief complaint. Patient specifically denies nausea, fever, vomiting, chills, shortness of breath, chest pain, abdominal pain, constipation or difficulty urinating.       Objective     /79   Pulse 75   Temp 98.2 °F (36.8 °C) (Oral)   Resp 18   Ht 1.956 m (6' 5\")   Wt 75.3 kg (166 lb)   SpO2 100%   BMI 19.68 kg/m²      I/O:  Intake/Output Summary (Last 24 hours) at 7/11/2025 1012  Last data filed at 7/11/2025 0427  Gross per 24 hour   Intake 4108.5 ml   Output 2350 ml   Net 1758.5 ml              Wt Readings from Last 3 Encounters:   07/10/25 75.3 kg (166 lb)   06/20/25 75.5 kg (166 lb 6.4 oz)   05/30/25 71.9 kg (158 lb 8 oz)       LABS:    Recent Labs     07/10/25  0840 07/11/25  0650   WBC 9.0 8.4   HGB 7.3* 7.8*   HCT 22.3* 23.3*   * 793*        Recent Labs     07/11/25  0650   *   K 4.3   CL 99   CO2 26   PHOS 4.0   BUN 9   CREATININE 0.6*        No results for input(s): \"INR\", \"APTT\" in the last 72 hours.    Invalid input(s): \"PROT\"        LOWER EXTREMITY EXAMINATION    Dressing to b/l LE intact. No strikethrough noted to the external dressing. Mild serosanguinous drainage noted to the internal layers of the dressing.     VASCULAR: DP and PT pulses are faintly palpable 1/4 on the left.  Upon previous doppler examination DP and PT pulses on left are found to be monophasic.  CFT is WNL to the digits of the left foot.  Skin temperature is warm to cool from proximal to distal with no focal calor of the left foot. No edema. No pain with calf compression b/l.     NEUROLOGIC: Gross and epicritic sensation is diminished to the left foot.  Protective sensation is diminished at all 
Podiatric Surgery Daily Progress Note  Natan Lynch      Subjective :   Patient seen and examined this am at the bedside. Patient denies any acute overnight events.  Patient continues to have pain to the right foot, however has been controlled better with current medication regimen. Patient underwent BKA 7/7 with Dr. Christiano MD.  Patient concerned about healing capabilities of the left foot, reassured that his foot is following routine healing.   Patient denies N/V/F/C/SOB    Review of Systems: A 12 point review of symptoms is unremarkable with the exception of the chief complaint. Patient specifically denies nausea, fever, vomiting, chills, shortness of breath, chest pain, abdominal pain, constipation or difficulty urinating.       Objective     /68   Pulse 80   Temp 98.3 °F (36.8 °C) (Oral)   Resp 18   Ht 1.956 m (6' 5\")   Wt 75.4 kg (166 lb 3.6 oz)   SpO2 99%   BMI 19.71 kg/m²      I/O:  Intake/Output Summary (Last 24 hours) at 7/10/2025 1105  Last data filed at 7/10/2025 1034  Gross per 24 hour   Intake 690 ml   Output 2250 ml   Net -1560 ml              Wt Readings from Last 3 Encounters:   07/09/25 75.4 kg (166 lb 3.6 oz)   06/20/25 75.5 kg (166 lb 6.4 oz)   05/30/25 71.9 kg (158 lb 8 oz)       LABS:    Recent Labs     07/09/25  0555 07/10/25  0840   WBC 12.4* 9.0   HGB 7.2* 7.3*   HCT 21.9* 22.3*   * 764*        Recent Labs     07/10/25  0840   *   K 3.6      CO2 25   PHOS 3.4   BUN 6*   CREATININE 0.6*        No results for input(s): \"INR\", \"APTT\" in the last 72 hours.    Invalid input(s): \"PROT\"          LOWER EXTREMITY EXAMINATION    Dressing to b/l LE intact. No strikethrough noted to the external dressing. Mild serosanguinous drainage noted to the internal layers of the dressing.     VASCULAR: DP and PT pulses are faintly palpable 1/4 on the left.  Upon previous doppler examination DP and PT pulses on left are found to be monophasic.  CFT is WNL to the digits of the 
Podiatric Surgery Daily Progress Note  Natan Lynch      Subjective :   Patient seen and examined this am at the bedside. Patient denies any acute overnight events.  Patient continues to have pain to the right foot, however has been controlled better with current medication regimen. Patient underwent BKA 7/7 with Dr. Christiano MD.  Patient concerned about healing capabilities of the left foot, reassured that his foot is following routine healing.   Patient denies N/V/F/C/SOB    Review of Systems: A 12 point review of symptoms is unremarkable with the exception of the chief complaint. Patient specifically denies nausea, fever, vomiting, chills, shortness of breath, chest pain, abdominal pain, constipation or difficulty urinating.       Objective     /70   Pulse 73   Temp 98.1 °F (36.7 °C) (Oral)   Resp 18   Ht 1.956 m (6' 5\")   Wt 75.4 kg (166 lb 3.6 oz)   SpO2 98%   BMI 19.71 kg/m²      I/O:  Intake/Output Summary (Last 24 hours) at 7/9/2025 0934  Last data filed at 7/9/2025 0733  Gross per 24 hour   Intake 420 ml   Output 3900 ml   Net -3480 ml              Wt Readings from Last 3 Encounters:   07/09/25 75.4 kg (166 lb 3.6 oz)   06/20/25 75.5 kg (166 lb 6.4 oz)   05/30/25 71.9 kg (158 lb 8 oz)       LABS:    Recent Labs     07/08/25  1322 07/09/25  0555   WBC 16.5* 12.4*   HGB 7.4* 7.2*   HCT 22.8* 21.9*   * 790*        Recent Labs     07/09/25  0555      K 4.0      CO2 25   PHOS 3.1   BUN 6*   CREATININE 0.5*        Recent Labs     07/06/25  1333 07/07/25  0457   INR  --  1.20*   APTT 62.6*  --            LOWER EXTREMITY EXAMINATION    Dressing to b/l LE intact. No strikethrough noted to the external dressing. Mild serosanguinous drainage noted to the internal layers of the dressing.     VASCULAR: DP and PT pulses are faintly palpable 1/4 on the left.  Upon previous doppler examination DP and PT pulses on left are found to be monophasic.  CFT is WNL to the digits of the left foot. 
Podiatric Surgery Daily Progress Note  Natan Lynch      Subjective :   Patient seen and examined this am at the bedside. Patient denies any acute overnight events.  Patient continues to have pain to the right foot, however has been controlled better with current medication regimen. Patient underwent BKA yesterday with Dr. Christiano MD.  Patient concerned about healing capabilities of the left foot.   Patient denies N/V/F/C/SOB    Review of Systems: A 12 point review of symptoms is unremarkable with the exception of the chief complaint. Patient specifically denies nausea, fever, vomiting, chills, shortness of breath, chest pain, abdominal pain, constipation or difficulty urinating.       Objective     /72   Pulse 79   Temp 98.8 °F (37.1 °C) (Oral)   Resp 18   Ht 1.956 m (6' 5\")   Wt 75.5 kg (166 lb 7.2 oz)   SpO2 98%   BMI 19.74 kg/m²      I/O:  Intake/Output Summary (Last 24 hours) at 7/8/2025 1015  Last data filed at 7/8/2025 0529  Gross per 24 hour   Intake 1850 ml   Output 2550 ml   Net -700 ml              Wt Readings from Last 3 Encounters:   07/08/25 75.5 kg (166 lb 7.2 oz)   06/20/25 75.5 kg (166 lb 6.4 oz)   05/30/25 71.9 kg (158 lb 8 oz)       LABS:    Recent Labs     07/06/25  0239 07/07/25  0457   WBC 16.9* 15.0*   HGB 7.8* 7.8*   HCT 23.6* 24.0*   * 812*        Recent Labs     07/07/25  0457   *   K 4.1   CL 98*   CO2 28   PHOS 3.6   BUN 12   CREATININE 0.7*        Recent Labs     07/05/25  1253 07/05/25  2046 07/06/25  0239 07/06/25  1333 07/07/25  0457   INR 1.31*  --   --   --  1.20*   APTT 43.5*   < > 78.9* 62.6*  --     < > = values in this interval not displayed.           LOWER EXTREMITY EXAMINATION    Dressing to b/l LE intact. No strikethrough noted to the external dressing. Mild serosanguinous drainage noted to the internal layers of the dressing.     VASCULAR: DP and PT pulses are faintly palpable 1/4 on the left.  Upon previous doppler examination DP and PT pulses 
Podiatric Surgery Daily Progress Note  Natan Lynch      Subjective :   Patient seen and examined this am at the bedside. Patient denies any acute overnight events.  Patient continues to have pain to the right foot, however has been controlled better with current medication regimen..  Patient is scheduled for BKA versus AKA today with Dr. Christiano MD.  Patient concerned about healing capabilities of the left foot.   Patient denies N/V/F/C/SOB    Review of Systems: A 12 point review of symptoms is unremarkable with the exception of the chief complaint. Patient specifically denies nausea, fever, vomiting, chills, shortness of breath, chest pain, abdominal pain, constipation or difficulty urinating.       Objective     /60   Pulse 88   Temp 98.9 °F (37.2 °C) (Oral)   Resp 18   Ht 1.956 m (6' 5\")   Wt 70.7 kg (155 lb 12.8 oz)   SpO2 97%   BMI 18.48 kg/m²      I/O:  Intake/Output Summary (Last 24 hours) at 7/7/2025 0842  Last data filed at 7/7/2025 0508  Gross per 24 hour   Intake 596.67 ml   Output 1675 ml   Net -1078.33 ml              Wt Readings from Last 3 Encounters:   07/03/25 70.7 kg (155 lb 12.8 oz)   06/20/25 75.5 kg (166 lb 6.4 oz)   05/30/25 71.9 kg (158 lb 8 oz)       LABS:    Recent Labs     07/06/25  0239 07/07/25  0457   WBC 16.9* 15.0*   HGB 7.8* 7.8*   HCT 23.6* 24.0*   * 812*        Recent Labs     07/07/25  0457   *   K 4.1   CL 98*   CO2 28   PHOS 3.6   BUN 12   CREATININE 0.7*        Recent Labs     07/05/25  1253 07/05/25  2046 07/06/25  0239 07/06/25  1333 07/07/25  0457   INR 1.31*  --   --   --  1.20*   APTT 43.5*   < > 78.9* 62.6*  --     < > = values in this interval not displayed.           LOWER EXTREMITY EXAMINATION    Dressing to b/l LE intact. No strikethrough noted to the external dressing. Mild serosanguinous drainage noted to the internal layers of the dressing.     VASCULAR: DP and PT pulses are non palpable 0/4 b/l.  Upon previous doppler examination DP 
Podiatric Surgery Daily Progress Note  Natan Lynch      Subjective :   Patient seen and examined this am at the bedside. Patient denies any acute overnight events.  Patient continues to have pain to the right foot.  Patient also has concerns with wound healing abilities of the left foot as well.  Patient denies N/V/F/C/SOB    Review of Systems: A 12 point review of symptoms is unremarkable with the exception of the chief complaint. Patient specifically denies nausea, fever, vomiting, chills, shortness of breath, chest pain, abdominal pain, constipation or difficulty urinating.       Objective     /75   Pulse 79   Temp 98.4 °F (36.9 °C) (Oral)   Resp 18   Ht 1.956 m (6' 5\")   Wt 70.7 kg (155 lb 12.8 oz)   SpO2 96%   BMI 18.48 kg/m²      I/O:  Intake/Output Summary (Last 24 hours) at 7/5/2025 0708  Last data filed at 7/5/2025 0558  Gross per 24 hour   Intake 480 ml   Output 850 ml   Net -370 ml              Wt Readings from Last 3 Encounters:   07/03/25 70.7 kg (155 lb 12.8 oz)   06/20/25 75.5 kg (166 lb 6.4 oz)   05/30/25 71.9 kg (158 lb 8 oz)       LABS:    Recent Labs     07/04/25  0609 07/05/25  0602   WBC 16.0* 15.1*   HGB 7.8* 8.0*   HCT 23.0* 23.9*   * 737*        Recent Labs     07/04/25  0609   *   K 3.9   CL 98*   CO2 26   PHOS 3.8   BUN 13   CREATININE 0.6*      No results for input(s): \"INR\", \"APTT\" in the last 72 hours.    Invalid input(s): \"PROT\"        LOWER EXTREMITY EXAMINATION    Dressing to b/l LE intact. No strikethrough noted to the external dressing. Mild serosanguinous drainage noted to the internal layers of the dressing.     VASCULAR: DP and PT pulses are non palpable 0/4 b/l.  Upon previous doppler examination DP and PT pulses bilateral are found to be monophasic.  CFT is sluggish to the distal aspect of the Lisfranc stump of the right foot.  Skin temperature is warm to cool from proximal to distal with mild focal calor to the right foot.  No edema. No pain with 
Podiatric Surgery Daily Progress Note  Natan Lynch      Subjective :   Patient seen and examined this am at the bedside. Patient denies any acute overnight events.  Patient continues to have pain to the right foot.  Patient is scheduled for BKA versus AKA tomorrow with Dr. Christiano MD.  Patient also has concerns with wound healing abilities of the left foot as well.  Patient denies N/V/F/C/SOB    Review of Systems: A 12 point review of symptoms is unremarkable with the exception of the chief complaint. Patient specifically denies nausea, fever, vomiting, chills, shortness of breath, chest pain, abdominal pain, constipation or difficulty urinating.       Objective     BP 94/62   Pulse 78   Temp 98.2 °F (36.8 °C) (Oral)   Resp 18   Ht 1.956 m (6' 5\")   Wt 70.7 kg (155 lb 12.8 oz)   SpO2 96%   BMI 18.48 kg/m²      I/O:  Intake/Output Summary (Last 24 hours) at 7/6/2025 1007  Last data filed at 7/6/2025 0814  Gross per 24 hour   Intake 347.51 ml   Output 2550 ml   Net -2202.49 ml              Wt Readings from Last 3 Encounters:   07/03/25 70.7 kg (155 lb 12.8 oz)   06/20/25 75.5 kg (166 lb 6.4 oz)   05/30/25 71.9 kg (158 lb 8 oz)       LABS:    Recent Labs     07/05/25  1253 07/06/25  0239   WBC 15.7* 16.9*   HGB 8.1* 7.8*   HCT 24.3* 23.6*   * 733*        Recent Labs     07/06/25  0239   *   K 4.4   CL 97*   CO2 26   PHOS 3.2   BUN 23*   CREATININE 0.8        Recent Labs     07/05/25  1253 07/05/25  2046 07/06/25  0239   INR 1.31*  --   --    APTT 43.5* 76.6* 78.9*           LOWER EXTREMITY EXAMINATION    Dressing to b/l LE intact. No strikethrough noted to the external dressing. Mild serosanguinous drainage noted to the internal layers of the dressing.     VASCULAR: DP and PT pulses are non palpable 0/4 b/l.  Upon previous doppler examination DP and PT pulses bilateral are found to be monophasic.  CFT is sluggish to the distal aspect of the Lisfranc stump of the right foot.  Skin temperature is 
Point of Care Note:  Vascular Surgery  Natan Lynch    12:44 PM  7/13/2025    Dressing changed to RLE BKA stump. Small abrasion to the lateral stump appears stable from prior. Adaptic placed to prevent adhesions and trauma to this area.    Pictures below for reference:                  
Procedure: peripheral block Right Leg (right popliteal and right femoral)  MD: Dr. Sanz  Timeout performed.  Pt monitored closely on heart monitor, 2L NC, continuous pulse oximetry, and frequent BPs.   Pt remained alert and oriented x4. pt tolerated procedure well.  Start time 12:58pm and End time 13:04 pm    
Pt arrived to 6304 from ED. Pt A&Ox4, VSS. Pt dinner tray ordered. Pt expresses no further needs at this time.   
Pt brought to pre-op in PACU 21. Vitals taken and pt found to have elevated temperature of 102 degrees F orally. OR Charge aware and will advise Dr. Alvarado. Dr. Sanz aware from anesthesia and will come back to do pt's nerve block if surgeon wishes to proceed.   
RN attempted to contact inpatient rehab, [facility], to deliver report about patient. [facility]  told RN to provide contact number for receiving nurse. RN told she will be contacted through the contact number when RN at [facility] is available for report.    TERESO Lamas from [facility] called. Report provided, Cydney stated there are no further questions at this time. Patient scheduled to discharge with transport at 4:30 pm.    Electronically signed by Rivka Alcantara RN on 7/16/2025 at 4:13 PM    
Spiritual Health History and Assessment/Progress Note  [facility]    (P) Attempted Encounter,  ,  ,      Name: Natan Lynch MRN: [id number]    Age: 64 y.o.     Sex: male   Language: English   Mu-ism: Unknown   Wound dehiscence     Date: 7/15/2025            Total Time Calculated: (P) 1 min              Spiritual Assessment began in [address] SURGERY        Referral/Consult From: (P) Nurse   Encounter Overview/Reason: (P) Attempted Encounter Patient refused spiritual care at this time.  Service Provided For: (P) Patient    Suzi, Belief, Meaning:   Patient Other: Patient refused spiritual care at this time.  Family/Friends No family/friends present      Importance and Influence:  Patient unable to assess at this time  Family/Friends No family/friends present    Community:  Patient Other: Unable to assess at this time.  Family/Friends No family/friends present    Assessment and Plan of Care:     Patient Interventions include: Other: unable to assess at this time.  Family/Friends Interventions include: No family/friends present    Patient Plan of Care: No future visits per patient/family request  Family/Friends Plan of Care: No family/friends present    Electronically signed by Oscar Arias,  Intern on 7/15/2025 at 12:01 PM    
[facility] -  Clinical Pharmacy Note    Vancomycin - Management by Pharmacy    Consult Date(s): 7/6/25  Consulting Provider(s): Alisha Stokes NP    Assessment / Plan   1)  Rt foot surgical site wound dehiscence - Vancomycin  Concurrent Antimicrobials: Meropenem  Day of Vanc Therapy / Ordered Duration: 2 of 7  Current Dosing Method: Bayesian-Guided AUC Dosing  Therapeutic Goal: -600 mg/L*hr  Current Dose / Plan:   Currently on 1000mg IV q12h.    SCr 0.8-->0.7 today.  Estimated AUC now on low end at 403.  Will increase to 1250mg IV q12h.  Regimen predicts an AUC = 503 with trough = 12.3 mcg/mL.  Random level is ordered for 7/8 AM to further evaluate above regimen.  Will continue to monitor clinical condition and make adjustments to regimen as appropriate.    Please call with questions--  Thanks--  Jillian Henning, LisD, BCPS, BCGP  [id number] ([facility] [place])   7/7/2025 9:33 AM      Interval update:  Vascular surgery taking to OR today for Rt BKA vs AKA.      Subjective/Objective:   Natan Lynch is a 64 y.o. 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     Pharmacy is consulted to dose Vancomycin.    Ht Readings from Last 1 Encounters:   07/03/25 1.956 m (6' 5\")     Wt Readings from Last 1 Encounters:   07/03/25 70.7 kg (155 lb 12.8 oz)     Current & Prior Antimicrobial Regimen(s):  Cefepime (7/3-7/6)  Vancomycin 1750mg x1 7/3  Meropenem (7/6-current)  Vancomycin - Pharmacy to dose  1750mg IV x1 7/6 AM  1000mg IV q12h (7/6 PM x1)  1250mg IV q12h (7/7-current)    Vancomycin Level(s) / Doses:    Date Time Dose Type of Level / Level Interpretation   7/8  1250mg IV q12h Random = ordered           Note: Serum levels collected for AUC-based dosing may be high if collected in close proximity to 
[facility] -  Clinical Pharmacy Note    Vancomycin - Management by Pharmacy    Consult Date(s): 7/6/25  Consulting Provider(s): Alisha Stokes NP    Assessment / Plan   1)  Rt foot surgical site wound dehiscence - Vancomycin  Concurrent Antimicrobials: Meropenem  Day of Vanc Therapy / Ordered Duration: 3 of 7  Current Dosing Method: Bayesian-Guided AUC Dosing  Therapeutic Goal: -600 mg/L*hr  Current Dose / Plan:   Currently on 1250mg IV q12h.    SCr 0.8-->0.7 yesterday; not drawn today.    Random level this AM = 10.3 mcg/mL.  Calculated AUC = 489 with trough = 11.8 mcg/mL.  Continue same regimen.  Will plan for repeat level in ~2-3 days (or sooner if clinically indicated).  Will continue to monitor clinical condition and make adjustments to regimen as appropriate.    Please call with questions--  Thanks--  Jillian Henning, PharmD, BCPS, BCGP  [id number] (\[facility]\"")   7/8/2025 1:11 PM      Interval update:  Pt now s/p Rt BKA.     Subjective/Objective:   Natan Lynch is a 64 y.o. 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     Pharmacy is consulted to dose Vancomycin.    Ht Readings from Last 1 Encounters:   07/03/25 1.956 m (6' 5\")     Wt Readings from Last 1 Encounters:   07/08/25 75.5 kg (166 lb 7.2 oz)     Current & Prior Antimicrobial Regimen(s):  Cefepime (7/3-7/6)  Vancomycin 1750mg x1 7/3  Meropenem (7/6-current)  Vancomycin - Pharmacy to dose  1750mg IV x1 7/6 AM  1000mg IV q12h (7/6 PM x1)  1250mg IV q12h (7/7-current)    Vancomycin Level(s) / Doses:    Date Time Dose Type of Level / Level Interpretation   7/8 11:25 1250mg IV q12h Random = 10.3 mcg/mL Drawn ~13h after prior dose  AUC = 489; ssTr = 11.8  Continue same regimen          Note: Serum levels collected for 
[facility] -  Clinical Pharmacy Note    Vancomycin - Management by Pharmacy    Consult Date(s): 7/6/25  Consulting Provider(s): Alisha Stokes NP    Assessment / Plan   1)  Rt foot surgical site wound dehiscence - Vancomycin  Concurrent Antimicrobials: Meropenem  Day of Vanc Therapy / Ordered Duration: 4 of 7  Current Dosing Method: Bayesian-Guided AUC Dosing  Therapeutic Goal: -600 mg/L*hr  Current Dose / Plan:   Currently on 1250mg IV q12h.    SCr stable at 0.5 today.    Calculated AUC = 489 with trough = 11.8 mcg/mL based on level 7/8.  Continue same regimen.  Per ID, abx likely to be discontinued soon (after 1st dressing change) .  Repeat level likely won't be required.  Will monitor and order level if plan changes / indicated.  Will continue to monitor clinical condition and make adjustments to regimen as appropriate.    Please call with questions--  Thanks--  Jillian Henning, PharmD, BCPS, BCGP  [id number] ([facility])   7/9/2025 10:19 AM      Interval update:  Pt now s/p Rt BKA (done 7/7/25).  Per ID, planning to continue abx at least until first dressing change.  Vascular surgery notes indicate dressing change likely tomorrow.    Subjective/Objective:   Natan Lynch is a 64 y.o. 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  Pt is s/p Rt BKA (done 7/7).    Pharmacy is consulted to dose Vancomycin.    Ht Readings from Last 1 Encounters:   07/03/25 1.956 m (6' 5\")     Wt Readings from Last 1 Encounters:   07/09/25 75.4 kg (166 lb 3.6 oz)     Current & Prior Antimicrobial Regimen(s):  Cefepime (7/3-7/6)  Vancomycin 1750mg x1 7/3  Meropenem (7/6-current)  Vancomycin - Pharmacy to dose  1750mg IV x1 7/6 AM  1000mg IV q12h (7/6 PM x1)  1250mg IV q12h (7/7-current)    Vancomycin 
[facility] - Acute Rehab Unit   After review, this patient is felt to be:       [x]  Dr. Lovell states this patient is appropriate for rehab.     []  Not appropriate for Acute Inpatient Rehab    []  Referral received and ARU reviewing patient      Pt is a good candidate for acute rehab, but due to no bed availability at [facility] Acute Rehab Unit at this time please refer pt to [facility], or [facility].     Will notify CM/SW with further updates. Thank you for the referral.    Viridiana TRAVIS OTR/L  Clinical Liaison- [facility]   (P): [phone]  (F): [phone]      
[facility] -  Clinical Pharmacy Note    Vancomycin - Management by Pharmacy    Consult Date(s): 7/6/25  Consulting Provider(s): Alisha Stokes NP    Assessment / Plan   1)  Rt foot surgical site wound dehiscence - Vancomycin  Concurrent Antimicrobials: Meropenem  Day of Vanc Therapy / Ordered Duration: 5 of 7  Current Dosing Method: Bayesian-Guided AUC Dosing  Therapeutic Goal: -600 mg/L*hr  Current Dose / Plan:   Currently on 1250mg IV q12h.    SCr stable at 0.6 today.    Calculated AUC = 481 with trough = 11.4 mcg/mL based on level 7/8.  Continue same regimen.  Per ID, abx likely to be discontinued soon (after 1st dressing change - possibly today?) .  Repeat level likely won't be required.  Will monitor and order level if plan changes / indicated.  Will continue to monitor clinical condition and make adjustments to regimen as appropriate.    Please call with questions--  Thanks--  Jillian Henning, PharmD, BCPS, BCGP  [id number] ([facility])   7/10/2025 9:52 AM      Interval update:  Per ID, planning to continue abx at least until first dressing change.  Vascular surgery notes indicate IPOP dressing change likely today with [employer].    Subjective/Objective:   Natan Lynch is a 64 y.o. 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  Pt is s/p Rt BKA (done 7/7).    Pharmacy is consulted to dose Vancomycin.    Ht Readings from Last 1 Encounters:   07/03/25 1.956 m (6' 5\")     Wt Readings from Last 1 Encounters:   07/09/25 75.4 kg (166 lb 3.6 oz)     Current & Prior Antimicrobial Regimen(s):  Cefepime (7/3-7/6)  Vancomycin 1750mg x1 7/3  Meropenem (7/6-current)  Vancomycin - Pharmacy to dose  1750mg IV x1 7/6 AM  1000mg IV q12h (7/6 PM x1)  1250mg IV q12h 
Vascular Surgery   Daily Progress Note  Patient: Natan Lynch      CC: Postoperative wound dehiscence, BKA vs AKA today      SUBJECTIVE:   Resting comfortably. Pain control stable. No complaints this morning. Understanding about needing laser perfusion prior to OR.     OBJECTIVE:    PHYSICAL EXAM:    Vitals:    07/07/25 0012 07/07/25 0015 07/07/25 0215 07/07/25 0508   BP:  116/66  121/60   Pulse:  90  88   Resp: 18 18 18 18   Temp:  98.4 °F (36.9 °C)  98.9 °F (37.2 °C)   TempSrc:  Oral  Oral   SpO2:  97%  97%   Weight:       Height:           General appearance: Alert, no acute distress  Eyes: No scleral icterus  Chest/Lungs: Normal effort with no accessory muscle use on RA  Cardiovascular: RR  Skin: R foot with erythema and minimal strike through at surgical site. Sutures at amputation site are partially dehisced with ischemic skin changes to medial aspect. Left foot with extensive stasis dermatitis as well has large ulceration with overlying eschar.   Extremities: R monophasic AT/PT ; L monophasic DP/PT. Bilat palpable bypass. Derek LE w/ ace wrap.  Neuro: A&Ox3, no focal deficits.    LABS:   Recent Labs     07/06/25  0239 07/07/25  0457   WBC 16.9* 15.0*   HGB 7.8* 7.8*   HCT 23.6* 24.0*   MCV 85.1 86.0   * 812*        Recent Labs     07/06/25  0239 07/07/25  0457   * 135*   K 4.4 4.1   CL 97* 98*   CO2 26 28   PHOS 3.2 3.6   BUN 23* 12   CREATININE 0.8 0.7*       ASSESSMENT & PLAN:   This is a 64 y.o. male with Hx of CAD, PAD, left popliteal to anterior tibial bypass and right popliteal to dorsalis pedis bypass on 4/14, and recent right foot Lisfranc amputation on 6/20. Vascular surgery was consulted for evaluation of R BKA given dehiscence and ischemic changes to RLE.     - Plan for R BKA vs AKA today (7/7)   - NPO, IVF   - Laser perfusion studies first thing this morning for surgical planning   - Will discuss nerve block with anesthesia for pain control  - Continue dressing changes per Podiatry  - 
Vascular Surgery   Daily Progress Note  Patient: Natan Lynch      CC: Postoperative wound dehiscence, BKA with IPOP (7/7)      SUBJECTIVE:   No acute events overnight. Patient is tolerating diet. Working with physical therapy. Pain is well controlled.      OBJECTIVE:    PHYSICAL EXAM:    Vitals:    07/14/25 1524 07/14/25 1927 07/14/25 2245 07/15/25 0259   BP: 114/68 105/68 104/65 109/69   Pulse: 90 77 78 69   Resp: 16 18 18 17   Temp: 99 °F (37.2 °C) 98.1 °F (36.7 °C) 98.1 °F (36.7 °C) 98.3 °F (36.8 °C)   TempSrc: Oral Oral Oral Oral   SpO2: 99% 100% 100% 100%   Weight:       Height:           General appearance: Alert, no acute distress  Eyes: No scleral icterus  Chest/Lungs: Normal effort with no accessory muscle use on RA  Cardiovascular: RR  Skin: R BKA with IPOP in place. Left foot dressing c/d/i  Extremities: RLE with IPOP in place.  Neuro: A&Ox3    ASSESSMENT & PLAN:   This is a 64 y.o. male with Hx of CAD, PAD, left popliteal to anterior tibial bypass and right popliteal to dorsalis pedis bypass on 4/14, and recent right foot Lisfranc amputation on 6/20. Vascular surgery was consulted for evaluation of R BKA given dehiscence and ischemic changes to RLE s/p R BKA 7/7     - S/p R BKA with IPOP (7/7)  - Continue regular diet  - Continue LLE dressing changes per Podiatry  - Plan RLE dressing change tomorrow  - MMPC  - Continue PT/OT  - Continue Eliquis  - Medical management and dispo per primary, patient to determine ARU preference     William Hernandez DO   PGY1, General Surgery  07/15/25  6:31 AM  [facility]: [facility]   Pager: [phone]    
Vascular Surgery   Daily Progress Note  Patient: Natan Lynch      CC: Postoperative wound dehiscence, BKA with IPOP (7/7)      SUBJECTIVE:   No acute events. Pain is controlled. Working with therapy. Decided on SNF placement to [place]. Tolerating diet.      OBJECTIVE:    PHYSICAL EXAM:    Vitals:    07/16/25 0034 07/16/25 0325 07/16/25 0327 07/16/25 0355   BP: 90/73  100/65    Pulse: 77  71    Resp: 18 18 18 16   Temp: 97.9 °F (36.6 °C)  98.4 °F (36.9 °C)    TempSrc: Oral  Oral    SpO2: 100%  100%    Weight:       Height:           General appearance: Alert, no acute distress  Eyes: No scleral icterus  Chest/Lungs: Normal effort with no accessory muscle use on RA  Cardiovascular: RR  Skin: R BKA with IPOP in place. Left foot dressing c/d/i  Extremities: RLE with IPOP in place.  Neuro: A&Ox3    ASSESSMENT & PLAN:   This is a 64 y.o. male with Hx of CAD, PAD, left popliteal to anterior tibial bypass and right popliteal to dorsalis pedis bypass on 4/14, and recent right foot Lisfranc amputation on 6/20. Vascular surgery was consulted for evaluation of R BKA given dehiscence and ischemic changes to RLE s/p R BKA 7/7     - S/p R BKA with IPOP (7/7)  - Continue regular diet  - Continue LLE dressing changes per Podiatry  - Plan RLE dressing change tomorrow (7/17), [facility] with [facility] at [facility]  - Continue PT/OT  - Continue Eliquis  - Medical management and dispo per primary, SNF at [place] on discharge, awaiting pre-cert      Brandi Mcdermott MD  PGY1, General Surgery  07/16/25  6:25 AM  [facility]  Pager: [phone]      
Vascular Surgery   Daily Progress Note  Patient: Natan Lynch      CC: Postoperative wound dehiscence, BKA with IPOP (7/7)      SUBJECTIVE:   Pain controlled. Up in chair. Working with physical therapy. Eating and drinking. Dressing changed 7/13.       OBJECTIVE:    PHYSICAL EXAM:    Vitals:    07/13/25 2015 07/13/25 2339 07/14/25 0600 07/14/25 0623   BP:   112/72    Pulse:   77    Resp: 15 14 16 15   Temp:   97.6 °F (36.4 °C)    TempSrc:   Oral    SpO2:   98%    Weight:       Height:           General appearance: Alert, no acute distress  Eyes: No scleral icterus  Chest/Lungs: Normal effort with no accessory muscle use on RA  Cardiovascular: RR  Skin: R BKA with IPOP in place. Left foot dressing c/d/i  Extremities: RLE with IPOP in place.  Neuro: A&O    ASSESSMENT & PLAN:   This is a 64 y.o. male with Hx of CAD, PAD, left popliteal to anterior tibial bypass and right popliteal to dorsalis pedis bypass on 4/14, and recent right foot Lisfranc amputation on 6/20. Vascular surgery was consulted for evaluation of R BKA given dehiscence and ischemic changes to RLE s/p R BKA 7/7     - S/p R BKA with IPOP (7/7)  - Continue regular diet  - Continue LLE dressing changes per Podiatry  - IPOP dressing changed yesterday 7/13; will discuss timing of next change tomorrow  - Pain control: tylenol, gabapentin, robaxin, valium, oxy vs dilaudid PRN  - Continue PT/OT  - Continue Eliquis  - Medical management and dispo per primary, ok to transition to rehab from vascular surgery standpoint    Chica Gautam MD  PGY1, General Surgery  07/14/25  6:52 AM  [employer]  Pager Number: [phone]    
Vascular Surgery   Daily Progress Note  Patient: Natan Lynch      CC: Postoperative wound dehiscence, BKA with IPOP (7/7)      SUBJECTIVE:   Pain is manageable. Working with physical therapy. Eating and drinking. Had a BM.       OBJECTIVE:    PHYSICAL EXAM:    Vitals:    07/10/25 2016 07/10/25 2206 07/11/25 0051 07/11/25 0353   BP:    106/69   Pulse:    79   Resp: 18 18 18 18   Temp:    97.5 °F (36.4 °C)   TempSrc:    Oral   SpO2:    98%   Weight:       Height:           General appearance: Alert, no acute distress  Eyes: No scleral icterus  Chest/Lungs: Normal effort with no accessory muscle use on RA  Cardiovascular: RR  Skin: R BKA with IPOP in place. Left foot with extensive stasis dermatitis as well has large ulceration with overlying eschar.   Extremities: RLE with IPOP in place. R fem palp.  Neuro: A&O        ASSESSMENT & PLAN:   This is a 64 y.o. male with Hx of CAD, PAD, left popliteal to anterior tibial bypass and right popliteal to dorsalis pedis bypass on 4/14, and recent right foot Lisfranc amputation on 6/20. Vascular surgery was consulted for evaluation of R BKA given dehiscence and ischemic changes to RLE s/p R BKA 7/7     - S/p R BKA with IPOP (7/7)  - Continue regular diet  - Continue LLE dressing changes per Podiatry  - IPOP dressing change today with [facility]  - Continue Q4H neurovascular checks  - Pain control: tylenol, gabapentin, robaxin, valium, oxy vs dilaudid PRN  - PT/OT today   - On heparin gtt, okay to transition to oral AC      Brandi Mcdermott MD  PGY1, General Surgery  07/11/25  7:03 AM  [employer]  Pager: [phone]              
Vascular Surgery   Daily Progress Note  Patient: Natan Lynch      CC: Postoperative wound dehiscence, BKA with IPOP (7/7)      SUBJECTIVE:   Pain slightly improved but still present. Tolerating diet. Having flatus no BMs.     OBJECTIVE:    PHYSICAL EXAM:    Vitals:    07/09/25 2027 07/09/25 2213 07/10/25 0047 07/10/25 0512   BP: 123/79 107/69  128/76   Pulse: 73 83  79   Resp: 18 16 18 16   Temp: 98.2 °F (36.8 °C) 99.1 °F (37.3 °C)  98 °F (36.7 °C)   TempSrc: Oral Oral  Oral   SpO2: 98% 99%  100%   Weight:       Height:           General appearance: Alert, no acute distress  Eyes: No scleral icterus  Chest/Lungs: Normal effort with no accessory muscle use on RA  Cardiovascular: RR  Skin: R BKA with IPOP in place. Left foot with extensive stasis dermatitis as well has large ulceration with overlying eschar.   Extremities: RLE with IPOP in place. R fem palp.  Neuro: A&Ox3, no focal deficits.    LABS:   Recent Labs     07/08/25  1322 07/09/25  0555   WBC 16.5* 12.4*   HGB 7.4* 7.2*   HCT 22.8* 21.9*   MCV 84.1 83.7   * 790*        Recent Labs     07/08/25  1322 07/09/25  0555    137   K 3.8 4.0    101   CO2 27 25   PHOS 2.7 3.1   BUN 9 6*   CREATININE 0.6* 0.5*       ASSESSMENT & PLAN:   This is a 64 y.o. male with Hx of CAD, PAD, left popliteal to anterior tibial bypass and right popliteal to dorsalis pedis bypass on 4/14, and recent right foot Lisfranc amputation on 6/20. Vascular surgery was consulted for evaluation of R BKA given dehiscence and ischemic changes to RLE s/p R BKA 7/7     - s/p R BKA with IPOP (7/7)  - Continue regular diet  - Continue LLE dressing changes per Podiatry  - IPOP dressing change today with [facility]  - Continue Q4H neurovascular checks  - Continue abx and trend leukocytosis   - Pain control: tylenol, gabapentin, robaxin, valium, oxy vs dilaudid PRN  - PT/OT today   - Continue heparin gtt    Chica Gautam MD  PGY1, General Surgery  07/10/25  6:56 
Vascular Surgery   Daily Progress Note  Patient: Natan Lynch      CC: Postoperative wound dehiscence, BKA with IPOP (7/7)      SUBJECTIVE:   Pain well controlled. Tolerating diet. Having flatus no Bms.     OBJECTIVE:    PHYSICAL EXAM:    Vitals:    07/08/25 1522 07/08/25 2111 07/08/25 2303 07/09/25 0116   BP: 116/63 130/75  121/70   Pulse: 72 71  82   Resp: 18 18 18 16   Temp: 98.4 °F (36.9 °C) 98.2 °F (36.8 °C)  98.2 °F (36.8 °C)   TempSrc: Oral Oral  Axillary   SpO2: 100% 100%  98%   Weight:       Height:           General appearance: Alert, no acute distress  Eyes: No scleral icterus  Chest/Lungs: Normal effort with no accessory muscle use on RA  Cardiovascular: RR  Skin: R BKA with IPOP in place. No visible bleeding. Left foot with extensive stasis dermatitis as well has large ulceration with overlying eschar.   Extremities: RLE with IPOP in place, no strikethrough. R fem palp. L monophasic DP/PT.   Neuro: A&Ox3, no focal deficits.    LABS:   Recent Labs     07/07/25  0457 07/08/25  1322   WBC 15.0* 16.5*   HGB 7.8* 7.4*   HCT 24.0* 22.8*   MCV 86.0 84.1   * 756*        Recent Labs     07/07/25  0457 07/08/25  1322   * 137   K 4.1 3.8   CL 98* 100   CO2 28 27   PHOS 3.6 2.7   BUN 12 9   CREATININE 0.7* 0.6*       ASSESSMENT & PLAN:   This is a 64 y.o. male with Hx of CAD, PAD, left popliteal to anterior tibial bypass and right popliteal to dorsalis pedis bypass on 4/14, and recent right foot Lisfranc amputation on 6/20. Vascular surgery was consulted for evaluation of R BKA given dehiscence and ischemic changes to RLE s/p R BKA 7/7     - s/p R BKA with IPOP (7/7)  - Regular diet  - Continue dressing changes per Podiatry  - Dressing change tomorrow  - Continue Q4H neurovascular checks  - Continue abx and trend leukocytosis   - Pain control: tylenol, gabapentin, valium, oxy vs dilaudid PRN  - Robaxin added for pain control  - PT/OT today   - Okay for AC from vascular standpoint    Brandi Mcdermott, 
Vascular Surgery   Daily Progress Note  Patient: Natan Lynch      CC: Postoperative wound dehiscence, discuss BKA       SUBJECTIVE:   Patient rested well overnight without any acute issues. Pain is stable from overnight. He denies fevers or chills.    OBJECTIVE:    PHYSICAL EXAM:    Vitals:    07/04/25 2122 07/04/25 2317 07/05/25 0147 07/05/25 0500   BP:  116/77  122/75   Pulse:  79  79   Resp: 18 18 18 18   Temp:  98.1 °F (36.7 °C)  98.4 °F (36.9 °C)   TempSrc:  Oral  Oral   SpO2:  97%  96%   Weight:       Height:           General appearance: Alert, no acute distress  Eyes: No scleral icterus  Chest/Lungs: Normal effort with no accessory muscle use on RA  Cardiovascular: RR  Skin: R foot with erythema and minimal strike through at surgical site. Sutures at amputation site are partially dehisced with ischemic skin changes to medial aspect. Left foot with extensive stasis dermatitis as well has large ulceration with overlying eschar.   Extremities: R monophasic AT/PT ; L monophasic DP/PT. R palpable bypass.  Neuro: A&Ox3, no focal deficits.    LABS:   Recent Labs     07/03/25  1528 07/04/25  0609   WBC 20.0* 16.0*   HGB 8.3* 7.8*   HCT 24.7* 23.0*   MCV 85.4 84.0   * 695*        Recent Labs     07/03/25  1528 07/04/25  0609   * 134*   K 4.7 3.9   CL 97* 98*   CO2 25 26   PHOS  --  3.8   BUN 21* 13   CREATININE 0.7* 0.6*       ASSESSMENT & PLAN:   This is a 64 y.o. male with Hx of CAD, PAD, left popliteal to anterior tibial bypass and right popliteal to dorsalis pedis bypass on 4/14, and recent right foot Lisfranc amputation on 6/20. Vascular surgery was consulted for evaluation of R BKA given dehiscence and ischemic changes to RLE.     - Plan for R BKA, 7/7  - Continue dressing changes per Podiatry  - Continue Q4H neurovascular checks  - Continue abx per primary, leukocytosis (WBC 20) in the setting of osteomyelitis  - MMPC per primary  - Hold Xarelto  - If anticoagulation needed, recommend heparin 
Vascular Surgery   Daily Progress Note  Patient: Natan Lynch      CC: Postoperative wound dehiscence, discuss BKA       SUBJECTIVE:   Resting comfortably. Pain control is improved.     OBJECTIVE:    PHYSICAL EXAM:    Vitals:    07/05/25 2025 07/05/25 2205 07/05/25 2235 07/06/25 0048   BP: 112/70   109/63   Pulse: 86   86   Resp: 18 18 18 18   Temp: 99.3 °F (37.4 °C)   98.9 °F (37.2 °C)   TempSrc: Oral   Oral   SpO2: 98%      Weight:       Height:           General appearance: Alert, no acute distress  Eyes: No scleral icterus  Chest/Lungs: Normal effort with no accessory muscle use on RA  Cardiovascular: RR  Skin: R foot with erythema and minimal strike through at surgical site. Sutures at amputation site are partially dehisced with ischemic skin changes to medial aspect. Left foot with extensive stasis dermatitis as well has large ulceration with overlying eschar.   Extremities: R monophasic AT/PT ; L monophasic DP/PT. Bilat palpable bypass.  Neuro: A&Ox3, no focal deficits.    LABS:   Recent Labs     07/05/25  1253 07/06/25  0239   WBC 15.7* 16.9*   HGB 8.1* 7.8*   HCT 24.3* 23.6*   MCV 85.5 85.1   * 733*        Recent Labs     07/05/25  0602 07/06/25  0239   * 133*   K 4.0 4.4   CL 97* 97*   CO2 27 26   PHOS 3.8 3.2   BUN 9 23*   CREATININE 0.6* 0.8       ASSESSMENT & PLAN:   This is a 64 y.o. male with Hx of CAD, PAD, left popliteal to anterior tibial bypass and right popliteal to dorsalis pedis bypass on 4/14, and recent right foot Lisfranc amputation on 6/20. Vascular surgery was consulted for evaluation of R BKA given dehiscence and ischemic changes to RLE.     - Plan for R BKA vs AKA, tomorrow (7/7)   - NPO at midnight, IVF   - Patient to need laser perfusion studies first thing in morning for surgical planning   - Will return to consent patient  - Continue dressing changes per Podiatry  - Continue Q4H neurovascular checks  - Continue abx and trend leukocytosis   - Pain control: tylenol, 
Vascular Surgery  Interval Note:    Patient examined at bedside. He reports yesterday's dressing change was very painful and would like to avoid today. Spoke with Chanel at [facility] who is able to assess patient today with dressing on. Will defer dressing change at this time.     Plan:    [facility] (next change 7/17)    Brandi Mcdermott MD  PGY1, General Surgery  07/14/25  11:00 AM  [employer]  Pager: [phone]      
Vascular Surgery  Post-operative Note      Procedure(s) Performed: R below knee amputation    Subjective:   Patient reports no significant pain post op. States that numbing medications are still in effect, he does state significant concern regarding post op pain the last time he had surgery and wants to ensure he doesn't have similar pain this time around.     Objective:  Anesthesia type: General    Vitals:   Vitals:    07/07/25 1623 07/07/25 1630 07/07/25 1645 07/07/25 2229   BP: 101/63 97/60 109/68 115/66   Pulse: 80 72 82 79   Resp: 12 23 17 18   Temp: 98.5 °F (36.9 °C)  98.4 °F (36.9 °C) 97.5 °F (36.4 °C)   TempSrc: Temporal  Oral Oral   SpO2: 100% 100% 100% 99%   Weight:       Height:           Physical Exam:  Post-op vital signs: Stable   General appearance: Alert, no acute distress, grooming appropriate  Chest/Lungs: Normal effort with no accessory muscle use on RA  Cardiovascular: RRR, well-perfused  Abdomen: Soft, non-tender, non-distended, no rebound, guarding, or rigidity.  Skin: Warm and dry, no rashes  Extremities: Right BKA IPOP dressing in place with adequate hemostasis, no surround edema or erythema. Palpable femoral pulses and popliteal pulses bilaterally.   Neuro: A&Ox3    Assessment and Plan  This is a 64 y.o. year old male status post R below knee leg amputation secondary to osteomyelitis of R foot POD0.    Pain management: Scheduled Tylenol, Roxicodone pain panel, Lidoderm patch, and IV Dilaudid pain panel, scheduled gabapentin  Cardiovasc: Hemodynamically stable, will continue to monitor  Respiratory: IS ordered to bedside, encourage hourly IS and deep breathing, wean oxygen as tolerated  Fluids:  LR 50cc/hr, Diet: Regular  : Urine output is adequate   Ambulation: OOB to chair, encourage ambulation  Prophylaxis: SCDs, on Hep gtt  Antibiotics: Continue Merrem and Vancomycin  Wound: Local wound care    Debbie Mancilla MD  PGY1, General Surgery  07/07/25  11:48 PM  [phone]    
microbiology called positive wound cultures, Klebsiella Pnuemoniae, ESBL. [facility]  
pt is complaining of right foot incisional pain, both Oxycodone and Dilaudid timed out. [facility] Hospitalist for new orders  
Continue heparin gtt    Chica Gautam MD  PGY1, General Surgery  07/08/25  6:16 AM  [employer]  Pager Number: [phone]        
Surgery: Blue Team   
AM   
CK    Goals  Short Term Goals  Time Frame for Short Term Goals: at d/c  Short Term Goal 1: Stance x 4 mins with CGA for ADLs/ IADLs  Short Term Goal 2: LE Dressing with Min and AE prn  Short Term Goal 3: Functional transfers with CGA  Short Term Goal 4: Toileting with CGA  Patient Goals   Patient goals : Return to [facility] / home    Therapy Time   Individual Concurrent Group Co-treatment   Time In 0900         Time Out 0955         Minutes 55             Timed Code Treatment Minutes:   39 mins     Total Treatment Minutes:  55 mins       Makayla Luna, OT

## 2025-07-16 NOTE — DISCHARGE SUMMARY
V2.0  Discharge Summary    Name:  Natan Lynch /Age/Sex: 1960 (64 y.o. male)   Admit Date: 7/3/2025  Discharge Date: 25    MRN & CSN:  [id number] & [id number] Encounter Date and Time 25 4:53 PM EDT    Attending:  Hansa Nunez MD Discharging Provider: Hansa Nunez MD       Hospital Course:     Hospital course:     Natan Lynch is a 64 y.o. male with pmh of with a past medical history of PE/DVT, coronary artery disease, peripheral vascular disease,, tobacco abuse who presents with postop Wound dehiscence.  Patient underwent right foot Lisfranc amputation on  and left popliteal to anterior tibial bypass with reversed GSV and right popliteal to dorsalis pedis bypass with reversed GSV on  and was discharged to a nursing facility where dressings were not done.  Patient presented to the ED with incision infection and dehiscence.  Wound culture grew corynebacterium Klebsiella ESBL and MRSA.  Patient underwent BKA on .  ID was consulted.  Patient was treated with Vanco and meropenem.  Antibiotics have been stopped as patient underwent BKA            Plan:      Right foot wound dehiscence with polymicrobial infection with right foot osteomyelitis s/p BKA on -dressing per surgery  - Now off antibiotics  - Management per vascular surgery  - Past history of Lisfranc amputation on   - Pain control with long-acting pain medication followed by as needed po medication     Peripheral vascular disease has full-thickness/prior left lower extremity  -- Unclear why patient was switched from Xarelto to Eliquis.  Patient Xarelto is his home medication will continue Xarelto  - S/p femoral anterior tibial bypass on   - Podiatry following     Constipation  - Improved  - Continue bowel regimen     Chronic anemia  - Hemoglobin stable between 7.5 and 8  -iron studies shows iron deficiency start iron supplement     Anxiety  - Patient started on scheduled Valium will cut back dose and

## 2025-07-18 ENCOUNTER — TELEPHONE (OUTPATIENT)
Dept: VASCULAR SURGERY | Age: 65
End: 2025-07-18

## 2025-07-18 NOTE — TELEPHONE ENCOUNTER
Spoke with TERESO Tripp at [facility] following up on Mr. Lynch s/p RBKA 7/7. He is doing well, working with therapy, no issues at this time. Post-op confirmed for 8/5.

## 2025-07-24 ENCOUNTER — TELEPHONE (OUTPATIENT)
Dept: VASCULAR SURGERY | Age: 65
End: 2025-07-24

## 2025-07-24 NOTE — TELEPHONE ENCOUNTER
LVM for nursing supervisor of [facility] informing them we need to reschedule his appt on 8/5. Will continue to reach out to someone at facility.

## 2025-07-28 ENCOUNTER — HOSPITAL ENCOUNTER (OUTPATIENT)
Dept: WOUND CARE | Age: 65
Discharge: HOME | End: 2025-07-28
Attending: STUDENT IN AN ORGANIZED HEALTH CARE EDUCATION/TRAINING PROGRAM
Payer: COMMERCIAL

## 2025-07-28 VITALS
HEART RATE: 87 BPM | SYSTOLIC BLOOD PRESSURE: 149 MMHG | TEMPERATURE: 97.7 F | DIASTOLIC BLOOD PRESSURE: 94 MMHG | RESPIRATION RATE: 18 BRPM

## 2025-07-28 DIAGNOSIS — I70.263 ATHEROSCLEROSIS OF NATIVE ARTERY OF BOTH LOWER EXTREMITIES WITH GANGRENE (HCC): Primary | ICD-10-CM

## 2025-07-28 DIAGNOSIS — I99.8 ISCHEMIA OF LEFT LOWER EXTREMITY: ICD-10-CM

## 2025-07-28 DIAGNOSIS — I73.9 PERIPHERAL ARTERY DISEASE: ICD-10-CM

## 2025-07-28 DIAGNOSIS — R73.9 ELEVATED BLOOD SUGAR: ICD-10-CM

## 2025-07-28 DIAGNOSIS — E46 PROTEIN DEFICIENCY: ICD-10-CM

## 2025-07-28 PROCEDURE — 99204 OFFICE O/P NEW MOD 45 MIN: CPT | Performed by: STUDENT IN AN ORGANIZED HEALTH CARE EDUCATION/TRAINING PROGRAM

## 2025-07-28 PROCEDURE — 11042 DBRDMT SUBQ TIS 1ST 20SQCM/<: CPT | Performed by: STUDENT IN AN ORGANIZED HEALTH CARE EDUCATION/TRAINING PROGRAM

## 2025-07-28 PROCEDURE — 99214 OFFICE O/P EST MOD 30 MIN: CPT

## 2025-07-28 PROCEDURE — 11042 DBRDMT SUBQ TIS 1ST 20SQCM/<: CPT

## 2025-07-28 RX ORDER — CLOBETASOL PROPIONATE 0.5 MG/G
OINTMENT TOPICAL PRN
OUTPATIENT
Start: 2025-07-28

## 2025-07-28 RX ORDER — LIDOCAINE 40 MG/G
CREAM TOPICAL PRN
OUTPATIENT
Start: 2025-07-28

## 2025-07-28 RX ORDER — LIDOCAINE 50 MG/G
OINTMENT TOPICAL PRN
OUTPATIENT
Start: 2025-07-28

## 2025-07-28 RX ORDER — TRIAMCINOLONE ACETONIDE 1 MG/G
OINTMENT TOPICAL PRN
OUTPATIENT
Start: 2025-07-28

## 2025-07-28 RX ORDER — MUPIROCIN 2 %
OINTMENT (GRAM) TOPICAL PRN
OUTPATIENT
Start: 2025-07-28

## 2025-07-28 RX ORDER — GINSENG 100 MG
CAPSULE ORAL PRN
OUTPATIENT
Start: 2025-07-28

## 2025-07-28 RX ORDER — BACITRACIN ZINC AND POLYMYXIN B SULFATE 500; 1000 [USP'U]/G; [USP'U]/G
OINTMENT TOPICAL PRN
OUTPATIENT
Start: 2025-07-28

## 2025-07-28 RX ORDER — LIDOCAINE HYDROCHLORIDE 40 MG/ML
SOLUTION TOPICAL PRN
OUTPATIENT
Start: 2025-07-28

## 2025-07-28 RX ORDER — SODIUM CHLOR/HYPOCHLOROUS ACID 0.033 %
SOLUTION, IRRIGATION IRRIGATION PRN
OUTPATIENT
Start: 2025-07-28

## 2025-07-28 RX ORDER — SILVER SULFADIAZINE 10 MG/G
CREAM TOPICAL PRN
OUTPATIENT
Start: 2025-07-28

## 2025-07-28 RX ORDER — GENTAMICIN SULFATE 1 MG/G
OINTMENT TOPICAL PRN
OUTPATIENT
Start: 2025-07-28

## 2025-07-28 NOTE — PATIENT INSTRUCTIONS
steri strips/staples/stitches in place.  [] Do Not Change Dressing  [] Other:          Pressure Relief:  When sitting, shift position or do seat lifts every 15 minutes.  Turn every 2 hours when in bed.  Avoid position directing pressure on wound site.  Limit side lying to 30 degree tilt.  Limit HOB elevation to 30 degrees.    Specialty equipment needed:  []Wheelchair cushion [] Specialty Bed/Mattress  []To be ordered by: []Skilled Nursing Facility []Home Health Care []Wound Care Center []Other:     Edema Control:       Elevate leg(s) above the level of the heart for 30 minutes 4-5 times a day and/or when sitting.   Avoid prolonged standing in one place.     Patient may participate in therapy with the following restrictions for off-Loading:   []Off Loading(Pressure Reduction) When: Walking  Weight Bearing Status: No Weight bearing restrictions Left;  Offloading devices: Post op shoe/ Surgical shoe: Left  []No Offloading required    Dietary:   Important dietary reminders:  1. Increase Protein intake (i.e. Lean meats, fish, eggs, legumes, and yogurt)  2. No added salt  3. If diabetic, follow a diabetic diet and check glucose prior to meals or as instructed by your physician.    [] SNF: Please have dietician evaluate patient for nutritional needs    Dietary Supplements: [x] Shaggy  [] 30ml ProMod/ProStat [] 60ml Expedite [] Other:   Take supplements twice a day or as directed as followed: Boost 30 gram      Supplies     Your nurse  is:  Faith     Electronically signed by Shyanne Lyles RN on 7/28/2025 at 10:37 AM     Wound Care Center Information: Should you experience any significant changes in your wound(s) or have questions about your wound care, please contact the [facility] at [phone].     Hours of operation  Mon:  8AM - 12:30PM  Tue: 12:30PM - 5PM  Wed: 12:30PM - 4:30PM  Thur: 12:30PM - 4:30PM  Fri:  CLOSED    Please give us 24-48 business hours to return your call.  These

## 2025-07-28 NOTE — PROGRESS NOTES
[facility] Progress Note    Natan Lynch     : 1960    DATE OF VISIT:  2025    Subjective:     Natan Lynch is a 64 y.o. male who has (a)n arterial ulcer(s) located on the right heel.  Laser perfusion scan showed good healing potential.  He states that his right BKA performed on 2025 is healing well with nothing open at this time.  The left lower extremity still has an open wound on the lateral foot.  He is status post bilateral femoral anterior tibial artery bypass on 2025.  He has been followed by Dr. Acosta with podiatry.  I was asked to see this patient this week and Dr. Acosta will resume seeing him next week in the wound care center.  He has been hypoalbuminemic and had some elevated blood sugars while admitted to the hospital with 172 being the highest.  Does not look like he has had a recent A1c.  He has been increasing his protein intake.  He is drinking high-protein boost, Shaggy, eating chicken, eggs, fish, etc.    Additional ulcer(s) noted? no.     Past Medical History:   Diagnosis Date    Bilateral pulmonary embolism (HCC)     CAD (coronary artery disease)     DVT (deep venous thrombosis) (HCC)     History of blood transfusion     Hx of blood clots     Peripheral artery disease     Tobacco abuse     Uses wheelchair        His current medication list consists of Balsam [place]-Castor Oil, Dextromethorphan-guaiFENesin, acetaminophen, docusate sodium, ferrous sulfate, gabapentin, loperamide, naloxegol, oxyCODONE, polyethylene glycol, promethazine, rivaroxaban, and sennosides-docusate sodium.    Allergies: Patient has no known allergies.    Objective:     Vitals:    25 0957   BP: (!) 149/94   Pulse: 87   Resp: 18   Temp: 97.7 °F (36.5 °C)   TempSrc: Temporal       Physical  Constitutional:  NAD  Respiratory:  Normal effort  Psychiatric:  Mood and affect appropriate  Lymphatic: No edema, no cellulitis  Vascular: Dorsalis pedis and posterior

## 2025-08-01 ENCOUNTER — TELEPHONE (OUTPATIENT)
Dept: VASCULAR SURGERY | Age: 65
End: 2025-08-01

## 2025-08-01 NOTE — TELEPHONE ENCOUNTER
Spoke with Mr. Lynch he is home now from [facility], discharged 7/31. We have his post-op appt rescheduled for 8/12 at 1p. He is also having paperwork faxed to office to be filled out for medical transport for his insurance. He states overall he is doing well and feels pretty good. No questions or concerns at this time.

## 2025-08-01 NOTE — TELEPHONE ENCOUNTER
DELMY for nursing supervisor at [facility] informing them we needed to reschedule Mr. Lynch appt on Tuesday 8/5.

## 2025-08-12 ENCOUNTER — OFFICE VISIT (OUTPATIENT)
Dept: VASCULAR SURGERY | Age: 65
End: 2025-08-12

## 2025-08-12 VITALS
DIASTOLIC BLOOD PRESSURE: 50 MMHG | SYSTOLIC BLOOD PRESSURE: 100 MMHG | HEART RATE: 85 BPM | TEMPERATURE: 96.9 F | OXYGEN SATURATION: 99 %

## 2025-08-12 DIAGNOSIS — T82.858A BYPASS GRAFT STENOSIS, INITIAL ENCOUNTER: ICD-10-CM

## 2025-08-12 DIAGNOSIS — Z89.511 STATUS POST BELOW KNEE AMPUTATION OF RIGHT LOWER EXTREMITY (HCC): Primary | ICD-10-CM

## 2025-08-12 DIAGNOSIS — I73.9 PERIPHERAL ARTERY DISEASE: Primary | ICD-10-CM

## 2025-08-12 PROCEDURE — 99024 POSTOP FOLLOW-UP VISIT: CPT | Performed by: SURGERY

## 2025-08-12 RX ORDER — DIAZEPAM 5 MG/1
5 TABLET ORAL EVERY 6 HOURS PRN
COMMUNITY

## 2025-08-12 RX ORDER — METHOCARBAMOL 500 MG/1
500 TABLET, FILM COATED ORAL 4 TIMES DAILY
COMMUNITY

## 2025-08-13 ENCOUNTER — HOSPITAL ENCOUNTER (OUTPATIENT)
Dept: WOUND CARE | Age: 65
Discharge: HOME | End: 2025-08-13
Attending: PODIATRIST
Payer: COMMERCIAL

## 2025-08-13 ENCOUNTER — TELEPHONE (OUTPATIENT)
Dept: VASCULAR SURGERY | Age: 65
End: 2025-08-13

## 2025-08-13 VITALS
SYSTOLIC BLOOD PRESSURE: 118 MMHG | DIASTOLIC BLOOD PRESSURE: 74 MMHG | TEMPERATURE: 97.1 F | RESPIRATION RATE: 18 BRPM | HEART RATE: 82 BPM

## 2025-08-13 DIAGNOSIS — I73.9 PERIPHERAL ARTERY DISEASE: ICD-10-CM

## 2025-08-13 DIAGNOSIS — E46 PROTEIN DEFICIENCY: ICD-10-CM

## 2025-08-13 DIAGNOSIS — I70.263 ATHEROSCLEROSIS OF NATIVE ARTERY OF BOTH LOWER EXTREMITIES WITH GANGRENE (HCC): Primary | ICD-10-CM

## 2025-08-13 DIAGNOSIS — I99.8 ISCHEMIA OF LEFT LOWER EXTREMITY: ICD-10-CM

## 2025-08-13 PROCEDURE — 11042 DBRDMT SUBQ TIS 1ST 20SQCM/<: CPT

## 2025-08-13 PROCEDURE — 6370000000 HC RX 637 (ALT 250 FOR IP): Performed by: STUDENT IN AN ORGANIZED HEALTH CARE EDUCATION/TRAINING PROGRAM

## 2025-08-13 RX ORDER — LIDOCAINE HYDROCHLORIDE 40 MG/ML
SOLUTION TOPICAL PRN
OUTPATIENT
Start: 2025-08-13

## 2025-08-13 RX ORDER — BACITRACIN ZINC AND POLYMYXIN B SULFATE 500; 1000 [USP'U]/G; [USP'U]/G
OINTMENT TOPICAL PRN
OUTPATIENT
Start: 2025-08-13

## 2025-08-13 RX ORDER — SODIUM CHLOR/HYPOCHLOROUS ACID 0.033 %
SOLUTION, IRRIGATION IRRIGATION PRN
OUTPATIENT
Start: 2025-08-13

## 2025-08-13 RX ORDER — LIDOCAINE 40 MG/G
CREAM TOPICAL PRN
Status: DISCONTINUED | OUTPATIENT
Start: 2025-08-13 | End: 2025-08-14 | Stop reason: HOSPADM

## 2025-08-13 RX ORDER — SILVER SULFADIAZINE 10 MG/G
CREAM TOPICAL PRN
OUTPATIENT
Start: 2025-08-13

## 2025-08-13 RX ORDER — LIDOCAINE 40 MG/G
CREAM TOPICAL PRN
OUTPATIENT
Start: 2025-08-13

## 2025-08-13 RX ORDER — CLOBETASOL PROPIONATE 0.5 MG/G
OINTMENT TOPICAL PRN
OUTPATIENT
Start: 2025-08-13

## 2025-08-13 RX ORDER — MUPIROCIN 2 %
OINTMENT (GRAM) TOPICAL PRN
OUTPATIENT
Start: 2025-08-13

## 2025-08-13 RX ORDER — GENTAMICIN SULFATE 1 MG/G
OINTMENT TOPICAL PRN
OUTPATIENT
Start: 2025-08-13

## 2025-08-13 RX ORDER — TRIAMCINOLONE ACETONIDE 1 MG/G
OINTMENT TOPICAL PRN
OUTPATIENT
Start: 2025-08-13

## 2025-08-13 RX ORDER — GINSENG 100 MG
CAPSULE ORAL PRN
OUTPATIENT
Start: 2025-08-13

## 2025-08-13 RX ORDER — LIDOCAINE 50 MG/G
OINTMENT TOPICAL PRN
OUTPATIENT
Start: 2025-08-13

## 2025-08-13 RX ADMIN — LIDOCAINE: 40 CREAM TOPICAL at 09:54

## 2025-08-19 ENCOUNTER — TELEPHONE (OUTPATIENT)
Dept: VASCULAR SURGERY | Age: 65
End: 2025-08-19

## 2025-08-22 PROBLEM — T82.858A BYPASS GRAFT STENOSIS: Status: ACTIVE | Noted: 2025-08-22

## 2025-08-27 ENCOUNTER — TELEPHONE (OUTPATIENT)
Dept: WOUND CARE | Age: 65
End: 2025-08-27

## 2025-09-03 ENCOUNTER — HOSPITAL ENCOUNTER (OUTPATIENT)
Age: 65
Setting detail: OBSERVATION
Discharge: HOME | End: 2025-09-04
Attending: SURGERY | Admitting: SURGERY
Payer: COMMERCIAL

## 2025-09-03 DIAGNOSIS — I73.9 PVD (PERIPHERAL VASCULAR DISEASE): ICD-10-CM

## 2025-09-03 LAB
ALBUMIN SERPL-MCNC: 4.4 G/DL (ref 3.4–5)
ALBUMIN/GLOB SERPL: 1.3 {RATIO} (ref 1.1–2.2)
ALP SERPL-CCNC: 95 U/L (ref 40–129)
ALT SERPL-CCNC: 9 U/L (ref 10–40)
ANION GAP SERPL CALCULATED.3IONS-SCNC: 11 MMOL/L (ref 3–16)
AST SERPL-CCNC: 20 U/L (ref 15–37)
BILIRUB SERPL-MCNC: 1.3 MG/DL (ref 0–1)
BUN SERPL-MCNC: 6 MG/DL (ref 7–20)
CALCIUM SERPL-MCNC: 10.4 MG/DL (ref 8.3–10.6)
CHLORIDE SERPL-SCNC: 106 MMOL/L (ref 99–110)
CO2 SERPL-SCNC: 25 MMOL/L (ref 21–32)
CREAT SERPL-MCNC: 0.8 MG/DL (ref 0.8–1.3)
DEPRECATED RDW RBC AUTO: 13.8 % (ref 12.4–15.4)
GFR SERPLBLD CREATININE-BSD FMLA CKD-EPI: >90 ML/MIN/{1.73_M2}
GLUCOSE SERPL-MCNC: 98 MG/DL (ref 70–99)
HCT VFR BLD AUTO: 39.6 % (ref 40.5–52.5)
HGB BLD-MCNC: 12.8 G/DL (ref 13.5–17.5)
INR PPP: 1.26 (ref 0.86–1.14)
MCH RBC QN AUTO: 27.2 PG (ref 26–34)
MCHC RBC AUTO-ENTMCNC: 32.3 G/DL (ref 31–36)
MCV RBC AUTO: 84.2 FL (ref 80–100)
PLATELET # BLD AUTO: 278 K/UL (ref 135–450)
PMV BLD AUTO: 7.3 FL (ref 5–10.5)
POTASSIUM SERPL-SCNC: 3.6 MMOL/L (ref 3.5–5.1)
PROT SERPL-MCNC: 7.9 G/DL (ref 6.4–8.2)
PROTHROMBIN TIME: 16.1 SEC (ref 12.1–14.9)
RBC # BLD AUTO: 4.7 M/UL (ref 4.2–5.9)
SODIUM SERPL-SCNC: 142 MMOL/L (ref 136–145)
WBC # BLD AUTO: 4.2 K/UL (ref 4–11)

## 2025-09-03 PROCEDURE — 2500000003 HC RX 250 WO HCPCS

## 2025-09-03 PROCEDURE — 6370000000 HC RX 637 (ALT 250 FOR IP)

## 2025-09-03 PROCEDURE — 85610 PROTHROMBIN TIME: CPT

## 2025-09-03 PROCEDURE — 2580000003 HC RX 258

## 2025-09-03 PROCEDURE — 85027 COMPLETE CBC AUTOMATED: CPT

## 2025-09-03 PROCEDURE — 80053 COMPREHEN METABOLIC PANEL: CPT

## 2025-09-03 PROCEDURE — G0378 HOSPITAL OBSERVATION PER HR: HCPCS

## 2025-09-03 RX ORDER — LOPERAMIDE HYDROCHLORIDE 2 MG/1
2 CAPSULE ORAL 4 TIMES DAILY PRN
Status: DISCONTINUED | OUTPATIENT
Start: 2025-09-03 | End: 2025-09-04 | Stop reason: HOSPADM

## 2025-09-03 RX ORDER — DIPHENHYDRAMINE HYDROCHLORIDE, ZINC ACETATE 2; .1 G/100G; G/100G
1 CREAM TOPICAL 3 TIMES DAILY PRN
Status: DISCONTINUED | OUTPATIENT
Start: 2025-09-03 | End: 2025-09-04 | Stop reason: HOSPADM

## 2025-09-03 RX ORDER — SODIUM CHLORIDE 9 MG/ML
INJECTION, SOLUTION INTRAVENOUS PRN
Status: DISCONTINUED | OUTPATIENT
Start: 2025-09-03 | End: 2025-09-04 | Stop reason: HOSPADM

## 2025-09-03 RX ORDER — SODIUM CHLORIDE, SODIUM LACTATE, POTASSIUM CHLORIDE, CALCIUM CHLORIDE 600; 310; 30; 20 MG/100ML; MG/100ML; MG/100ML; MG/100ML
INJECTION, SOLUTION INTRAVENOUS CONTINUOUS
Status: ACTIVE | OUTPATIENT
Start: 2025-09-04 | End: 2025-09-04

## 2025-09-03 RX ORDER — DOCUSATE SODIUM 100 MG/1
100 CAPSULE, LIQUID FILLED ORAL 2 TIMES DAILY PRN
Status: DISCONTINUED | OUTPATIENT
Start: 2025-09-03 | End: 2025-09-04 | Stop reason: HOSPADM

## 2025-09-03 RX ORDER — SODIUM CHLORIDE 0.9 % (FLUSH) 0.9 %
5-40 SYRINGE (ML) INJECTION EVERY 12 HOURS SCHEDULED
Status: DISCONTINUED | OUTPATIENT
Start: 2025-09-03 | End: 2025-09-04 | Stop reason: HOSPADM

## 2025-09-03 RX ORDER — GABAPENTIN 300 MG/1
300 CAPSULE ORAL NIGHTLY
Status: DISCONTINUED | OUTPATIENT
Start: 2025-09-03 | End: 2025-09-04 | Stop reason: HOSPADM

## 2025-09-03 RX ORDER — ACETAMINOPHEN 500 MG
500 TABLET ORAL EVERY 6 HOURS
Status: DISCONTINUED | OUTPATIENT
Start: 2025-09-03 | End: 2025-09-04 | Stop reason: HOSPADM

## 2025-09-03 RX ORDER — ONDANSETRON 2 MG/ML
4 INJECTION INTRAMUSCULAR; INTRAVENOUS EVERY 6 HOURS PRN
Status: DISCONTINUED | OUTPATIENT
Start: 2025-09-03 | End: 2025-09-04 | Stop reason: HOSPADM

## 2025-09-03 RX ORDER — OXYCODONE HYDROCHLORIDE 5 MG/1
5 TABLET ORAL EVERY 6 HOURS PRN
Refills: 0 | Status: DISCONTINUED | OUTPATIENT
Start: 2025-09-03 | End: 2025-09-04 | Stop reason: HOSPADM

## 2025-09-03 RX ORDER — ENOXAPARIN SODIUM 100 MG/ML
40 INJECTION SUBCUTANEOUS DAILY
Status: DISCONTINUED | OUTPATIENT
Start: 2025-09-03 | End: 2025-09-04 | Stop reason: HOSPADM

## 2025-09-03 RX ORDER — ENOXAPARIN SODIUM 100 MG/ML
30 INJECTION SUBCUTANEOUS DAILY
Status: DISCONTINUED | OUTPATIENT
Start: 2025-09-03 | End: 2025-09-03 | Stop reason: DRUGHIGH

## 2025-09-03 RX ORDER — OXYCODONE HYDROCHLORIDE 5 MG/1
10 TABLET ORAL EVERY 6 HOURS PRN
Refills: 0 | Status: DISCONTINUED | OUTPATIENT
Start: 2025-09-03 | End: 2025-09-04 | Stop reason: HOSPADM

## 2025-09-03 RX ORDER — METHOCARBAMOL 500 MG/1
500 TABLET, FILM COATED ORAL 4 TIMES DAILY
Status: DISCONTINUED | OUTPATIENT
Start: 2025-09-03 | End: 2025-09-04 | Stop reason: HOSPADM

## 2025-09-03 RX ORDER — ONDANSETRON 4 MG/1
4 TABLET, ORALLY DISINTEGRATING ORAL EVERY 8 HOURS PRN
Status: DISCONTINUED | OUTPATIENT
Start: 2025-09-03 | End: 2025-09-04 | Stop reason: HOSPADM

## 2025-09-03 RX ORDER — SODIUM CHLORIDE 0.9 % (FLUSH) 0.9 %
5-40 SYRINGE (ML) INJECTION PRN
Status: DISCONTINUED | OUTPATIENT
Start: 2025-09-03 | End: 2025-09-04 | Stop reason: HOSPADM

## 2025-09-03 RX ADMIN — SODIUM CHLORIDE, SODIUM LACTATE, POTASSIUM CHLORIDE, AND CALCIUM CHLORIDE: .6; .31; .03; .02 INJECTION, SOLUTION INTRAVENOUS at 23:54

## 2025-09-03 RX ADMIN — OXYCODONE HYDROCHLORIDE 5 MG: 5 TABLET ORAL at 23:49

## 2025-09-03 RX ADMIN — METHOCARBAMOL 500 MG: 500 TABLET ORAL at 20:56

## 2025-09-03 RX ADMIN — SODIUM CHLORIDE, PRESERVATIVE FREE 10 ML: 5 INJECTION INTRAVENOUS at 20:57

## 2025-09-03 RX ADMIN — ACETAMINOPHEN 500 MG: 500 TABLET ORAL at 20:56

## 2025-09-03 RX ADMIN — GABAPENTIN 300 MG: 300 CAPSULE ORAL at 20:56

## 2025-09-03 ASSESSMENT — PAIN DESCRIPTION - DESCRIPTORS: DESCRIPTORS: ACHING

## 2025-09-03 ASSESSMENT — PAIN DESCRIPTION - ORIENTATION: ORIENTATION: RIGHT;LEFT

## 2025-09-03 ASSESSMENT — PAIN SCALES - GENERAL: PAINLEVEL_OUTOF10: 7

## 2025-09-03 ASSESSMENT — PAIN - FUNCTIONAL ASSESSMENT
PAIN_FUNCTIONAL_ASSESSMENT: ACTIVITIES ARE NOT PREVENTED
PAIN_FUNCTIONAL_ASSESSMENT: 0-10

## 2025-09-03 ASSESSMENT — PAIN DESCRIPTION - LOCATION: LOCATION: LEG

## 2025-09-04 VITALS
BODY MASS INDEX: 17.12 KG/M2 | RESPIRATION RATE: 12 BRPM | WEIGHT: 148 LBS | DIASTOLIC BLOOD PRESSURE: 73 MMHG | OXYGEN SATURATION: 100 % | SYSTOLIC BLOOD PRESSURE: 146 MMHG | TEMPERATURE: 98.1 F | HEART RATE: 58 BPM | HEIGHT: 78 IN

## 2025-09-04 LAB
ABO/RH: NORMAL
ALBUMIN SERPL-MCNC: 3.6 G/DL (ref 3.4–5)
ANION GAP SERPL CALCULATED.3IONS-SCNC: 10 MMOL/L (ref 3–16)
ANTIBODY SCREEN: NORMAL
BASOPHILS # BLD: 0 K/UL (ref 0–0.2)
BASOPHILS NFR BLD: 0 %
BUN SERPL-MCNC: 10 MG/DL (ref 7–20)
CALCIUM SERPL-MCNC: 9.6 MG/DL (ref 8.3–10.6)
CHLORIDE SERPL-SCNC: 107 MMOL/L (ref 99–110)
CO2 SERPL-SCNC: 24 MMOL/L (ref 21–32)
CREAT SERPL-MCNC: 0.7 MG/DL (ref 0.8–1.3)
DEPRECATED RDW RBC AUTO: 13.7 % (ref 12.4–15.4)
EOSINOPHIL # BLD: 0 K/UL (ref 0–0.6)
EOSINOPHIL NFR BLD: 1 %
GFR SERPLBLD CREATININE-BSD FMLA CKD-EPI: >90 ML/MIN/{1.73_M2}
GLUCOSE SERPL-MCNC: 98 MG/DL (ref 70–99)
HCT VFR BLD AUTO: 34 % (ref 40.5–52.5)
HGB BLD-MCNC: 11.1 G/DL (ref 13.5–17.5)
INR PPP: 1.12 (ref 0.86–1.14)
LYMPHOCYTES # BLD: 2.9 K/UL (ref 1–5.1)
LYMPHOCYTES NFR BLD: 71 %
MAGNESIUM SERPL-MCNC: 1.93 MG/DL (ref 1.8–2.4)
MCH RBC QN AUTO: 27.7 PG (ref 26–34)
MCHC RBC AUTO-ENTMCNC: 32.6 G/DL (ref 31–36)
MCV RBC AUTO: 84.9 FL (ref 80–100)
MONOCYTES # BLD: 0.2 K/UL (ref 0–1.3)
MONOCYTES NFR BLD: 6 %
NEUTROPHILS # BLD: 0.9 K/UL (ref 1.7–7.7)
NEUTROPHILS NFR BLD: 22 %
PHOSPHATE SERPL-MCNC: 3.8 MG/DL (ref 2.5–4.9)
PLATELET # BLD AUTO: 252 K/UL (ref 135–450)
PMV BLD AUTO: 7.2 FL (ref 5–10.5)
POTASSIUM SERPL-SCNC: 3.7 MMOL/L (ref 3.5–5.1)
PROTHROMBIN TIME: 14.6 SEC (ref 12.1–14.9)
RBC # BLD AUTO: 4 M/UL (ref 4.2–5.9)
RBC MORPH BLD: NORMAL
SODIUM SERPL-SCNC: 141 MMOL/L (ref 136–145)
WBC # BLD AUTO: 4.1 K/UL (ref 4–11)

## 2025-09-04 PROCEDURE — 85025 COMPLETE CBC W/AUTO DIFF WBC: CPT

## 2025-09-04 PROCEDURE — 36415 COLL VENOUS BLD VENIPUNCTURE: CPT

## 2025-09-04 PROCEDURE — 6370000000 HC RX 637 (ALT 250 FOR IP)

## 2025-09-04 PROCEDURE — C1769 GUIDE WIRE: HCPCS | Performed by: SURGERY

## 2025-09-04 PROCEDURE — G0378 HOSPITAL OBSERVATION PER HR: HCPCS

## 2025-09-04 PROCEDURE — 75710 ARTERY X-RAYS ARM/LEG: CPT | Performed by: SURGERY

## 2025-09-04 PROCEDURE — 37228 HC TIB PER TERRITORY PLASTY: CPT | Performed by: SURGERY

## 2025-09-04 PROCEDURE — 85610 PROTHROMBIN TIME: CPT

## 2025-09-04 PROCEDURE — 7100000011 HC PHASE II RECOVERY - ADDTL 15 MIN: Performed by: SURGERY

## 2025-09-04 PROCEDURE — 2500000003 HC RX 250 WO HCPCS

## 2025-09-04 PROCEDURE — 75774 ARTERY X-RAY EACH VESSEL: CPT | Performed by: SURGERY

## 2025-09-04 PROCEDURE — 86901 BLOOD TYPING SEROLOGIC RH(D): CPT

## 2025-09-04 PROCEDURE — 99153 MOD SED SAME PHYS/QHP EA: CPT | Performed by: SURGERY

## 2025-09-04 PROCEDURE — C1887 CATHETER, GUIDING: HCPCS | Performed by: SURGERY

## 2025-09-04 PROCEDURE — 6360000002 HC RX W HCPCS: Performed by: SURGERY

## 2025-09-04 PROCEDURE — C1725 CATH, TRANSLUMIN NON-LASER: HCPCS | Performed by: SURGERY

## 2025-09-04 PROCEDURE — C1894 INTRO/SHEATH, NON-LASER: HCPCS | Performed by: SURGERY

## 2025-09-04 PROCEDURE — 7100000010 HC PHASE II RECOVERY - FIRST 15 MIN: Performed by: SURGERY

## 2025-09-04 PROCEDURE — 99152 MOD SED SAME PHYS/QHP 5/>YRS: CPT | Performed by: SURGERY

## 2025-09-04 PROCEDURE — 2709999900 HC NON-CHARGEABLE SUPPLY: Performed by: SURGERY

## 2025-09-04 PROCEDURE — 86850 RBC ANTIBODY SCREEN: CPT

## 2025-09-04 PROCEDURE — C1760 CLOSURE DEV, VASC: HCPCS | Performed by: SURGERY

## 2025-09-04 PROCEDURE — 6360000004 HC RX CONTRAST MEDICATION: Performed by: SURGERY

## 2025-09-04 PROCEDURE — 80069 RENAL FUNCTION PANEL: CPT

## 2025-09-04 PROCEDURE — 86900 BLOOD TYPING SEROLOGIC ABO: CPT

## 2025-09-04 PROCEDURE — 83735 ASSAY OF MAGNESIUM: CPT

## 2025-09-04 RX ORDER — DIPHENHYDRAMINE HYDROCHLORIDE 50 MG/ML
INJECTION, SOLUTION INTRAMUSCULAR; INTRAVENOUS PRN
Status: DISCONTINUED | OUTPATIENT
Start: 2025-09-04 | End: 2025-09-04 | Stop reason: HOSPADM

## 2025-09-04 RX ORDER — MIDAZOLAM 1 MG/ML
INJECTION INTRAMUSCULAR; INTRAVENOUS PRN
Status: DISCONTINUED | OUTPATIENT
Start: 2025-09-04 | End: 2025-09-04 | Stop reason: HOSPADM

## 2025-09-04 RX ORDER — HEPARIN SODIUM 1000 [USP'U]/ML
INJECTION, SOLUTION INTRAVENOUS; SUBCUTANEOUS PRN
Status: DISCONTINUED | OUTPATIENT
Start: 2025-09-04 | End: 2025-09-04 | Stop reason: HOSPADM

## 2025-09-04 RX ORDER — LIDOCAINE HYDROCHLORIDE 10 MG/ML
INJECTION, SOLUTION EPIDURAL; INFILTRATION; INTRACAUDAL; PERINEURAL PRN
Status: DISCONTINUED | OUTPATIENT
Start: 2025-09-04 | End: 2025-09-04 | Stop reason: HOSPADM

## 2025-09-04 RX ORDER — IOPAMIDOL 612 MG/ML
INJECTION, SOLUTION INTRAVASCULAR PRN
Status: DISCONTINUED | OUTPATIENT
Start: 2025-09-04 | End: 2025-09-04 | Stop reason: HOSPADM

## 2025-09-04 RX ADMIN — ACETAMINOPHEN 500 MG: 500 TABLET ORAL at 14:35

## 2025-09-04 RX ADMIN — SODIUM CHLORIDE, PRESERVATIVE FREE 10 ML: 5 INJECTION INTRAVENOUS at 13:29

## 2025-09-04 RX ADMIN — OXYCODONE HYDROCHLORIDE 5 MG: 5 TABLET ORAL at 11:26

## 2025-09-04 RX ADMIN — ACETAMINOPHEN 500 MG: 500 TABLET ORAL at 06:29

## 2025-09-04 RX ADMIN — METHOCARBAMOL 500 MG: 500 TABLET ORAL at 10:36

## 2025-09-04 RX ADMIN — METHOCARBAMOL 500 MG: 500 TABLET ORAL at 14:35

## 2025-09-04 RX ADMIN — ACETAMINOPHEN 500 MG: 500 TABLET ORAL at 00:25

## 2025-09-04 ASSESSMENT — PAIN DESCRIPTION - LOCATION: LOCATION: BACK

## 2025-09-04 ASSESSMENT — PAIN SCALES - GENERAL
PAINLEVEL_OUTOF10: 0
PAINLEVEL_OUTOF10: 4

## 2025-09-04 ASSESSMENT — PAIN DESCRIPTION - DESCRIPTORS: DESCRIPTORS: ACHING;NAGGING

## 2025-09-04 ASSESSMENT — PAIN - FUNCTIONAL ASSESSMENT
PAIN_FUNCTIONAL_ASSESSMENT: 0-10

## 2025-09-04 ASSESSMENT — PAIN DESCRIPTION - ORIENTATION: ORIENTATION: LOWER;MID

## 2025-09-07 LAB — ECHO BSA: 1.92 M2

## (undated) DEVICE — HANDPIECE SUCTION TUBING INTERPULSE 10FT

## (undated) DEVICE — SUTURE PERMAHAND SZ 2-0 L18IN NONABSORBABLE BLK L26MM SH C012D

## (undated) DEVICE — DRESSING,GAUZE,XEROFORM,CURAD,5"X9",ST: Brand: CURAD

## (undated) DEVICE — HIGH FLOW TIP

## (undated) DEVICE — NEPTUNE E-SEP SMOKE EVACUATION PENCIL, COATED, 70MM BLADE, PUSH BUTTON SWITCH: Brand: NEPTUNE E-SEP

## (undated) DEVICE — GENERAL: Brand: MEDLINE INDUSTRIES, INC.

## (undated) DEVICE — CLIP SM RED INTERN HMOCLP TITAN LIGATING

## (undated) DEVICE — YANKAUER,OPEN TIP,W/O VENT,STERILE: Brand: MEDLINE INDUSTRIES, INC.

## (undated) DEVICE — Device: Brand: MEDICAL ACTION INDUSTRIES

## (undated) DEVICE — OLCOTT TORQUE DEVICE: Brand: OLCOTT

## (undated) DEVICE — TRAP FLUID

## (undated) DEVICE — NAVICROSS SUPPORT CATHETER: Brand: NAVICROSS

## (undated) DEVICE — Device

## (undated) DEVICE — 6 ML SYRINGE LUER-LOCK TIP: Brand: MONOJECT

## (undated) DEVICE — SUTURE PERMAHAND SZ 3-0 L18IN NONABSORBABLE BLK L26MM SH C013D

## (undated) DEVICE — SUTURE PROL SZ 6-0 L24IN NONABSORBABLE BLU L9.3MM BV-1 3/8 8805H

## (undated) DEVICE — PERCLOSE™ PROSTYLE™ SUTURE-MEDIATED CLOSURE AND REPAIR SYSTEM: Brand: PERCLOSE™ PROSTYLE™

## (undated) DEVICE — RUNTHROUGH NS IZANAI PTCA GUIDEWIRE: Brand: RUNTHROUGH

## (undated) DEVICE — GUIDEWIRE VASC L150CM DIA0.035IN STR TIP PTFE FIX COR

## (undated) DEVICE — ST FLUFF LG 1 PLY: Brand: DEROYAL

## (undated) DEVICE — RADIFOCUS GLIDECATH: Brand: GLIDECATH

## (undated) DEVICE — SOL IRR SOD CHL 0.9% TITAN XL CNTNR 3000ML

## (undated) DEVICE — FOGARTY ARTERIAL EMBOLECTOMY CATHETER 3F 80CM: Brand: FOGARTY

## (undated) DEVICE — SUTURE ETHILON SZ 3-0 L18IN NONABSORBABLE BLK PS-2 L19MM 3/8 1669H

## (undated) DEVICE — SHEET,DRAPE,53X77,STERILE: Brand: MEDLINE

## (undated) DEVICE — SUTURE NONABSORBABLE MONOFILAMENT 4-0 RB-1 36 IN BLU PROLENE 8557H

## (undated) DEVICE — TOWEL,STOP FLAG GOLD N-W: Brand: MEDLINE

## (undated) DEVICE — HI-TORQUE WHISPER ES GUIDE WIRE .014 STRAIGHT TIP 3.0 CM X 300 CM: Brand: HI-TORQUE WHISPER

## (undated) DEVICE — RADIFOCUS GLIDEWIRE ADVANTAGE GUIDEWIRE: Brand: GLIDEWIRE ADVANTAGE

## (undated) DEVICE — APPLICATOR VISTASEAL DUAL

## (undated) DEVICE — 1.5MM HYDRO LEMAITRE VALVULOTOME (98 CM): Brand: HYDRO LEMAITRE VALVULOTOME

## (undated) DEVICE — PODIATRY: Brand: MEDLINE INDUSTRIES, INC.

## (undated) DEVICE — AGENT HEMSTAT 3GM OXIDIZED REGENERATED CELOS ABSRB FOR CONT (ORDER MULTIPLES OF 5EA)

## (undated) DEVICE — STRIP,CLOSURE,WOUND,MEDI-STRIP,1/2X4: Brand: MEDLINE

## (undated) DEVICE — COUNTER NDL 40 COUNT HLD 70 NUM FOAM BLK SGL MAG W BLDE REMV

## (undated) DEVICE — SUTURE ETHILON SZ 2-0 L30IN NONABSORBABLE BLK L36MM PSLX 3/8 1697H

## (undated) DEVICE — DECANTER BAG 9": Brand: MEDLINE INDUSTRIES, INC.

## (undated) DEVICE — GARMENT,MEDLINE,DVT,INT,CALF,MED, GEN2: Brand: MEDLINE

## (undated) DEVICE — NEEDLE HYPO 16GA L1.5IN PUR POLYPR HUB S STL REG BVL STR

## (undated) DEVICE — FOAM BUMP, LARGE: Brand: MEDLINE INDUSTRIES, INC.

## (undated) DEVICE — SUTURE ABSORBABLE MONOFILAMENT 4-0 P3 18 IN UD PDS + PDP494G

## (undated) DEVICE — DRESSING HYDROFIBER AQUACEL AG ADVANTAGE 3.5X14 IN

## (undated) DEVICE — SUTURE PERMAHAND SZ 2-0 L30IN NONABSORBABLE BLK SILK W/O A305H

## (undated) DEVICE — 1LYRTR 16FR10ML100%SILTMPS SNP: Brand: MEDLINE INDUSTRIES, INC.

## (undated) DEVICE — PINNACLE INTRODUCER SHEATH: Brand: PINNACLE

## (undated) DEVICE — DUAL CUT SAGITTAL BLADE

## (undated) DEVICE — SPLINT CAST W5XL30IN RL FRM WRINKLE FREE INTLOK PERFRMANCE

## (undated) DEVICE — SPONGE,LAP,18"X18",DLX,XR,ST,5/PK,40/PK: Brand: MEDLINE

## (undated) DEVICE — ZIMMER® STERILE DISPOSABLE TOURNIQUET CUFF WITH PLC, DUAL PORT, DUAL BLADDER, 18 IN. (46 CM)

## (undated) DEVICE — SUTURE NONABSORBABLE MONOFILAMENT 6-0 BV-1 1X30 IN PROLENE 8709H

## (undated) DEVICE — SUTURE VICRYL + SZ 3-0 L27IN ABSRB UD CT-2 L26MM 1/2 CIR TAPR VCP232H

## (undated) DEVICE — BLADE,CARBON-STEEL,10,STRL,DISPOSABLE,TB: Brand: MEDLINE

## (undated) DEVICE — SYRINGE WITH HYPODERMIC SAFETY NEEDLE: Brand: MAGELLAN

## (undated) DEVICE — SUTURE MONOCRYL + ABSORBABLE MONOFILAMENT 4-0 PS2 27 IN UD SXMP1B119

## (undated) DEVICE — GLOVE SURG SZ 6 L11.2IN FNGR THK9.8MIL STRW LTX POLYMER

## (undated) DEVICE — SOLUTION IV 500 ML 0.9 NACL INJ EXCEL CONTAINER USP LF

## (undated) DEVICE — SPONGE GZ W4XL4IN COT 12 PLY TYP VII WVN C FLD DSGN STERILE

## (undated) DEVICE — FOGARTY ARTERIAL EMBOLECTOMY CATHETER 3F 40CM: Brand: FOGARTY

## (undated) DEVICE — DRESSING FOAM SELF ADH 10X10 CM ABSORBENT MEPILEX BORDER FLX

## (undated) DEVICE — 3M™ STERI-DRAPE™ ISOLATION BAG, 10 PER CARTON / 4 CARTONS PER CASE, 1003: Brand: 3M™ STERI-DRAPE™

## (undated) DEVICE — DRAPE SURG W82XL124IN SMS INTVENT FEM ANGIO PCH POLY CIR FEN

## (undated) DEVICE — TOWEL,OR,DSP,ST,BLUE,DLX,8/PK,10PK/CS: Brand: MEDLINE

## (undated) DEVICE — BLANKET WRM W29.9XL79.1IN UP BODY FORC AIR MISTRAL-AIR

## (undated) DEVICE — ZIMMER® STERILE DISPOSABLE TOURNIQUET CUFF WITH PLC, DUAL PORT, SINGLE BLADDER, 18 IN. (46 CM)

## (undated) DEVICE — PROVE COVER: Brand: UNBRANDED

## (undated) DEVICE — CATHETER ANGIO 5FR L65CM 0.038IN VIS OMNI FLUSH-0 SFT TIP

## (undated) DEVICE — ADHESIVE SKIN CLOSURE WND 8.661X1.5 IN 22 CM LIQUIBAND SECUR

## (undated) DEVICE — INTENDED USE FOR SURGICAL MARKING ON INTACT SKIN, ALSO PROVIDES A PERMANENT METHOD OF IDENTIFYING OBJECTS IN THE OPERATING ROOM: Brand: WRITESITE® PLUS MINI PREP RESISTANT MARKER

## (undated) DEVICE — MICRO SAGITTAL BLADE (9.4 X 0.4 X 26.2MM)

## (undated) DEVICE — SUTURE MONOCRYL + SZ 4-0 L27IN ABSRB UD L19MM PS-2 3/8 CIR MCP426H

## (undated) DEVICE — AGENT HEMOSTATIC SURG ORIGINAL ABS 2X14IN LOOSE KNIT 12/CA

## (undated) DEVICE — SCANLAN® SURG-I-LOOP® SILICONE LOOPS - RED MINI, 1.5X.88 MM, 16"/40 CM LONG (2/PKG): Brand: SCANLAN® SURG-I-LOOP® SILICONE LOOPS

## (undated) DEVICE — GUIDEWIRE VASC L260CM DIA0.035IN TIP L7CM PTFE S STL STR

## (undated) DEVICE — SUTURE ETHILON SZ 3-0 L18IN NONABSORBABLE BLK L24MM FS-1 3/8 663G

## (undated) DEVICE — BANDAGE COBAN 4 IN COMPR W4INXL5YD FOAM COHESIVE QUIK STK SELF ADH SFT

## (undated) DEVICE — SYRINGE MEDICAL 3ML CLEAR PLASTIC STANDARD NON CONTROL LUERLOCK TIP DISPOSABLE

## (undated) DEVICE — SUTURE NONABSORBABLE MONOFILAMENT 7-0 BV-1 1X24 IN PROLENE 8702H

## (undated) DEVICE — SUTURE VICRYL + SZ 4-0 L27IN ABSRB UD L26MM SH 1/2 CIR VCP415H

## (undated) DEVICE — BANDAGE COMPR M W6INXL10YD WHT BGE VELC E MTRX HK AND LOOP

## (undated) DEVICE — RADIFOCUS GLIDEWIRE: Brand: GLIDEWIRE

## (undated) DEVICE — BLADE ES ELASTOMERIC COAT INSUL DURABLE BEND UPTO 90DEG

## (undated) DEVICE — SWAB SPEC COLLCTN DBL W/O CHAR CULTURESWAB +

## (undated) DEVICE — DISH PETRI ST LF

## (undated) DEVICE — BANDAGE,ELASTIC,SOFTWRAP,STERILE,4"X5YD: Brand: MEDLINE

## (undated) DEVICE — EP VASCULAR PACK: Brand: MEDLINE INDUSTRIES, INC.

## (undated) DEVICE — SOLUTION IRRIG 1000ML 09% SOD CHL USP PIC PLAS CONTAINER

## (undated) DEVICE — SYRINGE MED 10ML LUERLOCK TIP W/O SFTY DISP

## (undated) DEVICE — STAPLER SKIN H3.9MM WIRE DIA0.58MM CRWN 6.9MM 35 STPL ROT

## (undated) DEVICE — SUTURE VICRYL + SZ 3-0 L27IN ABSRB UD L26MM SH 1/2 CIR VCP416H

## (undated) DEVICE — SUTURE VICRYL + SZ 3-0 L36IN ABSRB UD L36MM CT-1 1/2 CIR VCP944H

## (undated) DEVICE — CXI SUPPORT CATHETER: Brand: CXI

## (undated) DEVICE — COVER LT HNDL BLU PLAS

## (undated) DEVICE — GOWN,SIRUS,POLYRNF,BRTHSLV,XL,30/CS: Brand: MEDLINE

## (undated) DEVICE — GEL US 20GM NONIRRITATING OVERWRAPPED FILE PCH TRNSMIT

## (undated) DEVICE — GLOVE ORANGE PI 8   MSG9080

## (undated) DEVICE — BANDAGE E SELF CLSR 6INX5YD VELC SWIFTWRAP

## (undated) DEVICE — GUIDEWIRE WITH ICE™ HYDROPHILIC COATING: Brand: V-14™ CONTROL WIRE™

## (undated) DEVICE — LOOP,VESSEL,MAXI,BLUE,2/PK,STERILE: Brand: MEDLINE

## (undated) DEVICE — BENTSON WIRE GUIDE 20CM DISTAL FLEXIBILITY WITH SOFTENED TIP: Brand: BENTSON

## (undated) DEVICE — SYRINGE MED 20ML STD CLR PLAS LUERLOCK TIP N CTRL DISP

## (undated) DEVICE — PAD,NON-ADHERENT,3X8,STERILE,LF,1/PK: Brand: CURAD

## (undated) DEVICE — GOWN,SIRUS,POLYRNF,BRTHSLV,LG,30/CS: Brand: MEDLINE

## (undated) DEVICE — DESTINATION RENAL GUIDING SHEATH: Brand: DESTINATION

## (undated) DEVICE — PADDING,UNDERCAST,COTTON, 4"X4YD STERILE: Brand: MEDLINE

## (undated) DEVICE — GLIDESHEATH SLENDER STAINLESS STEEL KIT: Brand: GLIDESHEATH SLENDER

## (undated) DEVICE — BANDAGE,GAUZE,BULKEE II,4.5"X4.1YD,STRL: Brand: MEDLINE

## (undated) DEVICE — SUTURE VICRYL + SZ 2-0 L27IN ABSRB CLR CT-1 1/2 CIR TAPERCUT VCP259H

## (undated) DEVICE — AIR SHEET,LAT,COMFORT GLIDE, BLEND 40X80: Brand: MEDLINE

## (undated) DEVICE — CLIP LIG M BLU TI HRT SHP WIRE HORZ 24 CLIPS/PK 25PK/CA

## (undated) DEVICE — PACK,UNIVERSAL,NO GOWNS: Brand: MEDLINE

## (undated) DEVICE — GUIDEWIRE VASC L260CM DIA0.035IN L7CM DIA3MM J TIP PTFE S

## (undated) DEVICE — SUTURE PERMAHAND SZ 4-0 L12X30IN NONABSORBABLE BLK SILK A303H

## (undated) DEVICE — 3M™ COBAN™ NL STERILE NON-LATEX SELF-ADHERENT WRAP, 2084S, 4 IN X 5 YD (10 CM X 4,5 M), 18 ROLLS/CASE: Brand: 3M™ COBAN™

## (undated) DEVICE — SCANLAN® SUTURE BOOT™ INSTRUMENT JAW COVERS - ORIGINAL YELLOW, STANDARD PKG (5 PAIR/CARTRIDGE, 1 CARTRIDGE/PKG): Brand: SCANLAN® SUTURE BOOT™ INSTRUMENT JAW COVERS

## (undated) DEVICE — SUTURE VICRYL + SZ 2-0 L18IN ABSRB UD CT1 L36MM 1/2 CIR VCP839D

## (undated) DEVICE — LIQUIBAND RAPID ADHESIVE 36/CS 0.8ML: Brand: MEDLINE

## (undated) DEVICE — DRESSING,GAUZE,XEROFORM,CURAD,1"X8",ST: Brand: CURAD

## (undated) DEVICE — SUTURE PERMAHAND SZ 3-0 L30IN NONABSORBABLE BLK SILK BRAID A304H

## (undated) DEVICE — 2108 SERIES SAGITTAL BLADE FAN, OFFSET  (29.0 X 0.89 X 73.0MM)

## (undated) DEVICE — 4-PORT MANIFOLD: Brand: NEPTUNE 2

## (undated) DEVICE — BLANKET WARMING L 2010 X W 760 MM UPPER BODY 2 HOSE INLET

## (undated) DEVICE — SOLUTION SURG PREP 26 CC PURPREP

## (undated) DEVICE — SOLUTION IV 1000ML 0.9% SOD CHL

## (undated) DEVICE — GLOVE SURG SZ 65 L12IN FNGR THK79MIL GRN LTX FREE

## (undated) DEVICE — PROTECTOR ULN NRV PUR FOAM HK LOOP STRP ANATOMICALLY